# Patient Record
Sex: MALE | Race: WHITE | NOT HISPANIC OR LATINO | Employment: FULL TIME | URBAN - METROPOLITAN AREA
[De-identification: names, ages, dates, MRNs, and addresses within clinical notes are randomized per-mention and may not be internally consistent; named-entity substitution may affect disease eponyms.]

---

## 2017-01-06 ENCOUNTER — APPOINTMENT (OUTPATIENT)
Dept: LAB | Age: 62
End: 2017-01-06
Payer: COMMERCIAL

## 2017-01-06 ENCOUNTER — TRANSCRIBE ORDERS (OUTPATIENT)
Dept: ADMINISTRATIVE | Age: 62
End: 2017-01-06

## 2017-01-06 DIAGNOSIS — R73.9 HYPERGLYCEMIA: ICD-10-CM

## 2017-01-06 LAB
EST. AVERAGE GLUCOSE BLD GHB EST-MCNC: 134 MG/DL
GLUCOSE P FAST SERPL-MCNC: 110 MG/DL (ref 65–99)
HBA1C MFR BLD: 6.3 % (ref 4.2–6.3)

## 2017-01-06 PROCEDURE — 83036 HEMOGLOBIN GLYCOSYLATED A1C: CPT

## 2017-01-06 PROCEDURE — 82947 ASSAY GLUCOSE BLOOD QUANT: CPT

## 2017-01-06 PROCEDURE — 36415 COLL VENOUS BLD VENIPUNCTURE: CPT

## 2017-01-10 ENCOUNTER — ALLSCRIPTS OFFICE VISIT (OUTPATIENT)
Dept: OTHER | Facility: OTHER | Age: 62
End: 2017-01-10

## 2017-01-10 DIAGNOSIS — E11.9 TYPE 2 DIABETES MELLITUS WITHOUT COMPLICATIONS (HCC): ICD-10-CM

## 2017-05-06 ENCOUNTER — TRANSCRIBE ORDERS (OUTPATIENT)
Dept: ADMINISTRATIVE | Age: 62
End: 2017-05-06

## 2017-05-06 ENCOUNTER — APPOINTMENT (OUTPATIENT)
Dept: LAB | Age: 62
End: 2017-05-06
Payer: COMMERCIAL

## 2017-05-06 DIAGNOSIS — E11.9 TYPE 2 DIABETES MELLITUS WITHOUT COMPLICATIONS (HCC): ICD-10-CM

## 2017-05-06 LAB
ANION GAP SERPL CALCULATED.3IONS-SCNC: 5 MMOL/L (ref 4–13)
BUN SERPL-MCNC: 20 MG/DL (ref 5–25)
CALCIUM SERPL-MCNC: 8.9 MG/DL (ref 8.3–10.1)
CHLORIDE SERPL-SCNC: 103 MMOL/L (ref 100–108)
CO2 SERPL-SCNC: 31 MMOL/L (ref 21–32)
CREAT SERPL-MCNC: 0.85 MG/DL (ref 0.6–1.3)
EST. AVERAGE GLUCOSE BLD GHB EST-MCNC: 143 MG/DL
GFR SERPL CREATININE-BSD FRML MDRD: >60 ML/MIN/1.73SQ M
GLUCOSE P FAST SERPL-MCNC: 121 MG/DL (ref 65–99)
HBA1C MFR BLD: 6.6 % (ref 4.2–6.3)
POTASSIUM SERPL-SCNC: 4.1 MMOL/L (ref 3.5–5.3)
SODIUM SERPL-SCNC: 139 MMOL/L (ref 136–145)

## 2017-05-06 PROCEDURE — 83036 HEMOGLOBIN GLYCOSYLATED A1C: CPT

## 2017-05-06 PROCEDURE — 36415 COLL VENOUS BLD VENIPUNCTURE: CPT

## 2017-05-06 PROCEDURE — 80048 BASIC METABOLIC PNL TOTAL CA: CPT

## 2017-05-09 ENCOUNTER — ALLSCRIPTS OFFICE VISIT (OUTPATIENT)
Dept: OTHER | Facility: OTHER | Age: 62
End: 2017-05-09

## 2017-05-09 DIAGNOSIS — Z12.5 ENCOUNTER FOR SCREENING FOR MALIGNANT NEOPLASM OF PROSTATE: ICD-10-CM

## 2017-05-09 DIAGNOSIS — E11.9 TYPE 2 DIABETES MELLITUS WITHOUT COMPLICATIONS (HCC): ICD-10-CM

## 2017-05-09 DIAGNOSIS — I10 ESSENTIAL (PRIMARY) HYPERTENSION: ICD-10-CM

## 2017-06-17 ENCOUNTER — TRANSCRIBE ORDERS (OUTPATIENT)
Dept: ADMINISTRATIVE | Age: 62
End: 2017-06-17

## 2017-06-17 ENCOUNTER — APPOINTMENT (OUTPATIENT)
Dept: LAB | Age: 62
End: 2017-06-17
Payer: COMMERCIAL

## 2017-06-17 DIAGNOSIS — Z00.8 HEALTH EXAMINATION IN POPULATION SURVEY: ICD-10-CM

## 2017-06-17 DIAGNOSIS — Z00.8 HEALTH EXAMINATION IN POPULATION SURVEY: Primary | ICD-10-CM

## 2017-06-17 LAB
CHOLEST SERPL-MCNC: 256 MG/DL (ref 50–200)
EST. AVERAGE GLUCOSE BLD GHB EST-MCNC: 140 MG/DL
HBA1C MFR BLD: 6.5 % (ref 4.2–6.3)
HDLC SERPL-MCNC: 55 MG/DL (ref 40–60)
LDLC SERPL CALC-MCNC: 177 MG/DL (ref 0–100)
TRIGL SERPL-MCNC: 118 MG/DL

## 2017-06-17 PROCEDURE — 36415 COLL VENOUS BLD VENIPUNCTURE: CPT

## 2017-06-17 PROCEDURE — 80061 LIPID PANEL: CPT

## 2017-06-17 PROCEDURE — 83036 HEMOGLOBIN GLYCOSYLATED A1C: CPT

## 2017-07-31 ENCOUNTER — ALLSCRIPTS OFFICE VISIT (OUTPATIENT)
Dept: OTHER | Facility: OTHER | Age: 62
End: 2017-07-31

## 2017-08-09 ENCOUNTER — ALLSCRIPTS OFFICE VISIT (OUTPATIENT)
Dept: OTHER | Facility: OTHER | Age: 62
End: 2017-08-09

## 2017-08-09 DIAGNOSIS — M54.42 LOW BACK PAIN WITH LEFT-SIDED SCIATICA: ICD-10-CM

## 2017-08-14 ENCOUNTER — APPOINTMENT (OUTPATIENT)
Dept: PHYSICAL THERAPY | Age: 62
End: 2017-08-14
Payer: COMMERCIAL

## 2017-08-14 DIAGNOSIS — M54.42 LOW BACK PAIN WITH LEFT-SIDED SCIATICA: ICD-10-CM

## 2017-08-14 PROCEDURE — 97162 PT EVAL MOD COMPLEX 30 MIN: CPT

## 2017-08-17 ENCOUNTER — APPOINTMENT (OUTPATIENT)
Dept: PHYSICAL THERAPY | Age: 62
End: 2017-08-17
Payer: COMMERCIAL

## 2017-08-18 ENCOUNTER — GENERIC CONVERSION - ENCOUNTER (OUTPATIENT)
Dept: OTHER | Facility: OTHER | Age: 62
End: 2017-08-18

## 2017-08-22 ENCOUNTER — APPOINTMENT (OUTPATIENT)
Dept: PHYSICAL THERAPY | Age: 62
End: 2017-08-22
Payer: COMMERCIAL

## 2017-08-22 PROCEDURE — 97110 THERAPEUTIC EXERCISES: CPT

## 2017-08-23 ENCOUNTER — APPOINTMENT (OUTPATIENT)
Dept: PHYSICAL THERAPY | Age: 62
End: 2017-08-23
Payer: COMMERCIAL

## 2017-08-23 PROCEDURE — 97110 THERAPEUTIC EXERCISES: CPT

## 2017-08-24 ENCOUNTER — APPOINTMENT (OUTPATIENT)
Dept: PHYSICAL THERAPY | Age: 62
End: 2017-08-24
Payer: COMMERCIAL

## 2017-08-28 ENCOUNTER — APPOINTMENT (OUTPATIENT)
Dept: PHYSICAL THERAPY | Age: 62
End: 2017-08-28
Payer: COMMERCIAL

## 2017-08-28 PROCEDURE — 97110 THERAPEUTIC EXERCISES: CPT

## 2017-08-29 ENCOUNTER — APPOINTMENT (OUTPATIENT)
Dept: LAB | Age: 62
End: 2017-08-29
Payer: COMMERCIAL

## 2017-08-29 ENCOUNTER — ALLSCRIPTS OFFICE VISIT (OUTPATIENT)
Dept: OTHER | Facility: OTHER | Age: 62
End: 2017-08-29

## 2017-08-29 ENCOUNTER — TRANSCRIBE ORDERS (OUTPATIENT)
Dept: ADMINISTRATIVE | Age: 62
End: 2017-08-29

## 2017-08-29 ENCOUNTER — APPOINTMENT (OUTPATIENT)
Dept: PHYSICAL THERAPY | Age: 62
End: 2017-08-29
Payer: COMMERCIAL

## 2017-08-29 DIAGNOSIS — M54.42 ACUTE BACK PAIN WITH SCIATICA, LEFT: Primary | ICD-10-CM

## 2017-08-29 DIAGNOSIS — M54.42 LOW BACK PAIN WITH LEFT-SIDED SCIATICA: ICD-10-CM

## 2017-08-29 LAB
ANION GAP SERPL CALCULATED.3IONS-SCNC: 8 MMOL/L (ref 4–13)
BUN SERPL-MCNC: 24 MG/DL (ref 5–25)
CALCIUM SERPL-MCNC: 9.8 MG/DL (ref 8.3–10.1)
CHLORIDE SERPL-SCNC: 100 MMOL/L (ref 100–108)
CO2 SERPL-SCNC: 28 MMOL/L (ref 21–32)
CREAT SERPL-MCNC: 0.89 MG/DL (ref 0.6–1.3)
GFR SERPL CREATININE-BSD FRML MDRD: 93 ML/MIN/1.73SQ M
GLUCOSE SERPL-MCNC: 170 MG/DL (ref 65–140)
POTASSIUM SERPL-SCNC: 3.6 MMOL/L (ref 3.5–5.3)
SODIUM SERPL-SCNC: 136 MMOL/L (ref 136–145)

## 2017-08-29 PROCEDURE — 80048 BASIC METABOLIC PNL TOTAL CA: CPT

## 2017-08-29 PROCEDURE — 36415 COLL VENOUS BLD VENIPUNCTURE: CPT

## 2017-08-30 ENCOUNTER — APPOINTMENT (OUTPATIENT)
Dept: PHYSICAL THERAPY | Age: 62
End: 2017-08-30
Payer: COMMERCIAL

## 2017-08-30 PROCEDURE — 97110 THERAPEUTIC EXERCISES: CPT

## 2017-08-31 ENCOUNTER — APPOINTMENT (OUTPATIENT)
Dept: PHYSICAL THERAPY | Age: 62
End: 2017-08-31
Payer: COMMERCIAL

## 2017-09-10 ENCOUNTER — HOSPITAL ENCOUNTER (OUTPATIENT)
Dept: RADIOLOGY | Facility: HOSPITAL | Age: 62
Discharge: HOME | End: 2017-09-10
Payer: COMMERCIAL

## 2017-09-10 DIAGNOSIS — M54.42 ACUTE BACK PAIN WITH SCIATICA, LEFT: ICD-10-CM

## 2017-09-10 PROCEDURE — 72148 MRI LUMBAR SPINE W/O DYE: CPT

## 2017-09-21 ENCOUNTER — APPOINTMENT (OUTPATIENT)
Dept: LAB | Age: 62
End: 2017-09-21
Payer: COMMERCIAL

## 2017-09-21 DIAGNOSIS — E11.9 TYPE 2 DIABETES MELLITUS WITHOUT COMPLICATIONS (HCC): ICD-10-CM

## 2017-09-21 DIAGNOSIS — I10 ESSENTIAL (PRIMARY) HYPERTENSION: ICD-10-CM

## 2017-09-21 DIAGNOSIS — Z12.5 ENCOUNTER FOR SCREENING FOR MALIGNANT NEOPLASM OF PROSTATE: ICD-10-CM

## 2017-09-21 LAB
ALBUMIN SERPL BCP-MCNC: 3.7 G/DL (ref 3.5–5)
ALP SERPL-CCNC: 59 U/L (ref 46–116)
ALT SERPL W P-5'-P-CCNC: 27 U/L (ref 12–78)
ANION GAP SERPL CALCULATED.3IONS-SCNC: 6 MMOL/L (ref 4–13)
AST SERPL W P-5'-P-CCNC: 13 U/L (ref 5–45)
BACTERIA UR QL AUTO: ABNORMAL /HPF
BASOPHILS # BLD AUTO: 0.02 THOUSANDS/ΜL (ref 0–0.1)
BASOPHILS NFR BLD AUTO: 0 % (ref 0–1)
BILIRUB SERPL-MCNC: 0.6 MG/DL (ref 0.2–1)
BILIRUB UR QL STRIP: ABNORMAL
BUN SERPL-MCNC: 16 MG/DL (ref 5–25)
CALCIUM SERPL-MCNC: 9.1 MG/DL (ref 8.3–10.1)
CHLORIDE SERPL-SCNC: 104 MMOL/L (ref 100–108)
CHOLEST SERPL-MCNC: 252 MG/DL (ref 50–200)
CLARITY UR: CLEAR
CO2 SERPL-SCNC: 29 MMOL/L (ref 21–32)
COLOR UR: YELLOW
CREAT SERPL-MCNC: 0.83 MG/DL (ref 0.6–1.3)
CREAT UR-MCNC: 286 MG/DL
EOSINOPHIL # BLD AUTO: 0.17 THOUSAND/ΜL (ref 0–0.61)
EOSINOPHIL NFR BLD AUTO: 2 % (ref 0–6)
ERYTHROCYTE [DISTWIDTH] IN BLOOD BY AUTOMATED COUNT: 14.4 % (ref 11.6–15.1)
EST. AVERAGE GLUCOSE BLD GHB EST-MCNC: 143 MG/DL
GFR SERPL CREATININE-BSD FRML MDRD: 96 ML/MIN/1.73SQ M
GLUCOSE P FAST SERPL-MCNC: 118 MG/DL (ref 65–99)
GLUCOSE UR STRIP-MCNC: ABNORMAL MG/DL
HBA1C MFR BLD: 6.6 % (ref 4.2–6.3)
HCT VFR BLD AUTO: 46.6 % (ref 36.5–49.3)
HDLC SERPL-MCNC: 61 MG/DL (ref 40–60)
HGB BLD-MCNC: 16.6 G/DL (ref 12–17)
HGB UR QL STRIP.AUTO: NEGATIVE
HYALINE CASTS #/AREA URNS LPF: ABNORMAL /LPF
KETONES UR STRIP-MCNC: NEGATIVE MG/DL
LDLC SERPL CALC-MCNC: 160 MG/DL (ref 0–100)
LEUKOCYTE ESTERASE UR QL STRIP: NEGATIVE
LYMPHOCYTES # BLD AUTO: 1.97 THOUSANDS/ΜL (ref 0.6–4.47)
LYMPHOCYTES NFR BLD AUTO: 27 % (ref 14–44)
MCH RBC QN AUTO: 29 PG (ref 26.8–34.3)
MCHC RBC AUTO-ENTMCNC: 35.6 G/DL (ref 31.4–37.4)
MCV RBC AUTO: 82 FL (ref 82–98)
MICROALBUMIN UR-MCNC: 47.5 MG/L (ref 0–20)
MICROALBUMIN/CREAT 24H UR: 17 MG/G CREATININE (ref 0–30)
MONOCYTES # BLD AUTO: 0.55 THOUSAND/ΜL (ref 0.17–1.22)
MONOCYTES NFR BLD AUTO: 8 % (ref 4–12)
NEUTROPHILS # BLD AUTO: 4.6 THOUSANDS/ΜL (ref 1.85–7.62)
NEUTS SEG NFR BLD AUTO: 63 % (ref 43–75)
NITRITE UR QL STRIP: NEGATIVE
NON-SQ EPI CELLS URNS QL MICRO: ABNORMAL /HPF
NRBC BLD AUTO-RTO: 0 /100 WBCS
PH UR STRIP.AUTO: 5.5 [PH] (ref 4.5–8)
PLATELET # BLD AUTO: 192 THOUSANDS/UL (ref 149–390)
PMV BLD AUTO: 10.1 FL (ref 8.9–12.7)
POTASSIUM SERPL-SCNC: 4.1 MMOL/L (ref 3.5–5.3)
PROT SERPL-MCNC: 7.1 G/DL (ref 6.4–8.2)
PROT UR STRIP-MCNC: ABNORMAL MG/DL
PSA SERPL-MCNC: 1.1 NG/ML (ref 0–4)
RBC # BLD AUTO: 5.72 MILLION/UL (ref 3.88–5.62)
RBC #/AREA URNS AUTO: ABNORMAL /HPF
SODIUM SERPL-SCNC: 139 MMOL/L (ref 136–145)
SP GR UR STRIP.AUTO: 1.04 (ref 1–1.03)
TRIGL SERPL-MCNC: 157 MG/DL
UROBILINOGEN UR QL STRIP.AUTO: 0.2 E.U./DL
WBC # BLD AUTO: 7.35 THOUSAND/UL (ref 4.31–10.16)
WBC #/AREA URNS AUTO: ABNORMAL /HPF

## 2017-09-21 PROCEDURE — 80053 COMPREHEN METABOLIC PANEL: CPT

## 2017-09-21 PROCEDURE — 83036 HEMOGLOBIN GLYCOSYLATED A1C: CPT

## 2017-09-21 PROCEDURE — 36415 COLL VENOUS BLD VENIPUNCTURE: CPT

## 2017-09-21 PROCEDURE — G0103 PSA SCREENING: HCPCS

## 2017-09-21 PROCEDURE — 82570 ASSAY OF URINE CREATININE: CPT

## 2017-09-21 PROCEDURE — 82043 UR ALBUMIN QUANTITATIVE: CPT

## 2017-09-21 PROCEDURE — 85025 COMPLETE CBC W/AUTO DIFF WBC: CPT

## 2017-09-21 PROCEDURE — 81001 URINALYSIS AUTO W/SCOPE: CPT

## 2017-09-21 PROCEDURE — 80061 LIPID PANEL: CPT

## 2017-09-28 ENCOUNTER — ALLSCRIPTS OFFICE VISIT (OUTPATIENT)
Dept: OTHER | Facility: OTHER | Age: 62
End: 2017-09-28

## 2017-09-28 DIAGNOSIS — E11.9 TYPE 2 DIABETES MELLITUS WITHOUT COMPLICATIONS (HCC): ICD-10-CM

## 2017-10-23 ENCOUNTER — GENERIC CONVERSION - ENCOUNTER (OUTPATIENT)
Dept: OTHER | Facility: OTHER | Age: 62
End: 2017-10-23

## 2017-10-24 NOTE — CONSULTS
Assessment  Assessed    1  Lumbar radiculopathy (724 4) (M54 16)   2  Myofascial pain (729 1) (M79 1)    Plan  Lumbago    · Procedure Flowsheet; Status:Complete - Retrospective Authorization;   Done: [id number]  01:30PM   Performed: In Office; Due:61Yjt0763; Last Updated By:Alberta Miramontes; 8/29/2017 1:30:42 PM;Ordered; For:Lumbago; Ordered By:Allison Kitchen Ours;    74-year-old male with a history of diabetes complicated with neuropathy presented for initial consultation regarding a 2 month history of lumbosacral back pain and radiculopathy into the S1 distribution of the left lower extremity  The patient does have an MRI scheduled which was ordered by his primary care physician, however this has not been done yet  The patient has been doing in physical therapy without any relief  He does take gabapentin 1-200 mg daily when necessary, however does not take this regularly and has not taken this for a few weeks  He is currently taking his second course of oral steroids and tramadol 50 mg every 6-8 hours when necessary with moderate relief  #1 we will await the results of the MRI of his lumbar spine  #2 I advised the patient to initiate gabapentin 100 mg daily at bedtime and titrate up to 300 mg daily at bedtime  I discussed with the patient that he needs to take this medication consistently due to the rapid clearance of the medication in order for him to notice any appreciable effect  Occasionally we will need to titrate up to 600 mg every 8 hours when necessary before we reach optimal therapeutic doses  The patient verbalized understanding and he was given a titration schedule at this visit  The patient states that he has enough of this medication at home and does not need a refill at this time    #3 the patient may continue tramadol as ordered by his primary care physician and I advised the patient to minimize the use of this medication to prevent tolerance and side effects  #4 the patient should finish out his course of oral steroids and avoid any other NSAIDs while on this medication  #5 once the patient has finished his oral steroids he can resume ibuprofen and should not exceed 800 mg every 8 hours when necessary  #6 the patient will continue with physical therapy and I have provided the patient a sheet regarding Stephen based exercises to hand to his physical therapist to see if these exercises may be of benefit for his radicular symptoms as the exercises the patient was explained to me sound more like lumbar strengthening exercises  #7 the patient may benefit from epidural steroid injections, however we will await the results of the imaging  #8 I will follow-up with the patient in 8 weeks, however I discussed with the patient that I will call him with the results of the MRI of the lumbar spine and offer interventional therapy based upon those results  If the patient would like to proceed with interventional therapy we will schedule a follow-up visit for 4-6 weeks after an injection     Chief Complaint  Chief Complaints    1  Back Pain  Low back and left leg pain      History of Present Illness  82-year-old male presenting for initial consultation regarding a two-month history of lumbosacral back pain that radiates down the posterior aspect of his left lower extremity to the ankle and an S1 distribution  The patient denies any numbness, paresthesias, or subjective weakness  The patient denies any trauma or inciting event, however the patient recently started yoga and feels this may be somewhat related  He denies any right lower stomach symptoms, bladder or bowel incontinence, or saddle anesthesia  The patient also has a history of diabetic neuropathy for which she takes gabapentin 1-200 mg when necessary, however he has not taken this in weeks  He is currently taking tramadol 50 mg every 6-8 hours when necessary and he is on his second round of oral steroids  The oral steroids and tramadol do provide him some relief   He was also taking ibuprofen prior to the steroids with minimal relief  The patient does have an MRI scheduled which was ordered by his primary care physician  He is involved in physical therapy which did not provide much relief  patient rates his pain a 7-9 out of 10 depending upon what he is doing  The pain is constant and does not follow any particular pattern throughout the day  The pain is described as shooting, sharp, dull, aching, and throbbing  The pain is decreased with standing  The pain is increased with bending at the waist, sitting, coughing/sneezing, and bowel movements  The patient has not gotten any relief from physical therapy or heat or ice application  have personally reviewed and/or updated the patient's past medical history, past surgical history, family history, social history, allergies, and vital signs today  Other than as stated above, the patient denies any interval changes in medications, medical condition, mental condition, symptoms, or allergies since the last office visit  Referring physician is  [facility]   Primary Care physician is  Glenroy Winters presents with complaints of constant episodes of moderate left lower back pain, described as sharp, dull, aching and throbbing, radiating to the left buttock, left thigh, left lower leg and left foot  On a scale of 1 to 10, the patient rates the pain as 8  Review of Systems    Constitutional: recent weight loss, but-- no fever-- and-- no recent weight gain  Eyes: no double vision-- and-- no blurry vision  Cardiovascular: no chest pain,-- no palpitations-- and-- no lower extremity edema  Respiratory: no complaints of shortness of breath-- and-- no wheezing  Musculoskeletal: pain in extremity -- and-- decreased range of motion, but-- no difficulty walking,-- no muscle weakness,-- no joint stiffness,-- no joint swelling-- and-- no limb swelling     Neurological: no dizziness,-- no difficulty swallowing,-- no memory loss,-- no loss of consciousness-- and-- no seizures  Gastrointestinal: no nausea,-- no vomiting,-- no constipation-- and-- no diarrhea  Genitourinary: no difficulty initiating urine stream,-- no genital pain-- and-- no frequent urination  Integumentary: no complaints of skin rash  Psychiatric: no depression  Endocrine: excessive thirst, but-- no adrenal disease,-- no hypothyroidism-- and-- no hyperthyroidism  Hematologic/Lymphatic: no tendency for easy bruising-- and-- no tendency for easy bleeding  ROS reviewed  Active Problems  Problems    1  Adjustment disorder with mixed anxiety and depressed mood (309 28) (F43 23)   2  Atopic dermatitis (691 8) (L20 9)   3  Chronic diastolic HF (heart failure) (428 32) (I50 32)   4  Diabetes (250 00) (E11 9)   5  Dyspnea on exertion (786 09) (R06 09)   6  Encounter for PPD test (V74 1) (Z11 1)   7  Hyperglycemia (790 29) (R73 9)   8  Hyperlipidemia (272 4) (E78 5)   9  Hypertension (401 9) (I10)   10  Lumbago (724 2) (M54 5)   11  Lumbago with sciatica, left side (724 3) (M54 42)   12  Morbid or severe obesity due to excess calories (278 01) (E66 01)   13  Need for influenza vaccination (V04 81) (Z23)   14  Neuropathy (355 9) (G62 9)   15  Obesity (278 00) (E66 9)   16  Otitis externa (380 10) (H60 90)   17  Pelvic pain in male (789 09) (R10 2)   18  Prostate cancer screening (V76 44) (Z12 5)   19  Shortness of breath (786 05) (R06 02)    Family History  Mother    1  No pertinent family history    Social History  Problems    · Never A Smoker    Current Meds   1  Marsha Contour Next Test In [employer]; TEST ONCE DAILY; Therapy: 25HID1534 to (Last Jolene Rue)  Requested for: 26Oct2016 Ordered   2  Marsha Microlet Lancets Miscellaneous; ONE  EVERY DAY; Therapy: 75XRK3397 to (Last Rx:13Oct2016) Ordered   3  Gabapentin 100 MG Oral Capsule; 2 capsules 3 times a day; Therapy: 94Ppu1801 to (Last Rx:13Oct2016)  Requested for: 13Oct2016 Ordered   4   Jardiance 10 MG Oral Tablet; Take 1 tablet daily; Therapy: 50XLD5039 to (Evaluate:42Cox4086)  Requested for: 59KEN5022; Last   Rx:13Jun2017 Ordered   5  Medrol 4 MG Oral Tablet Therapy Pack; Take as per package instructions; Therapy: 97HON4759 to (Last Peppereladia Pratt)  Requested for: 73Grs0690 Ordered   6  TraMADol HCl - 50 MG Oral Tablet; Take 1 tablet every 6 hours as needed for pain; Therapy: 10SAK2372 to (Last Rx:58Pge7321) Ordered    Allergies  Medication    1  No Known Drug Allergies  Non-Medication    2  Seasonal    Vitals  Vital Signs    Recorded: 29Aug2017 01:26PM   Temperature 65 4 F   Systolic 420   Diastolic 88   Height 6 ft 3 in   Weight 282 lb    BMI Calculated 35 25   BSA Calculated 2 54   Pain Scale 8     Physical Exam    Constitutional   General appearance: Well developed, well nourished, alert, in no distress, non-toxic and no overt pain behavior  Eyes   Sclera: anicteric   HEENT   Hearing grossly intact  Neck   Neck: Supple, symmetric, trachea midline, no masses  Pulmonary   Respiratory effort: Even and unlabored  Abdomen   Abdomen: Soft, non-tender, non-distended  Skin   Skin and subcutaneous tissue: Normal without rashes or lesions, well hydrated  Psychiatric   Mood and affect: Mood and affect appropriate  Neurologic   the muscle tone was normal   Musculoskeletal   Gait and station: Abnormal  -- Antalgic gait  Lumbar/Sacral Spine examination demonstrates Lumbosacral Spine:   Appearance: Normal    Tenderness: right paraspinal-- and-- left paraspinal  Palpatory Findings include bilateral muscle spasms  Lumbosacral Spine Sensory: intact to light touch and pinprick in the lower extremities  ROM Lumbosacral Spine: Full except as noted: Flexion was restricted-- and-- was painful  Extension was restricted-- and-- was painful  Left lateral flexion was restricted-- and-- was painful  Right lateral flexion was restricted  ROM Hips: Full   Foot and ankle strength was normal bilaterally  Knee strength was normal bilaterally  Hip strength was normal bilaterally  Evaluation of Muscle Stretch Reflexes on the right side demonstrates muscle stretch reflexes equal and symmetric right lower limbs-- and-- negative right ankle clonus  Evaluation of Muscle Stretch Reflexes on the left side demonstrates negative left ankle clonus, but-- muscle stretch reflexes equal and symmetric right lower limbs  Special Tests: equivocal Straight Leg Raise on left, but-- negative Straight Leg Raise on right,-- negative Satish's Maneuver on right,-- negative Satish's Maneuver on lef,-- negative Gaenslen's on the left-- and-- negative Gaenslen's Test on the left  Results/Data  Procedure Flowsheet [id number] 01:30PM Tala Arreola     Test Name Result Flag Reference   Oswestry Score 40         Results    I personally reviewed the films/images in the office today  * Sacrum & Coccyx Xray 2 Views [id number] 02:36PM Steff Pearce     Test Name Result Flag Reference   XR Sacrum/Coccyx 2 V (Report)     [facility];[address] [facility];;[place];[address]  07/17/2014 1445  07/17/2014 1450  3 VIEWS    SACRUM AND COCCYX    INDICATION- Sacral/coccygeal pain  COMPARISON- CT pelvis 12/15/2013    VIEWS- 3& 3 images3 images    FINDINGS-    There is no evidence of fracture  Sacral arcuate lines are maintained  EThe SI joints appear symmetric  Pubic symphysis maintained  IMPRESSION-    No fracture          Transcribed on- [address], RAD DO  Reading RadiologistMARTIR Ray DO  Releasing Radiologist- MARTIR Tang DO  Released Date Time- 07/17/14 6289  ------------------------------------------------------------------------------  [id number] [facility]  68103Q [facility]     Future Appointments    Date/Time Provider Specialty Site   09/28/2017 05:30 PM Steff Pearce DO Internal Medicine [facility] INTERNAL MED     Signatures   Electronically signed by : Audrey Ragsdale DO; Aug 29 2017  2:15PM EST                       (Author)

## 2017-11-01 ENCOUNTER — ALLSCRIPTS OFFICE VISIT (OUTPATIENT)
Dept: RADIOLOGY | Facility: CLINIC | Age: 62
End: 2017-11-01
Payer: COMMERCIAL

## 2017-12-27 ENCOUNTER — HOSPITAL ENCOUNTER (OUTPATIENT)
Dept: RADIOLOGY | Facility: CLINIC | Age: 62
Discharge: HOME | End: 2017-12-29
Attending: ANESTHESIOLOGY
Payer: COMMERCIAL

## 2018-01-13 VITALS
WEIGHT: 282 LBS | SYSTOLIC BLOOD PRESSURE: 146 MMHG | TEMPERATURE: 97.6 F | DIASTOLIC BLOOD PRESSURE: 88 MMHG | HEIGHT: 75 IN | BODY MASS INDEX: 35.06 KG/M2

## 2018-01-13 VITALS
DIASTOLIC BLOOD PRESSURE: 110 MMHG | WEIGHT: 294.25 LBS | HEIGHT: 75 IN | OXYGEN SATURATION: 97 % | HEART RATE: 75 BPM | SYSTOLIC BLOOD PRESSURE: 166 MMHG | BODY MASS INDEX: 36.59 KG/M2 | TEMPERATURE: 96.7 F

## 2018-01-14 VITALS
OXYGEN SATURATION: 97 % | TEMPERATURE: 97.7 F | HEIGHT: 75 IN | WEIGHT: 294.13 LBS | SYSTOLIC BLOOD PRESSURE: 140 MMHG | BODY MASS INDEX: 36.57 KG/M2 | DIASTOLIC BLOOD PRESSURE: 86 MMHG | HEART RATE: 79 BPM

## 2018-01-14 NOTE — PROGRESS NOTES
Assessment    1  Diabetes (250 00) (E11 9)   2  Hypertension (401 9) (I10)   3  Obesity (278 00) (E66 9)   4  Hyperlipidemia (272 4) (E78 5)    Plan  Diabetes    · 1 [facility] NUTRITION COUNSELING [facility] OUTPATIENT REFERRAL MNT  Physician Referral  Consult  Status: Need Information - Financial  Authorization  Requested for: [id number]  Hyperlipidemia    · Follow-up visit in 1 month Evaluation and Treatment  Follow-up  Status: Hold For -  Scheduling  Requested for: [id number]  Hypertension    · AmLODIPine Besylate 2 5 MG Oral Tablet (Norvasc)  Need for influenza vaccination    · Fluzone Quadrivalent Intramuscular Suspension  Neuropathy    · Gabapentin 100 MG Oral Capsule    Discussion/Summary  Impression: health maintenance visit  Currently, he eats a poor diet and has an inadequate exercise regimen  Prostate cancer screening: the risks and benefits of prostate cancer screening were discussed and prostate cancer screening is current  Testicular cancer screening: the risks and benefits of testicular cancer screening were discussed and testicular cancer screening is current  Colorectal cancer screening: the risks and benefits of colorectal cancer screening were discussed and colorectal cancer screening is current  The risks and benefits of immunizations were discussed, immunizations are needed and patient declines immunizations  He was advised to be evaluated by an ophthalmologist  Advice and education were given regarding nutrition, aerobic exercise, weight bearing exercise, weight loss, sunscreen use and advanced directive planning  Patient discussion: discussed with the patient  #1  Diabetes mellitus type 2  This is a new diagnosis and patient sugar is out of control with a hemoglobin A1c of 7 2  As noted patient's been placed on a medication that may have benefits with reducing his blood sugar, reducing his blood pressure and also helping him with his weight   He will return to the office in approximately one month for reevaluation  At that point in time we hope to introduce the patient to using a glucometer to check on his blood sugar readings  He also hopefully will of cleated and evaluation by attrition is will help us to work on diet, weight loss  Patient understands and agrees with present treatment  If patient's blood pressure still elevated with his next visit we will place him on an ACE inhibitor to help control blood pressure and this should also have benefits with his diabetes  He was told again today the importance of routine follow-up controlling his blood pressure, sugar, weight, cholesterol  #2  Hypertension  As noted patient's blood pressure is elevated and again the hopes are with the newer medication we will see improvement in his blood pressure but treatment will be initiated if this is not improved significantly  #3  Exogenous obesity  I did reinforce to the patient again the importance of losing weight area patient states he does not watch what he eats gets a little aerobic exercise  I had mentioned the patient in the past that he would benefit from bariatric surgery but he is not interested  #4  Hyperlipidemia  Patient did not have a lipid profile performed and we will give him a slip with his next visit to have this initiated  #5  History of noncompliance  Patient has a history in the past of not keeping appointments and stopping medication on his own without consultation with our office  We reinforced the importance of follow-up visits and keeping his multiple problems under control  Possible side effects of new medications were reviewed with the patient/guardian today  The patient was counseled regarding diagnostic results, instructions for management, risk factor reductions, prognosis, patient and family education, impressions, risks and benefits of treatment options, importance of compliance with treatment        Chief Complaint  Patient is here today for a physical      Advance Directives  Advance Directive [facility]:   NO - Patient does not have an advance health care directive  Capacity/Competence: This patient has full decision making capacity for discussion of advance care planning and This patient has full decision making competency for discussion of advance care planning  Summary of Advance Directive Conversation  We discussed with the patient going online to get information about a living will and designating [address] for healthcare  Patient states he will investigate this  History of Present Illness  HM, Adult Male: The patient is being seen for a health maintenance evaluation  The last health maintenance visit was One year year(s) ago  Social History: Household members include spouse and son(s)  He is   Work status: working full time  The patient has never smoked cigarettes  He reports rare alcohol use  General Health: The patient's health since the last visit is described as poor  He has regular dental visits  He denies vision problems  He denies hearing loss  Immunizations status: not up to date  Lifestyle:  He does not have a healthy diet  He has weight concerns  He does not exercise regularly  He does not use tobacco  He consumes alcohol  He denies drug use  Reproductive health:  the patient is sexually active  birth control is not being practiced  He complains of erectile dysfunction  Screening: cancer screening reviewed and updated  metabolic screening reviewed and updated  risk screening reviewed and updated  HPI: Patient is a 61-year-old male with a history of multiple medical problems including hypertension, hyperlipidemia, hyperglycemia, history of erectile dysfunction, anxiety disorder, obesity, peripheral neuropathy  Patient is here today for general physical  Patient has missed interim appointments to be seen in the office for evaluation of his Kaylynn   Patient also has stopped all his blood pressure medication on his own stating he didn't feel well on them  Patient did have complete labs prior to being seen today and of significance is the fact that he is now considered a diabetic with a hemoglobin A1c of 7 2  Fasting blood glucose was 141  Patient states as previously that is not following his diet or watching his weight or getting any regular exercise  His other lab tests were all essentially  Patient states he has had some increased urination at nighttime  We discussed the importance again with the patient of following directions given to him by our office, getting routine labs on a regular basis  We also discussed with him the importance of diet, weight loss, exercise  We reinforced with the patient today the fact that he does have a diagnosis of diabetes and that will be very important to treat this in order to get his sugars under control  After long discussion with the patient in the hopes of showing better control of his blood pressure, sugar, promote weight loss the patient was placed on Jardiance at 10 mg a day  I did explain the mechanism of action of the medication and that it will promote the dumping of sugar in his urine and will promote better blood pressure control, weight loss and hopefully better control of his blood sugars  The hope start by placing him on this one medication initially this will have beneficial effects with his multiple medical problems  Patient understands and agrees and I told him that he may notice some lightheadedness and dizziness initially with the medication because of systems dehydration  He was told to keep himself well-hydrated especially in the hotter weather  Patient has no new complaints or problems such as chest pain or shortness of breath   He states his neuropathy may have progressed slightly to his lower extremities area      Review of Systems    Constitutional: recent 9 pound weight gain since last seen lb weight gain, but no fever, not feeling poorly, no chills, not feeling tired and no recent weight loss  Eyes: No complaints of eye pain, no red eyes, no discharge from eyes, no itchy eyes  ENT: no complaints of earache, no hearing loss, no nosebleeds, no nasal discharge, no sore throat, no hoarseness  Cardiovascular: No complaints of slow heart rate, no fast heart rate, no chest pain, no palpitations, no leg claudication, no lower extremity  Respiratory: shortness of breath during exertion and Some chronic shortness of breath with exertion but no increase, but no shortness of breath, no cough, no orthopnea, no wheezing and no PND  Gastrointestinal: No complaints of abdominal pain, no constipation, no nausea or vomiting, no diarrhea or bloody stools  Genitourinary: nocturia, but no dysuria, no urinary hesitancy, no genital lesions, no incontinence and no testicular pain  Musculoskeletal: No complaints of arthralgia, no myalgias, no joint swelling or stiffness, no limb pain or swelling  Integumentary: No complaints of skin rash or skin lesions, no itching, no skin wound, no dry skin  Neurological: numbness and tingling, but as noted in HPI, no headache, no confusion, no dizziness, no limb weakness, no convulsions, no fainting and no difficulty walking  Psychiatric: Is not suicidal, no sleep disturbances, no anxiety or depression, no change in personality, no emotional problems  Endocrine: No complaints of proptosis, no hot flashes, no muscle weakness, no erectile dysfunction, no deepening of the voice, no feelings of weakness  Hematologic/Lymphatic: No complaints of swollen glands, no swollen glands in the neck, does not bleed easily, no easy bruising  Over the past 2 weeks, how often have you been bothered by the following problems? 1 ) Little interest or pleasure in doing things? Not at all    2 ) Feeling down, depressed or hopeless? Not at all    3 ) Trouble falling asleep or sleeping too much? Not at all    4 ) Feeling tired or having little energy?  Not at all    5 ) Poor appetite or overeating? Not at all    6 ) Feeling bad about yourself, or that you are a failure, or have let yourself or your family down? Not at all    7 ) Trouble concentrating on things, such as reading a newspaper or watching television? Not at all    8 ) Moving or speaking so slowly that other people could have noticed, or the opposite, moving or speaking faster than usual? Not at all    9 ) Thoughts that you would be better off dead or of hurting yourself in some way? Not at all  Score 0     ROS reviewed  Active Problems    1  Adjustment disorder with mixed anxiety and depressed mood (309 28) (F43 23)   2  Atopic dermatitis (691 8) (L20 9)   3  Chronic diastolic HF (heart failure) (428 32) (I50 32)   4  Dyspnea on exertion (786 09) (R06 09)   5  Encounter for PPD test (V74 1) (Z11 1)   6  Hyperglycemia (790 29) (R73 9)   7  Hyperlipidemia (272 4) (E78 5)   8  Hypertension (401 9) (I10)   9  Lumbago (724 2) (M54 5)   10  Morbid or severe obesity due to excess calories (278 01) (E66 01)   11  Need for influenza vaccination (V04 81) (Z23)   12  Neuropathy (355 9) (G62 9)   13  Obesity (278 00) (E66 9)   14  Otitis externa (380 10) (H60 90)   15  Pelvic pain in male (789 09) (R10 2)   16  Prostate cancer screening (V76 44) (Z12 5)   17  Shortness of breath (786 05) (R06 02)    Family History  Mother    · No pertinent family history    Social History    · Never A Smoker    Current Meds   1  AmLODIPine Besylate 2 5 MG Oral Tablet; Take 1 tablet daily; Therapy: [id number] to (Barbra Garcia)  Requested for: [id number]; Last   Rx:13Oct2015 Ordered   2  Excedrin TABS; TAKE 1 TABLET 3 TIMES DAILY AS NEEDED; Therapy: (Recorded:44Wyl8439) to Recorded   3  Gabapentin 100 MG Oral Capsule; TAKE 6 CAPSULE Daily; Therapy: [id number] to (Evaluate:83Cwe7021)  Requested for: 46Tvo0422; Last   Rx:20Apr2015 Ordered   4  Jardiance 10 MG Oral Tablet Recorded   5   Tylenol TABS; TAKE 1 TO 2 TABLETS EVERY 6 HOURS AS NEEDED; Therapy: (Recorded:22Ahu5442) to Recorded    Allergies    1  No Known Drug Allergies    Vitals   Recorded: 17YBE5256 04:92RJ   Systolic 922   Diastolic 90   Heart Rate 71   Temperature 97 5 F   O2 Saturation 95   Height 6 ft 3 in   Weight 309 lb 4 00 oz   BMI Calculated 38 65   BSA Calculated 2 65     Physical Exam    Constitutional Obese 73-year-old male who is awake alert no acute distress oriented x3  Head and Face   Head and face: Normal     Palpation of the face and sinuses: No sinus tenderness  Eyes   Conjunctiva and lids: No erythema, swelling or discharge  Pupils and irises: Equal, round, reactive to light  Ophthalmoscopic examination: Normal fundi and optic discs  Ears, Nose, Mouth, and Throat   External inspection of ears and nose: Normal     Otoscopic examination: Tympanic membranes translucent with normal light reflex  Canals patent without erythema  Hearing: Normal     Nasal mucosa, septum, and turbinates: Normal without edema or erythema  Lips, teeth, and gums: Normal, good dentition  Oropharynx: Normal with no erythema, edema, exudate or lesions  Neck   Neck: Supple, symmetric, trachea midline, no masses  Thyroid: Normal, no thyromegaly  Pulmonary   Respiratory effort: No increased work of breathing or signs of respiratory distress  Percussion of chest: Normal     Palpation of chest: Normal     Auscultation of lungs: Clear to auscultation  Cardiovascular   Palpation of heart: Normal PMI, no thrills  Auscultation of heart: Normal rate and rhythm, normal S1 and S2, no murmurs  Carotid pulses: 2+ bilaterally  Abdominal aorta: Normal     Femoral pulses: 2+ bilaterally  Pedal pulses: 2+ bilaterally  Peripheral vascular exam: Normal     Examination of extremities for edema and/or varicosities: Normal     Chest   Breasts: Abnormal   Mild gynecomastia  Palpation of breasts and axillae: Normal, no masses palpated      Chest: Normal  Abdomen   Abdomen: Abnormal   Obese soft nontender with positive bowel sounds x4 quadrants no organomegaly  Liver and spleen: No hepatomegaly or splenomegaly  Examination for hernias: No hernias appreciated  Anus, perineum, and rectum: Normal sphincter tone, no masses, no prolapse  Stool sample for occult blood: Negative  Genitourinary   Scrotal contents: Normal testes, no masses  Penis: Normal, no lesions  Digital rectal exam of prostate: Normal size, no masses  Lymphatic   Palpation of lymph nodes in neck: No lymphadenopathy  Palpation of lymph nodes in axillae: No lymphadenopathy  Palpation of lymph nodes in groin: No lymphadenopathy  Palpation of lymph nodes in other areas: No lymphadenopathy  Musculoskeletal   Gait and station: Normal     Inspection/palpation of digits and nails: Normal without clubbing or cyanosis  Inspection/palpation of joints, bones, and muscles: Normal     Range of motion: Normal     Stability: Normal     Muscle strength/tone: Normal     Skin   Skin and subcutaneous tissue: Normal without rashes or lesions  Palpation of skin and subcutaneous tissue: Normal turgor  Neurologic   Cranial nerves: Cranial nerves 2-12 intact  Cortical function: Normal mental status  Reflexes: 2+ and symmetric  Sensation: No sensory loss  Coordination: Normal finger to nose and heel to shin  Diabetic Foot Exam: Right Foot Findings: normal foot  The toes were normal  The sensory exam showed normal position sense at the level of the toes  Left Foot Findings: normal foot  The toes were normal  The sensory exam showed diminished vibratory sensation at the level of the toes, but normal position sense at the level of the toes  Monofilament Testing: diminished tactile sensation with monofilament testing throughout both feet  Vascular: Pulses: 2+ in the posterior tibialis and 2+ in the dorsalis pedis   Pulses: 2+ in the posterior tibialis and 2+ in the dorsalis pedis  Assign Risk Category: 2: Loss of protective sensation with or without weakness, deformity, callus, pre-ulcer, or history of ulceration  High risk  Psychiatric   Judgment and insight: Normal     Orientation to person, place and time: Normal     Recent and remote memory: Intact      Mood and affect: Normal        Future Appointments    Date/Time Provider Specialty Site   10/13/2016 06:30 PM Nba Huizar DO Internal Medicine [facility] INTERNAL MED     Signatures   Electronically signed by : Rachel Fernandez DO; Sep 14 2016  8:36AM EST                       (Author)

## 2018-01-15 VITALS
DIASTOLIC BLOOD PRESSURE: 86 MMHG | HEART RATE: 63 BPM | HEIGHT: 75 IN | OXYGEN SATURATION: 96 % | WEIGHT: 299.5 LBS | BODY MASS INDEX: 37.24 KG/M2 | SYSTOLIC BLOOD PRESSURE: 134 MMHG | TEMPERATURE: 95.9 F

## 2018-01-15 VITALS
HEART RATE: 81 BPM | SYSTOLIC BLOOD PRESSURE: 142 MMHG | TEMPERATURE: 96.6 F | WEIGHT: 282.25 LBS | BODY MASS INDEX: 35.09 KG/M2 | OXYGEN SATURATION: 98 % | HEIGHT: 75 IN | DIASTOLIC BLOOD PRESSURE: 88 MMHG

## 2018-01-16 NOTE — PROGRESS NOTES
Assessment    1  Diabetes (250 00) (E11 9)   2  Hypertension (401 9) (I10)   3  Hyperlipidemia (272 4) (E78 5)   4  Lumbago (724 2) (M54 5)   5  Morbid or severe obesity due to excess calories (278 01) (E66 01)   6  Encounter for preventive health examination (V70 0) (Z00 00)    Plan  Diabetes    · (1) GLUCOSE,  FASTING; Status:Active; Requested for:[id number];    · (1) HEMOGLOBIN A1C; Status:Active; Requested DVM:[id number];    · *VB - Foot Exam; Status:Active; Requested MGD:[id number]; Health Maintenance    · Follow-up visit in 4 Months Evaluation and Treatment  Follow-up  Status: Hold For -  Scheduling  Requested for: [id number]   · Begin a limited exercise program ; Status:Complete;   Done: 85CZD7755 06:57AM   · Eat a low fat and low cholesterol diet ; Status:Complete;   Done: 96UHG9701 06:57AM   · Some eating tips that can help you lose weight ; Status:Complete;   Done: 13FNX6823  06:57AM  Hypertension    · Lisinopril 5 MG Oral Tablet; Take 1 tablet daily  Need for influenza vaccination    · Fluzone Quadrivalent Intramuscular Suspension    Discussion/Summary  health maintenance visit Currently, he eats a poor diet and has an inadequate exercise regimen  Prostate cancer screening: the risks and benefits of prostate cancer screening were discussed and prostate cancer screening is current  Testicular cancer screening: the risks and benefits of testicular cancer screening were discussed and testicular cancer screening is current  Colorectal cancer screening: the risks and benefits of colorectal cancer screening were discussed, colorectal cancer screening is current, colonoscopy is needed every ten years and fecal occult blood testing is needed every year  The risks and benefits of immunizations were discussed, immunizations are up to date and immunizations will be given as outlined in the orders   Advice and education were given regarding nutrition, aerobic exercise, weight loss, sunscreen use and vitamin D supplements  Patient discussion: discussed with the patient  The patient was counseled on Hepatitis C screening  The patient declines Hepatitis C screening  #1  Diabetes mellitus type 2  Patient's sugars are controlled as treatment and present medication  Again we reinforced to the patient the importance of diet, aerobic exercise, weight loss in order to continue to have control of his blood sugars  Patient has been referred for diabetic eye exam and we discussed the importance of this length  Patient was given a slip to check on a fasting blood sugar, hemoglobin A1c when he returns to the office in 4 months  Because of his high blood pressure patient was also placed on a very low dose of ACE inhibitor specifically lisinopril 5 mg a day order to help with blood pressure and to prevent problems in the future related to diabetic kidney disease  #2  Hypertension  Again as noted patient's blood pressure was mildly elevated and he has had a problem with every medication that we have tried in order to control his blood pressure  Again we are attempting to restart very low dose ACE inhibitor to see if this helps with the blood pressure and also again will help prevent any diabetic kidney disease  Patient was told to call if any ill effects from medication  #3  Hyperlipidemia  As previously patient's cholesterol is high  He's been tried on multiple statins in the past and has had severe side effects from this medication including progression of his peripheral neuropathy severely, diffuse myalgias and arthralgias  Patient also is been tried on fenofibrate which again caused severe problems  We reinforced with the patient the importance of weight loss, watching his diet closely, eliminating fats and cholesterol from his diet, increasing his aerobic activity  #4  Lumbago  Patient is still suffering from some low back pain   Patient will be seen again with pain management and they have been helpful and reducing his disability  The hope start the patient can restart very limited aerobic exercise program in order to help lose weight and work on his deconditioning  #5  Morbid obesity  As above patient was told the importance of losing weight  Because he is been unsuccessful we suggested that he investigate Weight Watchers or another organized program in order to work on weight loss  Patient understands and agrees and hopefully we can see some significant changes with his next visit  #6  Health maintenance  Patient is up-to-date with all parameters except for a diabetic eye exam and he has been referred to an ophthalmologist to perform this  Patient has had routine colonoscopies and his prostatic exam is normal  Patient was be seen again in the office in approximately 4 months for reevaluation and he was told any problems or concerns in between to please call  We did suggest highly the patient received the influenza vaccine for this year  The patient was counseled regarding diagnostic results, instructions for management, risk factor reductions, prognosis, patient and family education, impressions, risks and benefits of treatment options, importance of compliance with treatment  Educational resources provided: Patient was given instructions on watching the fats and cholesterol with diet, tips on weight loss, information on beginning a limited aerobic activity, exercise program    Possible side effects of new medications were reviewed with the patient/guardian today  The treatment plan was reviewed with the patient/guardian  The patient/guardian understands and agrees with the treatment plan      Chief Complaint  Patient is here today for routine physical      History of Present Illness  HM, Adult Male: The patient is being seen for a health maintenance evaluation  The last health maintenance visit was One year year(s) ago  Social History: Household members include spouse, daughter(s) and One son son(s)  He is   Work status: working full time  The patient has never smoked cigarettes  He reports rare alcohol use  The patient has no concerns about alcohol abuse  He has never used illicit drugs  General Health: The patient's health since the last visit is described as fair  He has regular dental visits  He denies vision problems  He denies hearing loss  Immunizations status: up to date  Lifestyle:  He does not have a healthy diet  He has weight concerns  He does not exercise regularly  He does not use tobacco  He consumes alcohol  He denies drug use  Reproductive health:  the patient is sexually active  birth control is not being practiced  He denies erectile dysfunction  Screening: cancer screening reviewed and updated  metabolic screening reviewed and updated  risk screening reviewed and updated  HPI: Patient is a 72-year-old male with a history of hypertension, hyperlipidemia, diabetes mellitus type 2, morbid obesity, peripheral neuropathy which is idiopathic, chronic back pain with lumbar radiculopathy bilaterally now and pain management  Patient is here today for routine physical  He did have labs performed prior to his today and we did discuss the results  Patient's CBC was normal  Patient's blood sugar fasting was 118 hemoglobin A1c of 6 6  His cholesterol was elevated at 252 with an HDL cholesterol 61 and LDL cholesterol 160 and triglyceride level of 157  Patient is intolerant to statin drugs  Patient's PSA is 1 1 and patient with his urinalysis did show and dumping some glucose but this is because of the medication is on for his diabetes  Patient continues with some low back pain but he states it is 80% improved from previously  Patient did undergo an MRI of the lumbar spine which showed some mild disease which was unexpected with the severity of patient's pain and discomfort previously   Patient states that he's had some progression of his peripheral neuropathy which again is idiopathic and not related to his diabetes  He denies any chest pain or pressure and no increasing shortness of breath  He is still struggling with his weight and again we reinforced the importance of losing weight      Review of Systems    Constitutional: no fever, not feeling poorly, no recent weight gain, no chills and not feeling tired  Eyes: No complaints of eye pain, no red eyes, no discharge from eyes, no itchy eyes  ENT: no complaints of earache, no hearing loss, no nosebleeds, no nasal discharge, no sore throat, no hoarseness  Cardiovascular: No complaints of slow heart rate, no fast heart rate, no chest pain, no palpitations, no leg claudication, no lower extremity  Respiratory: No complaints of shortness of breath, no wheezing, no cough, no SOB on exertion, no orthopnea or PND  Gastrointestinal: No complaints of abdominal pain, no constipation, no nausea or vomiting, no diarrhea or bloody stools  Genitourinary: Some increased urination most likely is secondary effect of the steroids, but No complaints of dysuria, no incontinence, no hesitancy, no nocturia, no genital lesion, no testicular pain  Musculoskeletal: arthralgias and joint stiffness, but no limb pain, no myalgias and no limb swelling  Integumentary: Increased sweating, but No complaints of skin rash or skin lesions, no itching, no skin wound, no dry skin  Neurological: numbness and tingling, but as noted in HPI, no headache, no confusion, no dizziness, no limb weakness, no convulsions, no fainting and no difficulty walking  Psychiatric: Is not suicidal, no sleep disturbances, no anxiety or depression, no change in personality, no emotional problems  Endocrine: No complaints of proptosis, no hot flashes, no muscle weakness, no erectile dysfunction, no deepening of the voice, no feelings of weakness  Hematologic/Lymphatic: No complaints of swollen glands, no swollen glands in the neck, does not bleed easily, no easy bruising  ROS reviewed  Active Problems    1  Adjustment disorder with mixed anxiety and depressed mood (309 28) (F43 23)   2  Atopic dermatitis (691 8) (L20 9)   3  Chronic diastolic HF (heart failure) (428 32) (I50 32)   4  Diabetes (250 00) (E11 9)   5  Dyspnea on exertion (786 09) (R06 09)   6  Encounter for PPD test (V74 1) (Z11 1)   7  Hyperglycemia (790 29) (R73 9)   8  Hyperlipidemia (272 4) (E78 5)   9  Hypertension (401 9) (I10)   10  Lumbago (724 2) (M54 5)   11  Lumbago with sciatica, left side (724 3) (M54 42)   12  Lumbar radiculopathy (724 4) (M54 16)   13  Morbid or severe obesity due to excess calories (278 01) (E66 01)   14  Myofascial pain (729 1) (M79 1)   15  Need for influenza vaccination (V04 81) (Z23)   16  Neuropathy (355 9) (G62 9)   17  Obesity (278 00) (E66 9)   18  Otitis externa (380 10) (H60 90)   19  Pelvic pain in male (789 09) (R10 2)   20  Prostate cancer screening (V76 44) (Z12 5)   21  Shortness of breath (786 05) (R06 02)    Family History  Mother    · No pertinent family history    Social History    · Never A Smoker    Current Meds   1  Marsha Contour Next Test In [employer]; TEST ONCE DAILY; Therapy: 79RQN8236 to (Last [facility])  Requested for: 26Oct2016 Ordered   2  Marsha Microlet Lancets Miscellaneous; ONE  EVERY DAY; Therapy: 92YKO6068 to (Last Rx:13Oct2016) Ordered   3  Gabapentin 100 MG Oral Capsule; 2 capsules 3 times a day; Therapy: 78Axf0881 to (Last Rx:11Lhb5791)  Requested for: 13Oct2016 Ordered   4  Jardiance 10 MG Oral Tablet; Take 1 tablet daily; Therapy: 32FQF7777 to (Evaluate:20Wni0009)  Requested for: 35TJP8449; Last   Rx:24Quj0835 Ordered    Allergies    1  No Known Drug Allergies    2   Seasonal    Vitals   Recorded: 11DDY9776 05:18PM   Temperature 96 9 F   Heart Rate 75   Systolic 145   Diastolic 92   Height 6 ft 3 in   Weight 288 lb    BMI Calculated 36   BSA Calculated 2 56   O2 Saturation 95     Physical Exam    Constitutional Obese 79-year-old male who is awake alert no acute distress oriented x3  Head and Face   Head and face: Normal     Palpation of the face and sinuses: No sinus tenderness  Eyes   Conjunctiva and lids: No erythema, swelling or discharge  Pupils and irises: Equal, round, reactive to light  Ophthalmoscopic examination: Normal fundi and optic discs  Ears, Nose, Mouth, and Throat   External inspection of ears and nose: Normal     Otoscopic examination: Tympanic membranes translucent with normal light reflex  Canals patent without erythema  Hearing: Normal     Nasal mucosa, septum, and turbinates: Normal without edema or erythema  Lips, teeth, and gums: Normal, good dentition  Oropharynx: Normal with no erythema, edema, exudate or lesions  Neck   Neck: Supple, symmetric, trachea midline, no masses  Thyroid: Normal, no thyromegaly  Pulmonary   Respiratory effort: No increased work of breathing or signs of respiratory distress  Percussion of chest: Normal     Palpation of chest: Normal     Auscultation of lungs: Clear to auscultation  Cardiovascular   Palpation of heart: Normal PMI, no thrills  Auscultation of heart: Normal rate and rhythm, normal S1 and S2, no murmurs  Carotid pulses: 2+ bilaterally  Abdominal aorta: Normal     Femoral pulses: 2+ bilaterally  Pedal pulses: 2+ bilaterally  Peripheral vascular exam: Normal     Examination of extremities for edema and/or varicosities: Normal     Chest   Breasts: Abnormal   Mild gynecomastia  Palpation of breasts and axillae: Normal, no masses palpated  Chest: Normal     Abdomen   Abdomen: Abnormal   Obese soft nontender with positive bowel sounds x4 quadrants no organomegaly  Liver and spleen: No hepatomegaly or splenomegaly  Examination for hernias: No hernias appreciated  Anus, perineum, and rectum: Normal sphincter tone, no masses, no prolapse  Stool sample for occult blood: Negative      Genitourinary   Scrotal contents: Normal testes, no masses  Penis: Normal, no lesions  Digital rectal exam of prostate: Normal size, no masses  Lymphatic   Palpation of lymph nodes in neck: No lymphadenopathy  Palpation of lymph nodes in axillae: No lymphadenopathy  Palpation of lymph nodes in groin: No lymphadenopathy  Palpation of lymph nodes in other areas: No lymphadenopathy  Musculoskeletal   Gait and station: Abnormal   Wide-base gait but no instability  Inspection/palpation of digits and nails: Normal without clubbing or cyanosis  Inspection/palpation of joints, bones, and muscles: Normal     Range of motion: Normal     Stability: Normal     Muscle strength/tone: Normal     Skin   Skin and subcutaneous tissue: Normal without rashes or lesions  Palpation of skin and subcutaneous tissue: Normal turgor  Neurologic   Cranial nerves: Cranial nerves 2-12 intact  Cortical function: Normal mental status  Reflexes: 2+ and symmetric  Sensation: No sensory loss  Coordination: Normal finger to nose and heel to shin  Diabetic Foot Exam: Socks and shoes removed, Right Foot Findings: normal foot  The toes were normal  The sensory exam showed diminished vibratory sensation at the level of the toes and diminished position sense at the level of the toes  Socks and Shoes removed, Left Foot Findings: normal foot  The toes were normal  The sensory exam showed diminished vibratory sensation at the level of the toes and diminished position sense at the level of the toes  Monofilament Testing:  Vascular: Pulses: 2+ in the posterior tibialis and 2+ in the dorsalis pedis  Pulses: 2+ in the posterior tibialis and 2+ in the dorsalis pedis  Assign Risk Category: 2: Loss of protective sensation with or without weakness, deformity, callus, pre-ulcer, or history of ulceration  High risk  Psychiatric   Judgment and insight: Normal     Orientation to person, place and time: Normal     Recent and remote memory: Intact      Mood and affect: Normal        Future Appointments    Date/Time Provider Specialty Site   02/22/2018 05:30 PM Chris Fletcher DO Internal Medicine [facility] INTERNAL MED   10/23/2017 01:15 PM Mando Maldonado DO Pain Management [facility] 15     Signatures   Electronically signed by : lR Riddle DO; Sep 29 2017  7:04AM EST                       (Author)

## 2018-01-22 VITALS
BODY MASS INDEX: 35.71 KG/M2 | HEART RATE: 66 BPM | TEMPERATURE: 98 F | SYSTOLIC BLOOD PRESSURE: 127 MMHG | HEIGHT: 75 IN | WEIGHT: 287.25 LBS | DIASTOLIC BLOOD PRESSURE: 84 MMHG

## 2018-01-22 VITALS
HEART RATE: 75 BPM | HEIGHT: 75 IN | OXYGEN SATURATION: 95 % | WEIGHT: 288 LBS | SYSTOLIC BLOOD PRESSURE: 158 MMHG | DIASTOLIC BLOOD PRESSURE: 92 MMHG | BODY MASS INDEX: 35.81 KG/M2 | TEMPERATURE: 96.9 F

## 2018-01-26 ENCOUNTER — CLINICAL SUPPORT (OUTPATIENT)
Dept: PAIN MEDICINE | Facility: CLINIC | Age: 63
End: 2018-01-26
Payer: COMMERCIAL

## 2018-01-26 VITALS
TEMPERATURE: 97.9 F | WEIGHT: 292.2 LBS | BODY MASS INDEX: 36.33 KG/M2 | DIASTOLIC BLOOD PRESSURE: 88 MMHG | SYSTOLIC BLOOD PRESSURE: 142 MMHG | HEIGHT: 75 IN | HEART RATE: 85 BPM

## 2018-01-26 DIAGNOSIS — M79.18 MYOFASCIAL PAIN: ICD-10-CM

## 2018-01-26 DIAGNOSIS — M51.26 LUMBAR HERNIATED DISC: ICD-10-CM

## 2018-01-26 DIAGNOSIS — M51.36 LUMBAR DEGENERATIVE DISC DISEASE: ICD-10-CM

## 2018-01-26 DIAGNOSIS — M47.816 LUMBAR SPONDYLOSIS: ICD-10-CM

## 2018-01-26 DIAGNOSIS — M54.16 LUMBAR RADICULOPATHY: Primary | ICD-10-CM

## 2018-01-26 DIAGNOSIS — M54.42 BILATERAL LOW BACK PAIN WITH LEFT-SIDED SCIATICA, UNSPECIFIED CHRONICITY: ICD-10-CM

## 2018-01-26 PROBLEM — M51.369 LUMBAR DEGENERATIVE DISC DISEASE: Status: ACTIVE | Noted: 2017-10-23

## 2018-01-26 PROCEDURE — 99204 OFFICE O/P NEW MOD 45 MIN: CPT | Performed by: ANESTHESIOLOGY

## 2018-01-26 RX ORDER — LISINOPRIL 5 MG/1
1 TABLET ORAL DAILY
COMMUNITY
Start: 2017-09-28 | End: 2018-08-14 | Stop reason: SDUPTHER

## 2018-01-26 RX ORDER — GABAPENTIN 300 MG/1
1 CAPSULE ORAL 3 TIMES DAILY
COMMUNITY
Start: 2016-09-14 | End: 2018-01-26 | Stop reason: SDUPTHER

## 2018-01-26 RX ORDER — GABAPENTIN 300 MG/1
300 CAPSULE ORAL 3 TIMES DAILY
Qty: 90 CAPSULE | Refills: 2 | Status: SHIPPED | OUTPATIENT
Start: 2018-01-26 | End: 2018-02-06

## 2018-01-26 NOTE — PROGRESS NOTES
Assessment:  1  Lumbar radiculopathy    2  Lumbar degenerative disc disease    3  Lumbar herniated disc    4  Lumbar spondylosis    5  Bilateral low back pain with left-sided sciatica, unspecified chronicity    6  Myofascial pain        Plan:  29-year-old male returning for follow-up of lumbosacral back pain with radiculopathy in the L5 distribution of the left lower extremity secondary to disc herniation/extrusion at L4-5 which displaces the left L5 nerve root  The patient is status post left L4 and L5 TFESI with only approximately 5 days of significant relief  He is currently taking gabapentin 300 milligrams b i d  He tried to titrate up to t i d , however was causing too much drowsiness  I did discuss with him that he could consider taking 300 milligrams in the morning and 600 milligrams at bedtime to see if he tolerates this better  The patient was agreeable  At this point, I did discuss with the patient that we could consider repeating the left L4 and L5 TFESI or he could speak to a neurosurgeon regarding surgical options  The patient would like to speak with a neurosurgeon at this time  1   I will refer to Neurosurgery for surgical opinion  2  The patient will increase his gabapentin to 300 milligrams in the morning and 600 milligrams at bedtime to see if this provides better control of his neuropathic symptoms without the sedation  3  The patient may continue with ibuprofen and he should not exceed more than 800 milligrams q 8 hours p r n   4   The patient will continue with his home exercise program      History of Present Illness: The patient is a 66 y o  male who presents for a follow up office visit in regards to Back Pain  The patient is status post Left L4-L5 TFESI, on 12/27/2017  The patient reports 80% pain relief following the procedure  The patients current symptoms include lumbosacral back pain that radiates into the L5 distribution of left lower extremity    The patient does get some subjective weakness, but denies any numbness or paresthesias  He denies any right lower extremity symptoms, bladder or bowel incontinence, or saddle anesthesia  Current pain medications includes:  Gabapentin 300 milligrams b i d  and ibuprofen 200-400 milligrams b i d  p r n     The patient reports that this regimen is providing 30 % pain relief  The patient is reporting sleepiness from this pain medication regimen  I have personally reviewed and/or updated the patient's past medical history, past surgical history, family history, social history, current medications, allergies, and vital signs today  Review of Systems:  Review of Systems   Respiratory: Positive for shortness of breath  Cardiovascular: Negative for chest pain  Gastrointestinal: Negative for constipation, diarrhea, nausea and vomiting  Musculoskeletal: Negative for arthralgias, gait problem (Difficulty walking), joint swelling and myalgias  Skin: Negative for rash  Neurological: Negative for dizziness, seizures and weakness  All other systems reviewed and are negative  Past Medical History:   Diagnosis Date    Diabetes mellitus ([facility] [address] )     Headache     High cholesterol     Hypertension     Kidney stones     Sleep apnea        Past Surgical History:   Procedure Laterality Date    COLONOSCOPY N/A 11/22/2016    Procedure: COLONOSCOPY;  Surgeon: Briana Agrawal MD;  Location: [facility]; Service:     DEBRIDEMENT TENNIS ELBOW  2006    HERNIA REPAIR  [id number]    x2, umbilical and right side per patient   [address]    UVULOPALATOPHARYNGOPLASTY  2004       No family history on file  Social History     Occupational History    Not on file       Social History Main Topics    Smoking status: Never Smoker    Smokeless tobacco: Not on file    Alcohol use Yes      Comment: rarely    Drug use: No    Sexual activity: Not on file         Current Outpatient Prescriptions:   Kalie Melo lancets, by Does not apply route, Disp: , Rfl:     gabapentin (NEURONTIN) 300 mg capsule, Take 1 capsule by mouth 3 (three) times a day, Disp: , Rfl:     glucose blood test strip, by In Vitro route daily, Disp: , Rfl:     lisinopril (ZESTRIL) 5 mg tablet, Take 1 tablet by mouth daily, Disp: , Rfl:     Empagliflozin (JARDIANCE) 10 MG TABS, Take 1 tablet by mouth daily, Disp: , Rfl:     No Known Allergies    Physical Exam:    There were no vitals taken for this visit  Constitutional:normal, well developed, well nourished, alert, in no distress and non-toxic and no overt pain behavior  Eyes:anicteric  HEENT:grossly intact  Neck:supple, symmetric, trachea midline and no masses   Pulmonary:even and unlabored  Cardiovascular:No edema or pitting edema present  Skin:Normal without rashes or lesions and well hydrated  Psychiatric:Mood and affect appropriate  Neurologic:Cranial Nerves II-XII grossly intact  Musculoskeletal:normal   Positive seated straight leg raise on the left and negative on the right  Left lumbar paraspinal musculature tender to palpation from L3-L5 and ropy in texture  Bilateral lower extremity strength 5/5 in all muscle groups    Imaging  No orders to display    Imaging reviewed    No orders of the defined types were placed in this encounter

## 2018-01-29 ENCOUNTER — TRANSCRIBE ORDERS (OUTPATIENT)
Dept: LAB | Facility: CLINIC | Age: 63
End: 2018-01-29

## 2018-01-29 ENCOUNTER — OFFICE VISIT (OUTPATIENT)
Dept: NEUROSURGERY | Facility: CLINIC | Age: 63
End: 2018-01-29
Payer: COMMERCIAL

## 2018-01-29 VITALS
BODY MASS INDEX: 36.43 KG/M2 | SYSTOLIC BLOOD PRESSURE: 140 MMHG | HEART RATE: 66 BPM | HEIGHT: 75 IN | RESPIRATION RATE: 16 BRPM | WEIGHT: 293 LBS | TEMPERATURE: 98.1 F | DIASTOLIC BLOOD PRESSURE: 88 MMHG

## 2018-01-29 DIAGNOSIS — M53.3 SACROILIAC DYSFUNCTION: ICD-10-CM

## 2018-01-29 DIAGNOSIS — M54.50 ACUTE MIDLINE LOW BACK PAIN WITHOUT SCIATICA: ICD-10-CM

## 2018-01-29 DIAGNOSIS — M54.16 LUMBAR RADICULOPATHY: Primary | ICD-10-CM

## 2018-01-29 PROCEDURE — 99244 OFF/OP CNSLTJ NEW/EST MOD 40: CPT | Performed by: NEUROLOGICAL SURGERY

## 2018-01-29 NOTE — PROGRESS NOTES
Assessment/Plan:    No problem-specific Assessment & Plan notes found for this encounter  Subjective:      Patient ID: Josey Moore is a 66 y o  male  Back Pain   This is a chronic problem  The current episode started more than 1 month ago  The problem occurs constantly  The problem is unchanged  The pain is present in the lumbar spine  The pain radiates to the left knee and left foot  The pain is at a severity of 8/10  The pain is moderate  The pain is worse during the day  The symptoms are aggravated by bending and standing  Stiffness is present all day  Associated symptoms include numbness, paresthesias and tingling  Pertinent negatives include no abdominal pain, chest pain, fever, headaches or weakness  He has tried home exercises and analgesics for the symptoms  The treatment provided mild relief  Review of Systems   Constitutional: Positive for activity change  Negative for chills and fever  HENT: Negative for hearing loss and tinnitus  Eyes: Negative for pain and visual disturbance  Respiratory: Negative for cough, shortness of breath and wheezing  Cardiovascular: Negative for chest pain and palpitations  Gastrointestinal: Negative for abdominal pain and nausea  Musculoskeletal: Positive for arthralgias and back pain  Negative for neck pain and neck stiffness  Neurological: Positive for tingling, numbness and paresthesias  Negative for dizziness, speech difficulty, weakness and headaches  Objective:     Physical Exam   Constitutional: He appears well-developed  HENT:   Head: Normocephalic  Eyes: Pupils are equal, round, and reactive to light  Neck: Normal range of motion  Neurological: He is alert  Skin: Skin is warm  HPI;    Chronic low grade back pain with new left sided leg pain radiating to knee and lateral calf x 7 months   Leg pain improved with persistent low back pain worse with activity    Exam:    Full strength bilateral LE  Negative SLR  Intact sensation and DTR, paravertebral spasm, SI joint tenderness, normal gait and coordination    Radiology:    Tiny left L4/5 disc extrusion causing modest epidural involvement in region of lateral recess, multilevel lumbar disc degeneration with modic endplate changes  Bilateral SI joint arthropathy    Summary/Impression:    Mr Mick Brito has chronic low back pain with improving radiculopathy  We reviewed the conservative medical options including PT, medication and HERBIE  I explained to him that that open decompression to address the tiny disc extrusion may not help with either his leg or back pain  I asked him to return to Dr Kevin Perales to consider further PM intervention including SI joint injection  I will see him on a PRN basis

## 2018-01-30 ENCOUNTER — TELEPHONE (OUTPATIENT)
Dept: PAIN MEDICINE | Facility: CLINIC | Age: 63
End: 2018-01-30

## 2018-01-30 NOTE — TELEPHONE ENCOUNTER
Pt called requesting someone to call him regarding his referral drom Dr Kelsey Mario to Dr Kaushal Dean who Dr Kaushal Dean has now referred him back to Dr Kelsey Mario  Wants to know the next step   Please call [phone]

## 2018-01-30 NOTE — TELEPHONE ENCOUNTER
Pt  Has an sovs scheduled for 4/20  Would you like him to come in for an earlier appt to discuss treatment options?

## 2018-02-06 ENCOUNTER — CLINICAL SUPPORT (OUTPATIENT)
Dept: PAIN MEDICINE | Facility: CLINIC | Age: 63
End: 2018-02-06
Payer: COMMERCIAL

## 2018-02-06 VITALS
HEART RATE: 77 BPM | SYSTOLIC BLOOD PRESSURE: 129 MMHG | DIASTOLIC BLOOD PRESSURE: 80 MMHG | WEIGHT: 300 LBS | TEMPERATURE: 99.2 F | HEIGHT: 75 IN | BODY MASS INDEX: 37.3 KG/M2

## 2018-02-06 DIAGNOSIS — M51.36 LUMBAR DEGENERATIVE DISC DISEASE: ICD-10-CM

## 2018-02-06 DIAGNOSIS — M46.1 SACROILIITIS (HCC): ICD-10-CM

## 2018-02-06 DIAGNOSIS — M54.16 LUMBAR RADICULOPATHY: Primary | ICD-10-CM

## 2018-02-06 DIAGNOSIS — M51.26 LUMBAR HERNIATED DISC: ICD-10-CM

## 2018-02-06 DIAGNOSIS — M47.816 LUMBAR SPONDYLOSIS: ICD-10-CM

## 2018-02-06 DIAGNOSIS — M79.18 MYOFASCIAL PAIN: ICD-10-CM

## 2018-02-06 PROCEDURE — 99214 OFFICE O/P EST MOD 30 MIN: CPT | Performed by: ANESTHESIOLOGY

## 2018-02-06 RX ORDER — GABAPENTIN 400 MG/1
400 CAPSULE ORAL 3 TIMES DAILY
Qty: 90 CAPSULE | Refills: 2 | Status: SHIPPED | OUTPATIENT
Start: 2018-02-06 | End: 2018-02-09 | Stop reason: SDUPTHER

## 2018-02-06 NOTE — PROGRESS NOTES
Assessment:  1  Lumbar radiculopathy    2  Lumbar degenerative disc disease    3  Lumbar herniated disc    4  Lumbar spondylosis    5  Sacroiliitis ([address] )    6  Myofascial pain        Plan:  Kay Durbin is a 66 y o  male returning for follow-up of lumbosacral back pain with radiculopathy in the L5 distribution of left lower extremity secondary to disc herniation at L4-5 which displaces the left L5 nerve root  The patient did have a left L4 and L5 TFESI x2  He was also initiated on gabapentin and has titrated up to 300 mg t i d   The patient has noted almost complete resolution of his lower extremity symptoms, however the patient does have some persistent lumbosacral back pain which is worse on the left that will occasionally radiate into the buttock  The patient's lumbosacral back pain seems to be secondary to sacroiliitis  The patient has radicular symptoms in the L5 distribution of left lower extremity are quite rare at this point  1   I will schedule the patient for bilateral SI joint injections to reduce the inflammatory component of his pain  2   I will gently increase the patient's gabapentin to 400 mg t i d  for his neuropathic complaints  3  The patient may continue with ibuprofen p r n  and should not exceed more than 800 mg q 8 hours p r n   4   Patient will continue with his home exercise program  5  I will follow up the patient in 6-8 weeks after injection    Complete risks and benefits including bleeding, infection, tissue reaction, nerve injury and allergic reaction were discussed  The approach was demonstrated using models and literature was provided  Verbal and written consent was obtained  My impressions and treatment recommendations were discussed in detail with the patient who verbalized understanding and had no further questions  Discharge instructions were provided  I personally saw and examined the patient and I agree with the above discussed plan of care      No orders of the defined types were placed in this encounter  No orders of the defined types were placed in this encounter  History of Present Illness:    Suri Salgado is a 66 y o  male returning for follow-up of lumbosacral back pain with radiculopathy in the L5 distribution of left lower extremity secondary to disc herniation at L4-5 which displaces the left L5 nerve root  The patient did have a left L4 and L5 TFESI x2  He was also initiated on gabapentin and has titrated up to 300 mg t i d   The patient has noted almost complete resolution of his lower extremity symptoms, however the patient does have some persistent lumbosacral back pain which is worse on the left that will occasionally radiate into the buttock  Rarely he will get some pain on the lateral aspect of the left calf, however this has become more intermittent  He denies any bladder or bowel incontinence or saddle anesthesia  The patient was evaluated by Neurosurgery who did not feel that surgery was indicated and is recommended further conservative therapy  The patient rates his pain 8/10 and the pain does not follow any particular pattern throughout the day  The pain is constant and described as throbbing  The pain is worse with standing, walking, and bending and twisting at the waist   The pain is alleviated with sitting, relaxation, and lying down for short periods of time  I have personally reviewed and/or updated the patient's past medical history, past surgical history, family history, social history, allergies, and vital signs today  Other than as stated above, the patient denies any interval changes in medications, medical condition, mental condition, symptoms, or allergies since the last office visit  Review of Systems:    Review of Systems   Respiratory: Negative for shortness of breath  Cardiovascular: Negative for chest pain  Gastrointestinal: Negative for constipation, diarrhea, nausea and vomiting  Musculoskeletal: Positive for back pain  Negative for arthralgias, gait problem, joint swelling and myalgias  Skin: Negative for rash  Neurological: Negative for dizziness, seizures and weakness  All other systems reviewed and are negative  Patient Active Problem List   Diagnosis    Lumbago with sciatica, left side    Lumbar degenerative disc disease    Lumbar herniated disc    Lumbar radiculopathy    Lumbar spondylosis    Myofascial pain    Obesity       Past Medical History:   Diagnosis Date    Diabetes mellitus ([address] )     Headache     High cholesterol     Hypertension     Kidney stones     Sleep apnea        Past Surgical History:   Procedure Laterality Date    COLONOSCOPY N/A 11/22/2016    Procedure: COLONOSCOPY;  Surgeon: James Rolle MD;  Location: [facility]; Service:     DEBRIDEMENT TENNIS ELBOW  2006    HERNIA REPAIR  2282    x2, umbilical and right side per patient   [address]    UVULOPALATOPHARYNGOPLASTY  2004       No family history on file  Social History     Occupational History    Not on file  Social History Main Topics    Smoking status: Never Smoker    Smokeless tobacco: Not on file    Alcohol use Yes      Comment: rarely    Drug use: No    Sexual activity: Not on file       Current Outpatient Prescriptions on File Prior to Visit   Medication Sig    JUAN MICROLET LANCETS lancets by Does not apply route    Empagliflozin (JARDIANCE) 10 MG TABS Take 1 tablet by mouth daily    gabapentin (NEURONTIN) 300 mg capsule Take 1 capsule by mouth 3 (three) times a day    glucose blood test strip by In Vitro route daily    lisinopril (ZESTRIL) 5 mg tablet Take 1 tablet by mouth daily     No current facility-administered medications on file prior to visit          No Known Allergies    Physical Exam:    /80   Pulse 77   Temp 99 2 °F (37 3 °C)   Ht 6' 3" (1 905 m)   Wt 136 kg (300 lb)   BMI 37 50 kg/m²     Constitutional: underweight  Eyes: anicteric  HEENT: grossly intact  Neck: supple, symmetric, trachea midline and no masses   Pulmonary:even and unlabored  Cardiovascular:No edema or pitting edema present  Skin:Normal without rashes or lesions and well hydrated  Psychiatric:Mood and affect appropriate  Neurologic:Cranial Nerves II-XII grossly intact  Musculoskeletal:normal gait  Bilateral lumbar paraspinals mildly tender to palpation from L4-L5  Bilateral SI joints tender to palpation  Positive AP compression test, Gaenslen's tests, and Satish's test bilaterally  Positive seated straight leg raise on the left and negative on the right    Bilateral lower extremity strength 5/5 in all muscle groups    Imaging  Imaging reviewed

## 2018-02-08 ENCOUNTER — PATIENT MESSAGE (OUTPATIENT)
Dept: PAIN MEDICINE | Facility: CLINIC | Age: 63
End: 2018-02-08

## 2018-02-08 DIAGNOSIS — M54.16 LUMBAR RADICULOPATHY: ICD-10-CM

## 2018-02-08 NOTE — TELEPHONE ENCOUNTER
Edmar Patel, Received a message from patient that Gabapentin 400 mg prescription you prescribed on 2/5/18 did not make it to [facility] on [address]  Pt requesting you resend it  Thanks

## 2018-02-09 RX ORDER — GABAPENTIN 400 MG/1
400 CAPSULE ORAL 3 TIMES DAILY
Qty: 90 CAPSULE | Refills: 2 | Status: SHIPPED | OUTPATIENT
Start: 2018-02-09 | End: 2018-04-20 | Stop reason: SDUPTHER

## 2018-02-16 ENCOUNTER — APPOINTMENT (OUTPATIENT)
Dept: LAB | Age: 63
End: 2018-02-16
Payer: COMMERCIAL

## 2018-02-16 ENCOUNTER — TRANSCRIBE ORDERS (OUTPATIENT)
Dept: ADMINISTRATIVE | Age: 63
End: 2018-02-16

## 2018-02-16 ENCOUNTER — HOSPITAL ENCOUNTER (OUTPATIENT)
Dept: RADIOLOGY | Facility: CLINIC | Age: 63
Discharge: HOME | End: 2018-02-16
Attending: ANESTHESIOLOGY
Payer: COMMERCIAL

## 2018-02-16 VITALS
HEART RATE: 70 BPM | OXYGEN SATURATION: 97 % | SYSTOLIC BLOOD PRESSURE: 128 MMHG | RESPIRATION RATE: 20 BRPM | BODY MASS INDEX: 37.5 KG/M2 | DIASTOLIC BLOOD PRESSURE: 86 MMHG | TEMPERATURE: 96.7 F | WEIGHT: 300 LBS

## 2018-02-16 DIAGNOSIS — E11.9 TYPE 2 DIABETES MELLITUS WITHOUT COMPLICATIONS (HCC): ICD-10-CM

## 2018-02-16 DIAGNOSIS — M46.1 SACROILIITIS (HCC): ICD-10-CM

## 2018-02-16 LAB
EST. AVERAGE GLUCOSE BLD GHB EST-MCNC: 146 MG/DL
GLUCOSE P FAST SERPL-MCNC: 143 MG/DL (ref 65–99)
HBA1C MFR BLD: 6.7 % (ref 4.2–6.3)

## 2018-02-16 PROCEDURE — 27096 INJECT SACROILIAC JOINT: CPT | Performed by: ANESTHESIOLOGY

## 2018-02-16 PROCEDURE — 36415 COLL VENOUS BLD VENIPUNCTURE: CPT

## 2018-02-16 PROCEDURE — 83036 HEMOGLOBIN GLYCOSYLATED A1C: CPT

## 2018-02-16 PROCEDURE — 82947 ASSAY GLUCOSE BLOOD QUANT: CPT

## 2018-02-16 RX ORDER — LIDOCAINE HYDROCHLORIDE 10 MG/ML
5 INJECTION, SOLUTION EPIDURAL; INFILTRATION; INTRACAUDAL; PERINEURAL ONCE
Status: COMPLETED | OUTPATIENT
Start: 2018-02-16 | End: 2018-02-16

## 2018-02-16 RX ORDER — BUPIVACAINE HCL/PF 2.5 MG/ML
30 VIAL (ML) INJECTION ONCE
Status: COMPLETED | OUTPATIENT
Start: 2018-02-16 | End: 2018-02-16

## 2018-02-16 RX ORDER — METHYLPREDNISOLONE ACETATE 80 MG/ML
80 INJECTION, SUSPENSION INTRA-ARTICULAR; INTRALESIONAL; INTRAMUSCULAR; PARENTERAL; SOFT TISSUE ONCE
Status: COMPLETED | OUTPATIENT
Start: 2018-02-16 | End: 2018-02-16

## 2018-02-16 RX ADMIN — BUPIVACAINE HYDROCHLORIDE 4 ML: 2.5 INJECTION, SOLUTION EPIDURAL; INFILTRATION; INTRACAUDAL at 13:30

## 2018-02-16 RX ADMIN — METHYLPREDNISOLONE ACETATE 80 MG: 80 INJECTION, SUSPENSION INTRA-ARTICULAR; INTRALESIONAL; INTRAMUSCULAR; SOFT TISSUE at 13:25

## 2018-02-16 RX ADMIN — IOHEXOL 1 ML: 300 INJECTION, SOLUTION INTRAVENOUS at 13:31

## 2018-02-16 RX ADMIN — LIDOCAINE HYDROCHLORIDE 3 ML: 10 INJECTION, SOLUTION EPIDURAL; INFILTRATION; INTRACAUDAL; PERINEURAL at 13:31

## 2018-02-16 NOTE — H&P
History of Present Illness: The patient is a 66 y o  male who presents with complaints of low back pain  Patient Active Problem List   Diagnosis    Lumbago with sciatica, left side    Lumbar degenerative disc disease    Lumbar herniated disc    Lumbar radiculopathy    Lumbar spondylosis    Myofascial pain    Obesity    [address] [address]       Past Medical History:   Diagnosis Date    Diabetes mellitus ([facility] [address] )     Headache     High cholesterol     Hypertension     Kidney stones     Sleep apnea        Past Surgical History:   Procedure Laterality Date    COLONOSCOPY N/A 11/22/2016    Procedure: COLONOSCOPY;  Surgeon: Dillon Ruff MD;  [facility];   Service:     DEBRIDEMENT TENNIS ELBOW  2006    HERNIA REPAIR  6795    x2, umbilical and right side per patient   [address]    UVULOPALATOPHARYNGOPLASTY  2004         Current Outpatient Prescriptions:     JUAN MICROLET LANCETS lancets, by Does not apply route, Disp: , Rfl:     Empagliflozin (JARDIANCE) 10 MG TABS, Take 1 tablet by mouth daily, Disp: , Rfl:     gabapentin (NEURONTIN) 400 mg capsule, Take 1 capsule (400 mg total) by mouth 3 (three) times a day, Disp: 90 capsule, Rfl: 2    glucose blood test strip, by In Vitro route daily, Disp: , Rfl:     lisinopril (ZESTRIL) 5 mg tablet, Take 1 tablet by mouth daily, Disp: , Rfl:     Current Facility-Administered Medications:     bupivacaine (PF) (MARCAINE) 0 25 % injection 30 mL, 30 mL, Intra-articular, Once, Jeramie Kitchen, DO    iohexol (OMNIPAQUE) 300 mg/mL injection 50 mL, 50 mL, Injection, Once, Jeramie Kitchen, DO    lidocaine (PF) (XYLOCAINE-MPF) 1 % injection 5 mL, 5 mL, Intra-articular, Once, Landon Kitchen, DO    methylPREDNISolone acetate (DEPO-MEDROL) injection 80 mg, 80 mg, Intra-articular, Once, Jeramie Kitchen, DO    Allergies   Allergen Reactions    Tramadol Itching       Physical Exam:   Vitals:    02/16/18 1304   BP: 130/82   Pulse: 61   Resp: 20 Temp: (!) 96 7 °F (35 9 °C)   SpO2: 95%     General: Awake, Alert, Oriented x 3, Mood and affect appropriate  Respiratory: Respirations even and unlabored  Cardiovascular: Peripheral pulses intact; no edema  Musculoskeletal Exam:  Bilateral SI joints tender to palpation    ASA Score: 2    Assessment: No diagnosis found  Plan: sacroiilitis - bilateral SI joint injections       Assessment:  1  Lumbar radiculopathy    2  Lumbar degenerative disc disease    3  Lumbar herniated disc    4  Lumbar spondylosis    5  Sacroiliitis ([address] )    6  Myofascial pain          Plan:  Nnamdi Santos is a 66 y o  male returning for follow-up of lumbosacral back pain with radiculopathy in the L5 distribution of left lower extremity secondary to disc herniation at L4-5 which displaces the left L5 nerve root  The patient did have a left L4 and L5 TFESI x2  He was also initiated on gabapentin and has titrated up to 300 mg t i d   The patient has noted almost complete resolution of his lower extremity symptoms, however the patient does have some persistent lumbosacral back pain which is worse on the left that will occasionally radiate into the buttock  The patient's lumbosacral back pain seems to be secondary to sacroiliitis  The patient has radicular symptoms in the L5 distribution of left lower extremity are quite rare at this point  1   I will schedule the patient for bilateral SI joint injections to reduce the inflammatory component of his pain  2   I will gently increase the patient's gabapentin to 400 mg t i d  for his neuropathic complaints  3  The patient may continue with ibuprofen p r n  and should not exceed more than 800 mg q 8 hours p r n   4   Patient will continue with his home exercise program  5  I will follow up the patient in 6-8 weeks after injection     Complete risks and benefits including bleeding, infection, tissue reaction, nerve injury and allergic reaction were discussed   The approach was demonstrated using models and literature was provided  Verbal and written consent was obtained      My impressions and treatment recommendations were discussed in detail with the patient who verbalized understanding and had no further questions  Discharge instructions were provided  I personally saw and examined the patient and I agree with the above discussed plan of care      No orders of the defined types were placed in this encounter      No orders of the defined types were placed in this encounter         History of Present Illness:    Casi Cooepr is a 66 y o  male returning for follow-up of lumbosacral back pain with radiculopathy in the L5 distribution of left lower extremity secondary to disc herniation at L4-5 which displaces the left L5 nerve root  The patient did have a left L4 and L5 TFESI x2  He was also initiated on gabapentin and has titrated up to 300 mg t i d   The patient has noted almost complete resolution of his lower extremity symptoms, however the patient does have some persistent lumbosacral back pain which is worse on the left that will occasionally radiate into the buttock  Rarely he will get some pain on the lateral aspect of the left calf, however this has become more intermittent  He denies any bladder or bowel incontinence or saddle anesthesia  The patient was evaluated by Neurosurgery who did not feel that surgery was indicated and is recommended further conservative therapy  The patient rates his pain 8/10 and the pain does not follow any particular pattern throughout the day  The pain is constant and described as throbbing  The pain is worse with standing, walking, and bending and twisting at the waist   The pain is alleviated with sitting, relaxation, and lying down for short periods of time      I have personally reviewed and/or updated the patient's past medical history, past surgical history, family history, social history, allergies, and vital signs today  Other than as stated above, the patient denies any interval changes in medications, medical condition, mental condition, symptoms, or allergies since the last office visit                                            Review of Systems:     Review of Systems   Respiratory: Negative for shortness of breath  Cardiovascular: Negative for chest pain  Gastrointestinal: Negative for constipation, diarrhea, nausea and vomiting  Musculoskeletal: Positive for back pain  Negative for arthralgias, gait problem, joint swelling and myalgias  Skin: Negative for rash  Neurological: Negative for dizziness, seizures and weakness  All other systems reviewed and are negative             Patient Active Problem List   Diagnosis    Lumbago with sciatica, left side    Lumbar degenerative disc disease    Lumbar herniated disc    Lumbar radiculopathy    Lumbar spondylosis    Myofascial pain    Obesity         Medical History        Past Medical History:   Diagnosis Date    [facility] [address] [address]      Headache      High cholesterol      Hypertension      Kidney stones      Sleep apnea              Surgical History         Past Surgical History:   Procedure Laterality Date    COLONOSCOPY N/A 11/22/2016     Procedure: COLONOSCOPY;  Surgeon: Ap Nugent MD;  [facility]  [facility];   Service:    [address] [place] [address]    HERNIA REPAIR   [id number]     x2, umbilical and right side per patient   [facility]    UVULOPALATOPHARYNGOPLASTY   2004            No family history on file      Social History          Occupational History    Not on file              Social History Main Topics    Smoking status: Never Smoker    Smokeless tobacco: Not on file    Alcohol use Yes         Comment: rarely    Drug use: No    Sexual activity: Not on file              Current Outpatient Prescriptions on File Prior to Visit   Medication Sig    JUAN MICROLET LANCETS lancets by Does not apply route    Empagliflozin (JARDIANCE) 10 MG TABS Take 1 tablet by mouth daily    gabapentin (NEURONTIN) 300 mg capsule Take 1 capsule by mouth 3 (three) times a day    glucose blood test strip by In Vitro route daily    lisinopril (ZESTRIL) 5 mg tablet Take 1 tablet by mouth daily      No current facility-administered medications on file prior to visit           No Known Allergies     Physical Exam:     /80   Pulse 77   Temp 99 2 °F (37 3 °C)   Ht 6' 3" (1 905 m)   Wt 136 kg (300 lb)   BMI 37 50 kg/m²      Constitutional: underweight  Eyes: anicteric  HEENT: grossly intact  Neck: supple, symmetric, trachea midline and no masses   Pulmonary:even and unlabored  Cardiovascular:No edema or pitting edema present  Skin:Normal without rashes or lesions and well hydrated  Psychiatric:Mood and affect appropriate  Neurologic:Cranial Nerves II-XII grossly intact  Musculoskeletal:normal gait  Bilateral lumbar paraspinals mildly tender to palpation from L4-L5  Bilateral SI joints tender to palpation  Positive AP compression test, Gaenslen's tests, and Satish's test bilaterally  Positive seated straight leg raise on the left and negative on the right    Bilateral lower extremity strength 5/5 in all muscle groups

## 2018-02-16 NOTE — DISCHARGE INSTRUCTIONS
Steroid Joint Injection   WHAT YOU NEED TO KNOW:   A steroid joint injection is a procedure to inject steroid medicine into a joint  Steroid medicine decreases pain and inflammation  The injection may also contain an anesthetic (numbing medicine) to decrease pain  It may be done to treat conditions such as arthritis, gout, or carpal tunnel syndrome  The injections may be given in your knee, ankle, shoulder, elbow, wrist, ankle or sacroiliac joint  1  Do not apply heat to any area that is numb  If you have discomfort or soreness at the injection site, you may apply ice today, 20 minutes on and 20 minutes off  Tomorrow you may use ice or warm, moist heat  Do not apply ice or heat directly to the skin  2  You may have an increase or change in the discomfort for 36-48 hours after your treatment  Apply ice and continue with any pain medicine you have been prescribed  3  Do not do anything strenuous today  You may shower, but no tub baths or hot tubs today  You may resume your normal activities tomorrow, but do not overdo it  Resume normal activities slowly when you are feeling better  4  If you experience redness, drainage or swelling at the injection site, or if you develop a fever above 100 degrees, please call [facility] at [phone] or go to the Emergency Room  5  Continue to take all routine medicines prescribed by your primary care physician unless otherwise instructed by our staff  Most blood thinners should be started again according to your regularly scheduled dosing  If you have any questions, please give our office a call  If you have a problem specifically related to your procedure, please call our office at [phone]  Problems not related to your procedure should be directed to your primary care physician  Blood sugars may be elevated due to steroids, call your PCP if consistently over 300

## 2018-02-22 ENCOUNTER — TELEPHONE (OUTPATIENT)
Dept: PAIN MEDICINE | Facility: CLINIC | Age: 63
End: 2018-02-22

## 2018-02-22 ENCOUNTER — OFFICE VISIT (OUTPATIENT)
Dept: INTERNAL MEDICINE CLINIC | Facility: CLINIC | Age: 63
End: 2018-02-22
Payer: COMMERCIAL

## 2018-02-22 VITALS
DIASTOLIC BLOOD PRESSURE: 90 MMHG | BODY MASS INDEX: 36.65 KG/M2 | TEMPERATURE: 97.3 F | SYSTOLIC BLOOD PRESSURE: 160 MMHG | OXYGEN SATURATION: 98 % | HEIGHT: 75 IN | WEIGHT: 294.8 LBS | RESPIRATION RATE: 12 BRPM | HEART RATE: 75 BPM

## 2018-02-22 DIAGNOSIS — E11.9 TYPE 2 DIABETES MELLITUS WITHOUT COMPLICATION, WITHOUT LONG-TERM CURRENT USE OF INSULIN (HCC): ICD-10-CM

## 2018-02-22 DIAGNOSIS — E78.01 FAMILIAL HYPERCHOLESTEROLEMIA: ICD-10-CM

## 2018-02-22 DIAGNOSIS — R73.9 HYPERGLYCEMIA: Primary | ICD-10-CM

## 2018-02-22 DIAGNOSIS — E66.09 CLASS 2 OBESITY DUE TO EXCESS CALORIES WITHOUT SERIOUS COMORBIDITY WITH BODY MASS INDEX (BMI) OF 38.0 TO 38.9 IN ADULT: ICD-10-CM

## 2018-02-22 DIAGNOSIS — M54.16 LUMBAR RADICULOPATHY: ICD-10-CM

## 2018-02-22 DIAGNOSIS — M51.36 LUMBAR DEGENERATIVE DISC DISEASE: ICD-10-CM

## 2018-02-22 DIAGNOSIS — I10 ESSENTIAL HYPERTENSION: ICD-10-CM

## 2018-02-22 PROCEDURE — 99214 OFFICE O/P EST MOD 30 MIN: CPT | Performed by: INTERNAL MEDICINE

## 2018-02-22 RX ORDER — ACETAMINOPHEN 325 MG/1
10 TABLET ORAL EVERY 4 HOURS PRN
COMMUNITY
End: 2019-11-19 | Stop reason: HOSPADM

## 2018-02-23 NOTE — PROGRESS NOTES
Assessment/Plan:    Hypertension  Hypertension  As noted patient's blood pressure is moderately elevated today but patient is in excruciating pain from his chronic low back pain and lumbar radiculopathy  Patient was told once his pain is under control we will recheck his blood pressure and modify medication if necessary but it is unfair to give him more medication when he is in so much discomfort  Lumbar degenerative disc disease  Lumbar degenerative disc disease  Patient has a longstanding history of lumbar degenerative disc disease in lumbar radiculopathy  He is working very closely with pain management in order to control pain and at recently had 2 epidural injections which have so far not been effective in treating his discomfort  Patient will continue to work with pain management  There is a complication of him using his gabapentin with some fogginess to his thought process and I did discuss with the patient that he should not take this medication when he is driving or operating any machinery  Obesity  As previously we did discuss with the patient the importance of working hard to control his weight a which is helpful for control of his blood sugar and his hypertension  We did discuss different methods in order for him to lose weight but he is not able at this time to implement them specially regarding aerobic activity secondary to his chronic low back pain  Diabetes ([facility] [address] )  Diabetes  Patient has a history of diabetes mellitus type 2  As noted patient's blood sugar is showing relatively good control with a hemoglobin A1c less than 7  We did discuss the importance of diet and hopefully with the help of pain management he will have better control of his discomfort and will be able to perform more aerobic activities       Diagnoses and all orders for this visit:    Hyperglycemia  -     Basic metabolic panel; Future  -     Hemoglobin A1c;  Future  -     Dapagliflozin Propanediol 5 MG TABS; Take 1 tablet (5 mg total) by mouth daily for 30 days    Lumbar radiculopathy    Lumbar degenerative disc disease    Familial hypercholesterolemia    Type 2 diabetes mellitus without complication, without long-term current use of insulin ([facility])    Essential hypertension    Class 2 obesity due to excess calories without serious comorbidity with body mass index (BMI) of 38 0 to 38 9 in adult    Other orders  -     ibuprofen (MOTRIN) 100 mg/5 mL suspension; Take 200 mg by mouth every 4 (four) hours as needed for mild pain  -     acetaminophen (TYLENOL) 100 mg/mL solution; Take 10 mg/kg by mouth every 4 (four) hours as needed for fever          Subjective:      Patient ID: Marnie Simmons is a 66 y o  male  Patient is a 60-year-old male with a history of hypertension, hyperlipidemia, diabetes mellitus type 2, herniated disc to the lumbar spine with chronic pain and lumbar radiculopathy  Patient is here today for routine follow-up after four-month period of time  He did have labs performed including his blood sugar which shows relatively good control  Our only concern today is a mild elevation in his blood pressure but this is most likely secondary to his severe pain in the lumbar spine        The following portions of the patient's history were reviewed and updated as appropriate:   He  has a past medical history of Diabetes mellitus ([address] ); Headache; High cholesterol; Hypertension; Kidney stones; and Sleep apnea    He   Patient Active Problem List    Diagnosis Date Noted    Sacroiliitis ([address] ) 02/06/2018    Lumbar degenerative disc disease 10/23/2017    Lumbar herniated disc 10/23/2017    Lumbar spondylosis 10/23/2017    Lumbar radiculopathy 08/29/2017    Myofascial pain 08/29/2017    Lumbago with sciatica, left side 07/31/2017    Diabetes ([address] ) 09/13/2016    Obesity 06/15/2012    Hyperlipidemia 06/15/2012    Hypertension 06/15/2012     He  has a past surgical history that includes Nasal septum surgery (1980); Uvulopalatopharyngoplasty (2004); Debridement tennis elbow (2006); Hernia repair (2011); and Colonoscopy (N/A, 11/22/2016)  His Family history is unknown by patient  He  reports that he has never smoked  He has never used smokeless tobacco  He reports that he drinks alcohol  He reports that he does not use drugs  Current Outpatient Prescriptions   Medication Sig Dispense Refill    acetaminophen (TYLENOL) 100 mg/mL solution Take 10 mg/kg by mouth every 4 (four) hours as needed for fever      JUAN MICROLET LANCETS lancets by Does not apply route      gabapentin (NEURONTIN) 400 mg capsule Take 1 capsule (400 mg total) by mouth 3 (three) times a day 90 capsule 2    glucose blood test strip by In Vitro route daily      ibuprofen (MOTRIN) 100 mg/5 mL suspension Take 200 mg by mouth every 4 (four) hours as needed for mild pain      lisinopril (ZESTRIL) 5 mg tablet Take 1 tablet by mouth daily      Dapagliflozin Propanediol 5 MG TABS Take 1 tablet (5 mg total) by mouth daily for 30 days 30 tablet 5     No current facility-administered medications for this visit  Current Outpatient Prescriptions on File Prior to Visit   Medication Sig    JUAN MICROLET LANCETS lancets by Does not apply route    gabapentin (NEURONTIN) 400 mg capsule Take 1 capsule (400 mg total) by mouth 3 (three) times a day    glucose blood test strip by In Vitro route daily    lisinopril (ZESTRIL) 5 mg tablet Take 1 tablet by mouth daily    [DISCONTINUED] Empagliflozin (JARDIANCE) 10 MG TABS Take 1 tablet by mouth daily     No current facility-administered medications on file prior to visit  He is allergic to tramadol       Review of Systems   Constitutional: Positive for activity change (Patient states that he has been having to restrict his activity because of his chronic pain)  Negative for appetite change, chills, diaphoresis, fatigue, fever and unexpected weight change     HENT: Negative for congestion, dental problem, drooling, ear discharge, ear pain, facial swelling, hearing loss, mouth sores, nosebleeds, postnasal drip, rhinorrhea, sinus pain, sinus pressure, sneezing, sore throat, tinnitus, trouble swallowing and voice change  Eyes: Negative for photophobia, pain, discharge, redness and itching  Respiratory: Negative for apnea, cough, choking, chest tightness, shortness of breath, wheezing and stridor  Cardiovascular: Negative for chest pain, palpitations and leg swelling  Gastrointestinal: Negative for abdominal distention, abdominal pain, anal bleeding, blood in stool, constipation, diarrhea, nausea and rectal pain  Endocrine: Negative for cold intolerance, heat intolerance, polydipsia, polyphagia and polyuria  Genitourinary: Negative for decreased urine volume, difficulty urinating, discharge, dysuria, enuresis, flank pain, frequency, genital sores, hematuria, penile pain, penile swelling, scrotal swelling, testicular pain and urgency  Musculoskeletal: Positive for arthralgias, back pain and gait problem  Negative for joint swelling, myalgias, neck pain and neck stiffness  Skin: Negative for color change, pallor, rash and wound  Allergic/Immunologic: Negative for environmental allergies, food allergies and immunocompromised state  Neurological: Negative for dizziness, tremors, seizures, syncope, facial asymmetry, speech difficulty, weakness, light-headedness, numbness and headaches  Hematological: Negative for adenopathy  Does not bruise/bleed easily  Psychiatric/Behavioral: Negative for agitation, behavioral problems, confusion, decreased concentration, dysphoric mood, hallucinations, self-injury, sleep disturbance and suicidal ideas  The patient is not nervous/anxious and is not hyperactive            Objective:      /90 (BP Location: Left arm, Patient Position: Sitting, Cuff Size: Adult)   Pulse 75   Temp (!) 97 3 °F (36 3 °C) (Tympanic)   Resp 12   Ht 6' 3" (1 905 m)   Wt 134 kg (294 lb 12 8 oz)   SpO2 98%   BMI 36 85 kg/m²          Physical Exam   Constitutional: He is oriented to person, place, and time  He appears well-developed and well-nourished  He appears distressed  Pleasant obese 65-year-old male who is awake alert  Antalgic gait secondary to the severe low back pain the lumbar region   HENT:   Head: Normocephalic and atraumatic  Right Ear: External ear normal    Left Ear: External ear normal    Nose: Nose normal    Mouth/Throat: Oropharynx is clear and moist  No oropharyngeal exudate  Eyes: Conjunctivae and EOM are normal  Pupils are equal, round, and reactive to light  Right eye exhibits no discharge  Left eye exhibits no discharge  No scleral icterus  Neck: Normal range of motion  Neck supple  No JVD present  No tracheal deviation present  No thyromegaly present  Cardiovascular: Normal rate, regular rhythm, normal heart sounds and intact distal pulses  Exam reveals no gallop and no friction rub  No murmur heard  Pulmonary/Chest: Effort normal and breath sounds normal  No stridor  No respiratory distress  He has no wheezes  He has no rales  He exhibits no tenderness  Abdominal: Soft  Bowel sounds are normal  He exhibits no distension and no mass  There is no tenderness  There is no rebound and no guarding  Patient's abdomen it is morbidly obese soft nontender with positive bowel sounds x4 quadrants   Musculoskeletal: He exhibits tenderness  He exhibits no edema  We performed a very limited exam of his back today secondary to his severe pain and discomfort  Again patient recently had epidural injections to the lumbar spine with pain management and is still in considerable amount of discomfort  Patient has and talc G gait and difficulty with transfer and movement   Lymphadenopathy:     He has no cervical adenopathy  Neurological: He is alert and oriented to person, place, and time  He has normal reflexes  No cranial nerve deficit   Coordination normal    Skin: Skin is warm and dry  No rash noted  He is not diaphoretic  No erythema  No pallor  Psychiatric: He has a normal mood and affect  His behavior is normal  Judgment and thought content normal    Nursing note and vitals reviewed

## 2018-02-23 NOTE — ASSESSMENT & PLAN NOTE
Diabetes  Patient has a history of diabetes mellitus type 2  As noted patient's blood sugar is showing relatively good control with a hemoglobin A1c less than 7    We did discuss the importance of diet and hopefully with the help of pain management he will have better control of his discomfort and will be able to perform more aerobic activities

## 2018-02-23 NOTE — ASSESSMENT & PLAN NOTE
As previously we did discuss with the patient the importance of working hard to control his weight a which is helpful for control of his blood sugar and his hypertension  We did discuss different methods in order for him to lose weight but he is not able at this time to implement them specially regarding aerobic activity secondary to his chronic low back pain

## 2018-02-23 NOTE — ASSESSMENT & PLAN NOTE
Lumbar degenerative disc disease  Patient has a longstanding history of lumbar degenerative disc disease in lumbar radiculopathy  He is working very closely with pain management in order to control pain and at recently had 2 epidural injections which have so far not been effective in treating his discomfort  Patient will continue to work with pain management  There is a complication of him using his gabapentin with some fogginess to his thought process and I did discuss with the patient that he should not take this medication when he is driving or operating any machinery

## 2018-02-23 NOTE — ASSESSMENT & PLAN NOTE
Hypertension  As noted patient's blood pressure is moderately elevated today but patient is in excruciating pain from his chronic low back pain and lumbar radiculopathy  Patient was told once his pain is under control we will recheck his blood pressure and modify medication if necessary but it is unfair to give him more medication when he is in so much discomfort

## 2018-02-23 NOTE — PROGRESS NOTES
Diabetic Foot Exam    Patient's shoes and socks removed  Right Foot/Ankle   Right Foot Inspection  Skin Exam: skin normal and skin intact no dry skin, no warmth, no callus, no erythema, no maceration, no abnormal color, no pre-ulcer, no ulcer and no callus                          Toe Exam: no swelling, no tenderness, erythema and  no right toe deformity  Sensory   Vibration: intact  Proprioception: intact   Monofilament testing: intact      Left Foot/Ankle  Left Foot Inspection  Skin Exam: skin normal and skin intactno dry skin, no warmth, no erythema, no maceration, normal color, no pre-ulcer, no ulcer and no callus                         Toe Exam: no swelling, no tenderness, no erythema and no left toe deformity                   Sensory   Vibration: intact  Proprioception: intact  Monofilament: intact    Assign Risk Category:  No deformity present; No loss of protective sensation;  No weak pulses       Risk: 0

## 2018-03-05 ENCOUNTER — OFFICE VISIT (OUTPATIENT)
Dept: SLEEP CENTER | Facility: CLINIC | Age: 63
End: 2018-03-05

## 2018-03-05 VITALS
HEART RATE: 60 BPM | BODY MASS INDEX: 36.48 KG/M2 | HEIGHT: 75 IN | WEIGHT: 293.4 LBS | SYSTOLIC BLOOD PRESSURE: 138 MMHG | DIASTOLIC BLOOD PRESSURE: 86 MMHG

## 2018-03-05 DIAGNOSIS — I10 ESSENTIAL HYPERTENSION: ICD-10-CM

## 2018-03-05 DIAGNOSIS — G47.10 HYPERSOMNIA: ICD-10-CM

## 2018-03-05 DIAGNOSIS — E13.9 DIABETES 1.5, MANAGED AS TYPE 2 (HCC): ICD-10-CM

## 2018-03-05 DIAGNOSIS — E66.9 OBESITY (BMI 30-39.9): ICD-10-CM

## 2018-03-05 DIAGNOSIS — G47.33 OSA (OBSTRUCTIVE SLEEP APNEA): Primary | ICD-10-CM

## 2018-03-05 DIAGNOSIS — G47.00 INSOMNIA, UNSPECIFIED TYPE: ICD-10-CM

## 2018-03-05 NOTE — PROGRESS NOTES
Follow-Up Note - [address]  66 y o  male  :1955  IBJ:[id number]    CC: I saw this patient for follow-up in clinic today for his Sleep Disordered Breathing, Coexisting Sleep and Medical Problems  Medications, Past, Family & Social History were reviewed  Interval changes notable for low back pain with radicular symptoms for which he is getting lumbar epidural steroid injections  ROS: as attached reviewed   HPI:  With respect to positive airway pressure therapy (PAP) and compliance: He discontinued CPAP around 6 months ago mainly because of mask leaks  He tried various interfaces with no benefit  Yun Nunes reports ongoing symptoms of sleep disordered breathing:  Loud snoring that disturbs his wife who also has witnessed apneas  He has excessive drowsiness  He reported no features of movement disorder of parasomnia  Sleep Routine:  Yun Nunes reports getting 7 to 7-1/2 hours sleep  ; he has no difficulty initiating, but reports diffculty maintaining sleep  because of nocturia 3-4 times a night  He awakens spontaneously feeling unrefreshed He has excessive drowsiness  He rated himself at Total score: 10 /24 on the [place] sleepiness scale  EXAM: Vitals are stable: Blood pressure 138/86, pulse 60, height 6' 3" (1 905 m), weight 133 kg (293 lb 6 4 oz)  Body mass index is 36 67 kg/m²  [place]ck Circumference: 47 cm  Patient is alert, orientated, cooperative ad in no distress  Mental state appears normal  There are no facial pressure marks or rashes  Physical findings are essentially unchanged from previous  IMPRESSION:     1  AIDEE (obstructive sleep apnea)     2  Insomnia, unspecified type     3  Hypersomnia     4  Obesity (BMI 30-39 9)     5  Essential hypertension     6  Diabetes 1 5, 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[email or web address] [facility] )         PLAN:  1  I reviewed results of the Sleep studies with the patient:  He has diagnostic study in 2015 demonstrated an AHI of 32 per hour    Minimum oxygen saturation was 86% and 14 8% of time asleep was spent with saturations less than 90%  During a subsequent therapeutic study, he required nasal CPAP at between 16 and 20 cm H2 O to remediate is sleep disordered breathing  2  Treatment with  PAP is medically necessary and Mary Gong is agreable re-attempt use  He will try using 1 of the newer interfaces that will hopefully reduce the amount of Mask leaks  3  Pressure setting: [Continue at 12-20 cm H2O in auto titrating mode  He has a sleep number bed and elevating the head of the bed may reduce the pressure that he requires  4  Instruction on care of equipment and strategies to improve comfort with use of PAP were discussed :controlling mucosal dryness, nasal symptoms and Mask leaks]  5  Care coordinated with DME provider and prescription to replace supplies as needed was provided  6  Need for compliance with therapy and weight reduction were emphasized  7  With your consent, follow-up is advised in 1 year or sooner if neededto monitor progress, compliance and to adjust therapy]  Thank you for allowing me to participate in the care of this patient      Sincerely,    Scarlett Hines MD  Board Certified Specialist

## 2018-03-05 NOTE — PROGRESS NOTES
Review of Systems      Genitourinary frequent urination and frequent urination at night   Cardiology lightheadedness   Gastrointestinal heartburn/acid reflux   Neurology numbness/tingling of an extremity, forgetfulness and difficulity finding words   Constitutional none   Integumentary none   Psychiatry none   Musculoskeletal muscle tenderness/aching and back pain   Pulmonary none   ENT none   Endocrine none   Hematological none

## 2018-03-07 NOTE — PROCEDURES
Procedure      2 Level Transforaminal Epidural Steroid Injection    Indication: Low back and leg pain  Preoperative diagnosis: Lumbar radiculitis  Postoperative diagnosis: Lumbar radiculitis    Procedure: Fluoroscopically-guided left L4 and L5 transforaminal epidural steroid injection under fluoroscopy      After discussing the risks, benefits, and alternatives to the procedure, the patient expressed understanding and wished to proceed  The patient was brought to the fluoroscopy suite and placed in the prone position  A procedural pause was conducted to verify: correct patient identity, procedure to be performed and as applicable, correct side and site, correct patient position, and availability of implants, special equipment and special requirements  After identifying the left L4 and L5 pedicles fluoroscopically with an oblique view, the skin was sterilely prepped and draped in the usual fashion using Chloraprep skin prep  The skin and subcutaneous tissue were anesthetized with 1% lidocaine  A 5 inch 22 gauge spinal needle was then advanced under fluoroscopic guidance to the posterior aspect of the left L4 and L5 neural foramens  Appropriate foraminal depth was determined with a lateral fluoroscopic view, and AP visualization confirmed needle positioning at approximately the 6 o'clock position relative to the pedicles  After negative aspiration, 2 mL of Omnipaque 300 contrast was injected using live fluoroscopy/digital subtraction angiography, confirming appropriate transforaminal spread without evidence of intravascular or intrathecal uptake  Next, a local anesthetic test dose consisting of 1 mL of 2% lidocaine was injected through the needle at each level  After an appropriate period of observation, a directed neurological exam was performed which revealed no new neurologic deficits   Next, a 1 5 ml solution consisting of 7 5 mg of dexamethasone in sterile saline was injected slowly and incrementally into the epidural space at each level  Following the injection the needles were withdrawn slightly and flushed with lidocaine as they were fully extracted  The patient tolerated the procedure well and there were no apparent complications  The patient did not develop any new neurologic deficits  After appropriate observation, the patient was dismissed from the clinic in good condition under their own power  COMMENTS   The patient received a total steroid dose of 15 mg of dexamethasone        Signatures   Electronically signed by : Dada Grimes DO; Dec 27 2017 12:48PM EST                       (Author)

## 2018-03-07 NOTE — PROCEDURES
Procedure    2 Level Transforaminal Epidural Steroid Injection    Indication: Low back and leg pain   Preoperative diagnosis: Lumbar radiculitis   Postoperative diagnosis: Lumbar radiculitis    Procedure: Fluoroscopically-guided left L4 and L5 transforaminal epidural steroid injection under fluoroscopy   After discussing the risks, benefits, and alternatives to the procedure, the patient expressed understanding and wished to proceed  The patient was brought to the fluoroscopy suite and placed in the prone position  A procedural pause was conducted to verify: correct patient identity, procedure to be performed and as applicable, correct side and site, correct patient position, and availability of implants, special equipment and special requirements  After identifying the left L4 and L5 pedicles fluoroscopically with an oblique view, the skin was sterilely prepped and draped in the usual fashion using Chloraprep skin prep  The skin and subcutaneous tissue were anesthetized with 1% lidocaine  A 3 and half inch 22 gauge spinal needle was then advanced under fluoroscopic guidance to the posterior aspect of the the left L4 and L5 neural foramens  Appropriate foraminal depth was determined with a lateral fluoroscopic view, and AP visualization confirmed needle positioning at approximately the 6 o'clock position relative to the pedicles  After negative aspiration, 2 mL of Omnipaque 300 contrast was injected using live fluoroscopy/digital subtraction angiography, confirming appropriate transforaminal spread without evidence of intravascular or intrathecal uptake  Next, a local anesthetic test dose consisting of 1 mL of 2% lidocaine was injected through the needle at each level  After an appropriate period of observation, a directed neurological exam was performed which revealed no new neurologic deficits   Next, a 1 5 ml solution consisting of 7 5 mg of dexamethasone in sterile saline was injected slowly and incrementally into the epidural space at each level  Following the injection the needles were withdrawn slightly and flushed with lidocaine as they were fully extracted  The patient tolerated the procedure well and there were no apparent complications  The patient did not develop any new neurologic deficits  After appropriate observation, the patient was dismissed from the clinic in good condition under their own power  COMMENTS  The patient received a total steroid dose of 15 mg of dexamethasone        Signatures   Electronically signed by : Rl Conley DO; Nov 1 2017 10:09AM EST                       (Author)

## 2018-03-12 ENCOUNTER — TELEPHONE (OUTPATIENT)
Dept: PAIN MEDICINE | Facility: CLINIC | Age: 63
End: 2018-03-12

## 2018-03-12 DIAGNOSIS — M46.1 SACROILIITIS (HCC): Primary | ICD-10-CM

## 2018-03-12 NOTE — TELEPHONE ENCOUNTER
Please notify the patient that a prescription for diclofenac 50 milligrams b i d  p r n  with sent to pharmacy on file

## 2018-04-20 ENCOUNTER — CLINICAL SUPPORT (OUTPATIENT)
Dept: PAIN MEDICINE | Facility: CLINIC | Age: 63
End: 2018-04-20
Payer: COMMERCIAL

## 2018-04-20 VITALS
DIASTOLIC BLOOD PRESSURE: 87 MMHG | SYSTOLIC BLOOD PRESSURE: 133 MMHG | BODY MASS INDEX: 36.85 KG/M2 | HEIGHT: 75 IN | TEMPERATURE: 97.7 F | HEART RATE: 69 BPM | WEIGHT: 296.4 LBS

## 2018-04-20 DIAGNOSIS — M79.18 MYOFASCIAL PAIN: ICD-10-CM

## 2018-04-20 DIAGNOSIS — M47.816 LUMBAR SPONDYLOSIS: ICD-10-CM

## 2018-04-20 DIAGNOSIS — M46.1 SACROILIITIS (HCC): Primary | ICD-10-CM

## 2018-04-20 DIAGNOSIS — M51.36 LUMBAR DEGENERATIVE DISC DISEASE: ICD-10-CM

## 2018-04-20 DIAGNOSIS — M51.26 LUMBAR HERNIATED DISC: ICD-10-CM

## 2018-04-20 DIAGNOSIS — M54.16 LUMBAR RADICULOPATHY: ICD-10-CM

## 2018-04-20 PROCEDURE — 99214 OFFICE O/P EST MOD 30 MIN: CPT | Performed by: ANESTHESIOLOGY

## 2018-04-20 RX ORDER — DAPAGLIFLOZIN 5 MG/1
5 TABLET, FILM COATED ORAL DAILY
Refills: 5 | COMMUNITY
Start: 2018-03-31 | End: 2018-04-30 | Stop reason: SDUPTHER

## 2018-04-20 RX ORDER — GABAPENTIN 400 MG/1
400 CAPSULE ORAL 3 TIMES DAILY
Qty: 90 CAPSULE | Refills: 2 | Status: SHIPPED | OUTPATIENT
Start: 2018-04-20 | End: 2018-07-25 | Stop reason: SDUPTHER

## 2018-04-20 NOTE — PROGRESS NOTES
Assessment:  1  Sacroiliitis ([facility] [address] )    2  Lumbar radiculopathy    3  Lumbar degenerative disc disease    4  Lumbar herniated disc    5  Lumbar spondylosis    6  Myofascial pain        Plan:  80-year-old male returning for follow-up of lumbosacral back pain with radiculopathy in the L5 distribution of the left lower extremity secondary to disc herniation at L4-5 and sacroiliitis  The patient had essentially complete resolution of his left lower extremity radicular symptoms with left L4 and L5 TFESI x2  He continues to take gabapentin 400 mg t i d  for his neuropathic complaints with excellent relief  The patient did have bilateral SI joint injections which did give him good relief, however the relief was only lasting approximately 5-7 days  He has been taking diclofenac 50 mg b i d  p r n  with moderate relief, however the patient is still having some difficulty with prolonged periods of standing and walking secondary to the pain and pressure in his lumbosacral region  He gets approximately 90% of relief from his current pain medicine regimen, however once the patient is in the standing or upright position he only gets approximately 20% relief of his low back pain  The patient's major complaint is his lumbosacral back pain secondary to sacroiliitis  1   I will schedule the patient for bilateral L4 and L5 medial branch blocks and S1, S2, and S3 lateral branch blocks  The diagnostic nature of these blocks were discussed with the patient and if he has a favorable result x2 we could proceed with RFA for longer lasting relief  The patient wishes to proceed  2   We will continue diclofenac 50 mg b i d  p r n   3   We will continue gabapentin 400 mg t i d  for his neuropathic complaints  4    I will repeat left L4 and L5 TFESI p r n   5   I will follow up the patient pending results of medial and lateral branch blocks      [place] Prescription Drug Monitoring Program report was reviewed and was appropriate Complete risks and benefits including bleeding, infection, tissue reaction, nerve injury and allergic reaction were discussed  The approach was demonstrated using models and literature was provided  Verbal and written consent was obtained  My impressions and treatment recommendations were discussed in detail with the patient who verbalized understanding and had no further questions  Discharge instructions were provided  I personally saw and examined the patient and I agree with the above discussed plan of care  No orders of the defined types were placed in this encounter  New Medications Ordered This Visit   Medications    FARXIGA 5 MG TABS     Sig: Take 5 mg by mouth daily     Refill:  5       History of Present Illness:    Franchesca Peters is a 66 y o  male returning for follow-up of lumbar degenerative disc disease, spondylosis, disc herniation at L4-5, radiculopathy in the L5 distribution of the left lower extremity, and sacroiliitis  The patient has had excellent relief of his left lower extremity radicular symptoms with left L4 and L5 TFESI gabapentin 400 mg t i d  and has almost complete resolution of his radicular symptoms  He denies any right lower extremity symptoms, bladder or bowel incontinence, or saddle anesthesia  The patient continues to have lumbosacral back pain and did have excellent relief from bilateral SI joint injections, however the relief only lasted for approximately 5-7 days  He continues to take diclofenac 50 mg b i d  p r n  which gives him approximately 90% relief I will use sitting, however only about 20% relief when he is standing upright  He is unable to stand or walk for any particular distance secondary to the pain and pressure in his low back  The patient rates his pain 8/10 on the pain does not follow any particular pattern throughout the day  The pain is constant and described as sharp, pressure-like, pins and needles    The pain is worse with standing and walking  The pain is alleviated with sitting and lying down  I have personally reviewed and/or updated the patient's past medical history, past surgical history, family history, social history, current medications, allergies, and vital signs today  Other than as stated above, the patient denies any interval changes in medications, medical condition, mental condition, symptoms, or allergies since the last office visit  Review of Systems:    Review of Systems   Respiratory: Negative for shortness of breath  Cardiovascular: Negative for chest pain  Gastrointestinal: Negative for constipation, diarrhea, nausea and vomiting  Musculoskeletal: Negative for arthralgias, gait problem, joint swelling and myalgias  Difficulty walking, Decreased ROM    Skin: Negative for rash  Neurological: Negative for dizziness, seizures and weakness  All other systems reviewed and are negative  Patient Active Problem List   Diagnosis    Lumbago with sciatica, left side    Lumbar degenerative disc disease    Lumbar herniated disc    Lumbar radiculopathy    Lumbar spondylosis    Myofascial pain    Obesity    Sacroiliitis ([address]    Hyperlipidemia    Diabetes ([address] )    Essential hypertension    AIDEE (obstructive sleep apnea)    Insomnia    Hypersomnia    Obesity (BMI 30-39  9)    Diabetes 1 5, 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[address] )       Past Medical History:   Diagnosis Date    Diabetes mellitus ([address] )     Headache     High cholesterol     Hypertension     Kidney stones     Sleep apnea        Past Surgical History:   Procedure Laterality Date    COLONOSCOPY N/A 11/22/2016    Procedure: COLONOSCOPY;  Surgeon: Dia Durant MD;  [facility];   Service:     DEBRIDEMENT TENNIS ELBOW  2006    HERNIA REPAIR  2447    x2, umbilical and right side per patient   [address]    UVULOPALATOPHARYNGOPLASTY  2004       Family History   Problem Relation Age of Onset    Family history unknown: Yes       Social History     Occupational History    Not on file  Social History Main Topics    Smoking status: Never Smoker    Smokeless tobacco: Never Used    Alcohol use Yes      Comment: rarely    Drug use: No    Sexual activity: Not on file       Current Outpatient Prescriptions on File Prior to Visit   Medication Sig    acetaminophen (TYLENOL) 100 mg/mL solution Take 10 mg/kg by mouth every 4 (four) hours as needed for fever    JUAN MICROLET LANCETS lancets by Does not apply route    diclofenac sodium (VOLTAREN) 50 mg EC tablet Take 1 tablet (50 mg total) by mouth 2 (two) times a day as needed (pain)    gabapentin (NEURONTIN) 400 mg capsule Take 1 capsule (400 mg total) by mouth 3 (three) times a day    glucose blood test strip by In Vitro route daily    lisinopril (ZESTRIL) 5 mg tablet Take 1 tablet by mouth daily    [DISCONTINUED] Dapagliflozin Propanediol 5 MG TABS Take 1 tablet (5 mg total) by mouth daily for 30 days     No current facility-administered medications on file prior to visit  Allergies   Allergen Reactions    Tramadol Itching       Physical Exam:    /87   Pulse 69   Temp 97 7 °F (36 5 °C)   Ht 6' 3" (1 905 m)   Wt 134 kg (296 lb 6 4 oz)   BMI 37 05 kg/m²     Constitutional: normal, well developed, well nourished, alert, in no distress and non-toxic and no overt pain behavior  Eyes: anicteric  HEENT: grossly intact  Neck: supple, symmetric, trachea midline and no masses   Pulmonary:even and unlabored  Cardiovascular:No edema or pitting edema present  Skin:Normal without rashes or lesions and well hydrated  Psychiatric:Mood and affect appropriate  Neurologic:Cranial Nerves II-XII grossly intact  Musculoskeletal:normal gait  Bilateral lumbar paraspinals mildly tender to palpation and ropy in texture from L3-L5  Bilateral SI joints tender to palpation  Bilateral lower extremity strength 5/5 in all muscle groups    Negative seated straight leg raise bilaterally  Positive bilateral Satish's, Gaenslen's, and AP compression test bilaterally        Imaging  Imaging reviewed

## 2018-04-30 DIAGNOSIS — E11.9 TYPE 2 DIABETES MELLITUS WITHOUT COMPLICATION, WITHOUT LONG-TERM CURRENT USE OF INSULIN (HCC): Primary | ICD-10-CM

## 2018-04-30 RX ORDER — DAPAGLIFLOZIN 5 MG/1
5 TABLET, FILM COATED ORAL DAILY
Qty: 90 TABLET | Refills: 1 | Status: SHIPPED | OUTPATIENT
Start: 2018-04-30 | End: 2018-10-25 | Stop reason: SDUPTHER

## 2018-05-04 ENCOUNTER — HOSPITAL ENCOUNTER (OUTPATIENT)
Dept: RADIOLOGY | Facility: CLINIC | Age: 63
Discharge: HOME | End: 2018-05-04
Admitting: ANESTHESIOLOGY
Payer: COMMERCIAL

## 2018-05-04 VITALS
RESPIRATION RATE: 18 BRPM | TEMPERATURE: 97 F | OXYGEN SATURATION: 96 % | HEART RATE: 78 BPM | SYSTOLIC BLOOD PRESSURE: 138 MMHG | DIASTOLIC BLOOD PRESSURE: 82 MMHG

## 2018-05-04 DIAGNOSIS — M47.816 LUMBAR SPONDYLOSIS: ICD-10-CM

## 2018-05-04 DIAGNOSIS — M46.1 SACROILIITIS (HCC): ICD-10-CM

## 2018-05-04 PROCEDURE — 64493 INJ PARAVERT F JNT L/S 1 LEV: CPT | Performed by: ANESTHESIOLOGY

## 2018-05-04 PROCEDURE — 64450 NJX AA&/STRD OTHER PN/BRANCH: CPT | Performed by: ANESTHESIOLOGY

## 2018-05-04 RX ORDER — LIDOCAINE HYDROCHLORIDE 10 MG/ML
10 INJECTION, SOLUTION EPIDURAL; INFILTRATION; INTRACAUDAL; PERINEURAL ONCE
Status: COMPLETED | OUTPATIENT
Start: 2018-05-04 | End: 2018-05-04

## 2018-05-04 RX ADMIN — LIDOCAINE HYDROCHLORIDE 5 ML: 20 INJECTION, SOLUTION EPIDURAL; INFILTRATION; INTRACAUDAL; PERINEURAL at 15:19

## 2018-05-04 RX ADMIN — LIDOCAINE HYDROCHLORIDE 10 ML: 10 INJECTION, SOLUTION EPIDURAL; INFILTRATION; INTRACAUDAL; PERINEURAL at 15:14

## 2018-05-04 NOTE — DISCHARGE INSTRUCTIONS

## 2018-05-04 NOTE — H&P
History of Present Illness: The patient is a 66 y o  male who presents with complaints of low back pain  Patient Active Problem List   Diagnosis    Lumbago with sciatica, left side    Lumbar degenerative disc disease    Lumbar herniated disc    Lumbar radiculopathy    Lumbar spondylosis    Myofascial pain    Obesity    Sacroiliitis ([facility] [address]    Hyperlipidemia    Diabetes ([facility] [address] )    Essential hypertension    AIDEE (obstructive sleep apnea)    Insomnia    Hypersomnia    Obesity (BMI 30-39  9)    Diabetes 1 5, 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[facility] [address] )       Past Medical History:   Diagnosis Date    Diabetes mellitus ([facility] [address] )     Headache     High cholesterol     Hypertension     Kidney stones     Sleep apnea        Past Surgical History:   Procedure Laterality Date    COLONOSCOPY N/A 11/22/2016    Procedure: COLONOSCOPY;  Surgeon: Chaka Sanchez MD;  [facility];   Service:     DEBRIDEMENT TENNIS ELBOW  2006    HERNIA REPAIR  9814    x2, umbilical and right side per patient   [facility]    UVULOPALATOPHARYNGOPLASTY  2004         Current Outpatient Prescriptions:     acetaminophen (TYLENOL) 100 mg/mL solution, Take 10 mg/kg by mouth every 4 (four) hours as needed for fever, Disp: , Rfl:     JUAN MICROLET LANCETS lancets, by Does not apply route, Disp: , Rfl:     diclofenac sodium (VOLTAREN) 50 mg EC tablet, Take 1 tablet (50 mg total) by mouth 2 (two) times a day as needed (pain), Disp: 60 tablet, Rfl: 2    FARXIGA 5 MG TABS, Take 1 tablet (5 mg total) by mouth daily, Disp: 90 tablet, Rfl: 1    gabapentin (NEURONTIN) 400 mg capsule, Take 1 capsule (400 mg total) by mouth 3 (three) times a day, Disp: 90 capsule, Rfl: 2    glucose blood test strip, by In Vitro route daily, Disp: , Rfl:     lisinopril (ZESTRIL) 5 mg tablet, Take 1 tablet by mouth daily, Disp: , Rfl:     Allergies   Allergen Reactions    Tramadol Itching       Physical Exam:   Vitals:    05/04/18 1436   BP: 129/70 Pulse: 79   Resp: 18   Temp: (!) 97 °F (36 1 °C)   SpO2: 94%     General: Awake, Alert, Oriented x 3, Mood and affect appropriate  Respiratory: Respirations even and unlabored  Cardiovascular: Peripheral pulses intact; no edema  Musculoskeletal Exam:  Bilateral SI joints tender to palpation  ASA Score: 2    Assessment:   1  Sacroiliitis ([facility] [address] )    2   Lumbar spondylosis        Plan:  Bilateral L4 and L5 medial branch blocks and S1, S2, and S3 lateral branch blocks

## 2018-05-07 DIAGNOSIS — M54.16 LUMBAR RADICULOPATHY: ICD-10-CM

## 2018-05-07 RX ORDER — GABAPENTIN 400 MG/1
CAPSULE ORAL
Qty: 90 CAPSULE | Refills: 2 | OUTPATIENT
Start: 2018-05-07

## 2018-05-14 ENCOUNTER — TELEPHONE (OUTPATIENT)
Dept: RADIOLOGY | Facility: CLINIC | Age: 63
End: 2018-05-14

## 2018-05-25 ENCOUNTER — HOSPITAL ENCOUNTER (OUTPATIENT)
Dept: RADIOLOGY | Facility: CLINIC | Age: 63
Discharge: HOME | End: 2018-05-25
Admitting: ANESTHESIOLOGY
Payer: COMMERCIAL

## 2018-05-25 VITALS
TEMPERATURE: 97.6 F | DIASTOLIC BLOOD PRESSURE: 88 MMHG | OXYGEN SATURATION: 94 % | HEART RATE: 76 BPM | RESPIRATION RATE: 20 BRPM | SYSTOLIC BLOOD PRESSURE: 124 MMHG

## 2018-05-25 DIAGNOSIS — M47.816 LUMBAR SPONDYLOSIS: ICD-10-CM

## 2018-05-25 DIAGNOSIS — M46.1 SACROILIITIS (HCC): ICD-10-CM

## 2018-05-25 PROCEDURE — 64493 INJ PARAVERT F JNT L/S 1 LEV: CPT | Performed by: ANESTHESIOLOGY

## 2018-05-25 PROCEDURE — 64450 NJX AA&/STRD OTHER PN/BRANCH: CPT | Performed by: ANESTHESIOLOGY

## 2018-05-25 RX ORDER — LIDOCAINE HYDROCHLORIDE 10 MG/ML
10 INJECTION, SOLUTION EPIDURAL; INFILTRATION; INTRACAUDAL; PERINEURAL ONCE
Status: COMPLETED | OUTPATIENT
Start: 2018-05-25 | End: 2018-05-25

## 2018-05-25 RX ORDER — BUPIVACAINE HYDROCHLORIDE 5 MG/ML
30 INJECTION, SOLUTION EPIDURAL; INTRACAUDAL ONCE
Status: COMPLETED | OUTPATIENT
Start: 2018-05-25 | End: 2018-05-25

## 2018-05-25 RX ADMIN — BUPIVACAINE HYDROCHLORIDE 5 ML: 5 INJECTION, SOLUTION EPIDURAL; INTRACAUDAL at 08:56

## 2018-05-25 RX ADMIN — LIDOCAINE HYDROCHLORIDE 10 ML: 10 INJECTION, SOLUTION EPIDURAL; INFILTRATION; INTRACAUDAL; PERINEURAL at 08:50

## 2018-05-25 NOTE — DISCHARGE INSTRUCTIONS

## 2018-05-25 NOTE — H&P
History of Present Illness: The patient is a 66 y o  male who presents with complaints of low back pain  Patient Active Problem List   Diagnosis    Lumbago with sciatica, left side    Lumbar degenerative disc disease    Lumbar herniated disc    Lumbar radiculopathy    Lumbar spondylosis    Myofascial pain    Obesity    [facility]roiliitis ([facility] [address] [facility]    Hyperlipidemia    Diabetes ([facility] [address] )    Essential hypertension    AIDEE (obstructive sleep apnea)    Insomnia    Hypersomnia    Obesity (BMI 30-39  9)    Diabetes 1 5, 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[facility] [address] )       Past Medical History:   Diagnosis Date    Diabetes mellitus ([facility] [address] )     Headache     High cholesterol     Hypertension     Kidney stones     Sleep apnea        Past Surgical History:   Procedure Laterality Date    COLONOSCOPY N/A 11/22/2016    Procedure: COLONOSCOPY;  Surgeon: Ceferino Thomas MD;  [facility];   Service:     DEBRIDEMENT TENNIS ELBOW  2006    HERNIA REPAIR  8736    x2, umbilical and right side per patient   [address] [facility]    UVULOPALATOPHARYNGOPLASTY  2004         Current Outpatient Prescriptions:     acetaminophen (TYLENOL) 100 mg/mL solution, Take 10 mg/kg by mouth every 4 (four) hours as needed for fever, Disp: , Rfl:     JUAN MICROLET LANCETS lancets, by Does not apply route, Disp: , Rfl:     diclofenac sodium (VOLTAREN) 50 mg EC tablet, Take 1 tablet (50 mg total) by mouth 2 (two) times a day as needed (pain), Disp: 60 tablet, Rfl: 2    FARXIGA 5 MG TABS, Take 1 tablet (5 mg total) by mouth daily, Disp: 90 tablet, Rfl: 1    gabapentin (NEURONTIN) 400 mg capsule, Take 1 capsule (400 mg total) by mouth 3 (three) times a day, Disp: 90 capsule, Rfl: 2    glucose blood test strip, by In Vitro route daily, Disp: , Rfl:     lisinopril (ZESTRIL) 5 mg tablet, Take 1 tablet by mouth daily, Disp: , Rfl:     Allergies   Allergen Reactions    Tramadol Itching       Physical Exam:   Vitals:    05/25/18 0828   BP: 121/77 Pulse: 85   Resp: 18   Temp: 97 6 °F (36 4 °C)   SpO2: 95%     General: Awake, Alert, Oriented x 3, Mood and affect appropriate  Respiratory: Respirations even and unlabored  Cardiovascular: Peripheral pulses intact; no edema  Musculoskeletal Exam:  Bilateral SI joints tender to palpation  ASA Score: 2    Assessment:   1  Sacroiliitis ([facility] [address] )    2   Lumbar spondylosis        Plan: B/L L4, L5 MBB & S1, S2, S3 LBB#2

## 2018-06-05 ENCOUNTER — TELEPHONE (OUTPATIENT)
Dept: PAIN MEDICINE | Facility: CLINIC | Age: 63
End: 2018-06-05

## 2018-06-05 NOTE — TELEPHONE ENCOUNTER
The patient had a favorable result to bilateral L4 and L5 medial branch and S1, S2, and S3 lateral branch block 2  Please call the patient to schedule L4, L5, S1, S2, and S3 RFA    Please ask the patient which side he would like to start with 1st and schedule the opposite side for 2 weeks later

## 2018-06-07 NOTE — TELEPHONE ENCOUNTER
Called pt, scheduled LT RFA on 06/20/18 and RT RFA on 07/11/18  Went over pre procedure instructions, NPO 1 hr prior, if sick or on abx needs to call to rs, wear loose, comf clothing- no buttons/zippers, needs   Pt verbalized understanding

## 2018-06-18 ENCOUNTER — APPOINTMENT (OUTPATIENT)
Dept: LAB | Age: 63
End: 2018-06-18
Payer: COMMERCIAL

## 2018-06-18 DIAGNOSIS — R73.9 HYPERGLYCEMIA: ICD-10-CM

## 2018-06-18 LAB
ANION GAP SERPL CALCULATED.3IONS-SCNC: 7 MMOL/L (ref 4–13)
BUN SERPL-MCNC: 17 MG/DL (ref 5–25)
CALCIUM SERPL-MCNC: 8.7 MG/DL (ref 8.3–10.1)
CHLORIDE SERPL-SCNC: 106 MMOL/L (ref 100–108)
CO2 SERPL-SCNC: 27 MMOL/L (ref 21–32)
CREAT SERPL-MCNC: 0.93 MG/DL (ref 0.6–1.3)
EST. AVERAGE GLUCOSE BLD GHB EST-MCNC: 146 MG/DL
GFR SERPL CREATININE-BSD FRML MDRD: 88 ML/MIN/1.73SQ M
GLUCOSE P FAST SERPL-MCNC: 124 MG/DL (ref 65–99)
HBA1C MFR BLD: 6.7 % (ref 4.2–6.3)
POTASSIUM SERPL-SCNC: 4.1 MMOL/L (ref 3.5–5.3)
SODIUM SERPL-SCNC: 140 MMOL/L (ref 136–145)

## 2018-06-18 PROCEDURE — 36415 COLL VENOUS BLD VENIPUNCTURE: CPT

## 2018-06-18 PROCEDURE — 80048 BASIC METABOLIC PNL TOTAL CA: CPT

## 2018-06-18 PROCEDURE — 83036 HEMOGLOBIN GLYCOSYLATED A1C: CPT

## 2018-06-20 ENCOUNTER — TELEPHONE (OUTPATIENT)
Dept: RADIOLOGY | Facility: CLINIC | Age: 63
End: 2018-06-20

## 2018-06-20 ENCOUNTER — HOSPITAL ENCOUNTER (OUTPATIENT)
Dept: RADIOLOGY | Facility: CLINIC | Age: 63
Discharge: HOME | End: 2018-06-20
Payer: COMMERCIAL

## 2018-06-20 VITALS
SYSTOLIC BLOOD PRESSURE: 143 MMHG | DIASTOLIC BLOOD PRESSURE: 83 MMHG | TEMPERATURE: 97.3 F | OXYGEN SATURATION: 95 % | HEART RATE: 65 BPM | RESPIRATION RATE: 20 BRPM

## 2018-06-20 DIAGNOSIS — M46.1 SACROILIITIS (HCC): ICD-10-CM

## 2018-06-20 DIAGNOSIS — M47.816 LUMBAR SPONDYLOSIS: ICD-10-CM

## 2018-06-20 PROCEDURE — 64640 INJECTION TREATMENT OF NERVE: CPT | Performed by: ANESTHESIOLOGY

## 2018-06-20 PROCEDURE — 64635 DESTROY LUMB/SAC FACET JNT: CPT | Performed by: ANESTHESIOLOGY

## 2018-06-20 RX ORDER — BUPIVACAINE HYDROCHLORIDE 5 MG/ML
30 INJECTION, SOLUTION EPIDURAL; INTRACAUDAL ONCE
Status: COMPLETED | OUTPATIENT
Start: 2018-06-20 | End: 2018-06-20

## 2018-06-20 RX ORDER — LIDOCAINE HYDROCHLORIDE 10 MG/ML
10 INJECTION, SOLUTION EPIDURAL; INFILTRATION; INTRACAUDAL; PERINEURAL ONCE
Status: COMPLETED | OUTPATIENT
Start: 2018-06-20 | End: 2018-06-20

## 2018-06-20 RX ADMIN — LIDOCAINE HYDROCHLORIDE 2 ML: 20 INJECTION, SOLUTION EPIDURAL; INFILTRATION; INTRACAUDAL; PERINEURAL at 10:20

## 2018-06-20 RX ADMIN — BUPIVACAINE HYDROCHLORIDE 5 ML: 5 INJECTION, SOLUTION EPIDURAL; INTRACAUDAL at 10:24

## 2018-06-20 RX ADMIN — LIDOCAINE HYDROCHLORIDE 3 ML: 20 INJECTION, SOLUTION EPIDURAL; INFILTRATION; INTRACAUDAL; PERINEURAL at 10:20

## 2018-06-20 RX ADMIN — LIDOCAINE HYDROCHLORIDE 9 ML: 10 INJECTION, SOLUTION EPIDURAL; INFILTRATION; INTRACAUDAL; PERINEURAL at 10:16

## 2018-06-20 NOTE — H&P
History of Present Illness: The patient is a 66 y o  male who presents with complaints of low back pain  Patient Active Problem List   Diagnosis    Lumbago with sciatica, left side    Lumbar degenerative disc disease    Lumbar herniated disc    Lumbar radiculopathy    Lumbar spondylosis    Myofascial pain    Obesity    [facility]roiliitis ([facility] [address] [facility]    Hyperlipidemia    Diabetes ([facility] [address] )    Essential hypertension    AIDEE (obstructive sleep apnea)    Insomnia    Hypersomnia    Obesity (BMI 30-39  9)    Diabetes 1 5, 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[facility] [address] )       Past Medical History:   Diagnosis Date    Diabetes mellitus ([facility] [address] )     Headache     High cholesterol     Hypertension     Kidney stones     Sleep apnea        Past Surgical History:   Procedure Laterality Date    COLONOSCOPY N/A 11/22/2016    Procedure: COLONOSCOPY;  Surgeon: Christoph Cash MD;  [facility];   Service:     DEBRIDEMENT TENNIS ELBOW  2006    HERNIA REPAIR  2512    x2, umbilical and right side per patient   [address] [facility]    UVULOPALATOPHARYNGOPLASTY  2004         Current Outpatient Prescriptions:     acetaminophen (TYLENOL) 100 mg/mL solution, Take 10 mg/kg by mouth every 4 (four) hours as needed for fever, Disp: , Rfl:     JUAN MICROLET LANCETS lancets, by Does not apply route, Disp: , Rfl:     diclofenac sodium (VOLTAREN) 50 mg EC tablet, Take 1 tablet (50 mg total) by mouth 2 (two) times a day as needed (pain), Disp: 60 tablet, Rfl: 2    FARXIGA 5 MG TABS, Take 1 tablet (5 mg total) by mouth daily, Disp: 90 tablet, Rfl: 1    gabapentin (NEURONTIN) 400 mg capsule, Take 1 capsule (400 mg total) by mouth 3 (three) times a day, Disp: 90 capsule, Rfl: 2    glucose blood test strip, by In Vitro route daily, Disp: , Rfl:     lisinopril (ZESTRIL) 5 mg tablet, Take 1 tablet by mouth daily, Disp: , Rfl:     Current Facility-Administered Medications:     bupivacaine (PF) (MARCAINE) 0 5 % injection 30 mL, 30 mL, Injection, Once, Aldean Bolus, DO    lidocaine (PF) (XYLOCAINE-MPF) 1 % injection 10 mL, 10 mL, Intra-articular, Once, Jeramie Randolpht, DO    lidocaine (PF) (XYLOCAINE-MPF) 2 % injection 4 mL, 4 mL, Intra-articular, Once, Jeramie Randolpht, DO    Allergies   Allergen Reactions    Tramadol Itching       Physical Exam:   Vitals:    06/20/18 0926   BP: 133/84   Pulse: 66   Resp: 20   Temp: (!) 97 3 °F (36 3 °C)   SpO2: 96%     General: Awake, Alert, Oriented x 3, Mood and affect appropriate  Respiratory: Respirations even and unlabored  Cardiovascular: Peripheral pulses intact; no edema  Musculoskeletal Exam:  Left SI joint tender to palpation  ASA Score: 2    Patient/Chart Verification  Patient ID Verified: Verbal  ID Band Applied: No  Consents Confirmed: Procedural, To be obtained in the Pre-Procedure area  H&P( within 30 days) Verified: To be obtained in the Pre-Procedure area  Interval H&P(within 24 hr) Complete (required for Outpatients and Surgery Admit only): To be obtained in the Pre-Procedure area  Allergies Reviewed: Yes  Anticoag/NSAID held?: No  Currently on antibiotics?: No    Assessment:   1  Sacroiliitis ([facility] [address] )    2   Lumbar spondylosis        Plan: LT L4, L5 MB & S1, S2, S3 LB RFA

## 2018-06-20 NOTE — TELEPHONE ENCOUNTER
To be called on 6/21/18  S/P Lt L4 & L5 MB & S1,S2,S3 LB RFA on 6/20/18 with OXANA  Pt is scheduled on 7/11/18 with JW

## 2018-06-20 NOTE — DISCHARGE INSTRUCTIONS

## 2018-06-21 ENCOUNTER — OFFICE VISIT (OUTPATIENT)
Dept: INTERNAL MEDICINE CLINIC | Facility: CLINIC | Age: 63
End: 2018-06-21
Payer: COMMERCIAL

## 2018-06-21 VITALS
BODY MASS INDEX: 36.06 KG/M2 | HEART RATE: 75 BPM | DIASTOLIC BLOOD PRESSURE: 90 MMHG | RESPIRATION RATE: 16 BRPM | HEIGHT: 75 IN | TEMPERATURE: 97.6 F | SYSTOLIC BLOOD PRESSURE: 130 MMHG | WEIGHT: 290 LBS | OXYGEN SATURATION: 95 %

## 2018-06-21 DIAGNOSIS — E13.9 DIABETES 1.5, MANAGED AS TYPE 2 (HCC): ICD-10-CM

## 2018-06-21 DIAGNOSIS — M51.36 LUMBAR DEGENERATIVE DISC DISEASE: ICD-10-CM

## 2018-06-21 DIAGNOSIS — E11.9 TYPE 2 DIABETES MELLITUS WITHOUT COMPLICATION, WITHOUT LONG-TERM CURRENT USE OF INSULIN (HCC): ICD-10-CM

## 2018-06-21 DIAGNOSIS — M46.1 SACROILIITIS (HCC): ICD-10-CM

## 2018-06-21 DIAGNOSIS — I10 ESSENTIAL HYPERTENSION: ICD-10-CM

## 2018-06-21 DIAGNOSIS — G47.33 OSA (OBSTRUCTIVE SLEEP APNEA): ICD-10-CM

## 2018-06-21 DIAGNOSIS — M54.16 LUMBAR RADICULOPATHY: ICD-10-CM

## 2018-06-21 DIAGNOSIS — Z12.11 COLON CANCER SCREENING: ICD-10-CM

## 2018-06-21 DIAGNOSIS — M51.26 LUMBAR HERNIATED DISC: ICD-10-CM

## 2018-06-21 DIAGNOSIS — Z12.5 PROSTATE CANCER SCREENING: Primary | ICD-10-CM

## 2018-06-21 DIAGNOSIS — M47.816 LUMBAR SPONDYLOSIS: ICD-10-CM

## 2018-06-21 DIAGNOSIS — E78.01 FAMILIAL HYPERCHOLESTEROLEMIA: ICD-10-CM

## 2018-06-21 PROCEDURE — 99214 OFFICE O/P EST MOD 30 MIN: CPT | Performed by: INTERNAL MEDICINE

## 2018-06-21 NOTE — TELEPHONE ENCOUNTER
Pt returned call and states he is doing ok  Still taking the pain meds  Pt can be reached at [phone]

## 2018-06-21 NOTE — TELEPHONE ENCOUNTER
S/w the patient today and he stated he is feeling better, a little sore, bandaid's are off and reviewed the postop instructions

## 2018-06-22 NOTE — PROGRESS NOTES
Diabetic Foot Exam    Patient's shoes and socks removed  Right Foot/Ankle   Right Foot Inspection  Skin Exam: skin normal and skin intact no dry skin, no warmth, no callus, no erythema, no maceration, no abnormal color, no pre-ulcer, no ulcer and no callus                          Toe Exam: ROM and strength within normal limits and swelling (Trace edema)no tenderness and  no right toe deformity  Sensory   Vibration: diminished  Proprioception: diminished   Monofilament testing: diminished  Vascular  Capillary refills: < 3 seconds  The right DP pulse is 2+  The right PT pulse is 2+  Left Foot/Ankle  Left Foot Inspection  Skin Exam: skin normal and skin intactno dry skin, no warmth, no erythema, no maceration, normal color, no pre-ulcer, no ulcer and no callus                         Toe Exam: ROM and strength within normal limits and swelling ( trace edema)no tenderness, no erythema and no left toe deformity                   Sensory   Vibration: diminished  Proprioception: diminished  Monofilament: diminished  Vascular  Capillary refills: < 3 seconds  The left DP pulse is 2+  The left PT pulse is 2+  Assign Risk Category:  No deformity present; Loss of protective sensation;  No weak pulses       Risk: 1

## 2018-06-22 NOTE — PROGRESS NOTES
Assessment/Plan:    Essential hypertension  Hypertension  Has noted patient's blood pressure is under relatively good control other than a diastolic blood pressure of 90  Patient is continuing to take his ACE-inhibitor on a daily basis and we discussed with him increasing the dose  We have had trouble with increasing dosage of medication in the past with actually problems with hypotension  Patient at this point will continue present medication and we will check full labs with his next visit and consider some adjustment with treatment when he returns in 4 months for physical     Diabetes ([facility] [address] )  Lab Results   Component Value Date    HGBA1C 6 7 (H) 06/18/2018       No results for input(s): POCGLU in the last 72 hours  Blood Sugar Average: Last 72 hrs:   diabetes mellitus type 2  As noted patient's hemoglobin A1c is stable at 6 7  Patient is still not lost any weight since his last visit and admits the fact that he is not compliant 100% with his diet  We did discuss with the patient the importance of decreasing his caloric intake on a daily basis, avoiding concentrated sweets and simple carbohydrates  Patient because he has less problems with back pain and discomfort in his more ambulatory we also discussed with him increasing his exercise tolerance  We will check a fasting blood sugar, hemoglobin A1c with his next visit  Patient on exam does have a diabetic neuropathy which is longstanding but not progressed  AIDEE (obstructive sleep apnea)  Patient was told if he has any problems with the device and compliance to please call us or his sleep specialist for further evaluation Obstructive sleep apnea  Patient has been compliant with using his CPAP on a nightly basis  Patient again understands the importance of using this device in order to help with his low oxygen saturation at night      Lumbar degenerative disc disease  Lumbar degenerative disc disease with a history of lumbar disc herniation, spondylosis, sacroiliitis  Patient continues to follow-up with pain management and states that the therapies that he is receiving from them have been therapeutic and he is having less difficulty with low back pain  He is pleased with the results and states that he is beginning to be more active physically which should help with his overall health and welfare  Hyperlipidemia  Hyperlipidemia  Again we discussed with the patient today the importance of diet, avoiding fats and cholesterol in his diet  Patient was given a slip to check on a lipid profile when he returns in 4 months for physical   If patient does not show significant improvement we will need to place him on a statin drug in order to help keep his cholesterol under control especially in light of history of hypertension and diabetes  Diagnoses and all orders for this visit:    Prostate cancer screening  -     PSA, Total Screen; Future    Type 2 diabetes mellitus without complication, without long-term current use of insulin (HCC)  -     Comprehensive metabolic panel; Future  -     CBC and differential; Future  -     Lipid panel; Future  -     TSH, 3rd generation with T4 reflex; Future    Diabetes 1 5, 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[facility] [address] )  -     Hemoglobin A1C; Future    AIDEE (obstructive sleep apnea)    Essential hypertension  -     Comprehensive metabolic panel; Future  -     CBC and differential; Future  -     Lipid panel; Future  -     TSH, 3rd generation with T4 reflex; Future    Lumbar radiculopathy    Lumbar degenerative disc disease    Lumbar herniated disc    Lumbar spondylosis    Sacroiliitis (HCC)    Colon cancer screening  -     Fecal Globin By Immunochemistry; Future    Familial hypercholesterolemia          Subjective:      Patient ID: Tirso Melton is a 66 y o  male      Patient is a 72-year-old male with a history of hypertension, hyperlipidemia, diabetes mellitus type 2, exogenous obesity, chronic low back pain with a history of disc herniation, lumbago  Patient is here today for routine follow-up  We did discuss the results of recent lab tests showing relatively good control of his blood sugars with present treatment  Patient has been continuing to follow-up with pain management and states that he has less discomfort in low back and is more mobile  He still has not lost any weight since his last visit and we did discuss the importance of weight loss in order to help him medically with his diabetes, hypertension, cholesterol and degenerative arthritis  The following portions of the patient's history were reviewed and updated as appropriate:   He  has a past medical history of Diabetes mellitus ([facility] ); Headache; High cholesterol; Hypertension; Kidney stones; and Sleep apnea  He   Patient Active Problem List    Diagnosis Date Noted    AIDEE (obstructive sleep apnea) 03/05/2018    Insomnia 03/05/2018    Hypersomnia 03/05/2018    Obesity (BMI 30-39 9) 03/05/2018    Diabetes 1 5, 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[place] [address] ) 03/05/2018    Sacroiliitis ([place] [address] ) 02/06/2018    Lumbar degenerative disc disease 10/23/2017    Lumbar herniated disc 10/23/2017    Lumbar spondylosis 10/23/2017    Lumbar radiculopathy 08/29/2017    Myofascial pain 08/29/2017    Lumbago with sciatica, left side 07/31/2017    Diabetes ([facility] ) 09/13/2016    Obesity 06/15/2012    Hyperlipidemia 06/15/2012    Essential hypertension 06/15/2012     He  has a past surgical history that includes Nasal septum surgery (1980); Uvulopalatopharyngoplasty (2004); Debridement tennis elbow (2006); Hernia repair (2011); and Colonoscopy (N/A, 11/22/2016)  His Family history is unknown by patient  He  reports that he has never smoked  He has never used smokeless tobacco  He reports that he drinks alcohol  He reports that he does not use drugs    Current Outpatient Prescriptions   Medication Sig Dispense Refill    acetaminophen (TYLENOL) 325 mg tablet Take 10 mg/kg by mouth every 4 (four) hours as needed for fever      JUAN MICROLET LANCETS lancets by Does not apply route      diclofenac sodium (VOLTAREN) 50 mg EC tablet Take 1 tablet (50 mg total) by mouth 2 (two) times a day as needed (pain) 60 tablet 2    FARXIGA 5 MG TABS Take 1 tablet (5 mg total) by mouth daily 90 tablet 1    gabapentin (NEURONTIN) 400 mg capsule Take 1 capsule (400 mg total) by mouth 3 (three) times a day 90 capsule 2    glucose blood test strip by In Vitro route daily      lisinopril (ZESTRIL) 5 mg tablet Take 1 tablet by mouth daily       No current facility-administered medications for this visit  Current Outpatient Prescriptions on File Prior to Visit   Medication Sig    acetaminophen (TYLENOL) 325 mg tablet Take 10 mg/kg by mouth every 4 (four) hours as needed for fever    JUAN MICROLET LANCETS lancets by Does not apply route    diclofenac sodium (VOLTAREN) 50 mg EC tablet Take 1 tablet (50 mg total) by mouth 2 (two) times a day as needed (pain)    FARXIGA 5 MG TABS Take 1 tablet (5 mg total) by mouth daily    gabapentin (NEURONTIN) 400 mg capsule Take 1 capsule (400 mg total) by mouth 3 (three) times a day    glucose blood test strip by In Vitro route daily    lisinopril (ZESTRIL) 5 mg tablet Take 1 tablet by mouth daily     No current facility-administered medications on file prior to visit  He is allergic to tramadol       Review of Systems   Constitutional: Positive for activity change (Patient states he is more physically active now that he is having less problems with his low back pain  )  Negative for appetite change, chills, diaphoresis, fatigue, fever and unexpected weight change  HENT: Negative  Eyes: Negative  Respiratory: Negative  Cardiovascular: Positive for leg swelling ( some chronic edema bilateral lower extremities which is worse by the into the day but no increase)  Negative for chest pain  Gastrointestinal: Negative  Endocrine: Negative  Genitourinary: Negative  Musculoskeletal: Positive for arthralgias and back pain ( some appreciable improvement in his chronic low back pain with therapy that is been initiated with pain management)  Skin: Negative  Allergic/Immunologic: Negative  Neurological: Positive for numbness  Negative for dizziness, tremors, seizures, syncope, facial asymmetry, speech difficulty, weakness, light-headedness and headaches  Patient does have a history of a peripheral neuropathy been even though he does have diabetes this neuropathy was present before that diagnosis was made  At this point we will state that this is idiopathic and not specifically from his diabetes although this can be a contributing factor   Hematological: Negative  Psychiatric/Behavioral: Negative  Objective:      /90 (BP Location: Left arm, Patient Position: Sitting, Cuff Size: Standard)   Pulse 75   Temp 97 6 °F (36 4 °C)   Resp 16   Ht 6' 3" (1 905 m)   Wt 132 kg (290 lb)   SpO2 95%   BMI 36 25 kg/m²          Physical Exam   Constitutional: He is oriented to person, place, and time  He appears well-developed and well-nourished  No distress  Very pleasant, cheerful, overweight 72-year-old male who is awake alert in no acute distress and oriented x3   HENT:   Head: Normocephalic and atraumatic  Right Ear: External ear normal    Left Ear: External ear normal    Nose: Nose normal    Mouth/Throat: Oropharynx is clear and moist  No oropharyngeal exudate  Eyes: Conjunctivae and EOM are normal  Pupils are equal, round, and reactive to light  Right eye exhibits no discharge  Left eye exhibits no discharge  No scleral icterus  Neck: Normal range of motion  Neck supple  No JVD present  No tracheal deviation present  No thyromegaly present  Very thick neck but full range of motion both passively and actively without pain or discomfort   Cardiovascular: Normal rate, regular rhythm, normal heart sounds and intact distal pulses    Exam reveals no gallop and no friction rub  No murmur heard  Pulmonary/Chest: Effort normal and breath sounds normal  No stridor  No respiratory distress  He has no wheezes  He has no rales  He exhibits no tenderness  Abdominal: Soft  Bowel sounds are normal  He exhibits no distension and no mass  There is no tenderness  There is no rebound and no guarding  Abdomen is morbidly obese soft nontender with positive bowel sounds x4 quadrants  No organomegaly could be detected on exam today   Musculoskeletal: He exhibits edema, tenderness and deformity  Patient has a flattening of his lumbar curve with slight tenderness to palpation to the SI joint on the left  Patient has no aggravation of pain with passive and active range of motion but these were both decreased and evaluation to the lumbar spine throughout  Lymphadenopathy:     He has no cervical adenopathy  Neurological: He is alert and oriented to person, place, and time  He displays abnormal reflex  No cranial nerve deficit  He exhibits normal muscle tone  Coordination normal    Patient has peripheral neuropathy with paresthesia bilateral lower extremities  This is not progress since his last visit  Patient has normal tone and strength and absent deep tender reflexes to the Achilles and patella tendons   Skin: Skin is warm and dry  No rash noted  He is not diaphoretic  No erythema  No pallor  Psychiatric: He has a normal mood and affect  His behavior is normal  Judgment and thought content normal    Nursing note and vitals reviewed

## 2018-06-22 NOTE — ASSESSMENT & PLAN NOTE
Lab Results   Component Value Date    HGBA1C 6 7 (H) 06/18/2018       No results for input(s): POCGLU in the last 72 hours  Blood Sugar Average: Last 72 hrs:   diabetes mellitus type 2  As noted patient's hemoglobin A1c is stable at 6 7  Patient is still not lost any weight since his last visit and admits the fact that he is not compliant 100% with his diet  We did discuss with the patient the importance of decreasing his caloric intake on a daily basis, avoiding concentrated sweets and simple carbohydrates  Patient because he has less problems with back pain and discomfort in his more ambulatory we also discussed with him increasing his exercise tolerance  We will check a fasting blood sugar, hemoglobin A1c with his next visit  Patient on exam does have a diabetic neuropathy which is longstanding but not progressed

## 2018-06-22 NOTE — ASSESSMENT & PLAN NOTE
Patient was told if he has any problems with the device and compliance to please call us or his sleep specialist for further evaluation Obstructive sleep apnea  Patient has been compliant with using his CPAP on a nightly basis  Patient again understands the importance of using this device in order to help with his low oxygen saturation at night

## 2018-06-22 NOTE — ASSESSMENT & PLAN NOTE
Lumbar degenerative disc disease with a history of lumbar disc herniation, spondylosis, sacroiliitis  Patient continues to follow-up with pain management and states that the therapies that he is receiving from them have been therapeutic and he is having less difficulty with low back pain  He is pleased with the results and states that he is beginning to be more active physically which should help with his overall health and welfare

## 2018-06-22 NOTE — ASSESSMENT & PLAN NOTE
Hypertension  Has noted patient's blood pressure is under relatively good control other than a diastolic blood pressure of 90  Patient is continuing to take his ACE-inhibitor on a daily basis and we discussed with him increasing the dose  We have had trouble with increasing dosage of medication in the past with actually problems with hypotension    Patient at this point will continue present medication and we will check full labs with his next visit and consider some adjustment with treatment when he returns in 4 months for physical

## 2018-06-22 NOTE — ASSESSMENT & PLAN NOTE
Hyperlipidemia  Again we discussed with the patient today the importance of diet, avoiding fats and cholesterol in his diet  Patient was given a slip to check on a lipid profile when he returns in 4 months for physical   If patient does not show significant improvement we will need to place him on a statin drug in order to help keep his cholesterol under control especially in light of history of hypertension and diabetes

## 2018-07-11 ENCOUNTER — HOSPITAL ENCOUNTER (OUTPATIENT)
Dept: RADIOLOGY | Facility: CLINIC | Age: 63
Discharge: HOME | End: 2018-07-11
Payer: COMMERCIAL

## 2018-07-11 ENCOUNTER — TELEPHONE (OUTPATIENT)
Dept: RADIOLOGY | Facility: CLINIC | Age: 63
End: 2018-07-11

## 2018-07-11 VITALS
DIASTOLIC BLOOD PRESSURE: 84 MMHG | OXYGEN SATURATION: 96 % | RESPIRATION RATE: 20 BRPM | TEMPERATURE: 97.3 F | HEART RATE: 65 BPM | SYSTOLIC BLOOD PRESSURE: 122 MMHG

## 2018-07-11 DIAGNOSIS — M46.1 SACROILIITIS (HCC): ICD-10-CM

## 2018-07-11 DIAGNOSIS — M47.816 LUMBAR SPONDYLOSIS: ICD-10-CM

## 2018-07-11 PROCEDURE — 64635 DESTROY LUMB/SAC FACET JNT: CPT | Performed by: ANESTHESIOLOGY

## 2018-07-11 PROCEDURE — 64636 DESTROY L/S FACET JNT ADDL: CPT | Performed by: ANESTHESIOLOGY

## 2018-07-11 RX ORDER — BUPIVACAINE HYDROCHLORIDE 5 MG/ML
30 INJECTION, SOLUTION EPIDURAL; INTRACAUDAL ONCE
Status: COMPLETED | OUTPATIENT
Start: 2018-07-11 | End: 2018-07-11

## 2018-07-11 RX ORDER — LIDOCAINE HYDROCHLORIDE 10 MG/ML
10 INJECTION, SOLUTION EPIDURAL; INFILTRATION; INTRACAUDAL; PERINEURAL ONCE
Status: COMPLETED | OUTPATIENT
Start: 2018-07-11 | End: 2018-07-11

## 2018-07-11 RX ADMIN — LIDOCAINE HYDROCHLORIDE 5 ML: 20 INJECTION, SOLUTION EPIDURAL; INFILTRATION; INTRACAUDAL; PERINEURAL at 08:25

## 2018-07-11 RX ADMIN — LIDOCAINE HYDROCHLORIDE 10 ML: 10 INJECTION, SOLUTION EPIDURAL; INFILTRATION; INTRACAUDAL; PERINEURAL at 08:20

## 2018-07-11 RX ADMIN — BUPIVACAINE HYDROCHLORIDE 5 ML: 5 INJECTION, SOLUTION EPIDURAL; INTRACAUDAL at 08:30

## 2018-07-11 NOTE — TELEPHONE ENCOUNTER
To be called on 7/12/18  S/P Right L4,L5,MB & S1,S2,S3, LB RFA on 7/11/18 with OXANA  Patient is scheduled on 8/9/18  (4 week) f/u with OXANA

## 2018-07-11 NOTE — H&P
History of Present Illness: The patient is a 66 y o  male who presents with complaints of low back pain  Patient Active Problem List   Diagnosis    Lumbago with sciatica, left side    Lumbar degenerative disc disease    Lumbar herniated disc    Lumbar radiculopathy    Lumbar spondylosis    Myofascial pain    Obesity    [facility]roili[facility] [address] )    Hyperlipidemia    Diabetes ([facility] [address] )    Essential hypertension    AIDEE (obstructive sleep apnea)    Insomnia    Hypersomnia    Obesity (BMI 30-39  9)    Diabetes 1 5, 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[facility] [address] )       Past Medical History:   Diagnosis Date    Diabetes mellitus ([facility] [address] )     Headache     High cholesterol     Hypertension     Kidney stones     Sleep apnea        Past Surgical History:   Procedure Laterality Date    COLONOSCOPY N/A 11/22/2016    Procedure: COLONOSCOPY;  Surgeon: Kay Escoto MD;  Location: [facility];   Service:     DEBRIDEMENT TENNIS ELBOW  2006    HERNIA REPAIR  4564    x2, umbilical and right side per patient   [address]    UVULOPALATOPHARYNGOPLASTY  2004         Current Outpatient Prescriptions:     acetaminophen (TYLENOL) 325 mg tablet, Take 10 mg/kg by mouth every 4 (four) hours as needed for fever, Disp: , Rfl:     JUAN MICROLET LANCETS lancets, by Does not apply route, Disp: , Rfl:     diclofenac sodium (VOLTAREN) 50 mg EC tablet, Take 1 tablet (50 mg total) by mouth 2 (two) times a day as needed (pain), Disp: 60 tablet, Rfl: 2    FARXIGA 5 MG TABS, Take 1 tablet (5 mg total) by mouth daily, Disp: 90 tablet, Rfl: 1    gabapentin (NEURONTIN) 400 mg capsule, Take 1 capsule (400 mg total) by mouth 3 (three) times a day, Disp: 90 capsule, Rfl: 2    glucose blood test strip, by In Vitro route daily, Disp: , Rfl:     lisinopril (ZESTRIL) 5 mg tablet, Take 1 tablet by mouth daily, Disp: , Rfl:     Current Facility-Administered Medications:     bupivacaine (PF) (MARCAINE) 0 5 % injection 30 mL, 30 mL, Injection, Once, Jordan Godoy, DO    lidocaine (PF) (XYLOCAINE-MPF) 1 % injection 10 mL, 10 mL, Intra-articular, Once, Edgerton Ramming, DO    lidocaine (PF) (XYLOCAINE-MPF) 2 % injection 6 mL, 6 mL, Intra-articular, Once, Jeramie Kitchen, DO    Allergies   Allergen Reactions    Tramadol Itching       Physical Exam:   Vitals:    07/11/18 0753   BP: 122/74   Pulse: 67   Resp: 20   Temp: (!) 97 3 °F (36 3 °C)   SpO2: 96%     General: Awake, Alert, Oriented x 3, Mood and affect appropriate  Respiratory: Respirations even and unlabored  Cardiovascular: Peripheral pulses intact; no edema  Musculoskeletal Exam: Right SI joint tender to palpation    ASA Score: 2    Patient/Chart Verification  Patient ID Verified: Verbal  ID Band Applied: No  Consents Confirmed: Procedural, To be obtained in the Pre-Procedure area  H&P( within 30 days) Verified: To be obtained in the Pre-Procedure area  Interval H&P(within 24 hr) Complete (required for Outpatients and Surgery Admit only): To be obtained in the Pre-Procedure area  Allergies Reviewed: Yes  Anticoag/NSAID held?: No  Currently on antibiotics?: No    Assessment:   1  Sacroiliitis ([facility] [address] )    2   Lumbar spondylosis        Plan: RT L4, L5 MB & S1, S2, S3 LB RFA

## 2018-07-11 NOTE — DISCHARGE INSTRUCTIONS

## 2018-07-12 NOTE — TELEPHONE ENCOUNTER
S/W pt  Pt stated needle sites look good and there are no problems, denies S&S of infection, denies fevers and denies sun burn like sensation  Pt stated he was a little sore yesterday afternoon and he has no soreness now  Advised him if he gets pain to take his prescribed or OTC pain medications and/or use ice/heat and that it takes 4 to 6 weeks to see the full effect  Confirmed next appt w/ pt  Pt verbalized understanding

## 2018-07-14 ENCOUNTER — APPOINTMENT (OUTPATIENT)
Dept: LAB | Age: 63
End: 2018-07-14
Payer: COMMERCIAL

## 2018-07-14 ENCOUNTER — TRANSCRIBE ORDERS (OUTPATIENT)
Dept: ADMINISTRATIVE | Age: 63
End: 2018-07-14

## 2018-07-14 DIAGNOSIS — Z00.8 HEALTH EXAMINATION IN POPULATION SURVEY: ICD-10-CM

## 2018-07-14 DIAGNOSIS — Z00.8 HEALTH EXAMINATION IN POPULATION SURVEY: Primary | ICD-10-CM

## 2018-07-14 LAB
CHOLEST SERPL-MCNC: 288 MG/DL (ref 50–200)
EST. AVERAGE GLUCOSE BLD GHB EST-MCNC: 146 MG/DL
HBA1C MFR BLD: 6.7 % (ref 4.2–6.3)
HDLC SERPL-MCNC: 53 MG/DL (ref 40–60)
LDLC SERPL CALC-MCNC: 205 MG/DL (ref 0–100)
NONHDLC SERPL-MCNC: 235 MG/DL
TRIGL SERPL-MCNC: 148 MG/DL

## 2018-07-14 PROCEDURE — 36415 COLL VENOUS BLD VENIPUNCTURE: CPT

## 2018-07-14 PROCEDURE — 83036 HEMOGLOBIN GLYCOSYLATED A1C: CPT

## 2018-07-14 PROCEDURE — 80061 LIPID PANEL: CPT

## 2018-07-21 DIAGNOSIS — M46.1 SACROILIITIS (HCC): ICD-10-CM

## 2018-07-21 DIAGNOSIS — M54.16 LUMBAR RADICULOPATHY: ICD-10-CM

## 2018-07-23 RX ORDER — GABAPENTIN 400 MG/1
CAPSULE ORAL
Qty: 90 CAPSULE | Refills: 2 | OUTPATIENT
Start: 2018-07-23

## 2018-07-25 RX ORDER — GABAPENTIN 400 MG/1
400 CAPSULE ORAL 3 TIMES DAILY
Qty: 90 CAPSULE | Refills: 1 | Status: SHIPPED | OUTPATIENT
Start: 2018-07-25 | End: 2018-08-07 | Stop reason: SDUPTHER

## 2018-07-25 NOTE — TELEPHONE ENCOUNTER
LMOM for pt to C/B, C/B # provided      --when pt calls back need to ask him if he needs a refill of the Gabapentin also--

## 2018-07-25 NOTE — TELEPHONE ENCOUNTER
Pt contacted Call Center requested refill of their medication  Medication Name: Diclofenac Sodium      Dosage of Med: 50 mg      Frequency of Med: 1 tablet 2x daily      Remaining Medication: 3 or 4 tablets      Pharmacy and Location: [facility] on [address] [place] [address] Dr Saini can be reached at [phone]

## 2018-07-26 NOTE — TELEPHONE ENCOUNTER
Left detailed message on home/cell # as per release of health information, advising pt the same  C/B # provided for any questions

## 2018-08-07 ENCOUNTER — OFFICE VISIT (OUTPATIENT)
Dept: PAIN MEDICINE | Facility: CLINIC | Age: 63
End: 2018-08-07
Payer: COMMERCIAL

## 2018-08-07 VITALS
BODY MASS INDEX: 36.37 KG/M2 | SYSTOLIC BLOOD PRESSURE: 129 MMHG | DIASTOLIC BLOOD PRESSURE: 85 MMHG | HEART RATE: 66 BPM | WEIGHT: 291 LBS

## 2018-08-07 DIAGNOSIS — M51.26 LUMBAR HERNIATED DISC: ICD-10-CM

## 2018-08-07 DIAGNOSIS — M51.36 LUMBAR DEGENERATIVE DISC DISEASE: ICD-10-CM

## 2018-08-07 DIAGNOSIS — M54.16 LUMBAR RADICULOPATHY: ICD-10-CM

## 2018-08-07 DIAGNOSIS — M46.1 SACROILIITIS (HCC): ICD-10-CM

## 2018-08-07 DIAGNOSIS — M47.816 LUMBAR SPONDYLOSIS: Primary | ICD-10-CM

## 2018-08-07 PROCEDURE — 99213 OFFICE O/P EST LOW 20 MIN: CPT | Performed by: NURSE PRACTITIONER

## 2018-08-07 RX ORDER — GABAPENTIN 400 MG/1
400 CAPSULE ORAL 3 TIMES DAILY
Qty: 90 CAPSULE | Refills: 0 | Status: SHIPPED | OUTPATIENT
Start: 2018-08-07 | End: 2018-09-21 | Stop reason: SDUPTHER

## 2018-08-07 NOTE — PROGRESS NOTES
Pt is c/o lower back pain  Assessment:  1  Lumbar spondylosis    2  Lumbar radiculopathy    3  Sacroiliitis ([address] )    4  Lumbar herniated disc    5  Lumbar degenerative disc disease        Plan:  1  At this time, the patient is not even a month s/p right L4-5 medial branch and S1 to and 3 lateral branch RFA  He does continue to experience some discomfort in his low back, however he has received approximately 95% relief from the procedure in conjunction with the use of diclofenac  I did explain to the patient that the nerves may need some more time to fully degenerate before he feels full relief, but also did explain to the patient that our goal is 50% pain relief 50% of the time, using the least amount of medications as possible with the least amount of side effects  The patient was agreeable and verbalized an understanding  2   The patient will continue on gabapentin 400 mg t i d  as he feels this medication is providing significant relief of his radicular symptoms at this current dose  This medication was refilled at today's office visit  3   The patient can continue on diclofenac 50 mg b i d  as needed for pain  I did discuss with the patient to take this medication with food and to not use any other NSAIDs while on this medication  The patient verbalized understanding  This medication was refilled at today's office visit  4   Discussed with the patient the use of over-the-counter topical analgesics for his breakthrough pain  These include lidocaine 4% patches 12 hours on and 12 hours off, Aspercreme with lidocaine, Enrique-Wayne and Biofreeze which can be applied to the painful area 4 times a day as needed as he has received some relief of his low back pain from topical patches in the past   The patient was agreeable and verbalized an understanding  5   The patient can continue with Tylenol p r n , max 3 g daily    6   The patient will continue with his home exercise program   7   Will repeat left L4 and L5 TFESI prn, should patient's radiating leg pain return  8  The patient will follow-up in 8 weeks for medication prescription refill and reevaluation  The patient was advised to contact the office should their symptoms worsen in the interim  The patient was agreeable and verbalized an understanding  [place] Prescription Drug Monitoring Program report was reviewed and was appropriate     History of Present Illness: The patient is a 66 y o  male last seen on 4/20/18 who presents for a follow up office visit in regards to chronic pain secondary to lumbar degenerative disc disease, spondylosis, disc herniation at L4-5 with radiculopathy, and sacroiliitis  The patient has been evaluated by Dr Sameera Parra from neurosurgery in the past who did not feel surgery was warranted at that time and was to continue with conservative treatment options  Patient is status post left L4-5 medial branch and S1-S2 S3 lateral branch RFA on June 20, 2018 and right L4-5 medial branch and S1-S2 S3 lateral branch RFA on July 11, 2018  The patient has had bilateral SI joint injections that only provided relief for approximately 5-7 days  The patient currently reports that although he is still experiencing some discomfort in his bilateral axial low back, the pain is much improved, especially with the use of diclofenac  He does state however that his hopes were to discontinue the use of diclofenac after the RFA  The patient states, however, that he has been using his rowing machine frequently which he feels may be contributing to some of his low back pain  He denies any radiation into the bilateral lower extremities at this time  He denies any bowel or bladder incontinence or saddle anesthesia  At today's office visit, he rates his pain as 7/10 on the numeric pain rating scale  He states the pain is constant is most bothersome in the morning  He characterizes the pain as sharp, pressure like and shooting      Current pain medications includes:  Diclofenac 50 b i d  p r n pain and gabapentin 400 mg t i d   The patient reports that this regimen is providing 95% pain relief  The patient is reporting no side effects from this pain medication regimen  I have personally reviewed and/or updated the patient's past medical history, past surgical history, family history, social history, current medications, allergies, and vital signs today  Review of Systems:    Review of Systems   Respiratory: Positive for shortness of breath  Cardiovascular: Negative for chest pain  Gastrointestinal: Negative for constipation, diarrhea, nausea and vomiting  Musculoskeletal: Negative for arthralgias, gait problem, joint swelling and myalgias  Difficulty walking  Decreased rom   Skin: Negative for rash  Neurological: Negative for dizziness, seizures and weakness  Memory loss   All other systems reviewed and are negative  Past Medical History:   Diagnosis Date    [address] mellitus [address] [address] )     Headache     High cholesterol     Hypertension     Kidney stones     Sleep apnea        Past Surgical History:   Procedure Laterality Date    COLONOSCOPY N/A 11/22/2016    Procedure: COLONOSCOPY;  Surgeon: Unique Roque MD;  [facility]; Service:     DEBRIDEMENT TENNIS ELBOW  2006    HERNIA REPAIR  1033    x2, umbilical and right side per patient   [address]    UVULOPALATOPHARYNGOPLASTY  2004       Family History   Problem Relation Age of Onset    Family history unknown: Yes       Social History     Occupational History    Not on file       Social History Main Topics    Smoking status: Never Smoker    Smokeless tobacco: Never Used    Alcohol use Yes      Comment: rarely    Drug use: No    Sexual activity: Not on file         Current Outpatient Prescriptions:     acetaminophen (TYLENOL) 325 mg tablet, Take 10 mg/kg by mouth every 4 (four) hours as needed for fever, Disp: , Rfl:     JUAN MICROLET LANCETS lancets, by Does not apply route, Disp: , Rfl:     diclofenac sodium (VOLTAREN) 50 mg EC tablet, Take 1 tablet (50 mg total) by mouth 2 (two) times a day as needed (pain), Disp: 60 tablet, Rfl: 0    FARXIGA 5 MG TABS, Take 1 tablet (5 mg total) by mouth daily, Disp: 90 tablet, Rfl: 1    gabapentin (NEURONTIN) 400 mg capsule, Take 1 capsule (400 mg total) by mouth 3 (three) times a day, Disp: 90 capsule, Rfl: 0    glucose blood test strip, by In Vitro route daily, Disp: , Rfl:     lisinopril (ZESTRIL) 5 mg tablet, Take 1 tablet by mouth daily, Disp: , Rfl:     Allergies   Allergen Reactions    Tramadol Itching       Physical Exam:    /85   Pulse 66   Wt 132 kg (291 lb)   BMI 36 37 kg/m²     Constitutional:normal, well developed, well nourished, alert, in no distress and non-toxic and no overt pain behavior  Eyes:anicteric  HEENT:grossly intact  Neck:supple, symmetric, trachea midline and no masses   Pulmonary:even and unlabored  Cardiovascular:No edema or pitting edema present  Skin:Normal without rashes or lesions and well hydrated  Psychiatric:Mood and affect appropriate  Neurologic:Cranial Nerves II-XII grossly intact  Musculoskeletal:normal gait  Lumbar Spine Exam    Appearance:  Normal lordosis  Palpation/Tenderness:  left lumbar paraspinal tenderness  right lumbar paraspinal tenderness  Mild left sacroiliac joint tenderness  Mild right sacroiliac joint tenderness  Motor Strength:  Left hip flexion:  5/5  Right hip flexion:  5/5  Left knee flexion:  5/5  Left knee extension:  5/5  Right knee flexion:  5/5  Right knee extension:  5/5  Left foot dorsiflexion:  5/5  Left foot plantar flexion:  5/5  Right foot dorsiflexion:  5/5  Right foot plantar flexion:  5/5  Special Tests:  Negative seated straight leg raise bilaterally  Lumbar facet loading does reproduce the patient's pain complaint        Imaging reviewed  No orders to display       MRI lumbar spine wo contrast   Status: Final result   PACS Images     Show images for MRI lumbar spine wo contrast   Order Report      Order Details   Study Result     MRI LUMBAR SPINE WITHOUT CONTRAST     INDICATION:  Left leg pain and dysesthesia     COMPARISON:  None      TECHNIQUE:  Sagittal T1, sagittal T2, sagittal inversion recovery, axial T1 and axial T2, coronal T2        IMAGE QUALITY:  Diagnostic     FINDINGS:     ALIGNMENT:  Minor straightening of normal lumbar lordosis      MARROW SIGNAL:  Mild multifocal marrow edema on largely, the endplates affecting superior L3 endplate, and the opposing endplates at V1-9  Hemangioma L1      DISTAL CORD AND CONUS:  Normal size and signal within the distal cord and conus  The conus ends at the T12-L1 level      PARASPINAL SOFT TISSUES:  Paraspinal soft tissues are unremarkable      SACRUM:  Normal signal within the sacrum  No evidence of insufficiency or stress fracture      LOWER THORACIC DISC SPACES:  Normal disc height and signal   No disc herniation, canal stenosis or foraminal narrowing      LUMBAR DISC SPACES:          L1-L2:  Normal      L2-L3:  Reduction disc height, marginal osteophytes, facet are sinus  An evolving superior endplate Schmorl's node     L3-L4:  L3   Minor circumferential bulge, slight facet arthrosis     L4-L5:  Right greater than left facet arthrosis, circumferential bulge, reactive marrow edema, small left disc extrusion descending from the displacing contact with the L5 root  Correlate for left L5 radiculitis      L5-S1:  Circumferential bulging of this with marginal osteophytes  Minor loss of disc height and degenerative endplate changes      IMPRESSION:     A small disc extrusion descends on the left from the L4-L5 disc space  Correlate for left L5 radiculitis         Workstation performed: [id number]          No orders of the defined types were placed in this encounter

## 2018-08-14 DIAGNOSIS — I10 ESSENTIAL HYPERTENSION: Primary | ICD-10-CM

## 2018-08-14 PROCEDURE — 4010F ACE/ARB THERAPY RXD/TAKEN: CPT | Performed by: INTERNAL MEDICINE

## 2018-08-14 RX ORDER — LISINOPRIL 5 MG/1
TABLET ORAL
Qty: 90 TABLET | Refills: 2 | Status: SHIPPED | OUTPATIENT
Start: 2018-08-14 | End: 2019-04-26 | Stop reason: SDUPTHER

## 2018-09-10 ENCOUNTER — TELEPHONE (OUTPATIENT)
Dept: PAIN MEDICINE | Facility: CLINIC | Age: 63
End: 2018-09-10

## 2018-09-10 NOTE — TELEPHONE ENCOUNTER
Attempted to call the patient to acknowledge that we received his note via [employer] and left a detailed mom stating Melida Kang was out of the office and that the oncall physician would review  Please advise   thanks

## 2018-09-10 NOTE — TELEPHONE ENCOUNTER
----- Message from Renea Mcfadden sent at 9/8/2018 10:47 AM EDT -----  Regarding: Leroy Page, you have a [facility] Visit Summary [facility]  To view your summary pl  Contact: [phone]  Dr Nilda Camargo,  need some help! We did the oblation shots over 8 weeks ago  Over the last 3 weeks the pain has come back in a major way  The Diclofenac had been helping up until about 2 weeks ago  I am back to having real issues standing for very long  I have added lidocaine lotion to the mix and it's not helping either  The last two days I added an extra dose of the Diclofenac and it's provided no relief  My next appointment is 10/2 but I need something to  relieve this pain  I literally have to sit (for minimum pain) and lay down so it's bearable  Is there any treatment available until we decide next steps? Thanks  Vanessa Hubbard  ----- Message -----  From: Beena Quintanilla DO  Sent: 4/20/2018  9:00 AM EDT  To: Renea Mcfadden  Subject: Rocky Griffith, you have a new After Visit Summary in [address] [facility]  To view your summary please log into your   [employer] account by clicking here: [email or web address]  [address], you have a new after visit summary in [address]! Please click on visits and then your most recent appointment, to view your After Visit Summary

## 2018-09-21 ENCOUNTER — OFFICE VISIT (OUTPATIENT)
Dept: PAIN MEDICINE | Facility: CLINIC | Age: 63
End: 2018-09-21
Payer: COMMERCIAL

## 2018-09-21 VITALS
TEMPERATURE: 97.8 F | BODY MASS INDEX: 36.06 KG/M2 | SYSTOLIC BLOOD PRESSURE: 128 MMHG | HEIGHT: 75 IN | HEART RATE: 64 BPM | WEIGHT: 290 LBS | DIASTOLIC BLOOD PRESSURE: 88 MMHG

## 2018-09-21 DIAGNOSIS — G89.29 CHRONIC BILATERAL LOW BACK PAIN WITHOUT SCIATICA: Primary | ICD-10-CM

## 2018-09-21 DIAGNOSIS — M54.42 BILATERAL LOW BACK PAIN WITH LEFT-SIDED SCIATICA, UNSPECIFIED CHRONICITY: ICD-10-CM

## 2018-09-21 DIAGNOSIS — M54.50 CHRONIC BILATERAL LOW BACK PAIN WITHOUT SCIATICA: Primary | ICD-10-CM

## 2018-09-21 DIAGNOSIS — M79.18 MYOFASCIAL PAIN: ICD-10-CM

## 2018-09-21 DIAGNOSIS — M47.816 LUMBAR SPONDYLOSIS: ICD-10-CM

## 2018-09-21 DIAGNOSIS — M51.36 LUMBAR DEGENERATIVE DISC DISEASE: ICD-10-CM

## 2018-09-21 DIAGNOSIS — M51.26 LUMBAR HERNIATED DISC: ICD-10-CM

## 2018-09-21 DIAGNOSIS — M46.1 SACROILIITIS (HCC): ICD-10-CM

## 2018-09-21 DIAGNOSIS — M54.16 LUMBAR RADICULOPATHY: ICD-10-CM

## 2018-09-21 PROCEDURE — 99214 OFFICE O/P EST MOD 30 MIN: CPT | Performed by: NURSE PRACTITIONER

## 2018-09-21 RX ORDER — GABAPENTIN 400 MG/1
400 CAPSULE ORAL 3 TIMES DAILY
Qty: 90 CAPSULE | Refills: 2 | Status: SHIPPED | OUTPATIENT
Start: 2018-09-21 | End: 2018-12-17 | Stop reason: SDUPTHER

## 2018-09-21 NOTE — PROGRESS NOTES
Assessment:  1  Chronic bilateral low back pain without sciatica    2  Lumbar radiculopathy    3  Lumbar spondylosis    4  Sacroiliitis ([address] )    5  Lumbar herniated disc    6  Lumbar degenerative disc disease    7  Bilateral low back pain with left-sided sciatica, unspecified chronicity    8  Myofascial pain        Plan:  1  Patient continues to have bilateral back pain despite right L4-5 medial branch and S1-S3 lateral branch RFA on July 11, 2018  I offered an L5-S1 LESI in attempts to decrease the inflammatory component of the patient's low back pain, however he declines at this time and would like to try more conservative measures as he did not get much relief from previous epidural steroid injections in the past  Complete risks and benefits including bleeding, infection, tissue reaction, nerve injury and allergic reaction were discussed  The approach was demonstrated using models  2   I will order the patient aquatic physical therapy for his low back and myofascial pain complaints  3   Patient will continue on gabapentin 400 mg t i d  for his neuropathic pain complaints  This medication was refilled at today's office visit  4   The patient is no longer utilizing diclofenac 50 mg b i d p r n , therefore I will discontinue his medication at this time  5   The patient can continue wearing his back brace for lumbar support as he does feel that the brace gives him substantial relief of his low back pain  I instructed the patient to only wear it during active periods and to while he is relaxing or sleeping  The patient was agreeable and verbalized an understanding  6   Patient can continue with Tylenol p r n  max 3000 mg daily  7  The patient just recently joined a gym and plans to go at least 3 times a week  He will continue with his home exercise program   8   The patient will follow-up in 3 months for medication prescription refill and reevaluation   The patient was advised to contact the office should their symptoms worsen in the interim  The patient was agreeable and verbalized an understanding  [facility] Prescription Drug Monitoring Program report was reviewed and was appropriate     History of Present Illness: The patient is a 66 y o  male last seen on August 7, 2018 who presents for a follow up office visit in regards to chronic pain secondary to lumbar spondylosis sacroiliitis, lumbar degenerative disc disease, myofascial pain syndrome, lumbar radiculopathy, and chronic pain syndrome  The patient is status post left L4-5 medial branch and S1-3 lateral branch RFA on June 20, 2018 and right L4-5 medial branch and S1-3 lateral branch RFA on July 11, 2018 and reports that the RFA has not improved his bilateral low back pain complaints at this time  He continues with bilateral low back pain that does not radiate into the bilateral lower extremities at this time  He denies any bowel or bladder incontinence or saddle anesthesia  He has been seen and evaluated by Dr Donna Mitchell from Neurosurgery who did not feel he was a surgical candidate at that time  The patient reports that since our last office visit, he purchased a lumbar support back brace that he wears when he is active and feels like the brace essentially resolved his low back pain  He also recently joined a gym and is trying to lose weight and strengthen his core  He has not done physical therapy recently  He currently rates his pain a 5 to 7/10 on the numeric pain rating scale  He states his pain is constant in nature and bothersome throughout the entirety of the day, especially when changing positions  He characterizes the pain as dull aching, sharp, pressure like and pins and needles  Current pain medications includes:  Gabapentin 400 mg t i d      The patient reports that this regimen is providing 20% pain relief for his low back pain, however 100% relief of his lower extremity symptoms    The patient is reporting no side effects from this pain medication regimen  He also states that he has stopped taking the diclofenac since wearing the back brace and going to the gym since he did not feel the diclofenac was effective  I have personally reviewed and/or updated the patient's past medical history, past surgical history, family history, social history, current medications, allergies, and vital signs today  Review of Systems:    Review of Systems   Respiratory: Negative for shortness of breath  Cardiovascular: Negative for chest pain  Gastrointestinal: Negative for constipation, diarrhea, nausea and vomiting  Musculoskeletal: Negative for arthralgias, gait problem (Difficulty walking, decreased range of motion), joint swelling and myalgias  Joint stiffness   Skin: Negative for rash  Neurological: Negative for dizziness, seizures and weakness  All other systems reviewed and are negative  Past Medical History:   Diagnosis Date    Diabetes mellitus ([address] )     Headache     High cholesterol     Hypertension     Kidney stones     Sleep apnea        Past Surgical History:   Procedure Laterality Date    COLONOSCOPY N/A 11/22/2016    Procedure: COLONOSCOPY;  Surgeon: Molly Batista MD;  [facility]; Service:     DEBRIDEMENT TENNIS ELBOW  2006    HERNIA REPAIR  4514    x2, umbilical and right side per patient   [facility]    UVULOPALATOPHARYNGOPLASTY  2004       Family History   Problem Relation Age of Onset    Family history unknown: Yes       Social History     Occupational History    Not on file       Social History Main Topics    Smoking status: Never Smoker    Smokeless tobacco: Never Used    Alcohol use Yes      Comment: rarely    Drug use: No    Sexual activity: Not on file         Current Outpatient Prescriptions:     acetaminophen (TYLENOL) 325 mg tablet, Take 10 mg/kg by mouth every 4 (four) hours as needed for fever, Disp: , Rfl:    JUAN MICROLET LANCETS lancets, by Does not apply route, Disp: , Rfl:     diclofenac sodium (VOLTAREN) 50 mg EC tablet, Take 1 tablet (50 mg total) by mouth 2 (two) times a day as needed (pain), Disp: 60 tablet, Rfl: 0    FARXIGA 5 MG TABS, Take 1 tablet (5 mg total) by mouth daily, Disp: 90 tablet, Rfl: 1    gabapentin (NEURONTIN) 400 mg capsule, Take 1 capsule (400 mg total) by mouth 3 (three) times a day, Disp: 90 capsule, Rfl: 2    glucose blood test strip, by In Vitro route daily, Disp: , Rfl:     lisinopril (ZESTRIL) 5 mg tablet, TAKE 1 TABLET EVERY DAY, Disp: 90 tablet, Rfl: 2    Allergies   Allergen Reactions    Tramadol Itching       Physical Exam:    /88   Pulse 64   Temp 97 8 °F (36 6 °C)   Ht 6' 3" (1 905 m)   Wt 132 kg (290 lb)   BMI 36 25 kg/m²     Constitutional:Endomorphic body habitus  Eyes:anicteric  HEENT:grossly intact  Neck:supple, symmetric, trachea midline and no masses   Pulmonary:even and unlabored  Cardiovascular:No edema or pitting edema present  Skin:Normal without rashes or lesions and well hydrated  Psychiatric:Mood and affect appropriate  Neurologic:Cranial Nerves II-XII grossly intact  Musculoskeletal:normal gait  Bilateral lumbar paraspinal musculature tender to palpation  Bilateral lower extremity strength 5/5 in all muscle groups  Negative seated straight leg raise bilaterally  Imaging    MRI LUMBAR SPINE WITHOUT CONTRAST     INDICATION:  Left leg pain and dysesthesia     COMPARISON:  None      TECHNIQUE:  Sagittal T1, sagittal T2, sagittal inversion recovery, axial T1 and axial T2, coronal T2        IMAGE QUALITY:  Diagnostic     FINDINGS:     ALIGNMENT:  Minor straightening of normal lumbar lordosis      MARROW SIGNAL:  Mild multifocal marrow edema on largely, the endplates affecting superior L3 endplate, and the opposing endplates at P4-0  Hemangioma L1      DISTAL CORD AND CONUS:  Normal size and signal within the distal cord and conus    The conus ends at the T12-L1 level      PARASPINAL SOFT TISSUES:  Paraspinal soft tissues are unremarkable      SACRUM:  Normal signal within the sacrum  No evidence of insufficiency or stress fracture      LOWER THORACIC DISC SPACES:  Normal disc height and signal   No disc herniation, canal stenosis or foraminal narrowing      LUMBAR DISC SPACES:          L1-L2:  Normal      L2-L3:  Reduction disc height, marginal osteophytes, facet are sinus  An evolving superior endplate Schmorl's node     L3-L4:  L3   Minor circumferential bulge, slight facet arthrosis     L4-L5:  Right greater than left facet arthrosis, circumferential bulge, reactive marrow edema, small left disc extrusion descending from the displacing contact with the L5 root  Correlate for left L5 radiculitis      L5-S1:  Circumferential bulging of this with marginal osteophytes  Minor loss of disc height and degenerative endplate changes      IMPRESSION:     A small disc extrusion descends on the left from the L4-L5 disc space    Correlate for left L5 radiculitis          Orders Placed This Encounter   Procedures    Ambulatory referral to Physical Therapy

## 2018-10-03 ENCOUNTER — APPOINTMENT (OUTPATIENT)
Dept: LAB | Age: 63
End: 2018-10-03
Payer: COMMERCIAL

## 2018-10-03 DIAGNOSIS — I10 ESSENTIAL HYPERTENSION: ICD-10-CM

## 2018-10-03 DIAGNOSIS — E11.9 TYPE 2 DIABETES MELLITUS WITHOUT COMPLICATION, WITHOUT LONG-TERM CURRENT USE OF INSULIN (HCC): ICD-10-CM

## 2018-10-03 DIAGNOSIS — Z12.5 PROSTATE CANCER SCREENING: ICD-10-CM

## 2018-10-03 DIAGNOSIS — E13.9 DIABETES 1.5, MANAGED AS TYPE 2 (HCC): ICD-10-CM

## 2018-10-03 DIAGNOSIS — Z12.11 COLON CANCER SCREENING: ICD-10-CM

## 2018-10-03 LAB
ALBUMIN SERPL BCP-MCNC: 3.7 G/DL (ref 3.5–5)
ALP SERPL-CCNC: 50 U/L (ref 46–116)
ALT SERPL W P-5'-P-CCNC: 53 U/L (ref 12–78)
ANION GAP SERPL CALCULATED.3IONS-SCNC: 6 MMOL/L (ref 4–13)
AST SERPL W P-5'-P-CCNC: 20 U/L (ref 5–45)
BASOPHILS # BLD AUTO: 0.04 THOUSANDS/ΜL (ref 0–0.1)
BASOPHILS NFR BLD AUTO: 1 % (ref 0–1)
BILIRUB SERPL-MCNC: 0.68 MG/DL (ref 0.2–1)
BUN SERPL-MCNC: 18 MG/DL (ref 5–25)
CALCIUM SERPL-MCNC: 9 MG/DL (ref 8.3–10.1)
CHLORIDE SERPL-SCNC: 105 MMOL/L (ref 100–108)
CHOLEST SERPL-MCNC: 223 MG/DL (ref 50–200)
CO2 SERPL-SCNC: 27 MMOL/L (ref 21–32)
CREAT SERPL-MCNC: 0.86 MG/DL (ref 0.6–1.3)
EOSINOPHIL # BLD AUTO: 0.1 THOUSAND/ΜL (ref 0–0.61)
EOSINOPHIL NFR BLD AUTO: 1 % (ref 0–6)
ERYTHROCYTE [DISTWIDTH] IN BLOOD BY AUTOMATED COUNT: 13.7 % (ref 11.6–15.1)
EST. AVERAGE GLUCOSE BLD GHB EST-MCNC: 146 MG/DL
GFR SERPL CREATININE-BSD FRML MDRD: 94 ML/MIN/1.73SQ M
GLUCOSE P FAST SERPL-MCNC: 111 MG/DL (ref 65–99)
HBA1C MFR BLD: 6.7 % (ref 4.2–6.3)
HCT VFR BLD AUTO: 47.9 % (ref 36.5–49.3)
HDLC SERPL-MCNC: 51 MG/DL (ref 40–60)
HEMOCCULT STL QL IA: POSITIVE
HGB BLD-MCNC: 16.5 G/DL (ref 12–17)
IMM GRANULOCYTES # BLD AUTO: 0.03 THOUSAND/UL (ref 0–0.2)
IMM GRANULOCYTES NFR BLD AUTO: 0 % (ref 0–2)
LDLC SERPL CALC-MCNC: 151 MG/DL (ref 0–100)
LYMPHOCYTES # BLD AUTO: 2.11 THOUSANDS/ΜL (ref 0.6–4.47)
LYMPHOCYTES NFR BLD AUTO: 30 % (ref 14–44)
MCH RBC QN AUTO: 28.9 PG (ref 26.8–34.3)
MCHC RBC AUTO-ENTMCNC: 34.4 G/DL (ref 31.4–37.4)
MCV RBC AUTO: 84 FL (ref 82–98)
MONOCYTES # BLD AUTO: 0.65 THOUSAND/ΜL (ref 0.17–1.22)
MONOCYTES NFR BLD AUTO: 9 % (ref 4–12)
NEUTROPHILS # BLD AUTO: 4.2 THOUSANDS/ΜL (ref 1.85–7.62)
NEUTS SEG NFR BLD AUTO: 59 % (ref 43–75)
NONHDLC SERPL-MCNC: 172 MG/DL
NRBC BLD AUTO-RTO: 0 /100 WBCS
PLATELET # BLD AUTO: 191 THOUSANDS/UL (ref 149–390)
PMV BLD AUTO: 10.2 FL (ref 8.9–12.7)
POTASSIUM SERPL-SCNC: 4 MMOL/L (ref 3.5–5.3)
PROT SERPL-MCNC: 7 G/DL (ref 6.4–8.2)
PSA SERPL-MCNC: 0.9 NG/ML (ref 0–4)
RBC # BLD AUTO: 5.71 MILLION/UL (ref 3.88–5.62)
SODIUM SERPL-SCNC: 138 MMOL/L (ref 136–145)
TRIGL SERPL-MCNC: 107 MG/DL
TSH SERPL DL<=0.05 MIU/L-ACNC: 2.96 UIU/ML (ref 0.36–3.74)
WBC # BLD AUTO: 7.13 THOUSAND/UL (ref 4.31–10.16)

## 2018-10-03 PROCEDURE — 36415 COLL VENOUS BLD VENIPUNCTURE: CPT

## 2018-10-03 PROCEDURE — 80061 LIPID PANEL: CPT

## 2018-10-03 PROCEDURE — 85025 COMPLETE CBC W/AUTO DIFF WBC: CPT

## 2018-10-03 PROCEDURE — G0103 PSA SCREENING: HCPCS

## 2018-10-03 PROCEDURE — 84443 ASSAY THYROID STIM HORMONE: CPT

## 2018-10-03 PROCEDURE — G0328 FECAL BLOOD SCRN IMMUNOASSAY: HCPCS

## 2018-10-03 PROCEDURE — 80053 COMPREHEN METABOLIC PANEL: CPT

## 2018-10-03 PROCEDURE — 83036 HEMOGLOBIN GLYCOSYLATED A1C: CPT

## 2018-10-09 ENCOUNTER — OFFICE VISIT (OUTPATIENT)
Dept: INTERNAL MEDICINE CLINIC | Facility: CLINIC | Age: 63
End: 2018-10-09
Payer: COMMERCIAL

## 2018-10-09 ENCOUNTER — TELEPHONE (OUTPATIENT)
Dept: PAIN MEDICINE | Facility: CLINIC | Age: 63
End: 2018-10-09

## 2018-10-09 VITALS
HEIGHT: 75 IN | RESPIRATION RATE: 14 BRPM | WEIGHT: 288 LBS | BODY MASS INDEX: 35.81 KG/M2 | DIASTOLIC BLOOD PRESSURE: 78 MMHG | SYSTOLIC BLOOD PRESSURE: 126 MMHG | OXYGEN SATURATION: 97 % | HEART RATE: 66 BPM | TEMPERATURE: 95.6 F

## 2018-10-09 DIAGNOSIS — E66.09 CLASS 2 OBESITY DUE TO EXCESS CALORIES WITHOUT SERIOUS COMORBIDITY WITH BODY MASS INDEX (BMI) OF 38.0 TO 38.9 IN ADULT: ICD-10-CM

## 2018-10-09 DIAGNOSIS — M51.36 LUMBAR DEGENERATIVE DISC DISEASE: ICD-10-CM

## 2018-10-09 DIAGNOSIS — E11.9 TYPE 2 DIABETES MELLITUS WITHOUT COMPLICATION, WITHOUT LONG-TERM CURRENT USE OF INSULIN (HCC): ICD-10-CM

## 2018-10-09 DIAGNOSIS — G47.00 INSOMNIA, UNSPECIFIED TYPE: ICD-10-CM

## 2018-10-09 DIAGNOSIS — M46.1 SACROILIITIS (HCC): ICD-10-CM

## 2018-10-09 DIAGNOSIS — M54.16 LUMBAR RADICULOPATHY: ICD-10-CM

## 2018-10-09 DIAGNOSIS — Z00.00 HEALTHCARE MAINTENANCE: ICD-10-CM

## 2018-10-09 DIAGNOSIS — M47.816 LUMBAR SPONDYLOSIS: ICD-10-CM

## 2018-10-09 DIAGNOSIS — E78.01 FAMILIAL HYPERCHOLESTEROLEMIA: ICD-10-CM

## 2018-10-09 DIAGNOSIS — I10 ESSENTIAL HYPERTENSION: ICD-10-CM

## 2018-10-09 DIAGNOSIS — M51.26 LUMBAR HERNIATED DISC: ICD-10-CM

## 2018-10-09 DIAGNOSIS — M79.18 MYOFASCIAL PAIN: ICD-10-CM

## 2018-10-09 DIAGNOSIS — M54.42 BILATERAL LOW BACK PAIN WITH LEFT-SIDED SCIATICA, UNSPECIFIED CHRONICITY: ICD-10-CM

## 2018-10-09 DIAGNOSIS — E66.9 OBESITY (BMI 30-39.9): ICD-10-CM

## 2018-10-09 DIAGNOSIS — G47.33 OSA (OBSTRUCTIVE SLEEP APNEA): ICD-10-CM

## 2018-10-09 DIAGNOSIS — Z23 NEED FOR INFLUENZA VACCINATION: Primary | ICD-10-CM

## 2018-10-09 PROCEDURE — 90682 RIV4 VACC RECOMBINANT DNA IM: CPT

## 2018-10-09 PROCEDURE — 90471 IMMUNIZATION ADMIN: CPT

## 2018-10-09 PROCEDURE — 99396 PREV VISIT EST AGE 40-64: CPT | Performed by: INTERNAL MEDICINE

## 2018-10-09 NOTE — TELEPHONE ENCOUNTER
----- Message from Letitia Carrasco, Katerine Lucille  sent at 10/9/2018 11:00 AM EDT -----  Regarding: FW:Your Recent Visit  Contact: [phone]  If he has enough 300mg, I would have the patient take 300mg TID for 5 days, then BID for 5 days, then QHS for 5 days, then stop     ----- Message -----  From: Carla Asif RN  Sent: 10/9/2018  10:47 AM  To: GOLDY Gustafson  Subject: FW:Your Recent Visit                             Please advise   ----- Message -----  From: Nnamdi Santos  Sent: 10/9/2018   9:47 AM  To: [facility]  Subject: RE:Your Recent Visit                             Hi Rylee, I forgot to discuss with you a plan to come off the Gabapintin when we last met  I have not had any sciatica pain for almost 2 months now  I would like to come off of the Gabapintin if I can due to the memory side effects it has  I know Dr Alejandro Morton didn't want me to just stop so can you give me a plan to come off of it  I have plenty of 300mg and 100mg left so I don't think I would need any prescriptions to support coming off     thanks  ----- Message -----  From: GOLDY Cheema  Sent: 9/21/2018 11:38 AM EDT  To: Renetta Lay  Subject: Your Recent Visit  Nnamdi Santos, you have a new After Visit Summary in [address] [place] [address]  Please click on visits and then your most recent appointment, to view your After Visit Summary  If you are experiencing any technical issues please call our patient service desk at [phone]) option [phone]

## 2018-10-09 NOTE — ASSESSMENT & PLAN NOTE
As noted patient's blood pressure is under good control with present treatment  Patient will continue with present medication and this we checked again with his next visit in approximately 4 months  Patient is on an ACE-inhibitor which is beneficial with his diabetes also    Patient has no renal function abnormalities

## 2018-10-09 NOTE — ASSESSMENT & PLAN NOTE
Patient has a history of lumbar stenosis, spondylosis, sacroiliitis  He continues to follow-up with pain management and did have have multiple therapeutic injections which she states have not helped  He notes he started with wearing his back brace and that this is given him a lot of relief of his discomfort  He will discuss this with pain management    They are weaning him off of his gabapentin

## 2018-10-09 NOTE — ASSESSMENT & PLAN NOTE
Continued problem with the patient is is obesity  Patient states that both he and his wife have not only started a new diet with reduction of her caloric intake on a daily basis but the also has started on a new exercise program   Patient will continue both of these and we will hopefully see some significant weight loss in the next few months

## 2018-10-09 NOTE — ASSESSMENT & PLAN NOTE
Patient states he has been compliant with his CPAP machine  He he states he does not and has not noted an increase in his energy level since using the machine    Patient will continue with present treatment

## 2018-10-09 NOTE — PROGRESS NOTES
Assessment/Plan:    Healthcare maintenance  Patient is here today for a routine physical   He did have labs performed prior to the visit today and in general he has improved overall  His cholesterol shows significant improvement and patient admits that he is working hard on his diet and is getting regular exercise  He is still not to goal with his cholesterol and we have tried multiple statin drugs with the patient in the past which she is unable to tolerate  His sugars under good control  Patient states he is getting regular exercise which is new both for him and his wife and he is working hard on weight loss  Is up-to-date with routine testing including routine colonoscopy  Recent Hemoccult is positive and patient states he was having problems with some external hemorrhoids and some bleeding when he did the Hemoccult testing  Obesity (BMI 30-39  9)  Continued problem with the patient is is obesity  Patient states that both he and his wife have not only started a new diet with reduction of her caloric intake on a daily basis but the also has started on a new exercise program   Patient will continue both of these and we will hopefully see some significant weight loss in the next few months  Lumbar degenerative disc disease  Patient has a history of lumbar stenosis, spondylosis, sacroiliitis  He continues to follow-up with pain management and did have have multiple therapeutic injections which she states have not helped  He notes he started with wearing his back brace and that this is given him a lot of relief of his discomfort  He will discuss this with pain management  They are weaning him off of his gabapentin    Diabetes ([facility] )  Lab Results   Component Value Date    HGBA1C 6 7 (H) 10/03/2018       No results for input(s): POCGLU in the last 72 hours  Blood Sugar Average: Last 72 hrs:   patient's sugar showing good control with a hemoglobin A1c of 6 7    Patient states as noted that he and his wife her working harder on her diet lately and hopefully he will continues show good control  We will check a fasting blood sugar, hemoglobin A1c with his next visit  Patient was also given a slip to check on microalbumin which was not performed with this visit  AIDEE (obstructive sleep apnea)  Patient states he has been compliant with his CPAP machine  He he states he does not and has not noted an increase in his energy level since using the machine  Patient will continue with present treatment    Essential hypertension  As noted patient's blood pressure is under good control with present treatment  Patient will continue with present medication and this we checked again with his next visit in approximately 4 months  Patient is on an ACE-inhibitor which is beneficial with his diabetes also  Patient has no renal function abnormalities       Diagnoses and all orders for this visit:    Need for influenza vaccination  -     influenza vaccine, [id number], quadrivalent, recombinant, PF, 0 5 mL, for patients 18 yr+ (FLUBLOK)    Type 2 diabetes mellitus without complication, without long-term current use of insulin ([address] )  -     Lipid panel; Future  -     Hemoglobin A1C; Future  -     Basic metabolic panel; Future  -     Microalbumin / creatinine urine ratio    AIDEE (obstructive sleep apnea)    Essential hypertension  -     Basic metabolic panel; Future    Bilateral low back pain with left-sided sciatica, unspecified chronicity    Lumbar radiculopathy    Lumbar degenerative disc disease    Lumbar herniated disc    Lumbar spondylosis    Sacroiliitis (HCC)    Myofascial pain    Class 2 obesity due to excess calories without serious comorbidity with body mass index (BMI) of 38 0 to 38 9 in adult    Familial hypercholesterolemia  -     Lipid panel; Future    Insomnia, unspecified type    Obesity (BMI 30-39  9)    Healthcare maintenance          Subjective:      Patient ID: Casi Goodwin is a 66 y o  male      Patient is a 56-year-old male with a history of multiple medical problems who is here today for routine physical   Patient states he has been doing relatively well and he has no new complaints or problems  We did review the results of recent lab testing with the patient        The following portions of the patient's history were reviewed and updated as appropriate:   He  has a past medical history of Diabetes mellitus ([facility] ); Headache; High cholesterol; Hypertension; Kidney stones; and Sleep apnea  He   Patient Active Problem List    Diagnosis Date Noted   [address] maintenance 10/09/2018    AIDEE (obstructive sleep apnea) 03/05/2018    Insomnia 03/05/2018    Hypersomnia 03/05/2018    Obesity (BMI 30-39 9) 03/05/2018    Diabetes 1 5, 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[place] [address] ) 03/05/2018    Sacroiliitis ([facility] ) 02/06/2018    Lumbar degenerative disc disease 10/23/2017    Lumbar herniated disc 10/23/2017    Lumbar spondylosis 10/23/2017    Lumbar radiculopathy 08/29/2017    Myofascial pain 08/29/2017    Lumbago with sciatica, left side 07/31/2017    Diabetes ([facility] ) 09/13/2016    Obesity 06/15/2012    Hyperlipidemia 06/15/2012    Essential hypertension 06/15/2012     He  has a past surgical history that includes Nasal septum surgery (1980); Uvulopalatopharyngoplasty (2004); Debridement tennis elbow (2006); Hernia repair (2011); and Colonoscopy (N/A, 11/22/2016)  His Family history is unknown by patient  He  reports that he has never smoked  He has never used smokeless tobacco  He reports that he drinks alcohol  He reports that he does not use drugs    Current Outpatient Prescriptions   Medication Sig Dispense Refill    acetaminophen (TYLENOL) 325 mg tablet Take 10 mg/kg by mouth every 4 (four) hours as needed for fever      JUAN MICROLET LANCETS lancets by Does not apply route      diclofenac sodium (VOLTAREN) 50 mg EC tablet Take 1 tablet (50 mg total) by mouth 2 (two) times a day as needed (pain) 60 tablet 0    FARXIGA 5 MG TABS Take 1 tablet (5 mg total) by mouth daily 90 tablet 1    gabapentin (NEURONTIN) 400 mg capsule Take 1 capsule (400 mg total) by mouth 3 (three) times a day 90 capsule 2    glucose blood test strip by In Vitro route daily      lisinopril (ZESTRIL) 5 mg tablet TAKE 1 TABLET EVERY DAY 90 tablet 2     No current facility-administered medications for this visit  Current Outpatient Prescriptions on File Prior to Visit   Medication Sig    acetaminophen (TYLENOL) 325 mg tablet Take 10 mg/kg by mouth every 4 (four) hours as needed for fever    JUAN MICROLET LANCETS lancets by Does not apply route    diclofenac sodium (VOLTAREN) 50 mg EC tablet Take 1 tablet (50 mg total) by mouth 2 (two) times a day as needed (pain)    FARXIGA 5 MG TABS Take 1 tablet (5 mg total) by mouth daily    gabapentin (NEURONTIN) 400 mg capsule Take 1 capsule (400 mg total) by mouth 3 (three) times a day    glucose blood test strip by In Vitro route daily    lisinopril (ZESTRIL) 5 mg tablet TAKE 1 TABLET EVERY DAY     No current facility-administered medications on file prior to visit  He is allergic to tramadol       Review of Systems   Constitutional: Positive for activity change (Patient states both he and his wife if increased her activity level and joint gym)  Negative for appetite change, chills, diaphoresis, fatigue, fever and unexpected weight change  HENT: Negative  Eyes: Negative  Respiratory: Negative  Cardiovascular: Negative  Gastrointestinal: Negative  Endocrine: Negative  Genitourinary: Negative  Musculoskeletal: Positive for back pain  Negative for arthralgias (ic back pain), gait problem, joint swelling, myalgias, neck pain and neck stiffness  Skin: Negative  Allergic/Immunologic: Negative  Neurological: Positive for numbness ( paresthesia to both lower extremities worse on the right than left)   Negative for dizziness, tremors, seizures, syncope, facial asymmetry, speech difficulty, weakness, light-headedness and headaches  Hematological: Negative  Psychiatric/Behavioral: Negative  Objective:      /78 (BP Location: Right arm, Patient Position: Sitting, Cuff Size: Large)   Pulse 66   Temp (!) 95 6 °F (35 3 °C)   Resp 14   Ht 6' 3" (1 905 m)   Wt 131 kg (288 lb)   SpO2 97%   BMI 36 00 kg/m²          Physical Exam   Constitutional: He is oriented to person, place, and time  He appears well-developed and well-nourished  No distress  Extremely cheerful, overweight 80-year-old male who is awake alert no acute distress and oriented x3   HENT:   Head: Normocephalic and atraumatic  Right Ear: External ear normal    Left Ear: External ear normal    Nose: Nose normal    Mouth/Throat: Oropharynx is clear and moist  No oropharyngeal exudate  Eyes: Pupils are equal, round, and reactive to light  Conjunctivae are normal  Right eye exhibits no discharge  Left eye exhibits no discharge  No scleral icterus  Neck: No JVD present  No tracheal deviation present  No thyromegaly present  Patient has a very thick neck with some decreased range of motion throughout both passively and actively but no pain with movement   Cardiovascular: Normal rate, regular rhythm, normal heart sounds and intact distal pulses  Exam reveals no gallop and no friction rub  No murmur heard  Heart sounds are slightly distant   Pulmonary/Chest: Effort normal and breath sounds normal  No stridor  No respiratory distress  He has no wheezes  He has no rales  He exhibits no tenderness  Abdominal: Soft  Bowel sounds are normal  He exhibits no distension and no mass  There is no tenderness  There is no rebound and no guarding  Patient's abdomen is morbidly obese soft nontender with positive bowel sounds x4 quadrants   Genitourinary: Prostate normal and penis normal  Rectal exam shows guaiac positive stool  No penile tenderness  Musculoskeletal: Normal range of motion   He exhibits tenderness and deformity  He exhibits no edema  Patient has a notable flattening of his lumbar curve with decreased range of motion throughout all  Patient has no other specific orthopedic changes  Patient has chronic tenderness to palpation to the SI joints bilaterally   Lymphadenopathy:     He has no cervical adenopathy  Neurological: He is alert and oriented to person, place, and time  He displays abnormal reflex (Patient has chronic absence of patella and Achilles tendon reflexes bilaterally)  No cranial nerve deficit  He exhibits normal muscle tone  Coordination normal    Patient has history of a significant peripheral neuropathy to both lower extremities which is not secondary to his diabetes  He states the neuropathy is stable and has not progressed  Skin: Skin is warm and dry  No rash noted  He is not diaphoretic  No erythema  No pallor  Psychiatric: He has a normal mood and affect  His behavior is normal  Judgment and thought content normal    Nursing note and vitals reviewed

## 2018-10-09 NOTE — ASSESSMENT & PLAN NOTE
Patient is here today for a routine physical   He did have labs performed prior to the visit today and in general he has improved overall  His cholesterol shows significant improvement and patient admits that he is working hard on his diet and is getting regular exercise  He is still not to goal with his cholesterol and we have tried multiple statin drugs with the patient in the past which she is unable to tolerate  His sugars under good control  Patient states he is getting regular exercise which is new both for him and his wife and he is working hard on weight loss  Is up-to-date with routine testing including routine colonoscopy  Recent Hemoccult is positive and patient states he was having problems with some external hemorrhoids and some bleeding when he did the Hemoccult testing

## 2018-10-09 NOTE — PROGRESS NOTES
Diabetic Foot Exam    Patient's shoes and socks removed  Right Foot/Ankle   Right Foot Inspection  Skin Exam: skin normal and skin intact no dry skin, no warmth, no callus, no erythema, no maceration, no abnormal color, no pre-ulcer, no ulcer and no callus                          Toe Exam: no swelling, no tenderness, erythema and  no right toe deformity  Sensory   Vibration: diminished  Proprioception: diminished   Monofilament testing: diminished  Vascular  Capillary refills: < 3 seconds  The right DP pulse is 2+  The right PT pulse is 2+  Left Foot/Ankle  Left Foot Inspection  Skin Exam: skin normal and skin intactno dry skin, no warmth, no erythema, no maceration, normal color, no pre-ulcer, no ulcer and no callus                         Toe Exam: no swelling, no tenderness, no erythema and no left toe deformity                   Sensory   Vibration: diminished  Proprioception: diminished  Monofilament: diminished  Vascular  Capillary refills: < 3 seconds  The left DP pulse is 2+  The left PT pulse is 2+  Assign Risk Category:  No deformity present; Loss of protective sensation;  No weak pulses       Risk: 1

## 2018-10-09 NOTE — ASSESSMENT & PLAN NOTE
Lab Results   Component Value Date    HGBA1C 6 7 (H) 10/03/2018       No results for input(s): POCGLU in the last 72 hours  Blood Sugar Average: Last 72 hrs:   patient's sugar showing good control with a hemoglobin A1c of 6 7  Patient states as noted that he and his wife her working harder on her diet lately and hopefully he will continues show good control  We will check a fasting blood sugar, hemoglobin A1c with his next visit  Patient was also given a slip to check on microalbumin which was not performed with this visit

## 2018-10-17 DIAGNOSIS — M46.1 SACROILIITIS (HCC): ICD-10-CM

## 2018-10-25 DIAGNOSIS — E11.9 TYPE 2 DIABETES MELLITUS WITHOUT COMPLICATION, WITHOUT LONG-TERM CURRENT USE OF INSULIN (HCC): ICD-10-CM

## 2018-10-29 RX ORDER — DAPAGLIFLOZIN 5 MG/1
TABLET, FILM COATED ORAL
Qty: 90 TABLET | Refills: 1 | Status: SHIPPED | OUTPATIENT
Start: 2018-10-29 | End: 2019-04-26 | Stop reason: SDUPTHER

## 2018-12-17 ENCOUNTER — OFFICE VISIT (OUTPATIENT)
Dept: PAIN MEDICINE | Facility: CLINIC | Age: 63
End: 2018-12-17
Payer: COMMERCIAL

## 2018-12-17 VITALS
SYSTOLIC BLOOD PRESSURE: 137 MMHG | DIASTOLIC BLOOD PRESSURE: 89 MMHG | HEIGHT: 75 IN | BODY MASS INDEX: 35.43 KG/M2 | WEIGHT: 285 LBS | HEART RATE: 53 BPM

## 2018-12-17 DIAGNOSIS — M46.1 SACROILIITIS (HCC): ICD-10-CM

## 2018-12-17 DIAGNOSIS — M51.36 LUMBAR DEGENERATIVE DISC DISEASE: ICD-10-CM

## 2018-12-17 DIAGNOSIS — M54.16 LUMBAR RADICULOPATHY: ICD-10-CM

## 2018-12-17 DIAGNOSIS — M79.18 MYOFASCIAL PAIN: ICD-10-CM

## 2018-12-17 DIAGNOSIS — M51.26 LUMBAR HERNIATED DISC: ICD-10-CM

## 2018-12-17 DIAGNOSIS — M79.18 MYOFASCIAL PAIN SYNDROME: Primary | ICD-10-CM

## 2018-12-17 DIAGNOSIS — M54.42 BILATERAL LOW BACK PAIN WITH LEFT-SIDED SCIATICA, UNSPECIFIED CHRONICITY: ICD-10-CM

## 2018-12-17 DIAGNOSIS — M47.816 LUMBAR SPONDYLOSIS: ICD-10-CM

## 2018-12-17 PROCEDURE — 99214 OFFICE O/P EST MOD 30 MIN: CPT | Performed by: NURSE PRACTITIONER

## 2018-12-17 RX ORDER — GABAPENTIN 400 MG/1
400 CAPSULE ORAL 3 TIMES DAILY
Qty: 90 CAPSULE | Refills: 2 | Status: SHIPPED | OUTPATIENT
Start: 2018-12-17 | End: 2019-02-18 | Stop reason: SDUPTHER

## 2018-12-17 RX ORDER — TIZANIDINE 2 MG/1
TABLET ORAL
Qty: 60 TABLET | Refills: 1 | Status: SHIPPED | OUTPATIENT
Start: 2018-12-17 | End: 2019-02-28 | Stop reason: HOSPADM

## 2018-12-17 NOTE — PROGRESS NOTES
Assessment:  1  Myofascial pain syndrome    2  Sacroiliitis ([address] )    3  Lumbar radiculopathy    4  Lumbar degenerative disc disease    5  Lumbar herniated disc    6  Lumbar spondylosis    7  Bilateral low back pain with left-sided sciatica, unspecified chronicity    8  Myofascial pain        Plan:  1  I will prescribe the patient tizanidine 2 mg 1-2 tablets at bedtime as needed for myofascial pain  I advised the patient that they should not drive or operate machinery while on this medication until they see how it affects them, as it could cause lethargy and mental cloudyness  I advised the patient to call our office if they experience any side effects or issues with the medication changes  The patient verbalized an understanding  2   Patient will initiate in hands on healing massage therapy for his myofascial complaints  If this is not covered by insurance, could try aqua therapy  3   Patient may continue gabapentin 400 mg t i d  for his neuropathic complaints  4  Patient may continue diclofenac 50 mg b i d  p r n   5   May consider L5-S1 LESI for patient's ongoing low back pain  6  Discussed the possibility of spinal cord stimulation should he fail conservative therapy  7   Patient may continue with his home exercise program  8  The patient will follow-up in 8 weeks for medication prescription refill and reevaluation  The patient was advised to contact the office should their symptoms worsen in the interim  The patient was agreeable and verbalized an understanding  History of Present Illness: The patient is a 66 y o  male last seen on 9/21/18 who presents for a follow up office visit in regards to chronic bilateral low back pain with radiculopathy into the left lower extremity secondary to lumbar degenerative disc disease, lumbar spondylosis, sacroiliitis, myofascial pain syndrome, lumbar radiculopathy and chronic pain syndrome        At today's office visit, the patient's main pain complaint is his bilateral low back pain, right greater than left, which he localizes to the right paraspinal musculature  The patient is status post bilateral L4-5 medial branch and S1-3 lateral branch RFA and reports the procedure provided no relief to his back pain complaints  He has also had bilateral SI joint injections without relief of his low back pain  He denies any radiating pain into the bilateral lower extremities at this time, as he states the gabapentin is managing and treating that symptom  He has been seen and evaluated by Dr Skinny Hood from Neurosurgery who did not feel he was a surgical candidate at this time  The patient currently rates his pain a 5/10 on the numeric pain rating scale  He states his pain is constant in nature most bothersome in the morning  He characterizes the pain as sharp, pressure like and shooting  Current pain medications includes:  Gabapentin 400 mg t i d  and diclofenac 50 mg b i d     The patient reports that this regimen is providing 20-30% pain relief of his low back pain, however 100% relief of his lower extremity symptoms  The patient is reporting no side effects from this pain medication regimen  I have personally reviewed and/or updated the patient's past medical history, past surgical history, family history, social history, current medications, allergies, and vital signs today  Review of Systems:    Review of Systems   Respiratory: Positive for shortness of breath  Cardiovascular: Negative for chest pain  Gastrointestinal: Negative for constipation, diarrhea, nausea and vomiting  Musculoskeletal: Positive for gait problem (Difficulty walking, decreased range of motion)  Negative for arthralgias, joint swelling and myalgias  Joint stiffness   Skin: Negative for rash  Neurological: Negative for dizziness, seizures and weakness  All other systems reviewed and are negative          Past Medical History:   Diagnosis Date    Diabetes mellitus ([facility] )  Headache     High cholesterol     Hypertension     Kidney stones     Sleep apnea        Past Surgical History:   Procedure Laterality Date    COLONOSCOPY N/A 11/22/2016    Procedure: COLONOSCOPY;  Surgeon: Bernadette Walker MD;  Location: [facility]; Service:     DEBRIDEMENT TENNIS ELBOW  2006    HERNIA REPAIR  3255    x2, umbilical and right side per patient   [address]    UVULOPALATOPHARYNGOPLASTY  2004       Family History   Problem Relation Age of Onset    Family history unknown: Yes       Social History     Occupational History    Not on file       Social History Main Topics    Smoking status: Never Smoker    Smokeless tobacco: Never Used    Alcohol use Yes      Comment: rarely    Drug use: No    Sexual activity: Not on file         Current Outpatient Prescriptions:     acetaminophen (TYLENOL) 325 mg tablet, Take 10 mg/kg by mouth every 4 (four) hours as needed for fever, Disp: , Rfl:     JUAN MICROLET LANCETS lancets, by Does not apply route, Disp: , Rfl:     diclofenac sodium (VOLTAREN) 50 mg EC tablet, Take 1 tablet (50 mg total) by mouth 2 (two) times a day as needed (pain), Disp: 60 tablet, Rfl: 2    FARXIGA 5 MG TABS, TAKE 1 TABLET BY MOUTH EVERY DAY, Disp: 90 tablet, Rfl: 1    gabapentin (NEURONTIN) 400 mg capsule, Take 1 capsule (400 mg total) by mouth 3 (three) times a day, Disp: 90 capsule, Rfl: 2    glucose blood test strip, by In Vitro route daily, Disp: , Rfl:     lisinopril (ZESTRIL) 5 mg tablet, TAKE 1 TABLET EVERY DAY, Disp: 90 tablet, Rfl: 2    tiZANidine (ZANAFLEX) 2 mg tablet, 1-2 tabs PO QHS PRN myofascial pain, Disp: 60 tablet, Rfl: 1    Allergies   Allergen Reactions    Tramadol Itching       Physical Exam:    /89   Pulse (!) 53   Ht 6' 3" (1 905 m)   Wt 129 kg (285 lb)   BMI 35 62 kg/m²     Constitutional:normal, well developed, well nourished, alert, in no distress and non-toxic and no overt pain behavior  Eyes:anicteric  HEENT:grossly intact  Neck:supple, symmetric, trachea midline and no masses   Pulmonary:even and unlabored  Cardiovascular:No edema or pitting edema present  Skin:Normal without rashes or lesions and well hydrated  Psychiatric:Mood and affect appropriate  Neurologic:Cranial Nerves II-XII grossly intact  Musculoskeletal:normal gait  Bilateral lumbar paraspinal musculature tender to palpation from L2-L5, right greater than left  Bilateral SI joints nontender to palpation  Bilateral lower extremity strength 5/5 in all muscle groups  Negative seated straight leg raise bilaterally  Imaging  No orders to display     MRI LUMBAR SPINE WITHOUT CONTRAST     INDICATION:  Left leg pain and dysesthesia     COMPARISON:  None      TECHNIQUE:  Sagittal T1, sagittal T2, sagittal inversion recovery, axial T1 and axial T2, coronal T2        IMAGE QUALITY:  Diagnostic     FINDINGS:     ALIGNMENT:  Minor straightening of normal lumbar lordosis      MARROW SIGNAL:  Mild multifocal marrow edema on largely, the endplates affecting superior L3 endplate, and the opposing endplates at I0-2  Hemangioma L1      DISTAL CORD AND CONUS:  Normal size and signal within the distal cord and conus  The conus ends at the T12-L1 level      PARASPINAL SOFT TISSUES:  Paraspinal soft tissues are unremarkable      SACRUM:  Normal signal within the sacrum  No evidence of insufficiency or stress fracture      LOWER THORACIC DISC SPACES:  Normal disc height and signal   No disc herniation, canal stenosis or foraminal narrowing      LUMBAR DISC SPACES:          L1-L2:  Normal      L2-L3:  Reduction disc height, marginal osteophytes, facet are sinus    An evolving superior endplate Schmorl's node     L3-L4:  L3   Minor circumferential bulge, slight facet arthrosis     L4-L5:  Right greater than left facet arthrosis, circumferential bulge, reactive marrow edema, small left disc extrusion descending from the displacing contact with the L5 root  Correlate for left L5 radiculitis      L5-S1:  Circumferential bulging of this with marginal osteophytes  Minor loss of disc height and degenerative endplate changes      IMPRESSION:     A small disc extrusion descends on the left from the L4-L5 disc space    Correlate for left L5 radiculitis           Orders Placed This Encounter   Procedures    Ambulatory referral to Physical Therapy

## 2019-01-08 LAB
LEFT EYE DIABETIC RETINOPATHY: NORMAL
RIGHT EYE DIABETIC RETINOPATHY: NORMAL

## 2019-02-07 PROBLEM — E66.01 MORBID OBESITY (HCC): Status: ACTIVE | Noted: 2019-02-07

## 2019-02-12 DIAGNOSIS — M79.18 MYOFASCIAL PAIN SYNDROME: ICD-10-CM

## 2019-02-12 RX ORDER — TIZANIDINE 2 MG/1
TABLET ORAL
Qty: 60 TABLET | Refills: 1 | OUTPATIENT
Start: 2019-02-12

## 2019-02-18 ENCOUNTER — OFFICE VISIT (OUTPATIENT)
Dept: PAIN MEDICINE | Facility: CLINIC | Age: 64
End: 2019-02-18
Payer: COMMERCIAL

## 2019-02-18 VITALS
HEART RATE: 48 BPM | SYSTOLIC BLOOD PRESSURE: 123 MMHG | DIASTOLIC BLOOD PRESSURE: 80 MMHG | WEIGHT: 279 LBS | BODY MASS INDEX: 34.69 KG/M2 | HEIGHT: 75 IN

## 2019-02-18 DIAGNOSIS — M54.16 LUMBAR RADICULOPATHY: ICD-10-CM

## 2019-02-18 DIAGNOSIS — M54.50 CHRONIC BILATERAL LOW BACK PAIN WITHOUT SCIATICA: Primary | ICD-10-CM

## 2019-02-18 DIAGNOSIS — M51.36 LUMBAR DEGENERATIVE DISC DISEASE: ICD-10-CM

## 2019-02-18 DIAGNOSIS — M51.26 LUMBAR HERNIATED DISC: ICD-10-CM

## 2019-02-18 DIAGNOSIS — M46.1 SACROILIITIS (HCC): ICD-10-CM

## 2019-02-18 DIAGNOSIS — G89.29 CHRONIC BILATERAL LOW BACK PAIN WITHOUT SCIATICA: Primary | ICD-10-CM

## 2019-02-18 PROCEDURE — 99214 OFFICE O/P EST MOD 30 MIN: CPT | Performed by: NURSE PRACTITIONER

## 2019-02-18 RX ORDER — GABAPENTIN 400 MG/1
400 CAPSULE ORAL 3 TIMES DAILY
Qty: 90 CAPSULE | Refills: 2 | Status: SHIPPED | OUTPATIENT
Start: 2019-02-18 | End: 2019-02-21 | Stop reason: SDUPTHER

## 2019-02-18 NOTE — PROGRESS NOTES
Assessment:  1  Chronic bilateral low back pain without sciatica    2  Lumbar radiculopathy    3  Sacroiliitis ([address] )    4  Lumbar degenerative disc disease    5  Lumbar herniated disc        Plan:  1  I will schedule the patient for an L5-S1 interlaminar epidural steroid injection with Dr Hazel Ortiz to address the neuropathic component of the patient's low back pain  Complete risks and benefits including bleeding, infection, tissue reaction, nerve injury and allergic reaction were discussed  The approach was demonstrated using models and literature was provided  Verbal consent was obtained  2   Patient may continue with gabapentin 400 milligrams t i d  He does feel this medication is providing significant relief to his lower extremity symptoms  3  Patient may continue diclofenac 50 milligrams b i d  P r n     This medication was refilled at today's office visit  4  Patient will continue with his home exercise program   He has noticed a significant decrease in his low back pain as he has been increasing his activity and strengthening his low back  5   Patient is no longer taking tizanidine as he did not feel the medication was effective in treating his pain  6  We can repeat left L4 and L5 TFESI PRN should his symptoms return  7  The patient can continue with Tylenol p r n  And should not exceed more than 3000 milligrams daily  8  The patient will follow-up 4 weeks after the procedure for medication prescription refill and reevaluation  The patient was advised to contact the office should their symptoms worsen in the interim  The patient was agreeable and verbalized an understanding  History of Present Illness:     The patient is a 60 y o  male last seen on 12/17/18 who presents for a follow up office visit in regards to chronic lumbaosacral back pain with radiculopathy into the left lower extremity secondary to lumbar degenerative disc disease, lumbar spondylosis and stenosis, sacroiliitis, myofascial pain syndrome and chronic pain syndrome  The patient is status post bilateral L4-5 medial branch and S1-3 lateral branch RFA without any significant relief of his low back pain complaints  He has had a left L4 and L5 TFESI in the past with relief of his left lower extremity symptoms  He also states that the gabapentin continues to manage his lower extremity symptoms as well  His main complaint is his low back pain  He denies any bowel or bladder incontinence or saddle anesthesia  The patient was evaluated by Dr Aimee Casillas of neurosurgery in the past who did not feel he was a surgical candidate at that time  The patient states he continues with his home exercise program and has noticed this has significantly improved his pain complaints in his low back  He now mostly complains of stiffness in the morning    Patient currently rates his pain a 4/10 on the numeric pain rating scale  He states his pain is constant in nature most bothersome in the morning  He characterizes the pain as dull aching, pressure like and shooting  Current pain medications includes:  Gabapentin 400 milligrams t i d  And diclofenac 50 milligrams b i d  P r n     The patient reports that this regimen is providing 90% pain relief  The patient is reporting no side effects from this pain medication regimen  I have personally reviewed and/or updated the patient's past medical history, past surgical history, family history, social history, current medications, allergies, and vital signs today  Review of Systems:    Review of Systems   Respiratory: Negative for shortness of breath  Cardiovascular: Negative for chest pain  Gastrointestinal: Negative for constipation, diarrhea, nausea and vomiting  Musculoskeletal: Positive for gait problem (Difficulty walking, Decreased range of motion)  Negative for arthralgias, joint swelling and myalgias  Joint sstiffness   Skin: Negative for rash     Neurological: Negative for dizziness, seizures and weakness  All other systems reviewed and are negative  Past Medical History:   Diagnosis Date    Diabetes mellitus ([address] )     Headache     High cholesterol     Hypertension     Kidney stones     Sleep apnea        Past Surgical History:   Procedure Laterality Date    COLONOSCOPY N/A 11/22/2016    Procedure: COLONOSCOPY;  Surgeon: Dillon Ruff MD;  Location: [facility];   Service:     DEBRIDEMENT TENNIS ELBOW  2006    HERNIA REPAIR  3515    x2, umbilical and right side per patient   [address]    UVULOPALATOPHARYNGOPLASTY  2004       Family History   Family history unknown: Yes       Social History     Occupational History    Not on file   Tobacco Use    Smoking status: Never Smoker    Smokeless tobacco: Never Used   Substance and Sexual Activity    Alcohol use: Yes     Comment: rarely    Drug use: No    Sexual activity: Not on file         Current Outpatient Medications:     acetaminophen (TYLENOL) 325 mg tablet, Take 10 mg/kg by mouth every 4 (four) hours as needed for fever, Disp: , Rfl:     JUAN MICROLET LANCETS lancets, by Does not apply route, Disp: , Rfl:     diclofenac sodium (VOLTAREN) 50 mg EC tablet, Take 1 tablet (50 mg total) by mouth 2 (two) times a day as needed (pain), Disp: 60 tablet, Rfl: 2    FARXIGA 5 MG TABS, TAKE 1 TABLET BY MOUTH EVERY DAY, Disp: 90 tablet, Rfl: 1    gabapentin (NEURONTIN) 400 mg capsule, Take 1 capsule (400 mg total) by mouth 3 (three) times a day, Disp: 90 capsule, Rfl: 2    glucose blood test strip, by In Vitro route daily, Disp: , Rfl:     lisinopril (ZESTRIL) 5 mg tablet, TAKE 1 TABLET EVERY DAY, Disp: 90 tablet, Rfl: 2    tiZANidine (ZANAFLEX) 2 mg tablet, 1-2 tabs PO QHS PRN myofascial pain, Disp: 60 tablet, Rfl: 1    Allergies   Allergen Reactions    Tramadol Itching       Physical Exam:    /80   Pulse (!) 48   Ht 6' 3" (1 905 m)   Wt 127 kg (279 lb)   BMI 34 87 kg/m² Constitutional:normal, well developed, well nourished, alert, in no distress and non-toxic and no overt pain behavior  Eyes:anicteric  HEENT:grossly intact  Neck:supple, symmetric, trachea midline and no masses   Pulmonary:even and unlabored  Cardiovascular:No edema or pitting edema present  Skin:Normal without rashes or lesions and well hydrated  Psychiatric:Mood and affect appropriate  Neurologic:Cranial Nerves II-XII grossly intact  Musculoskeletal:Slightly antalgic gait, but steady without the use of assistive devices  Positive seated straight leg raise bilaterally  Imaging  FL spine and pain procedure    (Results Pending)   MRI LUMBAR SPINE WITHOUT CONTRAST     INDICATION:  Left leg pain and dysesthesia     COMPARISON:  None      TECHNIQUE:  Sagittal T1, sagittal T2, sagittal inversion recovery, axial T1 and axial T2, coronal T2        IMAGE QUALITY:  Diagnostic     FINDINGS:     ALIGNMENT:  Minor straightening of normal lumbar lordosis      MARROW SIGNAL:  Mild multifocal marrow edema on largely, the endplates affecting superior L3 endplate, and the opposing endplates at R4-2   Hemangioma L1      DISTAL CORD AND CONUS:  Normal size and signal within the distal cord and conus   The conus ends at the T12-L1 level      PARASPINAL SOFT TISSUES:  Paraspinal soft tissues are unremarkable      SACRUM:  Normal signal within the sacrum   No evidence of insufficiency or stress fracture      LOWER THORACIC DISC SPACES:  Normal disc height and signal   No disc herniation, canal stenosis or foraminal narrowing      LUMBAR DISC SPACES:          L1-L2:  Normal      L2-L3:  Reduction disc height, marginal osteophytes, facet are sinus   An evolving superior endplate Schmorl's node     L3-L4:  L3   Minor circumferential bulge, slight facet arthrosis     L4-L5:  Right greater than left facet arthrosis, circumferential bulge, reactive marrow edema, small left disc extrusion descending from the displacing contact with the L5 root   Correlate for left L5 radiculitis      L5-S1:  Circumferential bulging of this with marginal osteophytes   Minor loss of disc height and degenerative endplate changes      IMPRESSION:     A small disc extrusion descends on the left from the L4-L5 disc space   Correlate for left L5 radiculitis          Orders Placed This Encounter   Procedures    FL spine and pain procedure

## 2019-02-21 ENCOUNTER — TELEPHONE (OUTPATIENT)
Dept: PAIN MEDICINE | Facility: CLINIC | Age: 64
End: 2019-02-21

## 2019-02-21 DIAGNOSIS — M46.1 SACROILIITIS (HCC): ICD-10-CM

## 2019-02-21 DIAGNOSIS — M54.16 LUMBAR RADICULOPATHY: ICD-10-CM

## 2019-02-21 RX ORDER — GABAPENTIN 400 MG/1
400 CAPSULE ORAL 3 TIMES DAILY
Qty: 270 CAPSULE | Refills: 0 | Status: SHIPPED | OUTPATIENT
Start: 2019-02-21 | End: 2019-11-21

## 2019-02-21 NOTE — TELEPHONE ENCOUNTER
[facility] needs the scripts to be written as 90 day supply,  to fill per insurance  Thanks

## 2019-02-21 NOTE — TELEPHONE ENCOUNTER
[facility] pharmacy is calling because the scripst for Diclofenac and Gabapentin will have to be resent because the insurance will only pay for 90 day scripts   Call back # [phone]

## 2019-02-25 ENCOUNTER — APPOINTMENT (OUTPATIENT)
Dept: LAB | Age: 64
End: 2019-02-25
Payer: COMMERCIAL

## 2019-02-25 DIAGNOSIS — E78.01 FAMILIAL HYPERCHOLESTEROLEMIA: ICD-10-CM

## 2019-02-25 DIAGNOSIS — I10 ESSENTIAL HYPERTENSION: ICD-10-CM

## 2019-02-25 DIAGNOSIS — E11.9 TYPE 2 DIABETES MELLITUS WITHOUT COMPLICATION, WITHOUT LONG-TERM CURRENT USE OF INSULIN (HCC): ICD-10-CM

## 2019-02-25 LAB
ANION GAP SERPL CALCULATED.3IONS-SCNC: 3 MMOL/L (ref 4–13)
BUN SERPL-MCNC: 17 MG/DL (ref 5–25)
CALCIUM SERPL-MCNC: 8.9 MG/DL (ref 8.3–10.1)
CHLORIDE SERPL-SCNC: 104 MMOL/L (ref 100–108)
CHOLEST SERPL-MCNC: 265 MG/DL (ref 50–200)
CO2 SERPL-SCNC: 32 MMOL/L (ref 21–32)
CREAT SERPL-MCNC: 0.92 MG/DL (ref 0.6–1.3)
CREAT UR-MCNC: 166 MG/DL
EST. AVERAGE GLUCOSE BLD GHB EST-MCNC: 154 MG/DL
GFR SERPL CREATININE-BSD FRML MDRD: 89 ML/MIN/1.73SQ M
GLUCOSE P FAST SERPL-MCNC: 135 MG/DL (ref 65–99)
HBA1C MFR BLD: 7 % (ref 4.2–6.3)
HDLC SERPL-MCNC: 56 MG/DL (ref 40–60)
LDLC SERPL CALC-MCNC: 173 MG/DL (ref 0–100)
MICROALBUMIN UR-MCNC: 29.7 MG/L (ref 0–20)
MICROALBUMIN/CREAT 24H UR: 18 MG/G CREATININE (ref 0–30)
NONHDLC SERPL-MCNC: 209 MG/DL
POTASSIUM SERPL-SCNC: 4.2 MMOL/L (ref 3.5–5.3)
SODIUM SERPL-SCNC: 139 MMOL/L (ref 136–145)
TRIGL SERPL-MCNC: 182 MG/DL

## 2019-02-25 PROCEDURE — 80061 LIPID PANEL: CPT

## 2019-02-25 PROCEDURE — 80048 BASIC METABOLIC PNL TOTAL CA: CPT

## 2019-02-25 PROCEDURE — 82570 ASSAY OF URINE CREATININE: CPT | Performed by: INTERNAL MEDICINE

## 2019-02-25 PROCEDURE — 82043 UR ALBUMIN QUANTITATIVE: CPT | Performed by: INTERNAL MEDICINE

## 2019-02-25 PROCEDURE — 36415 COLL VENOUS BLD VENIPUNCTURE: CPT

## 2019-02-25 PROCEDURE — 83036 HEMOGLOBIN GLYCOSYLATED A1C: CPT

## 2019-02-25 PROCEDURE — 3061F NEG MICROALBUMINURIA REV: CPT | Performed by: INTERNAL MEDICINE

## 2019-02-28 ENCOUNTER — OFFICE VISIT (OUTPATIENT)
Dept: INTERNAL MEDICINE CLINIC | Facility: CLINIC | Age: 64
End: 2019-02-28
Payer: COMMERCIAL

## 2019-02-28 VITALS
SYSTOLIC BLOOD PRESSURE: 136 MMHG | TEMPERATURE: 97.5 F | BODY MASS INDEX: 34.57 KG/M2 | DIASTOLIC BLOOD PRESSURE: 82 MMHG | OXYGEN SATURATION: 95 % | HEIGHT: 75 IN | HEART RATE: 57 BPM | WEIGHT: 278 LBS

## 2019-02-28 DIAGNOSIS — E66.9 OBESITY (BMI 30.0-34.9): ICD-10-CM

## 2019-02-28 DIAGNOSIS — I10 ESSENTIAL HYPERTENSION: ICD-10-CM

## 2019-02-28 DIAGNOSIS — E66.9 OBESITY (BMI 30-39.9): ICD-10-CM

## 2019-02-28 DIAGNOSIS — G47.33 OSA (OBSTRUCTIVE SLEEP APNEA): ICD-10-CM

## 2019-02-28 DIAGNOSIS — E11.9 TYPE 2 DIABETES MELLITUS WITHOUT COMPLICATION, WITHOUT LONG-TERM CURRENT USE OF INSULIN (HCC): Primary | ICD-10-CM

## 2019-02-28 DIAGNOSIS — M54.16 LUMBAR RADICULOPATHY: ICD-10-CM

## 2019-02-28 DIAGNOSIS — E78.01 FAMILIAL HYPERCHOLESTEROLEMIA: ICD-10-CM

## 2019-02-28 PROCEDURE — 3075F SYST BP GE 130 - 139MM HG: CPT | Performed by: INTERNAL MEDICINE

## 2019-02-28 PROCEDURE — 3079F DIAST BP 80-89 MM HG: CPT | Performed by: INTERNAL MEDICINE

## 2019-02-28 PROCEDURE — 99214 OFFICE O/P EST MOD 30 MIN: CPT | Performed by: INTERNAL MEDICINE

## 2019-02-28 PROCEDURE — 3008F BODY MASS INDEX DOCD: CPT | Performed by: INTERNAL MEDICINE

## 2019-02-28 RX ORDER — ROSUVASTATIN CALCIUM 5 MG/1
5 TABLET, COATED ORAL DAILY
Qty: 30 TABLET | Refills: 5 | Status: SHIPPED | OUTPATIENT
Start: 2019-02-28 | End: 2019-08-20 | Stop reason: SDUPTHER

## 2019-03-01 NOTE — ASSESSMENT & PLAN NOTE
Patient is having difficulties wearing his CPAP  He states the pressure with his CPAP device a so high that it pushes the mask office face when he is sleeping  I did suggest the patient discuss this with his sleep medicine physician and see if there is any alternative does or methods that he can change his device so he can be more compliant

## 2019-03-01 NOTE — ASSESSMENT & PLAN NOTE
Patient's blood pressure showing excellent control  Patient will continue with present medications surveillance  We will be checking on his renal function with his next visit    Patient has had no side effects from present medication, treatment

## 2019-03-01 NOTE — PROGRESS NOTES
Diabetic Foot Exam    Patient's shoes and socks removed  Right Foot/Ankle   Right Foot Inspection  Skin Exam: skin normal and skin intact no dry skin, no warmth, no callus, no erythema, no maceration, no abnormal color, no pre-ulcer, no ulcer and no callus                          Toe Exam: ROM and strength within normal limitsno swelling, no tenderness, erythema and  no right toe deformity  Sensory   Vibration: diminished  Proprioception: diminished   Monofilament testing: diminished  Vascular  Capillary refills: < 3 seconds  The right DP pulse is 2+  The right PT pulse is 2+  Left Foot/Ankle  Left Foot Inspection  Skin Exam: skin normal and skin intactno dry skin, no warmth, no erythema, no maceration, normal color, no pre-ulcer, no ulcer and no callus                         Toe Exam: no swelling, no tenderness, no erythema and no left toe deformity                   Sensory   Vibration: diminished  Proprioception: diminished  Monofilament: diminished  Vascular  Capillary refills: < 3 seconds  The left DP pulse is 2+  The left PT pulse is 2+  Assign Risk Category:  No deformity present; Loss of protective sensation;  No weak pulses       Risk: 1

## 2019-03-01 NOTE — PROGRESS NOTES
Assessment/Plan:    Essential hypertension  Patient's blood pressure showing excellent control  Patient will continue with present medications surveillance  We will be checking on his renal function with his next visit  Patient has had no side effects from present medication, treatment    Diabetes ([facility] [address] )  Lab Results   Component Value Date    HGBA1C 7 0 (H) 02/25/2019       No results for input(s): POCGLU in the last 72 hours  Blood Sugar Average: Last 72 hrs:   patient is work to extremely hard on his diet and exercise program over the last 4 months  Patient's hemoglobin A1c is acceptable at 7 0  Patient states he will continue to work on his diet program and we will be checking on his blood sugars with his next visit including a fasting blood sugar, hemoglobin A1c  Diabetic foot exam was performed today and is abnormal with the patient having history of peripheral neuropathy which was found before the patient did have a diagnosis of diabetes  AIDEE (obstructive sleep apnea)  Patient is having difficulties wearing his CPAP  He states the pressure with his CPAP device a so high that it pushes the mask office face when he is sleeping  I did suggest the patient discuss this with his sleep medicine physician and see if there is any alternative does or methods that he can change his device so he can be more compliant  Lumbar radiculopathy  Patient states that since he started his exercise program he is having less problems with low back pain and discomfort  Patient is encouraged to continue his exercise program and hopefully along with weight loss and increased muscle tone he will have less difficulties with his chronic back pain  Hyperlipidemia  Patient did have a lipid profile performed prior to the visit today  His cholesterol is extremely high putting him at increased risk for coronary artery disease, stroke  We have reinstituted starting a statin drug specifically Crestor 5 mg a day    He did have problems in the past with statin drugs causing more problems with his peripheral neuropathy  Patient was told to call us if he is unable to tolerate the medication  Again he is commended on working with his diet and exercise program and hopefully we will see some improvement with his lipid profile with his next visit       Diagnoses and all orders for this visit:    Type 2 diabetes mellitus without complication, without long-term current use of insulin ([address] )  -     Basic metabolic panel; Future  -     Hemoglobin A1C; Future  -     Lipid panel; Future    Essential hypertension  -     Basic metabolic panel; Future    Obesity (BMI 30-39  9)    Familial hypercholesterolemia  -     Lipid panel; Future  -     rosuvastatin (CRESTOR) 5 mg tablet; Take 1 tablet (5 mg total) by mouth daily    AIDEE (obstructive sleep apnea)    Lumbar radiculopathy          Subjective:      Patient ID: Tomeka Mcclendon is a 60 y o  male  Patient is a 53-year-old male with a history of multiple medical problems as outlined previously  Patient is here today for routine follow-up  He did have labs performed prior to being seen today in the office and we did discuss the results showing adequate control of his blood sugars, severely elevated cholesterol level  The following portions of the patient's history were reviewed and updated as appropriate:   He  has a past medical history of Diabetes mellitus ([address] ), Headache, High cholesterol, Hypertension, Kidney stones, and Sleep apnea    He   Patient Active Problem List    Diagnosis Date Noted    Morbid obesity ([address] ) 02/07/2019    Healthcare maintenance 10/09/2018    AIDEE (obstructive sleep apnea) 03/05/2018    Insomnia 03/05/2018    Hypersomnia 03/05/2018    Obesity (BMI 30-39 9) 03/05/2018    Diabetes 1 5, 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[address] ) 03/05/2018    Sacroiliitis ([address] ) 02/06/2018    Lumbar degenerative disc disease 10/23/2017    Lumbar herniated disc 10/23/2017    Lumbar spondylosis 10/23/2017    Lumbar radiculopathy 08/29/2017    Myofascial pain 08/29/2017    Lumbago with sciatica, left side 07/31/2017    Diabetes ([facility] [address] ) 09/13/2016    Obesity 06/15/2012    Hyperlipidemia 06/15/2012    Essential hypertension 06/15/2012     He  has a past surgical history that includes Nasal septum surgery (1980); Uvulopalatopharyngoplasty (2004); Debridement tennis elbow (2006); Hernia repair (2011); and Colonoscopy (N/A, 11/22/2016)  His Family history is unknown by patient  He  reports that he has never smoked  He has never used smokeless tobacco  He reports that he drinks alcohol  He reports that he does not use drugs  Current Outpatient Medications   Medication Sig Dispense Refill    acetaminophen (TYLENOL) 325 mg tablet Take 10 mg/kg by mouth every 4 (four) hours as needed for fever      diclofenac sodium (VOLTAREN) 50 mg EC tablet Take 1 tablet (50 mg total) by mouth 2 (two) times a day as needed (pain) 180 tablet 0    FARXIGA 5 MG TABS TAKE 1 TABLET BY MOUTH EVERY DAY 90 tablet 1    gabapentin (NEURONTIN) 400 mg capsule Take 1 capsule (400 mg total) by mouth 3 (three) times a day 270 capsule 0    lisinopril (ZESTRIL) 5 mg tablet TAKE 1 TABLET EVERY DAY 90 tablet 2    rosuvastatin (CRESTOR) 5 mg tablet Take 1 tablet (5 mg total) by mouth daily 30 tablet 5     No current facility-administered medications for this visit        Current Outpatient Medications on File Prior to Visit   Medication Sig    acetaminophen (TYLENOL) 325 mg tablet Take 10 mg/kg by mouth every 4 (four) hours as needed for fever    diclofenac sodium (VOLTAREN) 50 mg EC tablet Take 1 tablet (50 mg total) by mouth 2 (two) times a day as needed (pain)    FARXIGA 5 MG TABS TAKE 1 TABLET BY MOUTH EVERY DAY    gabapentin (NEURONTIN) 400 mg capsule Take 1 capsule (400 mg total) by mouth 3 (three) times a day    lisinopril (ZESTRIL) 5 mg tablet TAKE 1 TABLET EVERY DAY     No current facility-administered medications on file prior to visit  He is allergic to tramadol       Review of Systems   Constitutional: Positive for activity change (Patient states that he is continued with his exercise program is physically more active  He is commended on this)  Negative for appetite change, chills, diaphoresis, fatigue, fever and unexpected weight change  HENT: Negative  Eyes: Negative  Respiratory: Negative  Cardiovascular: Negative  Gastrointestinal: Negative  Endocrine: Negative  Genitourinary: Negative  Musculoskeletal: Positive for back pain ( some chronic low back pain but he states this has occasional pain but not as severe in the past   Again this improved with his continued exercise program)  Negative for arthralgias, gait problem, joint swelling and myalgias  Allergic/Immunologic: Negative  Neurological: Positive for numbness ( peripheral neuropathy to lower extremities which she states has not progressed)  Negative for dizziness, tremors, seizures, syncope, facial asymmetry, speech difficulty, weakness, light-headedness and headaches  Hematological: Negative  Psychiatric/Behavioral: Negative  Objective:      /82   Pulse 57   Temp 97 5 °F (36 4 °C)   Ht 6' 3" (1 905 m)   Wt 126 kg (278 lb)   SpO2 95%   BMI 34 75 kg/m²          Physical Exam   Constitutional: He is oriented to person, place, and time  He appears well-developed and well-nourished  No distress  Very pleasant, overweight 59-year-old male who is awake alert no acute distress oriented x3   HENT:   Head: Normocephalic and atraumatic  Right Ear: External ear normal    Left Ear: External ear normal    Nose: Nose normal    Mouth/Throat: Oropharynx is clear and moist  No oropharyngeal exudate  Eyes: Pupils are equal, round, and reactive to light  Conjunctivae and EOM are normal  Right eye exhibits no discharge  Left eye exhibits no discharge  No scleral icterus  Neck: Normal range of motion  Neck supple   No JVD present  No tracheal deviation present  No thyromegaly present  Cardiovascular: Normal rate, regular rhythm, normal heart sounds and intact distal pulses  Exam reveals no gallop and no friction rub  No murmur heard  Pulmonary/Chest: Effort normal and breath sounds normal  No stridor  No respiratory distress  He has no wheezes  He has no rales  He exhibits no tenderness  Abdominal: Soft  Bowel sounds are normal  He exhibits no distension and no mass  There is no tenderness  There is no rebound and no guarding  No hernia  Obese   Musculoskeletal: He exhibits deformity  He exhibits no edema or tenderness  Patient has a flattening to his lumbar curve, decreased range of motion both actively and passively but improved since last visit  Lymphadenopathy:     He has no cervical adenopathy  Neurological: He is alert and oriented to person, place, and time  He displays normal reflexes  A sensory deficit is present  No cranial nerve deficit  He exhibits normal muscle tone  Coordination normal    Skin: Skin is warm and dry  Capillary refill takes less than 2 seconds  No rash noted  He is not diaphoretic  No erythema  No pallor  Psychiatric: He has a normal mood and affect  His behavior is normal  Judgment and thought content normal    Nursing note and vitals reviewed  BMI Counseling: Body mass index is 34 75 kg/m²  Discussed the patient's BMI with him  The BMI is above average  BMI counseling and education was provided to the patient  Nutrition recommendations include reducing portion sizes, decreasing overall calorie intake, 3-5 servings of fruits/vegetables daily, moderation in carbohydrate intake, increasing intake of lean protein, reducing intake of saturated fat and trans fat and reducing intake of cholesterol  Exercise recommendations include moderate aerobic physical activity for 150 minutes/week

## 2019-03-01 NOTE — ASSESSMENT & PLAN NOTE
Patient did have a lipid profile performed prior to the visit today  His cholesterol is extremely high putting him at increased risk for coronary artery disease, stroke  We have reinstituted starting a statin drug specifically Crestor 5 mg a day  He did have problems in the past with statin drugs causing more problems with his peripheral neuropathy  Patient was told to call us if he is unable to tolerate the medication    Again he is commended on working with his diet and exercise program and hopefully we will see some improvement with his lipid profile with his next visit

## 2019-03-01 NOTE — PATIENT INSTRUCTIONS
Low Fat Diet   AMBULATORY CARE:   A low-fat diet  is an eating plan that is low in total fat, unhealthy fat, and cholesterol  You may need to follow a low-fat diet if you have trouble digesting or absorbing fat  You may also need to follow this diet if you have high cholesterol  You can also lower your cholesterol by increasing the amount of fiber in your diet  Soluble fiber is a type of fiber that helps to decrease cholesterol levels  Different types of fat in food:   · Limit unhealthy fats  A diet that is high in cholesterol, saturated fat, and trans fat may cause unhealthy cholesterol levels  Unhealthy cholesterol levels increase your risk of heart disease  ¨ Cholesterol:  Limit intake of cholesterol to less than 200 mg per day  Cholesterol is found in meat, eggs, and dairy  ¨ Saturated fat:  Limit saturated fat to less than 7% of your total daily calories  Ask your dietitian how many calories you need each day  Saturated fat is found in butter, cheese, ice cream, whole milk, and palm oil  Saturated fat is also found in meat, such as beef, pork, chicken skin, and processed meats  Processed meats include sausage, hot dogs, and bologna  ¨ Trans fat:  Avoid trans fat as much as possible  Trans fat is used in fried and baked foods  Foods that say trans fat free on the label may still have up to 0 5 grams of trans fat per serving  · Include healthy fats  Replace foods that are high in saturated and trans fat with foods high in healthy fats  This may help to decrease high cholesterol levels  ¨ Monounsaturated fats: These are found in avocados, nuts, and vegetable oils, such as olive, canola, and sunflower oil  ¨ Polyunsaturated fats: These can be found in vegetable oils, such as soybean or corn oil  Omega-3 fats can help to decrease the risk of heart disease  Omega-3 fats are found in fish, such as salmon, herring, trout, and tuna   Omega-3 fats can also be found in plant foods, such as walnuts, flaxseed, soybeans, and canola oil    Foods to limit or avoid:   · Grains:      ¨ Snacks that are made with partially hydrogenated oils, such as chips, regular crackers, and butter-flavored popcorn    ¨ High-fat baked goods, such as biscuits, croissants, doughnuts, pies, cookies, and pastries    · Dairy:      ¨ Whole milk, 2% milk, and yogurt and ice cream made with whole milk    ¨ Half and half creamer, heavy cream, and whipping cream    ¨ Cheese, cream cheese, and sour cream    · Meats and proteins:      ¨ High-fat cuts of meat (T-bone steak, regular hamburger, and ribs)    ¨ Fried meat, poultry (turkey and chicken), and fish    ¨ Poultry (chicken and turkey) with skin    ¨ Cold cuts (salami or bologna), hot dogs, escobar, and sausage    ¨ Whole eggs and egg yolks    · Vegetables and fruits with added fat:      ¨ Fried vegetables or vegetables in butter or high-fat sauces, such as cream or cheese sauces    ¨ Fried fruit or fruit served with butter or cream    · Fats:      ¨ Butter, stick margarine, and shortening    ¨ Coconut, palm oil, and palm kernel oil  Foods to include:   · Grains:      ¨ Whole-grain breads, cereals, pasta, and brown rice    ¨ Low-fat crackers and pretzels    · Vegetables and fruits:      ¨ Fresh, frozen, or canned vegetables (no salt or low-sodium)    ¨ Fresh, frozen, dried, or canned fruit (canned in light syrup or fruit juice)    ¨ Avocado    · Low-fat dairy products:      ¨ Nonfat (skim) or 1% milk    ¨ Nonfat or low-fat cheese, yogurt, and cottage cheese    · Meats and proteins:      ¨ Chicken or turkey with no skin    ¨ Baked or broiled fish    ¨ Lean beef and pork (loin, round, extra lean hamburger)    ¨ Beans and peas, unsalted nuts, soy products    ¨ Egg whites and substitutes    ¨ Seeds and nuts    · Fats:      ¨ Unsaturated oil, such as canola, olive, peanut, soybean, or sunflower oil    ¨ Soft or liquid margarine and vegetable oil spread    ¨ Low-fat salad dressing  Other ways to decrease fat:   · Read food labels before you buy foods  Choose foods that have less than 30% of calories from fat  Choose low-fat or fat-free dairy products  Remember that fat free does not mean calorie free  These foods still contain calories, and too many calories can lead to weight gain  · Trim fat from meat and avoid fried food  Trim all visible fat from meat before you cook it  Remove the skin from poultry  Do not au meat, fish, or poultry  Bake, roast, boil, or broil these foods instead  Avoid fried foods  Eat a baked potato instead of Western Rosie fries  Steam vegetables instead of sautéing them in butter  · Add less fat to foods  Use imitation escobar bits on salads and baked potatoes instead of regular escobar bits  Use fat-free or low-fat salad dressings instead of regular dressings  Use low-fat or nonfat butter-flavored topping instead of regular butter or margarine on popcorn and other foods  Ways to decrease fat in recipes:  Replace high-fat ingredients with low-fat or nonfat ones  This may cause baked goods to be drier than usual  You may need to use nonfat cooking spray on pans to prevent food from sticking  You also may need to change the amount of other ingredients, such as water, in the recipe  Try the following:  · Use low-fat or light margarine instead of regular margarine or shortening  · Use lean ground turkey breast or chicken, or lean ground beef (less than 5% fat) instead of hamburger  · Add 1 teaspoon of canola oil to 8 ounces of skim milk instead of using cream or half and half  · Use grated zucchini, carrots, or apples in breads instead of coconut  · Use blenderized, low-fat cottage cheese, plain tofu, or low-fat ricotta cheese instead of cream cheese  · Use 1 egg white and 1 teaspoon of canola oil, or use ¼ cup (2 ounces) of fat-free egg substitute instead of a whole egg       · Replace half of the oil that is called for in a recipe with applesauce when you bake  Use 3 tablespoons of cocoa powder and 1 tablespoon of canola oil instead of a square of baking chocolate  How to increase fiber:  Eat enough high-fiber foods to get 20 to 30 grams of fiber every day  Slowly increase your fiber intake to avoid stomach cramps, gas, and other problems  · Eat 3 ounces of whole-grain foods each day  An ounce is about 1 slice of bread  Eat whole-grain breads, such as whole-wheat bread  Whole wheat, whole-wheat flour, or other whole grains should be listed as the first ingredient on the food label  Replace white flour with whole-grain flour or use half of each in recipes  Whole-grain flour is heavier than white flour, so you may have to add more yeast or baking powder  · Eat a high-fiber cereal for breakfast   Oatmeal is a good source of soluble fiber  Look for cereals that have bran or fiber in the name  Choose whole-grain products, such as brown rice, barley, and whole-wheat pasta  · Eat more beans, peas, and lentils  For example, add beans to soups or salads  Eat at least 5 cups of fruits and vegetables each day  Eat fruits and vegetables with the peel because the peel is high in fiber  © 2017 [employer] [employer] Information is for End User's use only and may not be sold, redistributed or otherwise used for commercial purposes  All illustrations and images included in CareNotes® are the copyrighted property of [employer]  or [employer]  The above information is an  only  It is not intended as medical advice for individual conditions or treatments  Talk to your doctor, nurse or pharmacist before following any medical regimen to see if it is safe and effective for you  Heart Healthy Diet   AMBULATORY CARE:   A heart healthy diet  is an eating plan low in total fat, unhealthy fats, and sodium (salt)  A heart healthy diet helps decrease your risk for heart disease and stroke   Limit the amount of fat you eat to 25% to 35% of your total daily calories  Limit sodium to less than 2,300 mg each day  Healthy fats:  Healthy fats can help improve cholesterol levels  The risk for heart disease is decreased when cholesterol levels are normal  Choose healthy fats, such as the following:  · Unsaturated fat  is found in foods such as soybean, canola, olive, corn, and safflower oils  It is also found in soft tub margarine that is made with liquid vegetable oil  · Omega-3 fat  is found in certain fish, such as salmon, tuna, and trout, and in walnuts and flaxseed  Unhealthy fats:  Unhealthy fats can cause unhealthy cholesterol levels in your blood and increase your risk of heart disease  Limit unhealthy fats, such as the following:  · Cholesterol  is found in animal foods, such as eggs and lobster, and in dairy products made from whole milk  Limit cholesterol to less than 300 milligrams (mg) each day  You may need to limit cholesterol to 200 mg each day if you have heart disease  · Saturated fat  is found in meats, such as escobar and hamburger  It is also found in chicken or turkey skin, whole milk, and butter  Limit saturated fat to less than 7% of your total daily calories  Limit saturated fat to less than 6% if you have heart disease or are at increased risk for it  · Trans fat  is found in packaged foods, such as potato chips and cookies  It is also in hard margarine, some fried foods, and shortening  Avoid trans fats as much as possible    Heart healthy foods and drinks to include:  Ask your dietitian or healthcare provider how many servings to have from each of the following food groups:  · Grains:      ¨ Whole-wheat breads, cereals, and pastas, and brown rice    ¨ Low-fat, low-sodium crackers and chips    · Vegetables:      ¨ Broccoli, green beans, green peas, and spinach    ¨ Collards, kale, and lima beans    ¨ Carrots, sweet potatoes, tomatoes, and peppers    ¨ Canned vegetables with no salt added    · Fruits:      ¨ Bananas, peaches, pears, and pineapple    ¨ Grapes, raisins, and dates    ¨ Oranges, tangerines, grapefruit, orange juice, and grapefruit juice    ¨ Apricots, mangoes, melons, and papaya    ¨ Raspberries and strawberries    ¨ Canned fruit with no added sugar    · Low-fat dairy products:      ¨ Nonfat (skim) milk, 1% milk, and low-fat almond, cashew, or soy milks fortified with calcium    ¨ Low-fat cheese, regular or frozen yogurt, and cottage cheese    · Meats and proteins , such as lean cuts of beef and pork (loin, leg, round), skinless chicken and turkey, legumes, soy products, egg whites, and nuts  Foods and drinks to limit or avoid:  Ask your dietitian or healthcare provider about these and other foods that are high in unhealthy fat, sodium, and sugar:  · Snack or packaged foods , such as frozen dinners, cookies, macaroni and cheese, and cereals with more than 300 mg of sodium per serving    · Canned or dry mixes  for cakes, soups, sauces, or gravies    · Vegetables with added sodium , such as instant potatoes, vegetables with added sauces, or regular canned vegetables    · Other foods high in sodium , such as ketchup, barbecue sauce, salad dressing, pickles, olives, soy sauce, and miso    · High-fat dairy foods  such as whole or 2% milk, cream cheese, or sour cream, and cheeses     · High-fat protein foods  such as high-fat cuts of beef (T-bone steaks, ribs), chicken or turkey with skin, and organ meats, such as liver    · Cured or smoked meats , such as hot dogs, escobar, and sausage    · Unhealthy fats and oils , such as butter, stick margarine, shortening, and cooking oils such as coconut or palm oil    · Food and drinks high in sugar , such as soft drinks (soda), sports drinks, sweetened tea, candy, cake, cookies, pies, and doughnuts  Other diet guidelines to follow:   · Eat more foods containing omega-3 fats  Eat fish high in omega-3 fats at least 2 times a week  · Limit alcohol    Too much alcohol can damage your heart and raise your blood pressure  Women should limit alcohol to 1 drink a day  Men should limit alcohol to 2 drinks a day  A drink of alcohol is 12 ounces of beer, 5 ounces of wine, or 1½ ounces of liquor  · Choose low-sodium foods  High-sodium foods can lead to high blood pressure  Add little or no salt to food you prepare  Use herbs and spices in place of salt  · Eat more fiber  to help lower cholesterol levels  Eat at least 5 servings of fruits and vegetables each day  Eat 3 ounces of whole-grain foods each day  Legumes (beans) are also a good source of fiber  Lifestyle guidelines:   · Do not smoke  Nicotine and other chemicals in cigarettes and cigars can cause lung and heart damage  Ask your healthcare provider for information if you currently smoke and need help to quit  E-cigarettes or smokeless tobacco still contain nicotine  Talk to your healthcare provider before you use these products  · Exercise regularly  to help you maintain a healthy weight and improve your blood pressure and cholesterol levels  Ask your healthcare provider about the best exercise plan for you  Do not start an exercise program without asking your healthcare provider  Follow up with your healthcare provider as directed:  Write down your questions so you remember to ask them during your visits  © 2017 [address] [facility] Information is for End User's use only and may not be sold, redistributed or otherwise used for commercial purposes  All illustrations and images included in CareNotes® are the copyrighted property of [employer]  or [employer]  The above information is an  only  It is not intended as medical advice for individual conditions or treatments  Talk to your doctor, nurse or pharmacist before following any medical regimen to see if it is safe and effective for you  Calorie Counting Diet   WHAT YOU NEED TO KNOW:   What is a calorie counting diet?   It is a meal plan based on counting calories each day to reach a healthy body weight  You will need to eat fewer calories if you are trying to lose weight  Weight loss may decrease your risk for certain health problems or improve your health if you have health problems  Some of these health problems include heart disease, high blood pressure, and diabetes  What foods should I avoid? Your dietitian will tell you if you need to avoid certain foods based on your body weight and health condition  You may need to avoid high-fat foods if you are at risk for or have heart disease  You may need to eat fewer foods from the breads and starches food group if you have diabetes  How many calories are in foods? The following is a list of foods and drinks with the approximate number of calories in each  Check the food label to find the exact number of calories  A dietitian can tell you how many calories you should have from each food group each day    · Carbohydrate:      ¨ ½ of a 3-inch bagel, 1 slice of bread, or ½ of a hamburger bun or hot dog bun (80)    ¨ 1 (8-inch) flour tortilla or ½ cup of cooked rice (100)    ¨ 1 (6-inch) corn tortilla (80)    ¨ 1 (6-inch) pancake or 1 cup of bran flakes cereal (110)    ¨ ½ cup of cooked cereal (80)    ¨ ½ cup of cooked pasta (85)    ¨ 1 ounce of pretzels (100)    ¨ 3 cups of air-popped popcorn without butter or oil (80)    · Dairy:      ¨ 1 cup of skim or 1% milk (90)    ¨ 1 cup of 2% milk (120)    ¨ 1 cup of whole milk (160)    ¨ 1 cup of 2% chocolate milk (220)    ¨ 1 ounce of low-fat cheese with 3 grams of fat per ounce (70)    ¨ 1 ounce of cheddar cheese (114)    ¨ ½ cup of 1% fat cottage cheese (80)    ¨ 1 cup of plain or sugar-free, fat-free yogurt (90)    · Protein foods:      ¨ 3 ounces of fish (not breaded or fried) (95)    ¨ 3 ounces of breaded, fried fish (195)    ¨ ¾ cup of tuna canned in water (105)    ¨ 3 ounces of chicken breast without skin (105)    ¨ 1 fried chicken breast with skin (350)    ¨ ¼ cup of fat free egg substitute (40)    ¨ 1 large egg (75)    ¨ 3 ounces of lean beef or pork (165)    ¨ 3 ounces of fried pork chop or ham (185)    ¨ ½ cup of cooked dried beans, such as kidney, ross, lentils, or navy (115)    ¨ 3 ounces of bologna or lunch meat (225)    ¨ 2 links of breakfast sausage (140)    · Vegetables:      ¨ ½ cup of sliced mushrooms (10)    ¨ 1 cup of salad greens, such as lettuce, spinach, or brenda (15)    ¨ ½ cup of steamed asparagus (20)    ¨ ½ cup of cooked summer squash, zucchini squash, or green or wax beans (25)    ¨ 1 cup of broccoli or cauliflower florets, or 1 medium tomato (25)    ¨ 1 large raw carrot or ½ cup of cooked carrots (40)    ¨ ? of a medium cucumber or 1 stalk of celery (5)    ¨ 1 small baked potato (160)    ¨ 1 cup of breaded, fried vegetables (230)    · Fruit:      ¨ 1 (6-inch) banana (55)     ¨ ½ of a 4-inch grapefruit (55)    ¨ 15 grapes (60)    ¨ 1 medium orange or apple (70)    ¨ 1 large peach (65)    ¨ 1 cup of fresh pineapple chunks (75)    ¨ 1 cup of melon cubes (50)    ¨ 1¼ cups of whole strawberries (45)    ¨ ½ cup of fruit canned in juice (55)    ¨ ½ cup of fruit canned in heavy syrup (110)    ¨ ?  cup of raisins (130)    ¨ ½ cup of unsweetened fruit juice (60)    ¨ ½ cup of grape, cranberry, or prune juice (90)    · Fat:      ¨ 10 peanuts or 2 teaspoons of peanut butter (55)    ¨ 2 tablespoons of avocado or 1 tablespoon of regular salad dressing (45)    ¨ 2 slices of escobar (90)    ¨ 1 teaspoon of oil, such as safflower, canola, corn, or olive oil (45)    ¨ 2 teaspoons of low-fat margarine, or 1 tablespoon of low-fat mayonnaise (50)    ¨ 1 teaspoon of regular margarine (40)    ¨ 1 tablespoon of regular mayonnaise (135)    ¨ 1 tablespoon of cream cheese or 2 tablespoons of low-fat cream cheese (45)    ¨ 2 tablespoons of vegetable shortening (215)    · Dessert and sweets:      ¨ 8 animal crackers or 5 vanilla wafers (80)    ¨ 1 frozen fruit juice bar (80)    ¨ ½ cup of ice milk or low-fat frozen yogurt (90)    ¨ ½ cup of sherbet or sorbet (125)    ¨ ½ cup of sugar-free pudding or custard (60)    ¨ ½ cup of ice cream (140)    ¨ ½ cup of pudding or custard (175)    ¨ 1 (2-inch) square chocolate brownie (185)    · Combination foods:      ¨ Bean burrito made with an 8-inch tortilla, without cheese (275)    ¨ Chicken breast sandwich with lettuce and tomato (325)    ¨ 1 cup of chicken noodle soup (60)    ¨ 1 beef taco (175)    ¨ Regular hamburger with lettuce and tomato (310)    ¨ Regular cheeseburger with lettuce and tomato (410)     ¨ ¼ of a 12-inch cheese pizza (280)    ¨ Fried fish sandwich with lettuce and tomato (425)    ¨ Hot dog and bun (275)    ¨ 1½ cups of macaroni and cheese (310)    ¨ Taco salad with a fried tortilla shell (870)    · Low-calorie foods:      ¨ 1 tablespoon of ketchup or 1 tablespoon of fat free sour cream (15)    ¨ 1 teaspoon of mustard (5)    ¨ ¼ cup of salsa (20)    ¨ 1 large dill pickle (15)    ¨ 1 tablespoon of fat free salad dressing (10)    ¨ 2 teaspoons of low-sugar, light jam or jelly, or 1 tablespoon of sugar-free syrup (15)    ¨ 1 sugar-free popsicle (15)    ¨ 1 cup of club soda, seltzer water, or diet soda (0)  CARE AGREEMENT:   You have the right to help plan your care  Discuss treatment options with your caregivers to decide what care you want to receive  You always have the right to refuse treatment  The above information is an  only  It is not intended as medical advice for individual conditions or treatments  Talk to your doctor, nurse or pharmacist before following any medical regimen to see if it is safe and effective for you  © 2017 [address] [employer] Information is for End User's use only and may not be sold, redistributed or otherwise used for commercial purposes   All illustrations and images included in CareNotes® are the copyrighted property of [employer]  or [employer]

## 2019-03-01 NOTE — ASSESSMENT & PLAN NOTE
Lab Results   Component Value Date    HGBA1C 7 0 (H) 02/25/2019       No results for input(s): POCGLU in the last 72 hours  Blood Sugar Average: Last 72 hrs:   patient is work to extremely hard on his diet and exercise program over the last 4 months  Patient's hemoglobin A1c is acceptable at 7 0  Patient states he will continue to work on his diet program and we will be checking on his blood sugars with his next visit including a fasting blood sugar, hemoglobin A1c  Diabetic foot exam was performed today and is abnormal with the patient having history of peripheral neuropathy which was found before the patient did have a diagnosis of diabetes

## 2019-03-08 ENCOUNTER — HOSPITAL ENCOUNTER (OUTPATIENT)
Dept: RADIOLOGY | Facility: CLINIC | Age: 64
Discharge: HOME | End: 2019-03-08
Admitting: ANESTHESIOLOGY
Payer: COMMERCIAL

## 2019-03-08 VITALS
SYSTOLIC BLOOD PRESSURE: 125 MMHG | TEMPERATURE: 98 F | OXYGEN SATURATION: 98 % | DIASTOLIC BLOOD PRESSURE: 84 MMHG | RESPIRATION RATE: 20 BRPM | HEART RATE: 72 BPM

## 2019-03-08 DIAGNOSIS — M54.16 LUMBAR RADICULOPATHY: ICD-10-CM

## 2019-03-08 PROCEDURE — 62323 NJX INTERLAMINAR LMBR/SAC: CPT | Performed by: ANESTHESIOLOGY

## 2019-03-08 RX ORDER — METHYLPREDNISOLONE ACETATE 80 MG/ML
80 INJECTION, SUSPENSION INTRA-ARTICULAR; INTRALESIONAL; INTRAMUSCULAR; PARENTERAL; SOFT TISSUE ONCE
Status: COMPLETED | OUTPATIENT
Start: 2019-03-08 | End: 2019-03-08

## 2019-03-08 RX ORDER — LIDOCAINE HYDROCHLORIDE 10 MG/ML
5 INJECTION, SOLUTION EPIDURAL; INFILTRATION; INTRACAUDAL; PERINEURAL ONCE
Status: COMPLETED | OUTPATIENT
Start: 2019-03-08 | End: 2019-03-08

## 2019-03-08 RX ADMIN — METHYLPREDNISOLONE ACETATE 80 MG: 80 INJECTION, SUSPENSION INTRA-ARTICULAR; INTRALESIONAL; INTRAMUSCULAR; SOFT TISSUE at 08:58

## 2019-03-08 RX ADMIN — IOHEXOL 1 ML: 300 INJECTION, SOLUTION INTRAVENOUS at 08:57

## 2019-03-08 RX ADMIN — LIDOCAINE HYDROCHLORIDE 3 ML: 10 INJECTION, SOLUTION EPIDURAL; INFILTRATION; INTRACAUDAL; PERINEURAL at 08:56

## 2019-03-08 NOTE — DISCHARGE INSTRUCTIONS
Epidural Steroid Injection   WHAT YOU NEED TO KNOW:   An epidural steroid injection (HERBIE) is a procedure to inject steroid medicine into the epidural space  The epidural space is between your spinal cord and vertebrae  Steroids reduce inflammation and fluid buildup in your spine that may be causing pain  You may be given pain medicine along with the steroids  ACTIVITY  · Do not drive or operate machinery today  · No strenuous activity today - bending, lifting, etc   · You may resume normal activites starting tomorrow - start slowly and as tolerated  · You may shower today, but no tub baths or hot tubs  · You may have numbness for several hours from the local anesthetic  Please use caution and common sense, especially with weight-bearing activities  CARE OF THE INJECTION SITE  · If you have soreness or pain, apply ice to the area today (20 minutes on/20 minutes off)  · Starting tomorrow, you may use warm, moist heat or ice if needed  · You may have an increase or change in your discomfort for 36-48 hours after your treatment  · Apply ice and continue with any pain medication you have been prescribed  · Notify [facility] if you have any of the following: redness, drainage, swelling, headache, stiff neck or fever above 100°F     SPECIAL INSTRUCTIONS  · Our office will contact you in approximately 7 days for a progress report  MEDICATIONS  · Continue to take all routine medications  · Our office may have instructed you to hold some medications  If you have a problem specifically related to your procedure, please call our office at [phone]  Problems not related to your procedure should be directed to your primary care physician

## 2019-03-08 NOTE — H&P
History of Present Illness: The patient is a 60 y o  male who presents with complaints of low back and leg pain  Patient Active Problem List   Diagnosis    Lumbago with sciatica, left side    Lumbar degenerative disc disease    Lumbar herniated disc    Lumbar radiculopathy    Lumbar spondylosis    Myofascial pain    Obesity    Sacroiliitis ([address] )    Hyperlipidemia    Diabetes ([address] )    Essential hypertension    AIDEE (obstructive sleep apnea)    Insomnia    Hypersomnia    Obesity (BMI 30-39  9)    Diabetes 1 5, 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[facility] )   [address] maintenance    Morbid obesity ([facility] )       Past Medical History:   Diagnosis Date    Diabetes mellitus ([facility] [facility] )     Headache     High cholesterol     Hypertension     Kidney stones     Sleep apnea        Past Surgical History:   Procedure Laterality Date    COLONOSCOPY N/A 11/22/2016    Procedure: COLONOSCOPY;  Surgeon: Dillon Ruff MD;  Location: [facility];   Service:     DEBRIDEMENT TENNIS ELBOW  2006    HERNIA REPAIR  6125    x2, umbilical and right side per patient   [address]    UVULOPALATOPHARYNGOPLASTY  2004         Current Outpatient Medications:     acetaminophen (TYLENOL) 325 mg tablet, Take 10 mg/kg by mouth every 4 (four) hours as needed for fever, Disp: , Rfl:     diclofenac sodium (VOLTAREN) 50 mg EC tablet, Take 1 tablet (50 mg total) by mouth 2 (two) times a day as needed (pain), Disp: 180 tablet, Rfl: 0    FARXIGA 5 MG TABS, TAKE 1 TABLET BY MOUTH EVERY DAY, Disp: 90 tablet, Rfl: 1    gabapentin (NEURONTIN) 400 mg capsule, Take 1 capsule (400 mg total) by mouth 3 (three) times a day, Disp: 270 capsule, Rfl: 0    lisinopril (ZESTRIL) 5 mg tablet, TAKE 1 TABLET EVERY DAY, Disp: 90 tablet, Rfl: 2    rosuvastatin (CRESTOR) 5 mg tablet, Take 1 tablet (5 mg total) by mouth daily, Disp: 30 tablet, Rfl: 5    Allergies   Allergen Reactions    Tramadol Itching       Physical Exam:   Vitals:    03/08/19 0841   BP: 131/87   Pulse: 67   Resp: 20   Temp: 98 °F (36 7 °C)   SpO2: 92%     General: Awake, Alert, Oriented x 3, Mood and affect appropriate  Respiratory: Respirations even and unlabored  Cardiovascular: Peripheral pulses intact; no edema  Musculoskeletal Exam:  Bilateral lumbar paraspinals tender to palpation  ASA Score: 2    Patient/Chart Verification  Patient ID Verified: Verbal  ID Band Applied: No  Consents Confirmed: Procedural  H&P( within 30 days) Verified: To be obtained in the Pre-Procedure area  Interval H&P(within 24 hr) Complete (required for Outpatients and Surgery Admit only): To be obtained in the Pre-Procedure area  Allergies Reviewed: Yes  Anticoag/NSAID held?: Yes(stopped diclofenac on 3/3)  Currently on antibiotics?: No  Pre-op Lab/Test Results Available: N/A    Assessment:   1   Lumbar radiculopathy        Plan: L5-S1 LESI

## 2019-03-15 ENCOUNTER — TELEPHONE (OUTPATIENT)
Dept: PAIN MEDICINE | Facility: CLINIC | Age: 64
End: 2019-03-15

## 2019-03-29 ENCOUNTER — TELEPHONE (OUTPATIENT)
Dept: PAIN MEDICINE | Facility: CLINIC | Age: 64
End: 2019-03-29

## 2019-03-29 NOTE — TELEPHONE ENCOUNTER
Regarding: RE:Your Recent Visit  Contact: [phone]  ----- Message from [address] [place] [address] [address], Generic sent at 3/29/2019  3:23 PM EDT -----    Mark Chopra,     Happy Spring  2 things  1 - it's been 3 weeks since my shots - the 2nd week I was able to come off the diclofenac, but this week I had to go back on it  I believe you indicated we can do a second shot without waiting the 6 months  if that is right can we schedule it? 2 - i'd like to try and go off the gabapentin again  I haven't had any leg pain for the last 3 weeks so I'm hoping with the weight loss and exercise the pressure on the sciatic nerve has let up  Let me know  Thanks  Sophy Sandhu  ----- Message -----  From: GOLDY Bojorquez  Sent: 2/18/2019 12:10 PM EST  To: Ines Alcantar  Subject: Your Recent Visit  Ines Alcantar, you have a new After Visit Summary in [address] [place] [address]  Please click on visits and then your most recent appointment, to view your After Visit Summary  If you are experiencing any technical issues please call our patient service desk at [phone]) option [phone]

## 2019-04-01 NOTE — TELEPHONE ENCOUNTER
Sent message to pt via [employer], please schedule pt for repeat L5-S1 LESI, pt will have to hold Diclofenac for 4 days prior

## 2019-04-01 NOTE — TELEPHONE ENCOUNTER
If he'd like to wean off, he can decrease to 400mg BID x 1 week, then 400my daily x 1 week, then every other day x 3 days and then STOP

## 2019-04-01 NOTE — TELEPHONE ENCOUNTER
Spoke with patient scheduled Repeat L5-S1 LESI, 04/17/19 arrival 7:45am  Diclofenac LD:  04/12/19    Went over pre procedure instructions, pt diabetic, follow normal routine for meds and food, if sick or on abx needs to call to rs, wear loose, comf clothing- no buttons/zippers, needs   Pt verbalized understanding

## 2019-04-01 NOTE — TELEPHONE ENCOUNTER
Sure - if the patient got good relief from the first epidural at the beginning of march, we can schedule for a repeat  Patient would have to hold diclofenac for procedure  In regards to gabapentin, please confirm dosage and that he is taking 400mg TID

## 2019-04-01 NOTE — TELEPHONE ENCOUNTER
S/w pt, he would like to repeat the L5-S1 LESI he had back on 3/7 (will send to Prudence Tay), and pt confirmed he is taking Gabapentin 400 mg TID, would like to know titration schedule  (Pt asked if I could respond via [employer] with schedule)

## 2019-04-17 ENCOUNTER — HOSPITAL ENCOUNTER (OUTPATIENT)
Dept: RADIOLOGY | Facility: CLINIC | Age: 64
Discharge: HOME | End: 2019-04-17
Payer: COMMERCIAL

## 2019-04-17 VITALS
HEART RATE: 56 BPM | OXYGEN SATURATION: 92 % | RESPIRATION RATE: 20 BRPM | DIASTOLIC BLOOD PRESSURE: 82 MMHG | SYSTOLIC BLOOD PRESSURE: 128 MMHG | TEMPERATURE: 99 F

## 2019-04-17 DIAGNOSIS — M54.16 LUMBAR RADICULOPATHY: ICD-10-CM

## 2019-04-17 PROCEDURE — 62323 NJX INTERLAMINAR LMBR/SAC: CPT | Performed by: ANESTHESIOLOGY

## 2019-04-17 RX ORDER — LIDOCAINE HYDROCHLORIDE 10 MG/ML
5 INJECTION, SOLUTION EPIDURAL; INFILTRATION; INTRACAUDAL; PERINEURAL ONCE
Status: COMPLETED | OUTPATIENT
Start: 2019-04-17 | End: 2019-04-17

## 2019-04-17 RX ORDER — METHYLPREDNISOLONE ACETATE 80 MG/ML
80 INJECTION, SUSPENSION INTRA-ARTICULAR; INTRALESIONAL; INTRAMUSCULAR; PARENTERAL; SOFT TISSUE ONCE
Status: COMPLETED | OUTPATIENT
Start: 2019-04-17 | End: 2019-04-17

## 2019-04-17 RX ADMIN — LIDOCAINE HYDROCHLORIDE 3 ML: 10 INJECTION, SOLUTION EPIDURAL; INFILTRATION; INTRACAUDAL; PERINEURAL at 08:16

## 2019-04-17 RX ADMIN — METHYLPREDNISOLONE ACETATE 80 MG: 80 INJECTION, SUSPENSION INTRA-ARTICULAR; INTRALESIONAL; INTRAMUSCULAR; SOFT TISSUE at 08:19

## 2019-04-17 RX ADMIN — IOHEXOL 1 ML: 300 INJECTION, SOLUTION INTRAVENOUS at 08:18

## 2019-04-24 ENCOUNTER — TELEPHONE (OUTPATIENT)
Dept: PAIN MEDICINE | Facility: CLINIC | Age: 64
End: 2019-04-24

## 2019-04-26 DIAGNOSIS — E11.9 TYPE 2 DIABETES MELLITUS WITHOUT COMPLICATION, WITHOUT LONG-TERM CURRENT USE OF INSULIN (HCC): ICD-10-CM

## 2019-04-26 DIAGNOSIS — I10 ESSENTIAL HYPERTENSION: ICD-10-CM

## 2019-04-26 PROCEDURE — 4010F ACE/ARB THERAPY RXD/TAKEN: CPT | Performed by: INTERNAL MEDICINE

## 2019-04-26 RX ORDER — LISINOPRIL 5 MG/1
TABLET ORAL
Qty: 90 TABLET | Refills: 2 | Status: SHIPPED | OUTPATIENT
Start: 2019-04-26 | End: 2020-01-27 | Stop reason: SDUPTHER

## 2019-04-26 RX ORDER — DAPAGLIFLOZIN 5 MG/1
TABLET, FILM COATED ORAL
Qty: 90 TABLET | Refills: 1 | Status: SHIPPED | OUTPATIENT
Start: 2019-04-26 | End: 2019-10-19 | Stop reason: SDUPTHER

## 2019-05-13 ENCOUNTER — TELEPHONE (OUTPATIENT)
Dept: PAIN MEDICINE | Facility: CLINIC | Age: 64
End: 2019-05-13

## 2019-05-13 DIAGNOSIS — M51.26 LUMBAR HERNIATED DISC: ICD-10-CM

## 2019-05-13 DIAGNOSIS — M54.16 LUMBAR RADICULOPATHY: Primary | ICD-10-CM

## 2019-06-03 ENCOUNTER — OFFICE VISIT (OUTPATIENT)
Dept: OBGYN CLINIC | Facility: HOSPITAL | Age: 64
End: 2019-06-03
Payer: COMMERCIAL

## 2019-06-03 ENCOUNTER — HOSPITAL ENCOUNTER (OUTPATIENT)
Dept: RADIOLOGY | Facility: HOSPITAL | Age: 64
Discharge: HOME | End: 2019-06-03
Attending: ORTHOPAEDIC SURGERY
Payer: COMMERCIAL

## 2019-06-03 VITALS
HEIGHT: 75 IN | DIASTOLIC BLOOD PRESSURE: 92 MMHG | SYSTOLIC BLOOD PRESSURE: 142 MMHG | BODY MASS INDEX: 34.44 KG/M2 | HEART RATE: 69 BPM | WEIGHT: 277 LBS

## 2019-06-03 DIAGNOSIS — M51.36 LUMBAR DEGENERATIVE DISC DISEASE: ICD-10-CM

## 2019-06-03 DIAGNOSIS — M47.816 LUMBAR SPONDYLOSIS: ICD-10-CM

## 2019-06-03 DIAGNOSIS — M54.16 LUMBAR RADICULOPATHY: ICD-10-CM

## 2019-06-03 DIAGNOSIS — M54.50 LUMBAR BACK PAIN: Primary | ICD-10-CM

## 2019-06-03 DIAGNOSIS — M51.26 LUMBAR HERNIATED DISC: ICD-10-CM

## 2019-06-03 PROCEDURE — 99204 OFFICE O/P NEW MOD 45 MIN: CPT | Performed by: ORTHOPAEDIC SURGERY

## 2019-06-03 PROCEDURE — 72110 X-RAY EXAM L-2 SPINE 4/>VWS: CPT

## 2019-06-06 ENCOUNTER — HOSPITAL ENCOUNTER (OUTPATIENT)
Dept: RADIOLOGY | Age: 64
Discharge: HOME | End: 2019-06-06
Payer: COMMERCIAL

## 2019-06-06 DIAGNOSIS — M54.50 LUMBAR BACK PAIN: ICD-10-CM

## 2019-06-06 PROCEDURE — 72148 MRI LUMBAR SPINE W/O DYE: CPT

## 2019-06-10 ENCOUNTER — OFFICE VISIT (OUTPATIENT)
Dept: OBGYN CLINIC | Facility: HOSPITAL | Age: 64
End: 2019-06-10
Payer: COMMERCIAL

## 2019-06-10 VITALS
SYSTOLIC BLOOD PRESSURE: 120 MMHG | DIASTOLIC BLOOD PRESSURE: 82 MMHG | HEIGHT: 75 IN | HEART RATE: 61 BPM | BODY MASS INDEX: 34.44 KG/M2 | WEIGHT: 277 LBS

## 2019-06-10 DIAGNOSIS — M51.36 LUMBAR DEGENERATIVE DISC DISEASE: ICD-10-CM

## 2019-06-10 DIAGNOSIS — M47.816 LUMBAR SPONDYLOSIS: Primary | ICD-10-CM

## 2019-06-10 PROCEDURE — 99213 OFFICE O/P EST LOW 20 MIN: CPT | Performed by: ORTHOPAEDIC SURGERY

## 2019-06-28 ENCOUNTER — APPOINTMENT (OUTPATIENT)
Dept: LAB | Age: 64
End: 2019-06-28
Payer: COMMERCIAL

## 2019-06-28 ENCOUNTER — TRANSCRIBE ORDERS (OUTPATIENT)
Dept: ADMINISTRATIVE | Age: 64
End: 2019-06-28

## 2019-06-28 DIAGNOSIS — E78.01 FAMILIAL HYPERCHOLESTEROLEMIA: ICD-10-CM

## 2019-06-28 DIAGNOSIS — I10 ESSENTIAL HYPERTENSION: ICD-10-CM

## 2019-06-28 DIAGNOSIS — E11.9 TYPE 2 DIABETES MELLITUS WITHOUT COMPLICATION, WITHOUT LONG-TERM CURRENT USE OF INSULIN (HCC): ICD-10-CM

## 2019-06-28 LAB
ANION GAP SERPL CALCULATED.3IONS-SCNC: 6 MMOL/L (ref 4–13)
BUN SERPL-MCNC: 17 MG/DL (ref 5–25)
CALCIUM SERPL-MCNC: 9 MG/DL (ref 8.3–10.1)
CHLORIDE SERPL-SCNC: 108 MMOL/L (ref 100–108)
CHOLEST SERPL-MCNC: 221 MG/DL (ref 50–200)
CO2 SERPL-SCNC: 29 MMOL/L (ref 21–32)
CREAT SERPL-MCNC: 0.85 MG/DL (ref 0.6–1.3)
EST. AVERAGE GLUCOSE BLD GHB EST-MCNC: 143 MG/DL
GFR SERPL CREATININE-BSD FRML MDRD: 93 ML/MIN/1.73SQ M
GLUCOSE P FAST SERPL-MCNC: 117 MG/DL (ref 65–99)
HBA1C MFR BLD: 6.6 % (ref 4.2–6.3)
HDLC SERPL-MCNC: 61 MG/DL (ref 40–60)
LDLC SERPL CALC-MCNC: 135 MG/DL (ref 0–100)
NONHDLC SERPL-MCNC: 160 MG/DL
POTASSIUM SERPL-SCNC: 4 MMOL/L (ref 3.5–5.3)
SODIUM SERPL-SCNC: 143 MMOL/L (ref 136–145)
TRIGL SERPL-MCNC: 126 MG/DL

## 2019-06-28 PROCEDURE — 80048 BASIC METABOLIC PNL TOTAL CA: CPT

## 2019-06-28 PROCEDURE — 36415 COLL VENOUS BLD VENIPUNCTURE: CPT

## 2019-06-28 PROCEDURE — 83036 HEMOGLOBIN GLYCOSYLATED A1C: CPT

## 2019-06-28 PROCEDURE — 80061 LIPID PANEL: CPT

## 2019-07-02 ENCOUNTER — OFFICE VISIT (OUTPATIENT)
Dept: INTERNAL MEDICINE CLINIC | Facility: CLINIC | Age: 64
End: 2019-07-02
Payer: COMMERCIAL

## 2019-07-02 VITALS
DIASTOLIC BLOOD PRESSURE: 84 MMHG | TEMPERATURE: 97.2 F | HEIGHT: 75 IN | HEART RATE: 76 BPM | BODY MASS INDEX: 34.07 KG/M2 | OXYGEN SATURATION: 98 % | SYSTOLIC BLOOD PRESSURE: 128 MMHG | WEIGHT: 274 LBS

## 2019-07-02 DIAGNOSIS — Z00.00 HEALTHCARE MAINTENANCE: ICD-10-CM

## 2019-07-02 DIAGNOSIS — G47.33 OSA (OBSTRUCTIVE SLEEP APNEA): ICD-10-CM

## 2019-07-02 DIAGNOSIS — Z12.11 COLON CANCER SCREENING: ICD-10-CM

## 2019-07-02 DIAGNOSIS — M65.331 TRIGGER MIDDLE FINGER OF RIGHT HAND: Primary | ICD-10-CM

## 2019-07-02 DIAGNOSIS — E13.9 DIABETES 1.5, MANAGED AS TYPE 2 (HCC): ICD-10-CM

## 2019-07-02 DIAGNOSIS — E78.01 FAMILIAL HYPERCHOLESTEROLEMIA: ICD-10-CM

## 2019-07-02 DIAGNOSIS — E66.09 CLASS 2 OBESITY DUE TO EXCESS CALORIES WITHOUT SERIOUS COMORBIDITY WITH BODY MASS INDEX (BMI) OF 38.0 TO 38.9 IN ADULT: ICD-10-CM

## 2019-07-02 DIAGNOSIS — M46.1 SACROILIITIS (HCC): ICD-10-CM

## 2019-07-02 DIAGNOSIS — I10 ESSENTIAL HYPERTENSION: ICD-10-CM

## 2019-07-02 PROCEDURE — 99214 OFFICE O/P EST MOD 30 MIN: CPT | Performed by: INTERNAL MEDICINE

## 2019-07-02 PROCEDURE — 1036F TOBACCO NON-USER: CPT | Performed by: INTERNAL MEDICINE

## 2019-07-02 NOTE — ASSESSMENT & PLAN NOTE
Patient is tolerating the Crestor which she takes 3 times a week  He states he has been taking along with this Co Q10 which has decreased his myalgias  Patient did have a cholesterol profile showing a reduction down to 230 from 260  Patient was told to increase his Crestor to 4 times per week and hopefully we can titrate him up to a dose which not only he is able to tolerate but helps to keep his cholesterol under better control    I again reinforced with the patient the importance of diet, limiting fats and cholesterol from his diet

## 2019-07-02 NOTE — ASSESSMENT & PLAN NOTE
Lab Results   Component Value Date    HGBA1C 6 6 (H) 06/28/2019       No results for input(s): POCGLU in the last 72 hours  Blood Sugar Average: Last 72 hrs:   patient's sugar continues to show good control with a hemoglobin A1c of 6 6  Patient will continue present medication  We will check a fasting blood sugar hemoglobin A1c, urine for microalbumin with his next visit  We did perform a diabetic foot exam patient is seeing his ophthalmologist at least on a yearly basis  We still encourage the patient to work harder on his diet and at weight loss

## 2019-07-02 NOTE — PROGRESS NOTES
Assessment/Plan:    Diabetes 1 5, 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[facility] [address] )  Lab Results   Component Value Date    HGBA1C 6 6 (H) 06/28/2019       No results for input(s): POCGLU in the last 72 hours  Blood Sugar Average: Last 72 hrs:   patient's sugar continues to show good control with a hemoglobin A1c of 6 6  Patient will continue present medication  We will check a fasting blood sugar hemoglobin A1c, urine for microalbumin with his next visit  We did perform a diabetic foot exam patient is seeing his ophthalmologist at least on a yearly basis  We still encourage the patient to work harder on his diet and at weight loss  AIDEE (obstructive sleep apnea)  Patient does have a history of severe obstructive sleep apnea  He has been unable to tolerate the CPAP and they have tried numerous masks and devices in order to help the patient  I did tell the patient that it is extremely important that he works a weight loss which might be help pull with his breathing disorder acute makes the CPAP more tolerable    Essential hypertension  Blood pressure control  Patient will continue present medications surveillance  We will check on his renal function with his next visit  Hyperlipidemia  Patient is tolerating the Crestor which she takes 3 times a week  He states he has been taking along with this Co Q10 which has decreased his myalgias  Patient did have a cholesterol profile showing a reduction down to 230 from 260  Patient was told to increase his Crestor to 4 times per week and hopefully we can titrate him up to a dose which not only he is able to tolerate but helps to keep his cholesterol under better control  I again reinforced with the patient the importance of diet, limiting fats and cholesterol from his diet    Healthcare maintenance  Patient will be due with his next visit for full physical   Patient was given a slip for labs to be performed prior to the visit    His employer wishes the patient to be tested for hepatitis C and this is been ordered also  Diagnoses and all orders for this visit:    Trigger middle finger of right hand  -     Ambulatory referral to Orthopedic Surgery; Future    Diabetes 1 5, 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[facility] [address] )  -     Comprehensive metabolic panel; Future  -     CBC and differential; Future  -     Lipid panel; Future  -     TSH, 3rd generation with Free T4 reflex; Future  -     Hemoglobin A1C; Future  -     Microalbumin / creatinine urine ratio    AIDEE (obstructive sleep apnea)    Essential hypertension  -     Comprehensive metabolic panel; Future  -     CBC and differential; Future  -     Lipid panel; Future    Class 2 obesity due to excess calories without serious comorbidity with body mass index (BMI) of 38 0 to 38 9 in adult    Sacroiliitis (HCC)  -     diclofenac sodium (VOLTAREN) 50 mg EC tablet; Take 1 tablet (50 mg total) by mouth 2 (two) times a day as needed (pain)    Healthcare maintenance  -     Hepatitis C antibody; Future  -     Comprehensive metabolic panel; Future  -     CBC and differential; Future    Colon cancer screening  -     Occult Blood, Fecal Immunochemical; Future    Familial hypercholesterolemia          Subjective:      Patient ID: Abbey Blackwell is a 60 y o  male  Patient is a 72-year-old male with a history of multiple medical problems as outlined previously  Patient is here today for routine follow-up after four-month period of time  Patient states he still having problems with chronic low back pain and was told by Orthopedics and Pain Management is secondary to his arthritis  He states he had to stop the gabapentin because of side effects and he is only on Voltaren 50 mg twice a day as needed for his discomfort  Patient otherwise states he is feeling well  He has lost 3 lb since his last visit and is commended for this but he was told he needs to work even harder at weight loss    He continues to work on his core exercises to keep his back pain and disability down to a minimum  He is complaining of trigger fingers index fingers both hands  We did discuss the results of recent lab testing showing good control of his blood sugar, some improvement in his cholesterol      The following portions of the patient's history were reviewed and updated as appropriate: He  has a past medical history of Diabetes mellitus ([facility] ), Headache, High cholesterol, Hypertension, Kidney stones, and Sleep apnea  He   Patient Active Problem List    Diagnosis Date Noted    Trigger middle finger of right hand 07/02/2019    Morbid obesity ([facility] [address] ) 02/07/2019    Healthcare maintenance 10/09/2018    AIDEE (obstructive sleep apnea) 03/05/2018    Insomnia 03/05/2018    Hypersomnia 03/05/2018    Obesity (BMI 30-39 9) 03/05/2018    Diabetes 1 5, 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[facility] ) 03/05/2018    Sacroiliitis ([facility] ) 02/06/2018    Lumbar degenerative disc disease 10/23/2017    Lumbar herniated disc 10/23/2017    Lumbar spondylosis 10/23/2017    Lumbar radiculopathy 08/29/2017    Myofascial pain 08/29/2017    Lumbago with sciatica, left side 07/31/2017    Diabetes ([facility] ) 09/13/2016    Obesity 06/15/2012    Hyperlipidemia 06/15/2012    Essential hypertension 06/15/2012     He  has a past surgical history that includes Nasal septum surgery (1980); Uvulopalatopharyngoplasty (2004); Debridement tennis elbow (2006); Hernia repair (2011); and Colonoscopy (N/A, 11/22/2016)  His Family history is unknown by patient  He  reports that he has never smoked  He has never used smokeless tobacco  He reports that he drinks alcohol  He reports that he does not use drugs    Current Outpatient Medications   Medication Sig Dispense Refill    acetaminophen (TYLENOL) 325 mg tablet Take 10 mg/kg by mouth every 4 (four) hours as needed for fever      diclofenac sodium (VOLTAREN) 50 mg EC tablet Take 1 tablet (50 mg total) by mouth 2 (two) times a day as needed (pain) 180 tablet 2    FARXIGA 5 MG TABS TAKE 1 TABLET BY MOUTH EVERY DAY 90 tablet 1    lisinopril (ZESTRIL) 5 mg tablet TAKE 1 TABLET EVERY DAY 90 tablet 2    rosuvastatin (CRESTOR) 5 mg tablet Take 1 tablet (5 mg total) by mouth daily 30 tablet 5    gabapentin (NEURONTIN) 400 mg capsule Take 1 capsule (400 mg total) by mouth 3 (three) times a day (Patient not taking: Reported on 7/2/2019) 270 capsule 0     No current facility-administered medications for this visit  Current Outpatient Medications on File Prior to Visit   Medication Sig    acetaminophen (TYLENOL) 325 mg tablet Take 10 mg/kg by mouth every 4 (four) hours as needed for fever    FARXIGA 5 MG TABS TAKE 1 TABLET BY MOUTH EVERY DAY    lisinopril (ZESTRIL) 5 mg tablet TAKE 1 TABLET EVERY DAY    rosuvastatin (CRESTOR) 5 mg tablet Take 1 tablet (5 mg total) by mouth daily    [DISCONTINUED] diclofenac sodium (VOLTAREN) 50 mg EC tablet Take 1 tablet (50 mg total) by mouth 2 (two) times a day as needed (pain)    gabapentin (NEURONTIN) 400 mg capsule Take 1 capsule (400 mg total) by mouth 3 (three) times a day (Patient not taking: Reported on 7/2/2019)     No current facility-administered medications on file prior to visit  He is allergic to tramadol       Review of Systems   Constitutional: Negative  HENT: Negative  Eyes: Negative  Respiratory: Negative  Cardiovascular: Negative  Gastrointestinal: Negative  Endocrine: Negative  Genitourinary: Negative  Musculoskeletal: Positive for arthralgias and back pain  Negative for gait problem, joint swelling, myalgias, neck pain and neck stiffness  Skin: Negative  Allergic/Immunologic: Negative  Neurological: Negative  Hematological: Negative  Psychiatric/Behavioral: Negative  Objective:      /84   Pulse 76   Temp (!) 97 2 °F (36 2 °C)   Ht 6' 3" (1 905 m)   Wt 124 kg (274 lb)   SpO2 98%   BMI 34 25 kg/m²          Physical Exam   Constitutional: He is oriented to person, place, and time   He appears well-developed and well-nourished  No distress  Very pleasant, articulate, overweight 26-year-old male who is awake alert in no acute distress and oriented x3   HENT:   Head: Normocephalic and atraumatic  Right Ear: External ear normal    Left Ear: External ear normal    Nose: Nose normal    Mouth/Throat: No oropharyngeal exudate  Pink but slightly dry mucous membranes   Eyes: Pupils are equal, round, and reactive to light  Conjunctivae and EOM are normal  Right eye exhibits no discharge  Left eye exhibits no discharge  No scleral icterus  Neck: Normal range of motion  Neck supple  No JVD present  No tracheal deviation present  No thyromegaly present  Cardiovascular: Normal rate, regular rhythm, normal heart sounds and intact distal pulses  Exam reveals no friction rub  No murmur heard  Pulmonary/Chest: Effort normal and breath sounds normal  No stridor  No respiratory distress  He has no wheezes  He has no rales  He exhibits no tenderness  Abdominal: Soft  Bowel sounds are normal  He exhibits no distension and no mass  There is no tenderness  There is no rebound and no guarding  No hernia  Obese   Musculoskeletal: Normal range of motion  He exhibits deformity  He exhibits no edema or tenderness  Lymphadenopathy:     He has no cervical adenopathy  Neurological: He is alert and oriented to person, place, and time  He displays normal reflexes  A sensory deficit is present  No cranial nerve deficit  He exhibits normal muscle tone  Coordination normal    Paresthesia to bilateral feet and ankles secondary to his progressive neuropathy   Skin: Skin is warm and dry  Capillary refill takes less than 2 seconds  He is not diaphoretic  No erythema  No pallor  Psychiatric: He has a normal mood and affect  His behavior is normal  Judgment and thought content normal    Nursing note and vitals reviewed

## 2019-07-02 NOTE — ASSESSMENT & PLAN NOTE
Patient does have a history of severe obstructive sleep apnea  He has been unable to tolerate the CPAP and they have tried numerous masks and devices in order to help the patient    I did tell the patient that it is extremely important that he works a weight loss which might be help pull with his breathing disorder acute makes the CPAP more tolerable

## 2019-07-02 NOTE — PROGRESS NOTES
Diabetic Foot Exam    Patient's shoes and socks removed  Right Foot/Ankle   Right Foot Inspection  Skin Exam: skin normal and skin intact no dry skin, no warmth, no callus, no erythema, no maceration, no abnormal color, no pre-ulcer, no ulcer and no callus                          Toe Exam: ROM and strength within normal limitsno swelling, no tenderness, erythema and  no right toe deformity  Sensory   Vibration: diminished  Proprioception: diminished   Monofilament testing: diminished  Vascular  Capillary refills: < 3 seconds  The right DP pulse is 2+  The right PT pulse is 2+  Left Foot/Ankle  Left Foot Inspection                           Toe Exam: ROM and strength within normal limitsno swelling, no tenderness and no left toe deformity                   Sensory   Vibration: diminished  Proprioception: diminished  Monofilament: diminished  Vascular  Capillary refills: < 3 seconds  The left DP pulse is 2+  The left PT pulse is 2+  Assign Risk Category:  No deformity present; Loss of protective sensation;  No weak pulses       Risk: 1

## 2019-07-02 NOTE — ASSESSMENT & PLAN NOTE
Blood pressure control  Patient will continue present medications surveillance  We will check on his renal function with his next visit

## 2019-07-02 NOTE — ASSESSMENT & PLAN NOTE
Patient will be due with his next visit for full physical   Patient was given a slip for labs to be performed prior to the visit  His employer wishes the patient to be tested for hepatitis C and this is been ordered also

## 2019-07-08 ENCOUNTER — TELEPHONE (OUTPATIENT)
Dept: PAIN MEDICINE | Facility: CLINIC | Age: 64
End: 2019-07-08

## 2019-07-08 NOTE — TELEPHONE ENCOUNTER
Appointment canceled for Tessa Ramirez ([id number])   Visit Type: OFFICE VISIT SHORT [facility]   Date        Time      Length    Provider                  Department   7/18/2019    8:30 AM  15 mins  GOLDY Roa       [facility] [place]      Reason for Cancellation: Patient      Patient Comments: no further pain treatment options available daron Mckee and the surgeon I was referred to says no option for surgery  I will continue treatment follow up with Dr Matias Arreguin, he has agreed to continue to prescribe Diclofenac and monitor my bloodwork

## 2019-08-02 ENCOUNTER — CONSULT (OUTPATIENT)
Dept: OBGYN CLINIC | Facility: HOSPITAL | Age: 64
End: 2019-08-02
Payer: COMMERCIAL

## 2019-08-02 VITALS
DIASTOLIC BLOOD PRESSURE: 79 MMHG | HEIGHT: 75 IN | WEIGHT: 283 LBS | SYSTOLIC BLOOD PRESSURE: 126 MMHG | HEART RATE: 62 BPM | BODY MASS INDEX: 35.19 KG/M2

## 2019-08-02 DIAGNOSIS — M65.332 TRIGGER FINGER, LEFT MIDDLE FINGER: Primary | ICD-10-CM

## 2019-08-02 DIAGNOSIS — M65.331 TRIGGER MIDDLE FINGER OF RIGHT HAND: ICD-10-CM

## 2019-08-02 PROCEDURE — 20550 NJX 1 TENDON SHEATH/LIGAMENT: CPT | Performed by: ORTHOPAEDIC SURGERY

## 2019-08-02 PROCEDURE — 99213 OFFICE O/P EST LOW 20 MIN: CPT | Performed by: ORTHOPAEDIC SURGERY

## 2019-08-02 RX ORDER — BETAMETHASONE SODIUM PHOSPHATE AND BETAMETHASONE ACETATE 3; 3 MG/ML; MG/ML
6 INJECTION, SUSPENSION INTRA-ARTICULAR; INTRALESIONAL; INTRAMUSCULAR; SOFT TISSUE
Status: COMPLETED | OUTPATIENT
Start: 2019-08-02 | End: 2019-08-02

## 2019-08-02 RX ORDER — LIDOCAINE HYDROCHLORIDE 20 MG/ML
1 INJECTION, SOLUTION EPIDURAL; INFILTRATION; INTRACAUDAL; PERINEURAL
Status: COMPLETED | OUTPATIENT
Start: 2019-08-02 | End: 2019-08-02

## 2019-08-02 RX ADMIN — BETAMETHASONE SODIUM PHOSPHATE AND BETAMETHASONE ACETATE 6 MG: 3; 3 INJECTION, SUSPENSION INTRA-ARTICULAR; INTRALESIONAL; INTRAMUSCULAR; SOFT TISSUE at 09:59

## 2019-08-02 RX ADMIN — LIDOCAINE HYDROCHLORIDE 1 ML: 20 INJECTION, SOLUTION EPIDURAL; INFILTRATION; INTRACAUDAL; PERINEURAL at 09:59

## 2019-08-02 NOTE — PATIENT INSTRUCTIONS
What is it TRIGGER FINGER? Stenosing tenosynovitis, commonly known as trigger finger or trigger thumb, involves the pulleys and tendons in the hand that bend the fingers  The tendons work like long ropes connecting the muscles of the forearm with the bones of the fingers and thumb  In the finger, the pulleys are a series of rings that form a tunnel through which the tendons must glide, much like the guides on a fishing kailyn through which the line (or tendon) must pass  These pulleys hold the tendons close against the bone  The tendons and the tunnel have a slick lining that allows easy gliding of the tendon through the pulleys (see Figure 1)  Trigger finger/thumb occurs when the pulley at the base of the finger becomes too thick and constricting around the tendon, making it hard for the tendon to move freely through the pulley  Sometimes the tendon develops a nodule (knot) or swelling of its lining  Because of the increased resistance to the gliding of the tendon through the  pulley, one may feel pain, popping, or a catching feeling in the finger or thumb (see Figure 2)  When the tendon catches, it produces irritation and more swelling  This causes a vicious cycle of triggering, irritation, and swelling  Sometimes the finger becomes stuck or locked, and is hard to straighten or bend  What causes it? Causes for this condition are not always clear  Some trigger fingers are associated with medical conditions such as rheumatoid arthritis, gout, and diabetes  Local trauma to the palm/base of the finger may be a factor on occasion, but in most cases there is not a clear cause  Signs and symptoms   Trigger finger/thumb may start with discomfort felt at the base of the finger or thumb, where they join the palm  This area is often tender to local pressure  A nodule may sometimes be found in this area   When the finger begins to trigger or lock, the patient may think the  problem is at the middle knuckle of the finger or the tip knuckle of the thumb, since the tendon that is sticking is the one that moves these joints  Treatment  The goal of treatment in trigger finger/thumb is to eliminate the catching or locking and allow full movement of the finger or thumb without discomfort  Swelling around the flexor tendon and tendon sheath must be reduced to allow smooth gliding of the tendon  The wearing of a splint or taking an oral anti-inflammatory medication may sometimes  help  Treatment may also include changing activities to reduce swelling  An injection of steroid into the area around the tendon and pulley is often effective in relieving the trigger finger/thumb  If non-surgical forms of treatment do not relieve the symptoms, surgery may be recommended  This surgery is performed as an outpatient, usually with simple local anesthesia  The goal of surgery is to open the pulley at the base of the finger so that the tendon can glide more freely (see Figure 3)  Active motion of the finger generally begins immediately after surgery  Normal use of the hand can usually be resumed once comfort permits  Some patients may feel tenderness, discomfort, and swelling about the area of their surgery longer than others  Occasionally, hand therapy is required after surgery to regain  better use  © 2012 [employer]  www handcare  org

## 2019-08-02 NOTE — PROGRESS NOTES
ASSESSMENT/PLAN:    Assessment:   B/L Long TFs    Plan:   Pt elects steroid injections today for b/l long fingers (1st)  Pt advised to monitor his BG after these   Diabetic chieroarthropathy was also d/w pt today that can cause stiffness and discomfort of the fingers  HEP for intrinsic tightness     Follow Up:  6  week(s)    General Discussions:  Trigger FInger: The anatomy and physiology of trigger finger was discussed with the patient today in the office  Edema and increased contact pressure within the flexor tendons at the A1 pulley can cause pain, crepitation, and limitation of function  Treatment options include resting MP blocking splints to decrease edema, oral anti-inflammatory medications, home or formal therapy exercises, up to 2 steroid injections within the tendon sheath, or surgical release  While majority of patients do respond to conservative treatment, up to 20% may require surgical release      _____________________________________________________  CHIEF COMPLAINT:  Chief Complaint   Patient presents with    Right Hand - Pain    Left Hand - Pain         SUBJECTIVE:  Thad Nuñez is a 60 y o  male who presents with c/o b/l long finger clicking and locking  States no specific pain with this locking, but is cumbersome  He also describes some generalized occasional discomfort and weakness sensation to the left hand primarily  No n/t  PAST MEDICAL HISTORY:  Past Medical History:   Diagnosis Date    Diabetes mellitus ([address] )     Headache     High cholesterol     Hypertension     Kidney stones     Sleep apnea        PAST SURGICAL HISTORY:  Past Surgical History:   Procedure Laterality Date    COLONOSCOPY N/A 11/22/2016    Procedure: COLONOSCOPY;  Surgeon: Anmol Rey MD;  Location: [facility];   Service:     DEBRIDEMENT TENNIS ELBOW  2006    HERNIA REPAIR  [id number]    x2, umbilical and right side per patient   [address]    UVULOPALATOPHARYNGOPLASTY  2004       FAMILY HISTORY:  Family History   Family history unknown: Yes       SOCIAL HISTORY:  Social History     Tobacco Use    Smoking status: Never Smoker    Smokeless tobacco: Never Used   Substance Use Topics    Alcohol use: Yes     Comment: rarely    Drug use: No       MEDICATIONS:    Current Outpatient Medications:     acetaminophen (TYLENOL) 325 mg tablet, Take 10 mg/kg by mouth every 4 (four) hours as needed for fever, Disp: , Rfl:     diclofenac sodium (VOLTAREN) 50 mg EC tablet, Take 1 tablet (50 mg total) by mouth 2 (two) times a day as needed (pain), Disp: 180 tablet, Rfl: 2    FARXIGA 5 MG TABS, TAKE 1 TABLET BY MOUTH EVERY DAY, Disp: 90 tablet, Rfl: 1    gabapentin (NEURONTIN) 400 mg capsule, Take 1 capsule (400 mg total) by mouth 3 (three) times a day, Disp: 270 capsule, Rfl: 0    lisinopril (ZESTRIL) 5 mg tablet, TAKE 1 TABLET EVERY DAY, Disp: 90 tablet, Rfl: 2    rosuvastatin (CRESTOR) 5 mg tablet, Take 1 tablet (5 mg total) by mouth daily, Disp: 30 tablet, Rfl: 5    ALLERGIES:  Allergies   Allergen Reactions    Tramadol Itching       REVIEW OF SYSTEMS:  Pertinent items are noted in HPI  A comprehensive review of systems was negative      LABS:  HgA1c:   Lab Results   Component Value Date    HGBA1C 6 6 (H) 06/28/2019     BMP:   Lab Results   Component Value Date    GLUCOSE 117 09/18/2015    CALCIUM 9 0 06/28/2019     09/18/2015    K 4 0 06/28/2019    CO2 29 06/28/2019     06/28/2019    BUN 17 06/28/2019    CREATININE 0 85 06/28/2019         _____________________________________________________  PHYSICAL EXAMINATION:  Vital signs: /79   Pulse 62   Ht 6' 3" (1 905 m)   Wt 128 kg (283 lb)   BMI 35 37 kg/m²   General: well developed and well nourished, alert, oriented times 3 and appears comfortable  Psychiatric: Normal  HEENT: Trachea Midline, No torticollis  Cardiovascular: No discernable arrhythmia  Pulmonary: No wheezing or stridor  Skin: No masses, erthema, lacerations, fluctation, ulcerations  Neurovascular: Sensation Intact to the Median, Ulnar, Radial Nerve, Motor Intact to the Median, Ulnar, Radial Nerve and Pulses Intact    MUSCULOSKELETAL EXAMINATION:  LEFT SIDE:  Finger:  Triggering  long finger, Nodules  Long, ring and small fingers and locking of the long finger  Patient with +Jamal testing of long, ring and small fingers  Negative Tinel's carpal tunnel  5/5 strength AIN, APB and intrinsics  RIGHT SIDE:  Finger:  Triggering long finger, nodules Long, ring and small fingers and locking of the long finger  Patient with +Wood testing primarily of long and ring fingers  Negative Tinel's carpal tunnel  5/5 strength AIN, APB and intrinsics      _____________________________________________________  STUDIES REVIEWED:  No Studies to review      PROCEDURES PERFORMED:  Hand/upper extremity injection: R long A1  Date/Time: 8/2/2019 9:59 AM  Consent given by: patient  Site marked: site marked  Supporting Documentation  Indications: pain   Procedure Details  Condition:trigger finger Location: long finger - R long A1   Needle size: 25 G  Ultrasound guidance: no  Approach: volar  Medications administered: 6 mg betamethasone acetate-betamethasone sodium phosphate 6 (3-3) mg/mL; 1 mL lidocaine (PF) 2 %    Patient tolerance: patient tolerated the procedure well with no immediate complications  Dressing:  Sterile dressing applied     Hand/upper extremity injection: L long A1  Date/Time: 8/2/2019 9:59 AM  Consent given by: patient  Site marked: site marked  Supporting Documentation  Indications: pain   Procedure Details  Condition:trigger finger Location: long finger - L long A1   Needle size: 25 G  Ultrasound guidance: no  Approach: volar  Medications administered: 6 mg betamethasone acetate-betamethasone sodium phosphate 6 (3-3) mg/mL; 1 mL lidocaine (PF) 2 %    Patient tolerance: patient tolerated the procedure well with no immediate complications  Dressing:  Sterile dressing applied Scribe Attestation    I,:   Radha Dhillon PA-C am acting as a scribe while in the presence of the attending physician :        I,:   Hernan Dangelo MD personally performed the services described in this documentation    as scribed in my presence :

## 2019-08-20 DIAGNOSIS — E78.01 FAMILIAL HYPERCHOLESTEROLEMIA: ICD-10-CM

## 2019-08-20 RX ORDER — ROSUVASTATIN CALCIUM 5 MG/1
TABLET, COATED ORAL
Qty: 90 TABLET | Refills: 1 | Status: SHIPPED | OUTPATIENT
Start: 2019-08-20 | End: 2019-11-05

## 2019-09-13 ENCOUNTER — OFFICE VISIT (OUTPATIENT)
Dept: OBGYN CLINIC | Facility: HOSPITAL | Age: 64
End: 2019-09-13
Payer: COMMERCIAL

## 2019-09-13 VITALS
DIASTOLIC BLOOD PRESSURE: 83 MMHG | HEIGHT: 75 IN | SYSTOLIC BLOOD PRESSURE: 128 MMHG | HEART RATE: 65 BPM | WEIGHT: 288 LBS | BODY MASS INDEX: 35.81 KG/M2

## 2019-09-13 DIAGNOSIS — M65.332 TRIGGER FINGER, LEFT MIDDLE FINGER: ICD-10-CM

## 2019-09-13 DIAGNOSIS — M65.331 TRIGGER MIDDLE FINGER OF RIGHT HAND: Primary | ICD-10-CM

## 2019-09-13 PROCEDURE — 99214 OFFICE O/P EST MOD 30 MIN: CPT | Performed by: ORTHOPAEDIC SURGERY

## 2019-09-13 RX ORDER — LIDOCAINE HYDROCHLORIDE AND EPINEPHRINE 10; 10 MG/ML; UG/ML
20 INJECTION, SOLUTION INFILTRATION; PERINEURAL ONCE
Status: CANCELLED | OUTPATIENT
Start: 2019-09-13 | End: 2019-09-13

## 2019-09-13 NOTE — PROGRESS NOTES
ASSESSMENT/PLAN:    Assessment:   Right long trigger finger  Left long trigger finger    Plan:   Trigger Finger Release  bilateral  long finger under local medication in a staged fashion    Follow Up: After Surgery    To Do Next Visit:  Sutures out      Operative Discussions:     Trigger Finger Release: The anatomy and physiology of trigger finger was discussed with the patient today in the office  Edema and increased contact pressure within the flexor tendons at the A1 pulley can cause pain, crepitation, and limitation of function  Treatment options include resting MP blocking splints to decrease edema, oral anti-inflammatory medications, home or formal therapy exercises, up to 2 steroid injections or surgical release  While majority of patients do respond to conservative treatment, up to 20% may require surgical release  The patient has elected release of the trigger finger  The patient has elected to undergo a release of the A1 pulley (trigger finger)  A small incision will be made over the palmar aspect of the hand, the tendon sheath holding the flexor tendons will be released  In the postoperative period, light activities are allowed immediately, driving is allowed when narcotic medication has stopped, and the incision may get wet after 2 days  Heavy activities (lifting more than approximately 10 pounds) will be allowed after the follow up appointment in 1-2 weeks  While the pain and discomfort within the wrist typically improves rapidly, some residual discomfort may be present for up to 6 weeks  The nodule that is typically palpable in the palmar aspect of the hand will not be removed, as this would necessitate removal of a portion of the flexor tendon, however the catching, clicking, and locking should resolve  Approximate success rate is 98%  The risks and benefits of the procedure were explained to the patient, which include, but are not limited to: Bleeding, infection, recurrence, pain, scar, damage to tendons, damage to nerves, and damage to blood vessels, need for future surgery and complications related to anesthesia  If bony work is done, risks also include malunion and nonunion  These risks, along with alternative conservative treatment options, and postoperative protocols were voiced back and understood by the patient  All questions were answered to the patient's satisfaction  The patient agrees to comply with a standard postoperative protocol, and is willing to proceed  Education was provided via written and auditory forms  There were no barriers to learning  Written handouts regarding wound care, incision and scar care, and general preoperative information, as well as risks and benefits were provided to the patient       _____________________________________________________  CHIEF COMPLAINT:  Chief Complaint   Patient presents with    Left Middle Finger - Follow-up    Right Middle Finger - Follow-up         SUBJECTIVE:  Lui Huitron is a 60y o  year old male who presents for follow up regarding Trigger Finger  bilateral  long finger  Since last visit, Lui Huitron has tried steroid injections with only partial relief  Today there is Catching and Locking to the bilateral long finger  Radiation: None  Associated symptoms: None    PAST MEDICAL HISTORY:  Past Medical History:   Diagnosis Date    Diabetes mellitus ([address] )     Headache     High cholesterol     Hypertension     Kidney stones     Sleep apnea        PAST SURGICAL HISTORY:  Past Surgical History:   Procedure Laterality Date    COLONOSCOPY N/A 11/22/2016    Procedure: COLONOSCOPY;  Surgeon: Jaxon Bergman MD;  Location: [facility];   Service:     DEBRIDEMENT TENNIS ELBOW  2006    HERNIA REPAIR  [id number]    x2, umbilical and right side per patient   [address] [facility]    UVULOPALATOPHARYNGOPLASTY  2004       FAMILY HISTORY:  Family History   Family history unknown: Yes       SOCIAL HISTORY:  Social History Tobacco Use    Smoking status: Never Smoker    Smokeless tobacco: Never Used   Substance Use Topics    Alcohol use: Yes     Comment: rarely    Drug use: No       MEDICATIONS:    Current Outpatient Medications:     diclofenac sodium (VOLTAREN) 50 mg EC tablet, Take 1 tablet (50 mg total) by mouth 2 (two) times a day as needed (pain), Disp: 180 tablet, Rfl: 2    FARXIGA 5 MG TABS, TAKE 1 TABLET BY MOUTH EVERY DAY, Disp: 90 tablet, Rfl: 1    lisinopril (ZESTRIL) 5 mg tablet, TAKE 1 TABLET EVERY DAY, Disp: 90 tablet, Rfl: 2    rosuvastatin (CRESTOR) 5 mg tablet, TAKE 1 TABLET BY MOUTH EVERY DAY, Disp: 90 tablet, Rfl: 1    acetaminophen (TYLENOL) 325 mg tablet, Take 10 mg/kg by mouth every 4 (four) hours as needed for fever, Disp: , Rfl:     gabapentin (NEURONTIN) 400 mg capsule, Take 1 capsule (400 mg total) by mouth 3 (three) times a day (Patient not taking: Reported on 9/13/2019), Disp: 270 capsule, Rfl: 0    ALLERGIES:  Allergies   Allergen Reactions    Tramadol Itching       REVIEW OF SYSTEMS:  Pertinent items are noted in HPI  A comprehensive review of systems was negative      LABS:  HgA1c:   Lab Results   Component Value Date    HGBA1C 6 6 (H) 06/28/2019     BMP:   Lab Results   Component Value Date    GLUCOSE 117 09/18/2015    CALCIUM 9 0 06/28/2019     09/18/2015    K 4 0 06/28/2019    CO2 29 06/28/2019     06/28/2019    BUN 17 06/28/2019    CREATININE 0 85 06/28/2019           _____________________________________________________    PHYSICAL EXAMINATION:  /83   Pulse 65   Ht 6' 3" (1 905 m)   Wt 131 kg (288 lb)   BMI 36 00 kg/m²   General: well developed and well nourished, alert, oriented times 3 and appears comfortable  Psychiatric: Normal  HEENT: Trachea Midline, No torticollis  Cardiovascular: No discernable arrhythmia  Pulmonary: No wheezing or stridor  Skin: No masses, erthema, lacerations, fluctation, ulcerations  Neurovascular: Sensation Intact to the Median, Ulnar, Radial Nerve, Motor Intact to the Median, Ulnar, Radial Nerve and Pulses Intact    MUSCULOSKELETAL EXAMINATION:  LEFT SIDE:  Finger:  Triggering  long finger, Nodules  Long, ring and small fingers and locking of the long finger  Patient with +Jamal testing of long, ring and small fingers  Negative Tinel's carpal tunnel  5/5 strength AIN, APB and intrinsics  RIGHT SIDE:  Finger:  Triggering long finger, nodules Long, ring and small fingers and locking of the long finger  Patient with +[place] testing primarily of long and ring fingers  Negative Tinel's carpal tunnel  5/5 strength AIN, APB and intrinsics      _____________________________________________________  STUDIES REVIEWED:  No Studies to review      PROCEDURES PERFORMED:  Procedures  No Procedures performed today    Scribe Attestation    I,:   Sandrita Garcia am acting as a scribe while in the presence of the attending physician :        I,:   Min Lyles MD personally performed the services described in this documentation    as scribed in my presence :

## 2019-09-13 NOTE — H&P
ASSESSMENT/PLAN:    Assessment:   Right long trigger finger  Left long trigger finger    Plan:   Trigger Finger Release  bilateral  long finger under local medication in a staged fashion    Follow Up: After Surgery    To Do Next Visit:  Sutures out      Operative Discussions:     Trigger Finger Release: The anatomy and physiology of trigger finger was discussed with the patient today in the office  Edema and increased contact pressure within the flexor tendons at the A1 pulley can cause pain, crepitation, and limitation of function  Treatment options include resting MP blocking splints to decrease edema, oral anti-inflammatory medications, home or formal therapy exercises, up to 2 steroid injections or surgical release  While majority of patients do respond to conservative treatment, up to 20% may require surgical release  The patient has elected release of the trigger finger  The patient has elected to undergo a release of the A1 pulley (trigger finger)  A small incision will be made over the palmar aspect of the hand, the tendon sheath holding the flexor tendons will be released  In the postoperative period, light activities are allowed immediately, driving is allowed when narcotic medication has stopped, and the incision may get wet after 2 days  Heavy activities (lifting more than approximately 10 pounds) will be allowed after the follow up appointment in 1-2 weeks  While the pain and discomfort within the wrist typically improves rapidly, some residual discomfort may be present for up to 6 weeks  The nodule that is typically palpable in the palmar aspect of the hand will not be removed, as this would necessitate removal of a portion of the flexor tendon, however the catching, clicking, and locking should resolve  Approximate success rate is 98%  The risks and benefits of the procedure were explained to the patient, which include, but are not limited to: Bleeding, infection, recurrence, pain, scar, damage to tendons, damage to nerves, and damage to blood vessels, need for future surgery and complications related to anesthesia  If bony work is done, risks also include malunion and nonunion  These risks, along with alternative conservative treatment options, and postoperative protocols were voiced back and understood by the patient  All questions were answered to the patient's satisfaction  The patient agrees to comply with a standard postoperative protocol, and is willing to proceed  Education was provided via written and auditory forms  There were no barriers to learning  Written handouts regarding wound care, incision and scar care, and general preoperative information, as well as risks and benefits were provided to the patient       _____________________________________________________  CHIEF COMPLAINT:  Chief Complaint   Patient presents with    Left Middle Finger - Follow-up    Right Middle Finger - Follow-up         SUBJECTIVE:  Anika Louie is a 60y o  year old male who presents for follow up regarding Trigger Finger  bilateral  long finger  Since last visit, Anika Louie has tried steroid injections with only partial relief  Today there is Catching and Locking to the bilateral long finger  Radiation: None  Associated symptoms: None    PAST MEDICAL HISTORY:  Past Medical History:   Diagnosis Date    Diabetes mellitus ([address] )     Headache     High cholesterol     Hypertension     Kidney stones     Sleep apnea        PAST SURGICAL HISTORY:  Past Surgical History:   Procedure Laterality Date    COLONOSCOPY N/A 11/22/2016    Procedure: COLONOSCOPY;  Surgeon: Jovan Hardy MD;  Location: [facility];   Service:     DEBRIDEMENT TENNIS ELBOW  2006    HERNIA REPAIR  2572    x2, umbilical and right side per patient   [address] [facility]    UVULOPALATOPHARYNGOPLASTY  2004       FAMILY HISTORY:  Family History   Family history unknown: Yes       SOCIAL HISTORY:  Social History Tobacco Use    Smoking status: Never Smoker    Smokeless tobacco: Never Used   Substance Use Topics    Alcohol use: Yes     Comment: rarely    Drug use: No       MEDICATIONS:    Current Outpatient Medications:     diclofenac sodium (VOLTAREN) 50 mg EC tablet, Take 1 tablet (50 mg total) by mouth 2 (two) times a day as needed (pain), Disp: 180 tablet, Rfl: 2    FARXIGA 5 MG TABS, TAKE 1 TABLET BY MOUTH EVERY DAY, Disp: 90 tablet, Rfl: 1    lisinopril (ZESTRIL) 5 mg tablet, TAKE 1 TABLET EVERY DAY, Disp: 90 tablet, Rfl: 2    rosuvastatin (CRESTOR) 5 mg tablet, TAKE 1 TABLET BY MOUTH EVERY DAY, Disp: 90 tablet, Rfl: 1    acetaminophen (TYLENOL) 325 mg tablet, Take 10 mg/kg by mouth every 4 (four) hours as needed for fever, Disp: , Rfl:     gabapentin (NEURONTIN) 400 mg capsule, Take 1 capsule (400 mg total) by mouth 3 (three) times a day (Patient not taking: Reported on 9/13/2019), Disp: 270 capsule, Rfl: 0    ALLERGIES:  Allergies   Allergen Reactions    Tramadol Itching       REVIEW OF SYSTEMS:  Pertinent items are noted in HPI  A comprehensive review of systems was negative      LABS:  HgA1c:   Lab Results   Component Value Date    HGBA1C 6 6 (H) 06/28/2019     BMP:   Lab Results   Component Value Date    GLUCOSE 117 09/18/2015    CALCIUM 9 0 06/28/2019     09/18/2015    K 4 0 06/28/2019    CO2 29 06/28/2019     06/28/2019    BUN 17 06/28/2019    CREATININE 0 85 06/28/2019           _____________________________________________________    PHYSICAL EXAMINATION:  /83   Pulse 65   Ht 6' 3" (1 905 m)   Wt 131 kg (288 lb)   BMI 36 00 kg/m²    General: well developed and well nourished, alert, oriented times 3 and appears comfortable  Psychiatric: Normal  HEENT: Trachea Midline, No torticollis  Cardiovascular: No discernable arrhythmia  Pulmonary: No wheezing or stridor  Skin: No masses, erthema, lacerations, fluctation, ulcerations  Neurovascular: Sensation Intact to the Median, Ulnar, Radial Nerve, Motor Intact to the Median, Ulnar, Radial Nerve and Pulses Intact    MUSCULOSKELETAL EXAMINATION:  LEFT SIDE:  Finger:  Triggering  long finger, Nodules  Long, ring and small fingers and locking of the long finger  Patient with +[employer] testing of long, ring and small fingers  Negative Tinel's carpal tunnel  5/5 strength AIN, APB and intrinsics  RIGHT SIDE:  Finger:  Triggering long finger, nodules Long, ring and small fingers and locking of the long finger  Patient with +[employer] testing primarily of long and ring fingers  Negative Tinel's carpal tunnel  5/5 strength AIN, APB and intrinsics      _____________________________________________________  STUDIES REVIEWED:  No Studies to review      PROCEDURES PERFORMED:  Procedures  No Procedures performed today    Scribe Attestation    I,:   Kole Godinez am acting as a scribe while in the presence of the attending physician :        I,:   Roberto Carlos Kenny MD personally performed the services described in this documentation    as scribed in my presence :

## 2019-10-19 DIAGNOSIS — E11.9 TYPE 2 DIABETES MELLITUS WITHOUT COMPLICATION, WITHOUT LONG-TERM CURRENT USE OF INSULIN (HCC): ICD-10-CM

## 2019-10-21 RX ORDER — DAPAGLIFLOZIN 5 MG/1
TABLET, FILM COATED ORAL
Qty: 90 TABLET | Refills: 1 | Status: SHIPPED | OUTPATIENT
Start: 2019-10-21 | End: 2020-04-17 | Stop reason: SDUPTHER

## 2019-10-31 ENCOUNTER — APPOINTMENT (OUTPATIENT)
Dept: LAB | Age: 64
End: 2019-10-31
Payer: COMMERCIAL

## 2019-10-31 DIAGNOSIS — Z12.11 COLON CANCER SCREENING: ICD-10-CM

## 2019-10-31 DIAGNOSIS — I10 ESSENTIAL HYPERTENSION: ICD-10-CM

## 2019-10-31 DIAGNOSIS — Z00.00 HEALTHCARE MAINTENANCE: ICD-10-CM

## 2019-10-31 DIAGNOSIS — E13.9 DIABETES 1.5, MANAGED AS TYPE 2 (HCC): ICD-10-CM

## 2019-10-31 LAB
ALBUMIN SERPL BCP-MCNC: 3.9 G/DL (ref 3.5–5)
ALP SERPL-CCNC: 53 U/L (ref 46–116)
ALT SERPL W P-5'-P-CCNC: 50 U/L (ref 12–78)
ANION GAP SERPL CALCULATED.3IONS-SCNC: 8 MMOL/L (ref 4–13)
AST SERPL W P-5'-P-CCNC: 22 U/L (ref 5–45)
BASOPHILS # BLD AUTO: 0.05 THOUSANDS/ΜL (ref 0–0.1)
BASOPHILS NFR BLD AUTO: 1 % (ref 0–1)
BILIRUB SERPL-MCNC: 0.64 MG/DL (ref 0.2–1)
BUN SERPL-MCNC: 18 MG/DL (ref 5–25)
CALCIUM SERPL-MCNC: 9.3 MG/DL (ref 8.3–10.1)
CHLORIDE SERPL-SCNC: 106 MMOL/L (ref 100–108)
CHOLEST SERPL-MCNC: 230 MG/DL (ref 50–200)
CO2 SERPL-SCNC: 26 MMOL/L (ref 21–32)
CREAT SERPL-MCNC: 0.87 MG/DL (ref 0.6–1.3)
EOSINOPHIL # BLD AUTO: 0.11 THOUSAND/ΜL (ref 0–0.61)
EOSINOPHIL NFR BLD AUTO: 2 % (ref 0–6)
ERYTHROCYTE [DISTWIDTH] IN BLOOD BY AUTOMATED COUNT: 13.2 % (ref 11.6–15.1)
EST. AVERAGE GLUCOSE BLD GHB EST-MCNC: 163 MG/DL
GFR SERPL CREATININE-BSD FRML MDRD: 92 ML/MIN/1.73SQ M
GLUCOSE P FAST SERPL-MCNC: 152 MG/DL (ref 65–99)
HBA1C MFR BLD: 7.3 % (ref 4.2–6.3)
HCT VFR BLD AUTO: 49.1 % (ref 36.5–49.3)
HCV AB SER QL: NORMAL
HDLC SERPL-MCNC: 60 MG/DL
HEMOCCULT STL QL IA: NEGATIVE
HGB BLD-MCNC: 16.7 G/DL (ref 12–17)
IMM GRANULOCYTES # BLD AUTO: 0.05 THOUSAND/UL (ref 0–0.2)
IMM GRANULOCYTES NFR BLD AUTO: 1 % (ref 0–2)
LDLC SERPL CALC-MCNC: 139 MG/DL (ref 0–100)
LYMPHOCYTES # BLD AUTO: 2 THOUSANDS/ΜL (ref 0.6–4.47)
LYMPHOCYTES NFR BLD AUTO: 27 % (ref 14–44)
MCH RBC QN AUTO: 28.6 PG (ref 26.8–34.3)
MCHC RBC AUTO-ENTMCNC: 34 G/DL (ref 31.4–37.4)
MCV RBC AUTO: 84 FL (ref 82–98)
MONOCYTES # BLD AUTO: 0.65 THOUSAND/ΜL (ref 0.17–1.22)
MONOCYTES NFR BLD AUTO: 9 % (ref 4–12)
NEUTROPHILS # BLD AUTO: 4.6 THOUSANDS/ΜL (ref 1.85–7.62)
NEUTS SEG NFR BLD AUTO: 60 % (ref 43–75)
NONHDLC SERPL-MCNC: 170 MG/DL
NRBC BLD AUTO-RTO: 0 /100 WBCS
PLATELET # BLD AUTO: 181 THOUSANDS/UL (ref 149–390)
PMV BLD AUTO: 9.6 FL (ref 8.9–12.7)
POTASSIUM SERPL-SCNC: 3.7 MMOL/L (ref 3.5–5.3)
PROT SERPL-MCNC: 7.1 G/DL (ref 6.4–8.2)
RBC # BLD AUTO: 5.83 MILLION/UL (ref 3.88–5.62)
SODIUM SERPL-SCNC: 140 MMOL/L (ref 136–145)
TRIGL SERPL-MCNC: 153 MG/DL
TSH SERPL DL<=0.05 MIU/L-ACNC: 3.39 UIU/ML (ref 0.36–3.74)
WBC # BLD AUTO: 7.46 THOUSAND/UL (ref 4.31–10.16)

## 2019-10-31 PROCEDURE — 83036 HEMOGLOBIN GLYCOSYLATED A1C: CPT

## 2019-10-31 PROCEDURE — 86803 HEPATITIS C AB TEST: CPT

## 2019-10-31 PROCEDURE — 84443 ASSAY THYROID STIM HORMONE: CPT

## 2019-10-31 PROCEDURE — 80061 LIPID PANEL: CPT

## 2019-10-31 PROCEDURE — 85025 COMPLETE CBC W/AUTO DIFF WBC: CPT

## 2019-10-31 PROCEDURE — 80053 COMPREHEN METABOLIC PANEL: CPT

## 2019-10-31 PROCEDURE — 36415 COLL VENOUS BLD VENIPUNCTURE: CPT

## 2019-10-31 PROCEDURE — G0328 FECAL BLOOD SCRN IMMUNOASSAY: HCPCS

## 2019-11-05 ENCOUNTER — OFFICE VISIT (OUTPATIENT)
Dept: INTERNAL MEDICINE CLINIC | Facility: CLINIC | Age: 64
End: 2019-11-05
Payer: COMMERCIAL

## 2019-11-05 VITALS
SYSTOLIC BLOOD PRESSURE: 122 MMHG | RESPIRATION RATE: 18 BRPM | WEIGHT: 294 LBS | OXYGEN SATURATION: 98 % | DIASTOLIC BLOOD PRESSURE: 80 MMHG | HEIGHT: 75 IN | TEMPERATURE: 97.8 F | HEART RATE: 78 BPM | BODY MASS INDEX: 36.56 KG/M2

## 2019-11-05 DIAGNOSIS — M65.331 TRIGGER MIDDLE FINGER OF RIGHT HAND: ICD-10-CM

## 2019-11-05 DIAGNOSIS — M65.332 TRIGGER FINGER, LEFT MIDDLE FINGER: ICD-10-CM

## 2019-11-05 DIAGNOSIS — Z01.818 PRE-OP TESTING: ICD-10-CM

## 2019-11-05 DIAGNOSIS — E13.9 DIABETES 1.5, MANAGED AS TYPE 2 (HCC): ICD-10-CM

## 2019-11-05 DIAGNOSIS — M54.16 LUMBAR RADICULOPATHY: ICD-10-CM

## 2019-11-05 DIAGNOSIS — M70.62 GREATER TROCHANTERIC BURSITIS OF BOTH HIPS: ICD-10-CM

## 2019-11-05 DIAGNOSIS — E78.01 FAMILIAL HYPERCHOLESTEROLEMIA: Primary | ICD-10-CM

## 2019-11-05 DIAGNOSIS — I10 ESSENTIAL HYPERTENSION: ICD-10-CM

## 2019-11-05 DIAGNOSIS — M70.61 GREATER TROCHANTERIC BURSITIS OF BOTH HIPS: ICD-10-CM

## 2019-11-05 PROCEDURE — 99214 OFFICE O/P EST MOD 30 MIN: CPT | Performed by: INTERNAL MEDICINE

## 2019-11-05 PROCEDURE — 3074F SYST BP LT 130 MM HG: CPT | Performed by: INTERNAL MEDICINE

## 2019-11-05 PROCEDURE — 3079F DIAST BP 80-89 MM HG: CPT | Performed by: INTERNAL MEDICINE

## 2019-11-05 RX ORDER — ROSUVASTATIN CALCIUM 10 MG/1
10 TABLET, COATED ORAL DAILY
Qty: 90 TABLET | Refills: 3 | Status: SHIPPED | OUTPATIENT
Start: 2019-11-05 | End: 2020-10-08 | Stop reason: SDUPTHER

## 2019-11-05 NOTE — ASSESSMENT & PLAN NOTE
Had recent lipid profile  Cholesterol is still not under control and we have increased his Crestor to 10 milligrams daily  We will continue to monitor this and make adjustments to medication    He states he can't tolerate the Crestor as long as he is taking the Co Q10

## 2019-11-05 NOTE — ASSESSMENT & PLAN NOTE
Blood pressure is controlled  Patient will continue present medication and surveillance  Patient's renal function has been stable

## 2019-11-05 NOTE — ASSESSMENT & PLAN NOTE
Patient does have a history of lumbago with sciatica on the left, lumbar radiculopathy  He had been seeing Pain Management and they did told him at this point time they have exhausted all their options as far as treatment is concerned  He was seen then by surgery who has been feels that is disease is not severe enough to require surgery  Patient is extremely frustrated    He is doing exercises which she states helps but knows the most likely he he may have worsening of symptoms

## 2019-11-05 NOTE — ASSESSMENT & PLAN NOTE
Patient is here for preop evaluation prior to undergoing surgical procedure, trigger finger will left and right middle fingers  Patient will have the surgery performed later this month  Had EKG performed prior to the visit today showing no contraindication to the procedure with his EKG  Went through physical exam today again showing no contraindication for upcoming procedure

## 2019-11-05 NOTE — PROGRESS NOTES
Assessment/Plan:    Trigger finger, left middle finger  Patient is here for preop evaluation prior to undergoing surgical procedure, trigger finger will left and right middle fingers  Patient will have the surgery performed later this month  Had EKG performed prior to the visit today showing no contraindication to the procedure with his EKG  Went through physical exam today again showing no contraindication for upcoming procedure  Diabetes 1 5, 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[address] )    Lab Results   Component Value Date    HGBA1C 7 3 (H) 10/31/2019    Patient's sugar continues to show deterioration control  At this point time patient states he tries his best to watches diet but is not always perfect  I feel the patient would be better served by seeing an endocrinologist regarding his diabetes  He is intolerant to metformin that we have tried in the past   He has having no difficulties with the [place] but no weight loss  Patient is hampered as far as exercise is concerned because of his chronic low back pain  Essential hypertension  Blood pressure is controlled  Patient will continue present medication and surveillance  Patient's renal function has been stable  Lumbar radiculopathy  Patient does have a history of lumbago with sciatica on the left, lumbar radiculopathy  He had been seeing Pain Management and they did told him at this point time they have exhausted all their options as far as treatment is concerned  He was seen then by surgery who has been feels that is disease is not severe enough to require surgery  Patient is extremely frustrated  He is doing exercises which she states helps but knows the most likely he he may have worsening of symptoms    Greater trochanteric bursitis of both hips  Patient is complaining of new pain  Bilateral greater trochanteric discomfort  Tender to palpation  Patient will be seen Orthopedics for further evaluation and treatment      Hyperlipidemia  Had recent lipid profile  Cholesterol is still not under control and we have increased his Crestor to 10 milligrams daily  We will continue to monitor this and make adjustments to medication  He states he can't tolerate the Crestor as long as he is taking the Co Q10        Diagnoses and all orders for this visit:    Familial hypercholesterolemia  -     rosuvastatin (CRESTOR) 10 MG tablet; Take 1 tablet (10 mg total) by mouth daily    Pre-op testing  -     POCT ECG    Greater trochanteric bursitis of both hips  -     Ambulatory referral to Orthopedic Surgery; Future    Trigger middle finger of right hand    Trigger finger, left middle finger    Diabetes 1 5, 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[facility] )  -     Ambulatory referral to Endocrinology; Future    Essential hypertension    Lumbar radiculopathy          Subjective:      Patient ID: Faoroq Lake is a 60 y o  male  Patient is a 57-year-old male with a history of multiple medical problems as outlined previously  Patient is here today originally for complete physical but this is changed to be preop evaluation and clearance prior to having bilateral repair of trigger fingers, middle fingers of both hands  Patient did have extensive lab testing prior to the visit today we did discuss the results  Patient states in general he is frustrated because he still having low back pain and he states is Pain Management physician no longer feels that he can help him with this problem  He is also complaining of discomfort to the greater trochanteric areas bilaterally  The following portions of the patient's history were reviewed and updated as appropriate: He  has a past medical history of Diabetes mellitus ([facility] [address] ), Headache, High cholesterol, Hypertension, Kidney stones, and Sleep apnea    He   Patient Active Problem List    Diagnosis Date Noted    Greater trochanteric bursitis of both hips 11/05/2019    Trigger finger, left middle finger 08/02/2019    Trigger middle finger of right hand 07/02/2019    Morbid obesity ([facility] [address] ) 02/07/2019    Healthcare maintenance 10/09/2018    AIDEE (obstructive sleep apnea) 03/05/2018    Insomnia 03/05/2018    Hypersomnia 03/05/2018    Obesity (BMI 30-39 9) 03/05/2018    Diabetes 1 5, 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[facility] [address] ) 03/05/2018    Sacroiliitis ([facility] ) 02/06/2018    Lumbar degenerative disc disease 10/23/2017    Lumbar herniated disc 10/23/2017    Lumbar spondylosis 10/23/2017    Lumbar radiculopathy 08/29/2017    Myofascial pain 08/29/2017    Lumbago with sciatica, left side 07/31/2017    Diabetes ([facility] ) 09/13/2016    Obesity 06/15/2012    Hyperlipidemia 06/15/2012    Essential hypertension 06/15/2012     He  has a past surgical history that includes Nasal septum surgery (1980); Uvulopalatopharyngoplasty (2004); Debridement tennis elbow (2006); Hernia repair (2011); and Colonoscopy (N/A, 11/22/2016)  His Family history is unknown by patient  He  reports that he has never smoked  He has never used smokeless tobacco  He reports that he drinks alcohol  He reports that he does not use drugs  Current Outpatient Medications   Medication Sig Dispense Refill    acetaminophen (TYLENOL) 325 mg tablet Take 10 mg/kg by mouth every 4 (four) hours as needed for fever      diclofenac sodium (VOLTAREN) 50 mg EC tablet Take 1 tablet (50 mg total) by mouth 2 (two) times a day as needed (pain) 180 tablet 2    FARXIGA 5 MG TABS TAKE 1 TABLET BY MOUTH EVERY DAY 90 tablet 1    gabapentin (NEURONTIN) 400 mg capsule Take 1 capsule (400 mg total) by mouth 3 (three) times a day 270 capsule 0    lisinopril (ZESTRIL) 5 mg tablet TAKE 1 TABLET EVERY DAY 90 tablet 2    rosuvastatin (CRESTOR) 10 MG tablet Take 1 tablet (10 mg total) by mouth daily 90 tablet 3     No current facility-administered medications for this visit        Current Outpatient Medications on File Prior to Visit   Medication Sig    acetaminophen (TYLENOL) 325 mg tablet Take 10 mg/kg by mouth every 4 (four) hours as needed for fever    diclofenac sodium (VOLTAREN) 50 mg EC tablet Take 1 tablet (50 mg total) by mouth 2 (two) times a day as needed (pain)    FARXIGA 5 MG TABS TAKE 1 TABLET BY MOUTH EVERY DAY    gabapentin (NEURONTIN) 400 mg capsule Take 1 capsule (400 mg total) by mouth 3 (three) times a day    lisinopril (ZESTRIL) 5 mg tablet TAKE 1 TABLET EVERY DAY    [DISCONTINUED] rosuvastatin (CRESTOR) 5 mg tablet TAKE 1 TABLET BY MOUTH EVERY DAY     No current facility-administered medications on file prior to visit  He is allergic to tramadol       Review of Systems   Constitutional: Negative  HENT: Negative  Eyes: Negative  Respiratory: Negative  Cardiovascular: Negative  Gastrointestinal: Negative  Endocrine: Negative  Genitourinary: Negative  Musculoskeletal: Positive for arthralgias and back pain  Negative for gait problem, joint swelling, myalgias, neck pain and neck stiffness  Skin: Negative  Allergic/Immunologic: Negative  Neurological: Negative  Hematological: Negative  Psychiatric/Behavioral: Negative  Objective:      /80   Pulse 78   Temp 97 8 °F (36 6 °C)   Resp 18   Ht 6' 3" (1 905 m)   Wt 133 kg (294 lb)   SpO2 98%   BMI 36 75 kg/m²          Physical Exam   Constitutional: He is oriented to person, place, and time  He appears well-developed and well-nourished  No distress  Very pleasant, articulate, overweight 28-year-old male who is awake alert no acute distress and oriented x3   HENT:   Head: Normocephalic  Right Ear: External ear normal    Left Ear: External ear normal    Nose: Nose normal    Mouth/Throat: Oropharynx is clear and moist  No oropharyngeal exudate  Eyes: Pupils are equal, round, and reactive to light  Conjunctivae and EOM are normal  Right eye exhibits no discharge  Left eye exhibits no discharge  No scleral icterus  Neck: Normal range of motion  Neck supple  No JVD present  No tracheal deviation present   No thyromegaly present  Cardiovascular: Normal rate, regular rhythm, normal heart sounds and intact distal pulses  Exam reveals no gallop and no friction rub  No murmur heard  Pulmonary/Chest: Effort normal and breath sounds normal  No stridor  No respiratory distress  He has no wheezes  He has no rales  He exhibits no tenderness  Abdominal: Soft  Bowel sounds are normal  He exhibits no distension and no mass  There is no tenderness  There is no rebound and no guarding  No hernia  Obese   Musculoskeletal: He exhibits edema, tenderness and deformity  Trigger finger is bilaterally he 3rd digit both hands  Flattening to his lumbar curve with decreased range of motion both actively and passively, tenderness to palpation to the greater trochanters bilaterally which is not aggravated by movement  Trace edema bilateral lower extremities   Lymphadenopathy:     He has no cervical adenopathy  Neurological: He is alert and oriented to person, place, and time  He displays abnormal reflex  No cranial nerve deficit or sensory deficit  He exhibits normal muscle tone  Coordination normal    Skin: Skin is warm and dry  Capillary refill takes less than 2 seconds  No rash noted  He is not diaphoretic  No erythema  Psychiatric: He has a normal mood and affect  His behavior is normal  Judgment and thought content normal    Nursing note and vitals reviewed

## 2019-11-05 NOTE — ASSESSMENT & PLAN NOTE
Lab Results   Component Value Date    HGBA1C 7 3 (H) 10/31/2019    Patient's sugar continues to show deterioration control  At this point time patient states he tries his best to watches diet but is not always perfect  I feel the patient would be better served by seeing an endocrinologist regarding his diabetes  He is intolerant to metformin that we have tried in the past   He has having no difficulties with the [place] but no weight loss  Patient is hampered as far as exercise is concerned because of his chronic low back pain

## 2019-11-05 NOTE — ASSESSMENT & PLAN NOTE
Patient is complaining of new pain  Bilateral greater trochanteric discomfort  Tender to palpation  Patient will be seen Orthopedics for further evaluation and treatment

## 2019-11-06 ENCOUNTER — TELEPHONE (OUTPATIENT)
Dept: OBGYN CLINIC | Facility: HOSPITAL | Age: 64
End: 2019-11-06

## 2019-11-06 PROCEDURE — 93000 ELECTROCARDIOGRAM COMPLETE: CPT | Performed by: INTERNAL MEDICINE

## 2019-11-06 NOTE — TELEPHONE ENCOUNTER
Received a call from [facility] @ PCP office that Angelina Trent was going for clearance appt  He was just going to get an EKG but he was set up for a clearance appt also  Please see his note from Dr Cele Mendoza  Thanks!

## 2019-11-19 ENCOUNTER — HOSPITAL ENCOUNTER (OUTPATIENT)
Facility: HOSPITAL | Age: 64
Setting detail: OUTPATIENT SURGERY
Discharge: HOME | End: 2019-11-19
Attending: ORTHOPAEDIC SURGERY | Admitting: ORTHOPAEDIC SURGERY
Payer: COMMERCIAL

## 2019-11-19 VITALS
SYSTOLIC BLOOD PRESSURE: 140 MMHG | OXYGEN SATURATION: 94 % | WEIGHT: 288 LBS | DIASTOLIC BLOOD PRESSURE: 86 MMHG | RESPIRATION RATE: 16 BRPM | HEIGHT: 75 IN | HEART RATE: 66 BPM | TEMPERATURE: 97.5 F | BODY MASS INDEX: 35.81 KG/M2

## 2019-11-19 DIAGNOSIS — M65.331 TRIGGER MIDDLE FINGER OF RIGHT HAND: Primary | ICD-10-CM

## 2019-11-19 LAB — GLUCOSE SERPL-MCNC: 143 MG/DL (ref 65–140)

## 2019-11-19 PROCEDURE — 82948 REAGENT STRIP/BLOOD GLUCOSE: CPT

## 2019-11-19 PROCEDURE — 26055 INCISE FINGER TENDON SHEATH: CPT | Performed by: PHYSICIAN ASSISTANT

## 2019-11-19 PROCEDURE — 99024 POSTOP FOLLOW-UP VISIT: CPT | Performed by: ORTHOPAEDIC SURGERY

## 2019-11-19 PROCEDURE — 26055 INCISE FINGER TENDON SHEATH: CPT | Performed by: ORTHOPAEDIC SURGERY

## 2019-11-19 RX ORDER — MAGNESIUM HYDROXIDE 1200 MG/15ML
LIQUID ORAL AS NEEDED
Status: DISCONTINUED | OUTPATIENT
Start: 2019-11-19 | End: 2019-11-19 | Stop reason: HOSPADM

## 2019-11-19 RX ORDER — NAPROXEN SODIUM 220 MG
220 TABLET ORAL 2 TIMES DAILY WITH MEALS
Qty: 10 TABLET | Refills: 0 | Status: SHIPPED | OUTPATIENT
Start: 2019-11-19 | End: 2019-12-10 | Stop reason: ALTCHOICE

## 2019-11-19 RX ORDER — HYDROCODONE BITARTRATE AND ACETAMINOPHEN 5; 325 MG/1; MG/1
1 TABLET ORAL EVERY 6 HOURS PRN
Qty: 5 TABLET | Refills: 0 | Status: SHIPPED | OUTPATIENT
Start: 2019-11-19 | End: 2019-11-24

## 2019-11-19 RX ORDER — SENNOSIDES 8.6 MG
650 CAPSULE ORAL EVERY 8 HOURS
Qty: 15 TABLET | Refills: 0 | Status: SHIPPED | OUTPATIENT
Start: 2019-11-19

## 2019-11-19 RX ADMIN — SODIUM BICARBONATE: 84 INJECTION, SOLUTION INTRAVENOUS at 10:50

## 2019-11-19 NOTE — H&P
H&P Exam - Orthopedics   Phyllis Cooper 60 y o  male MRN: [id number]  Unit/Bed#: APU 01    Assessment/Plan   Assessment:  B/L long TFs  Plan:  R long TFR today followed by L long TFR to be performed at a later date    History of Present Illness   HPI:  Phyllis Cooper is a 60 y o  male who presents with continued c/o b/l long finger pain and clicking/locking  Previous steroid injxs have only provided partial relief  Historical Information  Review Of Systems:   · Skin: Normal  · Neuro: See HPI  · Musculoskeletal: See HPI  · 14 point review of systems negative except as stated above     Past Medical History:   Past Medical History:   Diagnosis Date    Diabetes mellitus ([address] )     Headache     High cholesterol     Hypertension     Kidney stones     Sleep apnea        Past Surgical History:   Past Surgical History:   Procedure Laterality Date    COLONOSCOPY N/A 11/22/2016    Procedure: COLONOSCOPY;  Surgeon: Ramo Anthony MD;  Location: [facility];   Service:     DEBRIDEMENT TENNIS ELBOW  2006    HERNIA REPAIR  7335    x2, umbilical and right side per patient   [address]    UVULOPALATOPHARYNGOPLASTY  2004       Family History:  Family history reviewed and non-contributory  Family History   Family history unknown: Yes       Social History:  Social History     Socioeconomic History    Marital status: /Civil Union     Spouse name: None    Number of children: None    Years of education: None    Highest education level: None   Occupational History    None   Social Needs    Financial resource strain: None    Food insecurity:     Worry: None     Inability: None    Transportation needs:     Medical: None     Non-medical: None   Tobacco Use    Smoking status: Never Smoker    Smokeless tobacco: Never Used   Substance and Sexual Activity    Alcohol use: Yes     Comment: rarely    Drug use: No    Sexual activity: None   Lifestyle    Physical activity:     Days per week: None Minutes per session: None    Stress: None   Relationships    Social connections:     Talks on phone: None     Gets together: None     Attends Gnosticist service: None     Active member of club or organization: None     Attends meetings of clubs or organizations: None     Relationship status: None    Intimate partner violence:     Fear of current or ex partner: None     Emotionally abused: None     Physically abused: None     Forced sexual activity: None   Other Topics Concern    None   Social History Narrative    None       Allergies: Allergies   Allergen Reactions    Tramadol Itching           Labs:  0   Lab Value Date/Time    HCT 49 1 10/31/2019 0850    HCT 47 9 10/03/2018 0854    HCT 46 6 09/21/2017 0816    HCT 43 4 09/18/2015 0940    HCT 44 5 07/28/2015 0728    HCT 44 7 04/08/2014 1016    HGB 16 7 10/31/2019 0850    HGB 16 5 10/03/2018 0854    HGB 16 6 09/21/2017 0816    HGB 15 4 09/18/2015 0940    HGB 15 6 07/28/2015 0728    HGB 15 5 04/08/2014 1016    INR 1 04 09/18/2015 0940    WBC 7 46 10/31/2019 0850    WBC 7 13 10/03/2018 0854    WBC 7 35 09/21/2017 0816    WBC 7 18 09/18/2015 0940    WBC 7 92 07/28/2015 0728    WBC 8 22 04/08/2014 1016       Meds:    Current Facility-Administered Medications:     lidocaine-epinephrine (XYLOCAINE/EPINEPHRINE) 1 %-1:100,000 20 mL, sodium bicarbonate 2 mEq infiltration, , Infiltration, Once, Jalen Darling MD    Blood Culture:   No results found for: BLOODCX    Wound Culture:   No results found for: WOUNDCULT    Ins and Outs:  No intake/output data recorded              Physical Exam  /89   Pulse 76   Temp (!) 97 3 °F (36 3 °C) (Tympanic)   Resp 16   Ht 6' 3" (1 905 m)   Wt 131 kg (288 lb)   SpO2 94%   BMI 36 00 kg/m²   /89   Pulse 76   Temp (!) 97 3 °F (36 3 °C) (Tympanic)   Resp 16   Ht 6' 3" (1 905 m)   Wt 131 kg (288 lb)   SpO2 94%   BMI 36 00 kg/m²   Gen: Alert and oriented to person, place, time  HEENT: EOMI, eyes clear, moist mucus membranes, hearing intact  Respiratory: Bilateral chest rise   No audible wheezing found  Cardiovascular: Regular Rate and Rhythm  Abdomen: soft nontender/nondistended  Ortho Exam: TTP A1 pulleys of b/l long fingers with + nodules and + triggering, + [place] testing   Neuro Exam: Sensation and motor grossly intact    Lab Results: Reviewed  Imaging: Reviewed

## 2019-11-19 NOTE — OP NOTE
OPERATIVE REPORT  PATIENT NAME: Haroon Childers  :  1955  MRN: [id number]  Pt Location: [facility]    SURGERY DATE: 19    Surgeon(s) and Role:     * Shaun Simms MD - Primary     * Beryle Minister, PA-C - Assisting    Pre-Op Diagnosis:  Trigger middle finger of right hand [M65 331]    Post-Op Diagnosis Codes:     * Trigger middle finger of right hand [M65 331]    Procedure(s):  RELEASE TRIGGER FINGER-right long finger (Right)    Specimen(s):  * No orders in the log *    Estimated Blood Loss:   Minimal      Anesthesia Type:   Local    Operative Indications: The patient has a history of Trigger Finger  right  long finger that was recalcitrant to conservative management  The decision was made to bring the patient to the operating room for Trigger Finger Release  right  long finger  Risks of the procedure were explained which include, but are not limited to bleeding; infection; damage to nerves, arteries,veins, tendons; scar; pain; need for reoperation; failure to give desired result; and risks of anaesthesia  All questions were answered to satisfaction and they were willing to proceed  Operative Findings:  Right long finger trigger finger    Complications:   None    Procedure and Technique:  After the patient, site, and procedure were identified, the patient was brought into the operating room in a supine position  Local anaesthesia was adminstered in the preoperative holding area  A tourniquet was not used  The  right upper extremity was then prepped and drapped in a normal, sterile, orthopedic fashion  After the patient, site, and procedure were once again identified, attention was turned to the right long finger  An incision was made over the flexor tendon sheath at the level of the A1 pulley  Dissection was carried out in-line with the flexor tendon sheath and the radial and ulnar digital artery and nerve were protected  The A1 pulley was identified at the base of the incision  Under direct visualization, the A1 pulley was divided along the midline in its entirety with care taken to avoid injury to the underlying tendon  The tendons were examined to ensure that no further catching, popping, clicking or locking occurred with motion of the finger  At the completion of the procedure, hemostasis was obtained with cautery and direct pressure  The wounds were copiously irrigated with sterile solution  The wounds were closed with Prolene  Sterile dressings were applied, including Xeroform, gauze, tweeners, webril, ACE  Please note, all sponge, needle, and instrument counts were correct prior to closure  [place] magnification was utilized  The patient tolerated the procedure well       I was present for the entire procedure, A qualified resident physician was not available and A physician assistant was required during the procedure for retraction tissue handling,dissection and suturing    Patient Disposition:  APU and hemodynamically stable    SIGNATURE: Yulissa Brown MD  DATE: 11/19/19  TIME: 11:34 AM

## 2019-11-19 NOTE — H&P (VIEW-ONLY)
H&P Exam - Orthopedics   Emerson Ugalde 60 y o  male MRN: [id number]  Unit/Bed#: [facility] 01    Assessment/Plan   Assessment:  B/L long TFs  Plan:  R long TFR today followed by L long TFR to be performed at a later date    History of Present Illness   HPI:  Emerson Ugalde is a 60 y o  male who presents with continued c/o b/l long finger pain and clicking/locking  Previous steroid injxs have only provided partial relief  Historical Information  Review Of Systems:   · Skin: Normal  · Neuro: See HPI  · Musculoskeletal: See HPI  · 14 point review of systems negative except as stated above     Past Medical History:   Past Medical History:   Diagnosis Date    Diabetes mellitus ([address] )     Headache     High cholesterol     Hypertension     Kidney stones     Sleep apnea        Past Surgical History:   Past Surgical History:   Procedure Laterality Date    COLONOSCOPY N/A 11/22/2016    Procedure: COLONOSCOPY;  Surgeon: Cathie Espinosa MD;  [facility]: [facility];   Service:     DEBRIDEMENT TENNIS ELBOW  2006    HERNIA REPAIR  7361    x2, umbilical and right side per patient   [address]    UVULOPALATOPHARYNGOPLASTY  2004       Family History:  Family history reviewed and non-contributory  Family History   Family history unknown: Yes       Social History:  Social History     Socioeconomic History    Marital status: /Civil Union     Spouse name: None    Number of children: None    Years of education: None    Highest education level: None   Occupational History    None   Social Needs    Financial resource strain: None    Food insecurity:     Worry: None     Inability: None    Transportation needs:     Medical: None     Non-medical: None   Tobacco Use    Smoking status: Never Smoker    Smokeless tobacco: Never Used   Substance and Sexual Activity    Alcohol use: Yes     Comment: rarely    Drug use: No    Sexual activity: None   Lifestyle    Physical activity:     Days per week: None Minutes per session: None    Stress: None   Relationships    Social connections:     Talks on phone: None     Gets together: None     Attends Amish service: None     Active member of club or organization: None     Attends meetings of clubs or organizations: None     Relationship status: None    Intimate partner violence:     Fear of current or ex partner: None     Emotionally abused: None     Physically abused: None     Forced sexual activity: None   Other Topics Concern    None   Social History Narrative    None       Allergies: Allergies   Allergen Reactions    Tramadol Itching           Labs:  0   Lab Value Date/Time    HCT 49 1 10/31/2019 0850    HCT 47 9 10/03/2018 0854    HCT 46 6 09/21/2017 0816    HCT 43 4 09/18/2015 0940    HCT 44 5 07/28/2015 0728    HCT 44 7 04/08/2014 1016    HGB 16 7 10/31/2019 0850    HGB 16 5 10/03/2018 0854    HGB 16 6 09/21/2017 0816    HGB 15 4 09/18/2015 0940    HGB 15 6 07/28/2015 0728    HGB 15 5 04/08/2014 1016    INR 1 04 09/18/2015 0940    WBC 7 46 10/31/2019 0850    WBC 7 13 10/03/2018 0854    WBC 7 35 09/21/2017 0816    WBC 7 18 09/18/2015 0940    WBC 7 92 07/28/2015 0728    WBC 8 22 04/08/2014 1016       Meds:    Current Facility-Administered Medications:     lidocaine-epinephrine (XYLOCAINE/EPINEPHRINE) 1 %-1:100,000 20 mL, sodium bicarbonate 2 mEq infiltration, , Infiltration, Once, Larissa Weber MD    Blood Culture:   No results found for: BLOODCX    Wound Culture:   No results found for: WOUNDCULT    Ins and Outs:  No intake/output data recorded              Physical Exam  /89   Pulse 76   Temp (!) 97 3 °F (36 3 °C) (Tympanic)   Resp 16   Ht 6' 3" (1 905 m)   Wt 131 kg (288 lb)   SpO2 94%   BMI 36 00 kg/m²   /89   Pulse 76   Temp (!) 97 3 °F (36 3 °C) (Tympanic)   Resp 16   Ht 6' 3" (1 905 m)   Wt 131 kg (288 lb)   SpO2 94%   BMI 36 00 kg/m²   Gen: Alert and oriented to person, place, time  HEENT: EOMI, eyes clear, moist mucus membranes, hearing intact  Respiratory: Bilateral chest rise   No audible wheezing found  Cardiovascular: Regular Rate and Rhythm  Abdomen: soft nontender/nondistended  Ortho Exam: TTP A1 pulleys of b/l long fingers with + nodules and + triggering, + [place] testing   Neuro Exam: Sensation and motor grossly intact    Lab Results: Reviewed  Imaging: Reviewed

## 2019-11-21 ENCOUNTER — OFFICE VISIT (OUTPATIENT)
Dept: OBGYN CLINIC | Facility: HOSPITAL | Age: 64
End: 2019-11-21
Payer: COMMERCIAL

## 2019-11-21 ENCOUNTER — HOSPITAL ENCOUNTER (OUTPATIENT)
Dept: RADIOLOGY | Facility: HOSPITAL | Age: 64
Discharge: HOME | End: 2019-11-21
Attending: ORTHOPAEDIC SURGERY
Payer: COMMERCIAL

## 2019-11-21 VITALS
HEART RATE: 86 BPM | DIASTOLIC BLOOD PRESSURE: 94 MMHG | WEIGHT: 292.6 LBS | SYSTOLIC BLOOD PRESSURE: 147 MMHG | BODY MASS INDEX: 36.38 KG/M2 | HEIGHT: 75 IN

## 2019-11-21 DIAGNOSIS — M70.62 GREATER TROCHANTERIC BURSITIS, LEFT: Primary | ICD-10-CM

## 2019-11-21 DIAGNOSIS — M25.552 PAIN IN LEFT HIP: ICD-10-CM

## 2019-11-21 PROCEDURE — 99214 OFFICE O/P EST MOD 30 MIN: CPT | Performed by: ORTHOPAEDIC SURGERY

## 2019-11-21 PROCEDURE — 73502 X-RAY EXAM HIP UNI 2-3 VIEWS: CPT

## 2019-11-21 RX ORDER — KETOCONAZOLE 20 MG/G
CREAM TOPICAL
COMMUNITY
End: 2019-12-10 | Stop reason: ALTCHOICE

## 2019-11-21 RX ORDER — LANCETS
EACH MISCELLANEOUS
COMMUNITY
End: 2021-03-18 | Stop reason: ALTCHOICE

## 2019-11-21 NOTE — PROGRESS NOTES
Assessment:    Left greater trochanteric bursitis    Plan:     Patient has left greater trochanteric bursitis   Patient received a left greater trochanteric bursitis injection in the office today   Provided patient with stretching and strengthening exercises for the greater trochanter   Follow-up in 3 months        The above stated was discussed in layman's terms and the patient expressed understanding  All questions were answered to the patient's satisfaction  Subjective:   Evert Ahumada is a 60 y o  male who presents left hip pain  Patient states he has been having left hip pain for 1 month, denies any falls or trauma  Patient states having left lateral hip pain  It is a constant ache  Denies any groin pain  Denies any radiating leg symptoms  Patient states left leg feels unstable with transitional positions  Pain is worse with lying on the left side and walking  Patient has tried using heat with no relief  Patient does have history of sciatica and does take Diclofenac p r n  For pain  Patient has no history of having physical therapy, surgeries, or injections in the left hip  Review of systems negative unless otherwise specified in HPI    Past Medical History:   Diagnosis Date    Diabetes mellitus ([address] )     Headache     High cholesterol     Hypertension     Kidney stones     Sleep apnea        Past Surgical History:   Procedure Laterality Date    COLONOSCOPY N/A 11/22/2016    Procedure: COLONOSCOPY;  Surgeon: Jin Gamez MD;  [facility]: [facility];   Service:     DEBRIDEMENT TENNIS ELBOW  2006    HERNIA REPAIR  0015    x2, umbilical and right side per patient   [address] Right 11/19/2019    Procedure: RELEASE TRIGGER FINGER-right long finger;  Surgeon: Allan Reynolds MD;  [facility];  Service: Orthopedics    UVULOPALATOPHARYNGOPLASTY  2004       Family History   Family history unknown: Yes       Social History     Occupational History    Not on file   Tobacco Use    Smoking status: Never Smoker    Smokeless tobacco: Never Used   Substance and Sexual Activity    Alcohol use: Yes     Comment: rarely    Drug use: No    Sexual activity: Not on file         Current Outpatient Medications:     acetaminophen (TYLENOL 8 HOUR) 650 mg CR tablet, Take 1 tablet (650 mg total) by mouth every 8 (eight) hours, Disp: 15 tablet, Rfl: 0    diclofenac sodium (VOLTAREN) 50 mg EC tablet, Take 1 tablet (50 mg total) by mouth 2 (two) times a day as needed (pain), Disp: 180 tablet, Rfl: 2    FARXIGA 5 MG TABS, TAKE 1 TABLET BY MOUTH EVERY DAY, Disp: 90 tablet, Rfl: 1    HYDROcodone-acetaminophen (NORCO) 5-325 mg per tablet, Take 1 tablet by mouth every 6 (six) hours as needed for pain for up to 5 daysMax Daily Amount: 4 tablets, Disp: 5 tablet, Rfl: 0    ketoconazole (NIZORAL) 2 % cream, ketoconazole 2 % topical cream, Disp: , Rfl:     lisinopril (ZESTRIL) 5 mg tablet, TAKE 1 TABLET EVERY DAY, Disp: 90 tablet, Rfl: 2    MICROLET LANCETS MISC, Microlet Lancet, Disp: , Rfl:     naproxen sodium (ALEVE) 220 MG tablet, Take 1 tablet (220 mg total) by mouth 2 (two) times a day with meals for 5 days, Disp: 10 tablet, Rfl: 0    rosuvastatin (CRESTOR) 10 MG tablet, Take 1 tablet (10 mg total) by mouth daily, Disp: 90 tablet, Rfl: 3    Allergies   Allergen Reactions    Tramadol Itching            Vitals:    11/21/19 1314   BP: 147/94   Pulse: 86       Objective:            Physical Exam  · General: Awake, Alert, Oriented  · Eyes: Pupils equal, round and reactive to light  · Heart: regular rate and rhythm  · Lungs: No audible wheezing  · Abdomen: soft                    Ortho Exam  Left hip  No lacerations, no abrasions, no open wounds  No erythema  No pain with internal rotation  Negative Stinchfield  Tenderness to palpation of the greater trochanteric bursa  Neurovascularly Intact Distally     Diagnostics, reviewed and taken today if performed as documented: The attending physician has personally reviewed the pertinent films in PACS and interpretation is as follows:  XR Left hip:  No acute osseous abnormality    Procedures, if performed today:    Procedures    None performed      Scribe Attestation    I,:   Je Munoz am acting as a scribe while in the presence of the attending physician :        I,:   Venessa Huang MD personally performed the services described in this documentation    as scribed in my presence :              Portions of the record may have been created with voice recognition software  Occasional wrong word or "sound a like" substitutions may have occurred due to the inherent limitations of voice recognition software  Read the chart carefully and recognize, using context, where substitutions have occurred

## 2019-11-26 ENCOUNTER — HOSPITAL ENCOUNTER (OUTPATIENT)
Facility: HOSPITAL | Age: 64
Setting detail: OUTPATIENT SURGERY
Discharge: HOME | End: 2019-11-26
Attending: ORTHOPAEDIC SURGERY | Admitting: ORTHOPAEDIC SURGERY
Payer: COMMERCIAL

## 2019-11-26 VITALS
HEIGHT: 75 IN | TEMPERATURE: 96.3 F | WEIGHT: 280 LBS | DIASTOLIC BLOOD PRESSURE: 93 MMHG | RESPIRATION RATE: 16 BRPM | SYSTOLIC BLOOD PRESSURE: 148 MMHG | HEART RATE: 61 BPM | OXYGEN SATURATION: 95 % | BODY MASS INDEX: 34.82 KG/M2

## 2019-11-26 LAB — GLUCOSE SERPL-MCNC: 161 MG/DL (ref 65–140)

## 2019-11-26 PROCEDURE — 82948 REAGENT STRIP/BLOOD GLUCOSE: CPT

## 2019-11-26 PROCEDURE — 26055 INCISE FINGER TENDON SHEATH: CPT | Performed by: PHYSICIAN ASSISTANT

## 2019-11-26 PROCEDURE — 26055 INCISE FINGER TENDON SHEATH: CPT | Performed by: ORTHOPAEDIC SURGERY

## 2019-11-26 PROCEDURE — NC001 PR NO CHARGE: Performed by: PHYSICIAN ASSISTANT

## 2019-11-26 RX ORDER — LIDOCAINE HYDROCHLORIDE AND EPINEPHRINE 10; 10 MG/ML; UG/ML
20 INJECTION, SOLUTION INFILTRATION; PERINEURAL ONCE
Status: DISCONTINUED | OUTPATIENT
Start: 2019-11-26 | End: 2019-11-26 | Stop reason: HOSPADM

## 2019-11-26 RX ORDER — MAGNESIUM HYDROXIDE 1200 MG/15ML
LIQUID ORAL AS NEEDED
Status: DISCONTINUED | OUTPATIENT
Start: 2019-11-26 | End: 2019-11-26 | Stop reason: HOSPADM

## 2019-11-26 RX ADMIN — SODIUM BICARBONATE: 84 INJECTION, SOLUTION INTRAVENOUS at 08:03

## 2019-11-26 NOTE — INTERVAL H&P NOTE
H&P reviewed  After examining the patient I find no changes in the patients condition since the H&P had been written  Patient for left long trigger finger release today      Vitals:    11/26/19 0737   BP: 148/95   Pulse: 71   Resp: 20   Temp: (!) 96 7 °F (35 9 °C)   SpO2: 95%

## 2019-11-26 NOTE — DISCHARGE INSTRUCTIONS
Post Operative Instructions    You have had surgery on your arm today, please read and follow the information below:  · Elevate your hand above your elbow during the next 24-48 hours to help with swelling  · Place your hand and arm over your head with motion at your shoulder three times a day  · Do not apply any cream/ointment/oil to your incisions including antibiotics  · Do not soak your hands in standing water (dishwater, tubs, Jacuzzi's, pools, etc ) until given permission (typically 2-3 weeks after injury)    Call the office if you notice any:  · Increased numbness or tingling of your hand or fingers that is not relieved with elevation  · Increasing pain that is not controlled with medication  · Difficulty chewing, breathing, swallowing  · Chest pains or shortness of breath  · Fever over 101 4 degrees  Bandage: Remove bandage after 5 days  Motion: Move fingers into a fist 5 times a day, DO NOT move any splinted fingers  Weight bearing status: Avoid heavy lifting (>5 pounds) with the extremity that was operated on until follow up appointment  Normal activities of daily living are OK  Ice: Ice for 10 minutes every hour as needed for swelling x 24 hours  Sling: No sling necessary  Medications:   Naproxen 220 mg two times a day (or you may take your Voltaren, do not take both)  Tylenol Extended Release 650 mg every 8 hours  Norco/Hydrocodone one tab every 6 hours AS NEEDED for pain     Follow-up Appointment: 7-10 days  Please call the office if you have any questions or concerns regarding your post-operative care

## 2019-11-26 NOTE — OP NOTE
OPERATIVE REPORT  PATIENT NAME: Terra Rodriguez  :  1955  MRN: [id number]  Pt Location: [facility]    SURGERY DATE: 19    Surgeon(s) and Role:     * Bird Day MD - Primary     * Una Bauman PA-C - Assisting    Pre-Op Diagnosis:  Trigger finger, left middle finger [M65 332]    Post-Op Diagnosis Codes:     * Trigger finger, left middle finger [M65 332]    Procedure(s):  RELEASE TRIGGER FINGER-left long finger (Left)    Specimen(s):  * No orders in the log *    Estimated Blood Loss:   Minimal      Anesthesia Type:   Local    Operative Indications: The patient has a history of Trigger Finger  left  long finger that was recalcitrant to conservative management  The decision was made to bring the patient to the operating room for Trigger Finger Release  left  long finger  Risks of the procedure were explained which include, but are not limited to bleeding; infection; damage to nerves, arteries,veins, tendons; scar; pain; need for reoperation; failure to give desired result; and risks of anaesthesia  All questions were answered to satisfaction and they were willing to proceed  Operative Findings:  Left long finger trigger finger    Complications:   None    Procedure and Technique:  After the patient, site, and procedure were identified, the patient was brought into the operating room in a supine position  Local anaesthesia was adminstered in the preoperative holding area  A tourniquet was not used  The  left upper extremity was then prepped and drapped in a normal, sterile, orthopedic fashion  After the patient, site, and procedure were once again identified, attention was turned to the left long finger  An incision was made over the flexor tendon sheath at the level of the A1 pulley  Dissection was carried out in-line with the flexor tendon sheath and the radial and ulnar digital artery and nerve were protected  The A1 pulley was identified at the base of the incision    Under direct visualization, the A1 pulley was divided along the midline in its entirety with care taken to avoid injury to the underlying tendon  The tendons were examined to ensure that no further catching, popping, clicking or locking occurred with motion of the finger  At the completion of the procedure, hemostasis was obtained with cautery and direct pressure  The wounds were copiously irrigated with sterile solution  The wounds were closed with Prolene  Sterile dressings were applied, including Xeroform, gauze, tweeners, webril, ACE  Please note, all sponge, needle, and instrument counts were correct prior to closure  Loupe magnification was utilized  The patient tolerated the procedure well       I was present for the entire procedure, A qualified resident physician was not available and A physician assistant was required during the procedure for retraction tissue handling,dissection and suturing    Patient Disposition:  APU and hemodynamically stable    SIGNATURE: Kayla Anderson MD  DATE: 11/26/19  TIME: 8:55 AM

## 2019-12-06 ENCOUNTER — OFFICE VISIT (OUTPATIENT)
Dept: OBGYN CLINIC | Facility: HOSPITAL | Age: 64
End: 2019-12-06

## 2019-12-06 VITALS
SYSTOLIC BLOOD PRESSURE: 127 MMHG | HEART RATE: 80 BPM | BODY MASS INDEX: 35.68 KG/M2 | DIASTOLIC BLOOD PRESSURE: 87 MMHG | WEIGHT: 287 LBS | HEIGHT: 75 IN

## 2019-12-06 DIAGNOSIS — Z98.890 STATUS POST TRIGGER FINGER RELEASE: ICD-10-CM

## 2019-12-06 PROCEDURE — 99024 POSTOP FOLLOW-UP VISIT: CPT | Performed by: ORTHOPAEDIC SURGERY

## 2019-12-06 NOTE — PROGRESS NOTES
Assessment:   Left long trigger finger release 11/26/19  Right long trigger finger release 11/19/19  Intrinsic tightness right long finger    Plan:   Patient will meet with hand therapy for home excise program    Scribe Attestation    I,:   Mariya Dewitt am acting as a scribe while in the presence of the attending physician :        I,:   Bryant Smiley MD personally performed the services described in this documentation    as scribed in my presence :            Follow Up:  PRN      CHIEF COMPLAINT:  Chief Complaint   Patient presents with    Left Middle Finger - Post-op         SUBJECTIVE:  Erwin Shepherd is a 60y o  year old male who presents for follow up after Left long trigger finger release 11/26/19 and Right long trigger finger release 11/19/19  Today patient complains of some tightness in the right long finger         PHYSICAL EXAMINATION:  /87   Pulse 80   Ht 6' 3" (1 905 m)   Wt 130 kg (287 lb)   BMI 35 87 kg/m²   General: well developed and well nourished, alert, oriented times 3 and appears comfortable  Psychiatric: Normal    MUSCULOSKELETAL EXAMINATION:  Incision: Clean, dry, intact  Range of Motion: full ROM left long finger, near full ROM right long finger, intrinsic tightness right long finger, no catching, clicking, or popping  Neurovascular status: Neuro intact, good cap refill  Activity Restrictions: No restrictions  Done today: Sutures out and Steri strips applied      STUDIES REVIEWED:  No Studies to review      PROCEDURES PERFORMED:  Procedures  No Procedures performed today

## 2019-12-09 ENCOUNTER — ANESTHESIA EVENT (OUTPATIENT)
Dept: GASTROENTEROLOGY | Facility: HOSPITAL | Age: 64
End: 2019-12-09

## 2019-12-09 RX ORDER — SODIUM CHLORIDE 9 MG/ML
125 INJECTION, SOLUTION INTRAVENOUS CONTINUOUS
Status: CANCELLED | OUTPATIENT
Start: 2019-12-09

## 2019-12-09 NOTE — ANESTHESIA PREPROCEDURE EVALUATION
Review of Systems/Medical History  Patient summary reviewed  Chart reviewed  No history of anesthetic complications     Cardiovascular  Exercise tolerance (METS): >4,  Hyperlipidemia, Hypertension ,    Pulmonary  Sleep apnea CPAP,        GI/Hepatic            Endo/Other  Diabetes type 2 ,   Obesity    GYN       Hematology   Musculoskeletal  Back pain , lumbar pain,   Arthritis     Neurology   Psychology           Physical Exam    Airway    Mallampati score: II  TM Distance: >3 FB       Dental   No notable dental hx     Cardiovascular  Rhythm: regular,     Pulmonary  Breath sounds clear to auscultation,     Other Findings        Anesthesia Plan  ASA Score- 3     Anesthesia Type- IV sedation with anesthesia with ASA Monitors  Additional Monitors:   Airway Plan:         Plan Factors-    Induction- intravenous  Postoperative Plan-     Informed Consent- Anesthetic plan and risks discussed with patient  I personally reviewed this patient with the CRNA  Discussed and agreed on the Anesthesia Plan with the CRNA  Robby Mackey

## 2019-12-10 ENCOUNTER — HOSPITAL ENCOUNTER (OUTPATIENT)
Dept: GASTROENTEROLOGY | Facility: HOSPITAL | Age: 64
Setting detail: OUTPATIENT SURGERY
Discharge: HOME | End: 2019-12-10
Attending: SURGERY | Admitting: SURGERY
Payer: COMMERCIAL

## 2019-12-10 ENCOUNTER — ANESTHESIA (OUTPATIENT)
Dept: GASTROENTEROLOGY | Facility: HOSPITAL | Age: 64
End: 2019-12-10

## 2019-12-10 VITALS
BODY MASS INDEX: 35.68 KG/M2 | HEIGHT: 75 IN | DIASTOLIC BLOOD PRESSURE: 84 MMHG | WEIGHT: 287 LBS | SYSTOLIC BLOOD PRESSURE: 117 MMHG | TEMPERATURE: 98.2 F | HEART RATE: 74 BPM | RESPIRATION RATE: 18 BRPM | OXYGEN SATURATION: 95 %

## 2019-12-10 DIAGNOSIS — Z86.010 PERSONAL HISTORY OF COLONIC POLYPS: ICD-10-CM

## 2019-12-10 LAB — GLUCOSE SERPL-MCNC: 119 MG/DL (ref 65–140)

## 2019-12-10 PROCEDURE — 88305 TISSUE EXAM BY PATHOLOGIST: CPT | Performed by: PATHOLOGY

## 2019-12-10 PROCEDURE — 82948 REAGENT STRIP/BLOOD GLUCOSE: CPT

## 2019-12-10 RX ORDER — PROPOFOL 10 MG/ML
INJECTION, EMULSION INTRAVENOUS CONTINUOUS PRN
Status: DISCONTINUED | OUTPATIENT
Start: 2019-12-10 | End: 2019-12-10 | Stop reason: SURG

## 2019-12-10 RX ORDER — PROPOFOL 10 MG/ML
INJECTION, EMULSION INTRAVENOUS AS NEEDED
Status: DISCONTINUED | OUTPATIENT
Start: 2019-12-10 | End: 2019-12-10 | Stop reason: SURG

## 2019-12-10 RX ORDER — SODIUM CHLORIDE 9 MG/ML
INJECTION, SOLUTION INTRAVENOUS CONTINUOUS PRN
Status: DISCONTINUED | OUTPATIENT
Start: 2019-12-10 | End: 2019-12-10 | Stop reason: SURG

## 2019-12-10 RX ADMIN — PROPOFOL 100 MG: 10 INJECTION, EMULSION INTRAVENOUS at 09:17

## 2019-12-10 RX ADMIN — PROPOFOL 120 MCG/KG/MIN: 10 INJECTION, EMULSION INTRAVENOUS at 09:17

## 2019-12-10 RX ADMIN — SODIUM CHLORIDE: 0.9 INJECTION, SOLUTION INTRAVENOUS at 09:08

## 2019-12-10 NOTE — ANESTHESIA POSTPROCEDURE EVALUATION
Post-Op Assessment Note    CV Status:  Stable  Pain Score: 0    Pain management: adequate     Mental Status:  Sleepy   Hydration Status:  Euvolemic   PONV Controlled:  Controlled   Airway Patency:  Patent   Post Op Vitals Reviewed: Yes      Staff: CRNA   Comments: to rn, report to rn,  117/79, 68, 16, 99%            BP      Temp      Pulse     Resp      SpO2

## 2020-01-02 ENCOUNTER — CONSULT (OUTPATIENT)
Dept: ENDOCRINOLOGY | Facility: CLINIC | Age: 65
End: 2020-01-02
Payer: COMMERCIAL

## 2020-01-02 VITALS
HEART RATE: 78 BPM | DIASTOLIC BLOOD PRESSURE: 82 MMHG | SYSTOLIC BLOOD PRESSURE: 120 MMHG | HEIGHT: 75 IN | WEIGHT: 291 LBS | BODY MASS INDEX: 36.18 KG/M2

## 2020-01-02 DIAGNOSIS — E11.9 TYPE 2 DIABETES MELLITUS WITHOUT COMPLICATION, WITHOUT LONG-TERM CURRENT USE OF INSULIN (HCC): Primary | ICD-10-CM

## 2020-01-02 DIAGNOSIS — G47.30 SLEEP APNEA, UNSPECIFIED TYPE: ICD-10-CM

## 2020-01-02 DIAGNOSIS — E78.2 MIXED HYPERLIPIDEMIA: ICD-10-CM

## 2020-01-02 DIAGNOSIS — I10 ESSENTIAL HYPERTENSION: ICD-10-CM

## 2020-01-02 DIAGNOSIS — IMO0002 TYPE 2 DIABETES, UNCONTROLLED, WITH NEUROPATHY: ICD-10-CM

## 2020-01-02 PROCEDURE — 99244 OFF/OP CNSLTJ NEW/EST MOD 40: CPT | Performed by: INTERNAL MEDICINE

## 2020-01-02 RX ORDER — BLOOD-GLUCOSE METER
EACH MISCELLANEOUS
Qty: 1 KIT | Refills: 0 | Status: SHIPPED | OUTPATIENT
Start: 2020-01-02 | End: 2020-07-09 | Stop reason: ALTCHOICE

## 2020-01-02 RX ORDER — LANCETS
EACH MISCELLANEOUS
Qty: 60 EACH | Refills: 3 | Status: SHIPPED | OUTPATIENT
Start: 2020-01-02 | End: 2020-07-09 | Stop reason: ALTCHOICE

## 2020-01-02 RX ORDER — HYDROCODONE BITARTRATE AND ACETAMINOPHEN 5; 325 MG/1; MG/1
TABLET ORAL
COMMUNITY
End: 2020-07-09 | Stop reason: ALTCHOICE

## 2020-01-02 NOTE — PROGRESS NOTES
Duane Ayala 60 y o  male MRN: [id number]    Encounter: [id number]      Assessment/Plan     Assessment: This is a 60y o -year-old male with diabetes with hyperglycemia  Plan:      Diagnoses and all orders for this visit:    Type 2 diabetes mellitus without complication, without long-term current use of insulin ([facility])    Lab Results   Component Value Date    HGBA1C 7 3 (H) 10/31/2019    A1c 7 3%  Continue Farxiga 10 mg daily  Patient is tolerating it well  Discussed the option of starting GLP analogue as well as lifestyle modifications, such as dietary modifications, exercise routine  Patient prefers to focus on dietary changes 1st, will refer him to nutrition therapy as well as for diabetes classes  Will follow-up in 3 months  Also discussed to check blood sugar twice daily, and bring log for his appointment for Diabetes Education,  In next follow-up visit if the A1c is not improved, will consider about starting glucagon like peptide therapy   Also discussed importance of starting exercise as tolerated, slowly  Discussed importance of control of diabetes and effect on complications, such as risk of coronary artery disease stroke, nephropathy, worsening of neuropathy  Also discussed importance of follow-up with Ophthalmology and podiatry    -     Ambulatory referral to Endocrinology  -     glucose blood (ACCU-CHEK JYOTI PLUS) test strip; Use as instructed,  Two times daily  -     Blood Glucose Monitoring Suppl (ACCU-CHEK JYOTI PLUS) w/Device KIT; Dispense 1 kit  -     ACCU-CHEK FASTCLIX LANCETS MISC; Use two times daily  -     Hemoglobin A1C; Future  -     Microalbumin / creatinine urine ratio; Future  -     Lipid panel; Future  -     Basic metabolic panel; Future  -     Ambulatory referral to Diabetic Education; Future  -     Ambulatory referral to medical nutrition therapy for diabetes; Future  -     Ambulatory referral to Podiatry;  Future    Essential hypertension    BP Readings from Last 3 Encounters: 01/02/20 120/82   12/10/19 117/84   12/06/19 127/87    Blood pressure well controlled  Continue current management  Low salt diet    Mixed hyperlipidemia  Currently on Crestor  Discussed about lifestyle modifications  -     Lipid panel; Future    Sleep apnea, unspecified type  Discussed to follow-up with sleep medicine and importance of using CPAP therapy    Type 2 diabetes, uncontrolled, with neuropathy ([address] )  Will refer to podiatry    -     Ambulatory referral to Diabetic Education; Future  -     Ambulatory referral to medical nutrition therapy for diabetes; Future  -     Ambulatory referral to Podiatry; Future          CC: Diabetes    History of Present Illness     HPI:    Liat Alicia is 60 yr old gentleman with PMH of type 2 diabetes,  Hypertension, Hyperlipidemia, lumber disc problems is here for evaluation of type 2 DM  He denies any complications of diabetes, except neuropathy, however he also has lumbar disc problem which could be cause for neuropathy  He also has history of sleep apnea however not using CPAP machine  He does not have a glucometer and does not check blood sugars  His last A1c is 7 3%     He has history of hypertension for which his taking lisinopril 5 mg daily  For hyperlipidemia his taking Crestor 10 mg daily    He has attended nutrition classes, 2-3 years ago  He does not follow any diet plan  Currently not exercising      Wt Readings from Last 3 Encounters:   01/02/20 132 kg (291 lb)   12/10/19 130 kg (287 lb)   12/06/19 130 kg (287 lb)     Review of Systems   Constitutional: Negative for activity change, diaphoresis, fatigue, fever and unexpected weight change  HENT: Negative  Eyes: Negative for visual disturbance  Respiratory: Negative for cough, chest tightness and shortness of breath  Cardiovascular: Negative for chest pain, palpitations and leg swelling  Gastrointestinal: Negative for abdominal pain, blood in stool, constipation, diarrhea, nausea and vomiting  Endocrine: Negative for cold intolerance, heat intolerance, polydipsia, polyphagia and polyuria  Genitourinary: Negative for dysuria, enuresis, frequency and urgency  Musculoskeletal: Negative for arthralgias and myalgias  Skin: Negative for pallor, rash and wound  Allergic/Immunologic: Negative  Neurological: Negative for dizziness, tremors, weakness and numbness  Hematological: Negative  Psychiatric/Behavioral: Negative  Historical Information   Past Medical History:   Diagnosis Date    Diabetes mellitus ([address] )     Headache     High cholesterol     Hypertension     Kidney stones     Sleep apnea      Past Surgical History:   Procedure Laterality Date    COLONOSCOPY N/A 11/22/2016    Procedure: COLONOSCOPY;  Surgeon: Poly Mckinney MD;  [facility];   Service:     DEBRIDEMENT TENNIS ELBOW  2006    HERNIA REPAIR  9654    x2, umbilical and right side per patient   [address] Right 11/19/2019    Procedure: RELEASE TRIGGER FINGER-right long finger;  Surgeon: Mariaelena Fabian MD;  [facility];  Service: Orthopedics    ND INCISE FINGER TENDON SHEATH Left 11/26/2019    Procedure: RELEASE TRIGGER FINGER-left long finger;  Surgeon: Mariaelena Fabian MD;  [facility];  Service: Orthopedics    UVULOPALATOPHARYNGOPLASTY  2004     Social History   Social History     Substance and Sexual Activity   Alcohol Use Yes    Comment: rarely     Social History     Substance and Sexual Activity   Drug Use No     Social History     Tobacco Use   Smoking Status Never Smoker   Smokeless Tobacco Never Used     Family History:   Family History   Family history unknown: Yes       Meds/Allergies   Current Outpatient Medications   Medication Sig Dispense Refill    acetaminophen (TYLENOL 8 HOUR) 650 mg CR tablet Take 1 tablet (650 mg total) by mouth every 8 (eight) hours 15 tablet 0    diclofenac sodium (VOLTAREN) 50 mg EC tablet Take 1 tablet (50 mg total) by mouth 2 (two) times a day as needed (pain) 180 tablet 2    FARXIGA 5 MG TABS TAKE 1 TABLET BY MOUTH EVERY DAY 90 tablet 1    lisinopril (ZESTRIL) 5 mg tablet TAKE 1 TABLET EVERY DAY 90 tablet 2    MICROLET LANCETS MISC Microlet Lancet      rosuvastatin (CRESTOR) 10 MG tablet Take 1 tablet (10 mg total) by mouth daily 90 tablet 3    ACCU-CHEK FASTCLIX LANCETS MISC Use two times daily 60 each 3    Blood Glucose Monitoring Suppl (ACCU-CHEK JYOTI PLUS) w/Device KIT Dispense 1 kit 1 kit 0    glucose blood (ACCU-CHEK JYOTI PLUS) test strip Use as instructed,  Two times daily 60 each 3    HYDROcodone-acetaminophen (NORCO) 5-325 mg per tablet hydrocodone 5 mg-acetaminophen 325 mg tablet       No current facility-administered medications for this visit  Allergies   Allergen Reactions    Tramadol Itching       Objective   Vitals: Blood pressure 120/82, pulse 78, height 6' 3" (1 905 m), weight 132 kg (291 lb)  Physical Exam   Constitutional: He is oriented to person, place, and time  He appears well-developed and well-nourished  No distress  HENT:   Head: Normocephalic and atraumatic  Eyes: Pupils are equal, round, and reactive to light  EOM are normal    Neck: Normal range of motion  Neck supple  No thyromegaly present  Cardiovascular: Normal rate, regular rhythm and normal heart sounds  Pulses are no weak pulses  Pulses:       Dorsalis pedis pulses are 1+ on the right side, and 1+ on the left side  Posterior tibial pulses are 1+ on the right side, and 1+ on the left side  Pulmonary/Chest: Effort normal and breath sounds normal    Abdominal: Soft  Bowel sounds are normal    Musculoskeletal: Normal range of motion  He exhibits no edema or deformity  Feet:   Right Foot:   Skin Integrity: Positive for dry skin  Negative for ulcer, skin breakdown, erythema, warmth or callus  Left Foot:   Skin Integrity: Positive for dry skin     Neurological: He is alert ALT 50 10/31/2019 08:50 AM    Albumin 3 9 10/31/2019 08:50 AM    HDL, Direct 60 10/31/2019 08:50 AM    HDL, Direct 61 (H) 06/28/2019 08:14 AM    HDL, Direct 56 02/25/2019 07:26 AM    Triglycerides 153 (H) 10/31/2019 08:50 AM    Triglycerides 126 06/28/2019 08:14 AM    Triglycerides 182 (H) 02/25/2019 07:26 AM           Imaging Studies: I have personally reviewed pertinent reports  Portions of the record may have been created with voice recognition software  Occasional wrong word or "sound a like" substitutions may have occurred due to the inherent limitations of voice recognition software  Read the chart carefully and recognize, using context, where substitutions have occurred

## 2020-01-27 DIAGNOSIS — I10 ESSENTIAL HYPERTENSION: ICD-10-CM

## 2020-01-27 PROCEDURE — 4010F ACE/ARB THERAPY RXD/TAKEN: CPT | Performed by: INTERNAL MEDICINE

## 2020-01-27 RX ORDER — LISINOPRIL 5 MG/1
5 TABLET ORAL DAILY
Qty: 90 TABLET | Refills: 2 | Status: SHIPPED | OUTPATIENT
Start: 2020-01-27 | End: 2020-10-08 | Stop reason: SDUPTHER

## 2020-02-26 RX ORDER — UBIDECARENONE 75 MG
CAPSULE ORAL
COMMUNITY
Start: 2020-01-04

## 2020-02-27 ENCOUNTER — OFFICE VISIT (OUTPATIENT)
Dept: OBGYN CLINIC | Facility: HOSPITAL | Age: 65
End: 2020-02-27
Payer: COMMERCIAL

## 2020-02-27 VITALS
DIASTOLIC BLOOD PRESSURE: 86 MMHG | HEART RATE: 62 BPM | BODY MASS INDEX: 35.77 KG/M2 | WEIGHT: 286.2 LBS | SYSTOLIC BLOOD PRESSURE: 130 MMHG

## 2020-02-27 DIAGNOSIS — M70.62 GREATER TROCHANTERIC BURSITIS, LEFT: Primary | ICD-10-CM

## 2020-02-27 PROCEDURE — 99213 OFFICE O/P EST LOW 20 MIN: CPT | Performed by: ORTHOPAEDIC SURGERY

## 2020-02-27 PROCEDURE — 20610 DRAIN/INJ JOINT/BURSA W/O US: CPT | Performed by: ORTHOPAEDIC SURGERY

## 2020-02-27 PROCEDURE — 1036F TOBACCO NON-USER: CPT | Performed by: ORTHOPAEDIC SURGERY

## 2020-02-27 PROCEDURE — 3079F DIAST BP 80-89 MM HG: CPT | Performed by: ORTHOPAEDIC SURGERY

## 2020-02-27 PROCEDURE — 2022F DILAT RTA XM EVC RTNOPTHY: CPT | Performed by: ORTHOPAEDIC SURGERY

## 2020-02-27 PROCEDURE — 3075F SYST BP GE 130 - 139MM HG: CPT | Performed by: ORTHOPAEDIC SURGERY

## 2020-02-27 RX ORDER — METHYLPREDNISOLONE ACETATE 40 MG/ML
2 INJECTION, SUSPENSION INTRA-ARTICULAR; INTRALESIONAL; INTRAMUSCULAR; SOFT TISSUE
Status: COMPLETED | OUTPATIENT
Start: 2020-02-27 | End: 2020-02-27

## 2020-02-27 RX ORDER — BUPIVACAINE HYDROCHLORIDE 5 MG/ML
2 INJECTION, SOLUTION EPIDURAL; INTRACAUDAL
Status: COMPLETED | OUTPATIENT
Start: 2020-02-27 | End: 2020-02-27

## 2020-02-27 RX ADMIN — METHYLPREDNISOLONE ACETATE 2 ML: 40 INJECTION, SUSPENSION INTRA-ARTICULAR; INTRALESIONAL; INTRAMUSCULAR; SOFT TISSUE at 14:02

## 2020-02-27 RX ADMIN — BUPIVACAINE HYDROCHLORIDE 2 ML: 5 INJECTION, SOLUTION EPIDURAL; INTRACAUDAL at 14:02

## 2020-02-27 NOTE — ASSESSMENT & PLAN NOTE
Patient states that since he started his exercise program he is having less problems with low back pain and discomfort  Patient is encouraged to continue his exercise program and hopefully along with weight loss and increased muscle tone he will have less difficulties with his chronic back pain  Island Pedicle Flap-Requiring Vessel Identification Text: The defect edges were debeveled with a #15 scalpel blade.  Given the location of the defect, shape of the defect and the proximity to free margins an island pedicle advancement flap was deemed most appropriate.  Using a sterile surgical marker, an appropriate advancement flap was drawn, based on the axial vessel mentioned above, incorporating the defect, outlining the appropriate donor tissue and placing the expected incisions within the relaxed skin tension lines where possible.    The area thus outlined was incised deep to adipose tissue with a #15 scalpel blade.  The skin margins were undermined to an appropriate distance in all directions around the primary defect and laterally outward around the island pedicle utilizing iris scissors.  There was minimal undermining beneath the pedicle flap.

## 2020-02-27 NOTE — PROGRESS NOTES
Assessment:  1  Greater trochanteric bursitis, left         Plan:     Patient has left greater trochanteric bursitis    Patient received Left greater trochanteric bursitis injection in the office today   Recommended to patch to use OTC Lidocaine patch for pain    Continue with home stretching and strengthening exercises   Follow up in 3 months          The above stated was discussed in layman's terms and the patient expressed understanding  All questions were answered to the patient's satisfaction  Subjective:   Hiren Silverio is a 63 y o  male who presents today follow up for left greater trochanteric bursitis CSI on 11/21/19 and states he had about 75-80% relief from injection  He also states he has been doing stretching and strengthening exercises  He is having pain over lateral aspect of the left hip and radiating pain down to the lateral knee  Pain is worse laying on the left side  He is on Diclofenac 75 mg for sciatica  Review of systems negative unless otherwise specified in HPI    Past Medical History:   Diagnosis Date    Diabetes mellitus ([address] )     Headache     High cholesterol     Hypertension     Kidney stones     Sleep apnea        Past Surgical History:   Procedure Laterality Date    COLONOSCOPY N/A 11/22/2016    Procedure: COLONOSCOPY;  Surgeon: Raffi Kidd MD;  Location: [facility];   Service:     DEBRIDEMENT TENNIS ELBOW  2006    HERNIA REPAIR  [id number]    x2, umbilical and right side per patient   [address] Right 11/19/2019    Procedure: RELEASE TRIGGER FINGER-right long finger;  Surgeon: Kayla Anderson MD;  [facility]: [facility];  Service: Orthopedics    NH INCISE FINGER TENDON SHEATH Left 11/26/2019    Procedure: RELEASE TRIGGER FINGER-left long finger;  Surgeon: Kayla Anderson MD;  [facility];  Service: Orthopedics    UVULOPALATOPHARYNGOPLASTY  2004       Family History   Family history unknown: Yes       Social History     Occupational History    Not on file   Tobacco Use    Smoking status: Never Smoker    Smokeless tobacco: Never Used   Substance and Sexual Activity    Alcohol use: Yes     Comment: rarely    Drug use: No    Sexual activity: Not on file         Current Outpatient Medications:     ACCU-CHEK FASTCLIX LANCETS MISC, Use two times daily, Disp: 60 each, Rfl: 3    acetaminophen (TYLENOL 8 HOUR) 650 mg CR tablet, Take 1 tablet (650 mg total) by mouth every 8 (eight) hours, Disp: 15 tablet, Rfl: 0    B Complex-Biotin-FA (B-100 B-COMPLEX) TABS, , Disp: , Rfl:     Blood Glucose Monitoring Suppl (ACCU-CHEK JYOTI PLUS) w/Device KIT, Dispense 1 kit, Disp: 1 kit, Rfl: 0    Coenzyme Q10 (CO Q-10) 200 MG CAPS, , Disp: , Rfl:     diclofenac sodium (VOLTAREN) 50 mg EC tablet, Take 1 tablet (50 mg total) by mouth 2 (two) times a day as needed (pain), Disp: 180 tablet, Rfl: 2    FARXIGA 5 MG TABS, TAKE 1 TABLET BY MOUTH EVERY DAY, Disp: 90 tablet, Rfl: 1    glucose blood (ACCU-CHEK JYOTI PLUS) test strip, Use as instructed,  Two times daily, Disp: 60 each, Rfl: 3    HYDROcodone-acetaminophen (NORCO) 5-325 mg per tablet, hydrocodone 5 mg-acetaminophen 325 mg tablet, Disp: , Rfl:     lisinopril (ZESTRIL) 5 mg tablet, Take 1 tablet (5 mg total) by mouth daily, Disp: 90 tablet, Rfl: 2    MICROLET LANCETS MISC, Microlet Lancet, Disp: , Rfl:     rosuvastatin (CRESTOR) 10 MG tablet, Take 1 tablet (10 mg total) by mouth daily, Disp: 90 tablet, Rfl: 3    Allergies   Allergen Reactions    Tramadol Itching            Vitals:    02/27/20 1306   BP: 130/86   Pulse: 62       Objective:            Physical Exam  · General: Awake, Alert, Oriented  · Eyes: Pupils equal, round and reactive to light  · Heart: regular rate and rhythm  · Lungs: No audible wheezing  · Abdomen: soft                    Ortho Exam  Left hip  No lacerations, no abrasions no open wounds  No erythema  No pain with internal rotation  Negative Stinchfield  Tenderness to palpation of the greater trochanteric bursa  Neurovascularly Intact Distally     Diagnostics, reviewed and taken today if performed as documented:    None performed        Procedures, if performed today:    Large joint arthrocentesis: L greater trochanteric bursa  Date/Time: 2/27/2020 2:02 PM  Consent given by: patient  Site marked: site marked  Timeout: Immediately prior to procedure a time out was called to verify the correct patient, procedure, equipment, support staff and site/side marked as required   Supporting Documentation  Indications: pain   Procedure Details  Location: hip - L greater trochanteric bursa  Preparation: Patient was prepped and draped in the usual sterile fashion  Needle size: 22 G  Ultrasound guidance: no  Approach: anterolateral  Medications administered: 2 mL bupivacaine (PF) 0 5 %; 2 mL methylPREDNISolone acetate 40 mg/mL    Patient tolerance: patient tolerated the procedure well with no immediate complications  Dressing:  Sterile dressing applied              Scribe Attestation    I,:   James Mccollum am acting as a scribe while in the presence of the attending physician :        I,:   Madeleine Briones MD personally performed the services described in this documentation    as scribed in my presence :              Portions of the record may have been created with voice recognition software  Occasional wrong word or "sound a like" substitutions may have occurred due to the inherent limitations of voice recognition software  Read the chart carefully and recognize, using context, where substitutions have occurred

## 2020-03-03 ENCOUNTER — OFFICE VISIT (OUTPATIENT)
Dept: INTERNAL MEDICINE CLINIC | Facility: CLINIC | Age: 65
End: 2020-03-03
Payer: COMMERCIAL

## 2020-03-03 VITALS
BODY MASS INDEX: 35.31 KG/M2 | SYSTOLIC BLOOD PRESSURE: 138 MMHG | WEIGHT: 284 LBS | HEIGHT: 75 IN | DIASTOLIC BLOOD PRESSURE: 84 MMHG | HEART RATE: 88 BPM | TEMPERATURE: 97.9 F | OXYGEN SATURATION: 95 %

## 2020-03-03 DIAGNOSIS — E78.2 MIXED HYPERLIPIDEMIA: ICD-10-CM

## 2020-03-03 DIAGNOSIS — Z12.5 PROSTATE CANCER SCREENING: ICD-10-CM

## 2020-03-03 DIAGNOSIS — E66.9 OBESITY (BMI 30-39.9): ICD-10-CM

## 2020-03-03 DIAGNOSIS — I10 ESSENTIAL HYPERTENSION: ICD-10-CM

## 2020-03-03 DIAGNOSIS — G89.29 CHRONIC BILATERAL LOW BACK PAIN WITH LEFT-SIDED SCIATICA: ICD-10-CM

## 2020-03-03 DIAGNOSIS — M54.42 CHRONIC BILATERAL LOW BACK PAIN WITH LEFT-SIDED SCIATICA: ICD-10-CM

## 2020-03-03 DIAGNOSIS — E13.9 DIABETES 1.5, MANAGED AS TYPE 2 (HCC): Primary | ICD-10-CM

## 2020-03-03 DIAGNOSIS — Z00.00 HEALTHCARE MAINTENANCE: ICD-10-CM

## 2020-03-03 PROCEDURE — 99396 PREV VISIT EST AGE 40-64: CPT | Performed by: INTERNAL MEDICINE

## 2020-03-03 NOTE — PROGRESS NOTES
Assessment/Plan:    Healthcare maintenance  Patient is here today for general physical   History of hypertension, hyperlipidemia, exogenous obesity, diabetes mellitus type 2, chronic low back pain  Patient did not have labs performed prior to the visit today but he continues to follow-up with his endocrinologist   Patient states that he is having some decreased with low back pain with a concerted effort for him to lose weight and also started an exercise program and stretching  He denies any chest pain or pressure and no increasing shortness of breath  He has not had a lipid profile performed in a prolonged period of time and this will be obtained in the near future  Patient also needs a PSA  Otherwise patient is up-to-date with all parameters  Underwent physical today in the office no specific abnormalities  Patient refused colon rectal examination stating that within the last year he did have a colonoscopy    Obesity (BMI 30-39  9)  Patient has made a concerted effort to work to lose weight and he continues to watch his diet, decreasing his caloric intake and again he is started on a regular exercise program   Patient is commended on his have hurt and hopefully we will continue to see weight loss  Hyperlipidemia  As mentioned patient has not had a lipid profile performed in extended period of time  Patient was given a slip to have this performed in the near future  We will adjust medication as needed  Patient is working extremely hard on his diet, weight loss and exercise program again is commended for this  Essential hypertension  Patient's blood pressure is controlled with present treatment  Patient will continue present medication and surveillance  We continue also to watch his renal function  We are hoping that with continued weight loss we would consider continue to see stabilization of his blood pressure readings      Lumbago with sciatica, left side  Patient does have a history of chronic low back pain on the left with sciatica  Patient has been seen by Orthopedics, Pain Management and is now working with his chiropractor which seems to be helpful and he is started on at stretching exercise and weight loss program which she feels has been beneficial   Patient was commended on the improvement       Diagnoses and all orders for this visit:    Diabetes 1 5, 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[address] )    Essential hypertension    Mixed hyperlipidemia  -     Lipid panel; Future    Prostate cancer screening  -     PSA, Total Screen; Future    Healthcare maintenance    Obesity (BMI 30-39  9)    Chronic bilateral low back pain with left-sided sciatica          Subjective:      Patient ID: Senait Reid is a 63 y o  male  Patient is a 14-year-old male with a history of hypertension, hyperlipidemia, sleep apnea, morbid obesity, DJD, chronic low back pain, diabetes mellitus type 2 with peripheral neuropathy  Patient is here today for general physical   He did not have complete labs performed prior to the visit today  Patient states he is extremely proud of a self with his exercise and weight loss program   He denies any chest pain or pressure and no increasing shortness of breath  States with his present program he is having less low back pain  The following portions of the patient's history were reviewed and updated as appropriate: He  has a past medical history of Diabetes mellitus ([address] ), Headache, High cholesterol, Hypertension, Kidney stones, Memory loss (2017), Sleep apnea, and Visual impairment (1957)  He   Patient Active Problem List    Diagnosis Date Noted    Greater trochanteric bursitis of both hips 11/05/2019    Trigger finger, left middle finger 08/02/2019    Trigger middle finger of right hand 07/02/2019    Morbid obesity ([address] ) 02/07/2019    Healthcare maintenance 10/09/2018    AIDEE (obstructive sleep apnea) 03/05/2018    Insomnia 03/05/2018    Hypersomnia 03/05/2018    Obesity (BMI 30-39  9) 03/05/2018    Diabetes 1 5, 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[facility] ) 03/05/2018    Sacroiliitis ([facility] ) 02/06/2018    Lumbar degenerative disc disease 10/23/2017    Lumbar herniated disc 10/23/2017    Lumbar spondylosis 10/23/2017    Lumbar radiculopathy 08/29/2017    Myofascial pain 08/29/2017    Lumbago with sciatica, left side 07/31/2017    Diabetes ([facility] ) 09/13/2016    Obesity 06/15/2012    Hyperlipidemia 06/15/2012    Essential hypertension 06/15/2012     He  has a past surgical history that includes Nasal septum surgery (1980); Uvulopalatopharyngoplasty (2004); Debridement tennis elbow (2006); Hernia repair (2011); Colonoscopy (N/A, 11/22/2016); pr incise finger tendon sheath (Right, 11/19/2019); and pr incise finger tendon sheath (Left, 11/26/2019)  His Family history is unknown by patient  He  reports that he has never smoked  He has never used smokeless tobacco  He reports that he drinks alcohol  He reports that he does not use drugs    Current Outpatient Medications   Medication Sig Dispense Refill    ACCU-CHEK FASTCLIX LANCETS MISC Use two times daily 60 each 3    acetaminophen (TYLENOL 8 HOUR) 650 mg CR tablet Take 1 tablet (650 mg total) by mouth every 8 (eight) hours 15 tablet 0    B Complex-Biotin-FA (B-100 B-COMPLEX) TABS       Blood Glucose Monitoring Suppl (ACCU-CHEK JYOTI PLUS) w/Device KIT Dispense 1 kit 1 kit 0    Coenzyme Q10 (CO Q-10) 200 MG CAPS       diclofenac sodium (VOLTAREN) 50 mg EC tablet Take 1 tablet (50 mg total) by mouth 2 (two) times a day as needed (pain) 180 tablet 2    FARXIGA 5 MG TABS TAKE 1 TABLET BY MOUTH EVERY DAY 90 tablet 1    glucose blood (ACCU-CHEK JYOTI PLUS) test strip Use as instructed,  Two times daily 60 each 3    HYDROcodone-acetaminophen (NORCO) 5-325 mg per tablet hydrocodone 5 mg-acetaminophen 325 mg tablet      lisinopril (ZESTRIL) 5 mg tablet Take 1 tablet (5 mg total) by mouth daily 90 tablet 2    MICROLET LANCETS MISC Microlet Lancet      rosuvastatin (CRESTOR) 10 MG tablet Take 1 tablet (10 mg total) by mouth daily 90 tablet 3     No current facility-administered medications for this visit  Current Outpatient Medications on File Prior to Visit   Medication Sig    ACCU-CHEK FASTCLIX LANCETS MISC Use two times daily    acetaminophen (TYLENOL 8 HOUR) 650 mg CR tablet Take 1 tablet (650 mg total) by mouth every 8 (eight) hours    B Complex-Biotin-FA (B-100 B-COMPLEX) TABS     Blood Glucose Monitoring Suppl (ACCU-CHEK JYOTI PLUS) w/Device KIT Dispense 1 kit    Coenzyme Q10 (CO Q-10) 200 MG CAPS     diclofenac sodium (VOLTAREN) 50 mg EC tablet Take 1 tablet (50 mg total) by mouth 2 (two) times a day as needed (pain)    FARXIGA 5 MG TABS TAKE 1 TABLET BY MOUTH EVERY DAY    glucose blood (ACCU-CHEK JYOTI PLUS) test strip Use as instructed,  Two times daily    HYDROcodone-acetaminophen (NORCO) 5-325 mg per tablet hydrocodone 5 mg-acetaminophen 325 mg tablet    lisinopril (ZESTRIL) 5 mg tablet Take 1 tablet (5 mg total) by mouth daily    MICROLET LANCETS MISC Microlet Lancet    rosuvastatin (CRESTOR) 10 MG tablet Take 1 tablet (10 mg total) by mouth daily     No current facility-administered medications on file prior to visit  He is allergic to tramadol       Review of Systems   Constitutional: Positive for activity change (States more physically active with exercise program as per his chiropractor)  Negative for appetite change, chills, diaphoresis, fatigue, fever and unexpected weight change  HENT: Negative  Eyes: Negative  Respiratory: Negative  Cardiovascular: Negative  Gastrointestinal: Negative  Endocrine: Negative  Musculoskeletal: Positive for back pain  Negative for arthralgias, gait problem, joint swelling, myalgias, neck pain and neck stiffness  Skin: Negative  Allergic/Immunologic: Negative  Neurological: Positive for numbness   Negative for dizziness, tremors, seizures, syncope, facial asymmetry, speech difficulty, weakness, light-headedness and headaches  Hematological: Negative  Psychiatric/Behavioral: Negative  Objective:      /84 (BP Location: Left arm, Patient Position: Sitting, Cuff Size: Adult)   Pulse 88   Temp 97 9 °F (36 6 °C) (Tympanic)   Ht 6' 3" (1 905 m)   Wt 129 kg (284 lb)   SpO2 95%   BMI 35 50 kg/m²          Physical Exam   Constitutional: He is oriented to person, place, and time  He appears well-developed and well-nourished  No distress  Cheerful, obese 82-year-old male who is awake alert in no acute distress and oriented x3   HENT:   Head: Normocephalic  Right Ear: External ear normal    Left Ear: External ear normal    Nose: Nose normal    Mouth/Throat: Oropharynx is clear and moist  No oropharyngeal exudate  Crowding of his oral airway, history of sleep apnea   Eyes: Pupils are equal, round, and reactive to light  Conjunctivae and EOM are normal  Right eye exhibits no discharge  Left eye exhibits no discharge  No scleral icterus  Neck: Normal range of motion  Neck supple  No JVD present  No tracheal deviation present  No thyromegaly present  Cardiovascular: Normal rate, regular rhythm, normal heart sounds and intact distal pulses  Exam reveals no gallop and no friction rub  No murmur heard  Pulmonary/Chest: Effort normal and breath sounds normal  No stridor  No respiratory distress  He has no wheezes  He has no rales  He exhibits no tenderness  Abdominal: Soft  Bowel sounds are normal  He exhibits no distension and no mass  There is no tenderness  There is no rebound and no guarding  No hernia  Obese   Genitourinary:   Genitourinary Comments: Defers exam   Musculoskeletal: He exhibits deformity  He exhibits no edema or tenderness  Flattening to his lumbar curve decreased range of motion both actively and passively but no pain with movement today   Lymphadenopathy:     He has no cervical adenopathy     Neurological: He is alert and oriented to person, place, and time  He displays abnormal reflex (Absent patella and Achilles tendon reflexes bilaterally)  A sensory deficit ( peripheral neuropathy bilateral lower extremities from the ankles to his toe) is present  No cranial nerve deficit  He exhibits normal muscle tone  Coordination normal    Skin: Skin is warm and dry  Capillary refill takes less than 2 seconds  No rash noted  He is not diaphoretic  No erythema  No pallor  Psychiatric: He has a normal mood and affect  His behavior is normal  Thought content normal    Nursing note and vitals reviewed  BMI Counseling: Body mass index is 35 5 kg/m²  The BMI is above normal  Nutrition recommendations include reducing portion sizes, decreasing overall calorie intake, 3-5 servings of fruits/vegetables daily, consuming healthier snacks, moderation in carbohydrate intake, increasing intake of lean protein, reducing intake of saturated fat and trans fat and reducing intake of cholesterol  Exercise recommendations include exercising 3-5 times per week

## 2020-03-03 NOTE — ASSESSMENT & PLAN NOTE
Patient is here today for general physical   History of hypertension, hyperlipidemia, exogenous obesity, diabetes mellitus type 2, chronic low back pain  Patient did not have labs performed prior to the visit today but he continues to follow-up with his endocrinologist   Patient states that he is having some decreased with low back pain with a concerted effort for him to lose weight and also started an exercise program and stretching  He denies any chest pain or pressure and no increasing shortness of breath  He has not had a lipid profile performed in a prolonged period of time and this will be obtained in the near future  Patient also needs a PSA  Otherwise patient is up-to-date with all parameters  Underwent physical today in the office no specific abnormalities    Patient refused colon rectal examination stating that within the last year he did have a colonoscopy

## 2020-03-03 NOTE — ASSESSMENT & PLAN NOTE
Patient has made a concerted effort to work to lose weight and he continues to watch his diet, decreasing his caloric intake and again he is started on a regular exercise program   Patient is commended on his have hurt and hopefully we will continue to see weight loss

## 2020-03-03 NOTE — ASSESSMENT & PLAN NOTE
Patient does have a history of chronic low back pain on the left with sciatica    Patient has been seen by Orthopedics, Pain Management and is now working with his chiropractor which seems to be helpful and he is started on at stretching exercise and weight loss program which she feels has been beneficial   Patient was commended on the improvement

## 2020-03-03 NOTE — PROGRESS NOTES
Diabetic Foot Exam    Patient's shoes and socks removed  Right Foot/Ankle   Right Foot Inspection  Skin Exam: skin normal and skin intact no dry skin, no warmth, no callus, no erythema, no maceration, no abnormal color, no pre-ulcer, no ulcer and no callus                          Toe Exam: ROM and strength within normal limitsno swelling, no tenderness, erythema and  no right toe deformity  Sensory   Vibration: diminished  Proprioception: diminished   Monofilament testing: diminished  Vascular  Capillary refills: < 3 seconds  The right DP pulse is 2+  The right PT pulse is 2+  Left Foot/Ankle  Left Foot Inspection  Skin Exam: skin normal and skin intactno dry skin, no warmth, no erythema, no maceration, normal color, no pre-ulcer, no ulcer and no callus                         Toe Exam: ROM and strength within normal limitsno swelling, no tenderness, no erythema and no left toe deformity                   Sensory   Vibration: diminished  Proprioception: diminished  Monofilament: diminished  Vascular  Capillary refills: < 3 seconds  The left DP pulse is 2+  The left PT pulse is 2+  Assign Risk Category:  No deformity present; Loss of protective sensation;  No weak pulses       Risk: 1

## 2020-03-03 NOTE — ASSESSMENT & PLAN NOTE
As mentioned patient has not had a lipid profile performed in extended period of time  Patient was given a slip to have this performed in the near future  We will adjust medication as needed  Patient is working extremely hard on his diet, weight loss and exercise program again is commended for this

## 2020-03-03 NOTE — ASSESSMENT & PLAN NOTE
Patient's blood pressure is controlled with present treatment  Patient will continue present medication and surveillance  We continue also to watch his renal function  We are hoping that with continued weight loss we would consider continue to see stabilization of his blood pressure readings

## 2020-03-03 NOTE — PATIENT INSTRUCTIONS
Calorie Counting Diet   WHAT YOU NEED TO KNOW:   What is a calorie counting diet? It is a meal plan based on counting calories each day to reach a healthy body weight  You will need to eat fewer calories if you are trying to lose weight  Weight loss may decrease your risk for certain health problems or improve your health if you have health problems  Some of these health problems include heart disease, high blood pressure, and diabetes  What foods should I avoid? Your dietitian will tell you if you need to avoid certain foods based on your body weight and health condition  You may need to avoid high-fat foods if you are at risk for or have heart disease  You may need to eat fewer foods from the breads and starches food group if you have diabetes  How many calories are in foods? The following is a list of foods and drinks with the approximate number of calories in each  Check the food label to find the exact number of calories  A dietitian can tell you how many calories you should have from each food group each day    · Carbohydrate:      ¨ ½ of a 3-inch bagel, 1 slice of bread, or ½ of a hamburger bun or hot dog bun (80)    ¨ 1 (8-inch) flour tortilla or ½ cup of cooked rice (100)    ¨ 1 (6-inch) corn tortilla (80)    ¨ 1 (6-inch) pancake or 1 cup of bran flakes cereal (110)    ¨ ½ cup of cooked cereal (80)    ¨ ½ cup of cooked pasta (85)    ¨ 1 ounce of pretzels (100)    ¨ 3 cups of air-popped popcorn without butter or oil (80)    · Dairy:      ¨ 1 cup of skim or 1% milk (90)    ¨ 1 cup of 2% milk (120)    ¨ 1 cup of whole milk (160)    ¨ 1 cup of 2% chocolate milk (220)    ¨ 1 ounce of low-fat cheese with 3 grams of fat per ounce (70)    ¨ 1 ounce of cheddar cheese (114)    ¨ ½ cup of 1% fat cottage cheese (80)    ¨ 1 cup of plain or sugar-free, fat-free yogurt (90)    · Protein foods:      ¨ 3 ounces of fish (not breaded or fried) (95)    ¨ 3 ounces of breaded, fried fish (195)    ¨ ¾ cup of tuna canned in water (105)    ¨ 3 ounces of chicken breast without skin (105)    ¨ 1 fried chicken breast with skin (350)    ¨ ¼ cup of fat free egg substitute (40)    ¨ 1 large egg (75)    ¨ 3 ounces of lean beef or pork (165)    ¨ 3 ounces of fried pork chop or ham (185)    ¨ ½ cup of cooked dried beans, such as kidney, ross, lentils, or navy (115)    ¨ 3 ounces of bologna or lunch meat (225)    ¨ 2 links of breakfast sausage (140)    · Vegetables:      ¨ ½ cup of sliced mushrooms (10)    ¨ 1 cup of salad greens, such as lettuce, spinach, or brenda (15)    ¨ ½ cup of steamed asparagus (20)    ¨ ½ cup of cooked summer squash, zucchini squash, or green or wax beans (25)    ¨ 1 cup of broccoli or cauliflower florets, or 1 medium tomato (25)    ¨ 1 large raw carrot or ½ cup of cooked carrots (40)    ¨ ? of a medium cucumber or 1 stalk of celery (5)    ¨ 1 small baked potato (160)    ¨ 1 cup of breaded, fried vegetables (230)    · Fruit:      ¨ 1 (6-inch) banana (55)     ¨ ½ of a 4-inch grapefruit (55)    ¨ 15 grapes (60)    ¨ 1 medium orange or apple (70)    ¨ 1 large peach (65)    ¨ 1 cup of fresh pineapple chunks (75)    ¨ 1 cup of melon cubes (50)    ¨ 1¼ cups of whole strawberries (45)    ¨ ½ cup of fruit canned in juice (55)    ¨ ½ cup of fruit canned in heavy syrup (110)    ¨ ?  cup of raisins (130)    ¨ ½ cup of unsweetened fruit juice (60)    ¨ ½ cup of grape, cranberry, or prune juice (90)    · Fat:      ¨ 10 peanuts or 2 teaspoons of peanut butter (55)    ¨ 2 tablespoons of avocado or 1 tablespoon of regular salad dressing (45)    ¨ 2 slices of escobar (90)    ¨ 1 teaspoon of oil, such as safflower, canola, corn, or olive oil (45)    ¨ 2 teaspoons of low-fat margarine, or 1 tablespoon of low-fat mayonnaise (50)    ¨ 1 teaspoon of regular margarine (40)    ¨ 1 tablespoon of regular mayonnaise (135)    ¨ 1 tablespoon of cream cheese or 2 tablespoons of low-fat cream cheese (45)    ¨ 2 tablespoons of vegetable shortening (215)    · Dessert and sweets:      ¨ 8 animal crackers or 5 vanilla wafers (80)    ¨ 1 frozen fruit juice bar (80)    ¨ ½ cup of ice milk or low-fat frozen yogurt (90)    ¨ ½ cup of sherbet or sorbet (125)    ¨ ½ cup of sugar-free pudding or custard (60)    ¨ ½ cup of ice cream (140)    ¨ ½ cup of pudding or custard (175)    ¨ 1 (2-inch) square chocolate brownie (185)    · Combination foods:      ¨ Bean burrito made with an 8-inch tortilla, without cheese (275)    ¨ Chicken breast sandwich with lettuce and tomato (325)    ¨ 1 cup of chicken noodle soup (60)    ¨ 1 beef taco (175)    ¨ Regular hamburger with lettuce and tomato (310)    ¨ Regular cheeseburger with lettuce and tomato (410)     ¨ ¼ of a 12-inch cheese pizza (280)    ¨ Fried fish sandwich with lettuce and tomato (425)    ¨ Hot dog and bun (275)    ¨ 1½ cups of macaroni and cheese (310)    ¨ Taco salad with a fried tortilla shell (870)    · Low-calorie foods:      ¨ 1 tablespoon of ketchup or 1 tablespoon of fat free sour cream (15)    ¨ 1 teaspoon of mustard (5)    ¨ ¼ cup of salsa (20)    ¨ 1 large dill pickle (15)    ¨ 1 tablespoon of fat free salad dressing (10)    ¨ 2 teaspoons of low-sugar, light jam or jelly, or 1 tablespoon of sugar-free syrup (15)    ¨ 1 sugar-free popsicle (15)    ¨ 1 cup of club soda, seltzer water, or diet soda (0)  CARE AGREEMENT:   You have the right to help plan your care  Discuss treatment options with your caregivers to decide what care you want to receive  You always have the right to refuse treatment  The above information is an  only  It is not intended as medical advice for individual conditions or treatments  Talk to your doctor, nurse or pharmacist before following any medical regimen to see if it is safe and effective for you  © 2017 [employer] Information is for End User's use only and may not be sold, redistributed or otherwise used for commercial purposes   All illustrations and images included in CareNotes® are the copyrighted property of [employer]  or [employer]

## 2020-04-17 DIAGNOSIS — E11.9 TYPE 2 DIABETES MELLITUS WITHOUT COMPLICATION, WITHOUT LONG-TERM CURRENT USE OF INSULIN (HCC): ICD-10-CM

## 2020-04-17 DIAGNOSIS — M46.1 SACROILIITIS (HCC): ICD-10-CM

## 2020-04-17 RX ORDER — DAPAGLIFLOZIN 5 MG/1
5 TABLET, FILM COATED ORAL DAILY
Qty: 90 TABLET | Refills: 0 | Status: SHIPPED | OUTPATIENT
Start: 2020-04-17 | End: 2020-07-12

## 2020-05-14 DIAGNOSIS — E11.9 TYPE 2 DIABETES MELLITUS WITHOUT COMPLICATION, WITHOUT LONG-TERM CURRENT USE OF INSULIN (HCC): ICD-10-CM

## 2020-05-29 ENCOUNTER — LAB (OUTPATIENT)
Dept: LAB | Age: 65
End: 2020-05-29
Payer: COMMERCIAL

## 2020-05-29 DIAGNOSIS — E78.2 MIXED HYPERLIPIDEMIA: ICD-10-CM

## 2020-05-29 DIAGNOSIS — Z12.5 PROSTATE CANCER SCREENING: ICD-10-CM

## 2020-05-29 DIAGNOSIS — E11.9 TYPE 2 DIABETES MELLITUS WITHOUT COMPLICATION, WITHOUT LONG-TERM CURRENT USE OF INSULIN (HCC): ICD-10-CM

## 2020-05-29 LAB
ANION GAP SERPL CALCULATED.3IONS-SCNC: 4 MMOL/L (ref 4–13)
BUN SERPL-MCNC: 18 MG/DL (ref 5–25)
CALCIUM SERPL-MCNC: 9.9 MG/DL (ref 8.3–10.1)
CHLORIDE SERPL-SCNC: 106 MMOL/L (ref 100–108)
CHOLEST SERPL-MCNC: 188 MG/DL (ref 50–200)
CO2 SERPL-SCNC: 31 MMOL/L (ref 21–32)
CREAT SERPL-MCNC: 0.85 MG/DL (ref 0.6–1.3)
CREAT UR-MCNC: 150 MG/DL
EST. AVERAGE GLUCOSE BLD GHB EST-MCNC: 157 MG/DL
GFR SERPL CREATININE-BSD FRML MDRD: 93 ML/MIN/1.73SQ M
GLUCOSE P FAST SERPL-MCNC: 111 MG/DL (ref 65–99)
HBA1C MFR BLD: 7.1 %
HDLC SERPL-MCNC: 64 MG/DL
LDLC SERPL CALC-MCNC: 102 MG/DL (ref 0–100)
MICROALBUMIN UR-MCNC: 38.5 MG/L (ref 0–20)
MICROALBUMIN/CREAT 24H UR: 26 MG/G CREATININE (ref 0–30)
NONHDLC SERPL-MCNC: 124 MG/DL
POTASSIUM SERPL-SCNC: 4.7 MMOL/L (ref 3.5–5.3)
PSA SERPL-MCNC: 1 NG/ML (ref 0–4)
SODIUM SERPL-SCNC: 141 MMOL/L (ref 136–145)
TRIGL SERPL-MCNC: 108 MG/DL

## 2020-05-29 PROCEDURE — 36415 COLL VENOUS BLD VENIPUNCTURE: CPT

## 2020-05-29 PROCEDURE — G0103 PSA SCREENING: HCPCS

## 2020-05-29 PROCEDURE — 83036 HEMOGLOBIN GLYCOSYLATED A1C: CPT

## 2020-05-29 PROCEDURE — 80048 BASIC METABOLIC PNL TOTAL CA: CPT

## 2020-05-29 PROCEDURE — 80061 LIPID PANEL: CPT

## 2020-05-29 PROCEDURE — 82043 UR ALBUMIN QUANTITATIVE: CPT | Performed by: INTERNAL MEDICINE

## 2020-05-29 PROCEDURE — 3051F HG A1C>EQUAL 7.0%<8.0%: CPT | Performed by: INTERNAL MEDICINE

## 2020-05-29 PROCEDURE — 3061F NEG MICROALBUMINURIA REV: CPT | Performed by: INTERNAL MEDICINE

## 2020-05-29 PROCEDURE — 82570 ASSAY OF URINE CREATININE: CPT | Performed by: INTERNAL MEDICINE

## 2020-06-02 ENCOUNTER — TELEPHONE (OUTPATIENT)
Dept: ENDOCRINOLOGY | Facility: CLINIC | Age: 65
End: 2020-06-02

## 2020-06-03 ENCOUNTER — TELEPHONE (OUTPATIENT)
Dept: ENDOCRINOLOGY | Facility: CLINIC | Age: 65
End: 2020-06-03

## 2020-06-03 ENCOUNTER — OFFICE VISIT (OUTPATIENT)
Dept: ENDOCRINOLOGY | Facility: CLINIC | Age: 65
End: 2020-06-03
Payer: COMMERCIAL

## 2020-06-03 VITALS
SYSTOLIC BLOOD PRESSURE: 122 MMHG | DIASTOLIC BLOOD PRESSURE: 80 MMHG | BODY MASS INDEX: 33.87 KG/M2 | HEART RATE: 78 BPM | TEMPERATURE: 97.4 F | WEIGHT: 271 LBS

## 2020-06-03 DIAGNOSIS — I10 ESSENTIAL HYPERTENSION: ICD-10-CM

## 2020-06-03 DIAGNOSIS — E11.9 TYPE 2 DIABETES MELLITUS WITHOUT COMPLICATION, WITHOUT LONG-TERM CURRENT USE OF INSULIN (HCC): Primary | ICD-10-CM

## 2020-06-03 DIAGNOSIS — E78.2 MIXED HYPERLIPIDEMIA: ICD-10-CM

## 2020-06-03 PROCEDURE — 2022F DILAT RTA XM EVC RTNOPTHY: CPT | Performed by: PHYSICIAN ASSISTANT

## 2020-06-03 PROCEDURE — 99214 OFFICE O/P EST MOD 30 MIN: CPT | Performed by: PHYSICIAN ASSISTANT

## 2020-06-03 PROCEDURE — 3051F HG A1C>EQUAL 7.0%<8.0%: CPT | Performed by: PHYSICIAN ASSISTANT

## 2020-06-03 PROCEDURE — 3079F DIAST BP 80-89 MM HG: CPT | Performed by: PHYSICIAN ASSISTANT

## 2020-06-03 PROCEDURE — 3074F SYST BP LT 130 MM HG: CPT | Performed by: PHYSICIAN ASSISTANT

## 2020-06-03 PROCEDURE — 3060F POS MICROALBUMINURIA REV: CPT | Performed by: PHYSICIAN ASSISTANT

## 2020-06-03 PROCEDURE — 1036F TOBACCO NON-USER: CPT | Performed by: PHYSICIAN ASSISTANT

## 2020-07-09 ENCOUNTER — OFFICE VISIT (OUTPATIENT)
Dept: INTERNAL MEDICINE CLINIC | Facility: CLINIC | Age: 65
End: 2020-07-09
Payer: COMMERCIAL

## 2020-07-09 VITALS
HEIGHT: 75 IN | OXYGEN SATURATION: 98 % | HEART RATE: 85 BPM | SYSTOLIC BLOOD PRESSURE: 134 MMHG | DIASTOLIC BLOOD PRESSURE: 82 MMHG | BODY MASS INDEX: 32.58 KG/M2 | TEMPERATURE: 96.6 F | WEIGHT: 262 LBS

## 2020-07-09 DIAGNOSIS — E78.2 MIXED HYPERLIPIDEMIA: ICD-10-CM

## 2020-07-09 DIAGNOSIS — I10 ESSENTIAL HYPERTENSION: ICD-10-CM

## 2020-07-09 DIAGNOSIS — G47.33 OSA (OBSTRUCTIVE SLEEP APNEA): Primary | ICD-10-CM

## 2020-07-09 DIAGNOSIS — E13.9 DIABETES 1.5, MANAGED AS TYPE 2 (HCC): ICD-10-CM

## 2020-07-09 DIAGNOSIS — E66.01 MORBID OBESITY (HCC): ICD-10-CM

## 2020-07-09 DIAGNOSIS — M54.16 LUMBAR RADICULOPATHY: ICD-10-CM

## 2020-07-09 PROCEDURE — 99214 OFFICE O/P EST MOD 30 MIN: CPT | Performed by: INTERNAL MEDICINE

## 2020-07-09 PROCEDURE — 3008F BODY MASS INDEX DOCD: CPT | Performed by: INTERNAL MEDICINE

## 2020-07-09 PROCEDURE — 3079F DIAST BP 80-89 MM HG: CPT | Performed by: INTERNAL MEDICINE

## 2020-07-09 PROCEDURE — 3051F HG A1C>EQUAL 7.0%<8.0%: CPT | Performed by: INTERNAL MEDICINE

## 2020-07-09 PROCEDURE — 3075F SYST BP GE 130 - 139MM HG: CPT | Performed by: INTERNAL MEDICINE

## 2020-07-09 PROCEDURE — 3060F POS MICROALBUMINURIA REV: CPT | Performed by: INTERNAL MEDICINE

## 2020-07-09 PROCEDURE — 2022F DILAT RTA XM EVC RTNOPTHY: CPT | Performed by: INTERNAL MEDICINE

## 2020-07-09 PROCEDURE — 1036F TOBACCO NON-USER: CPT | Performed by: INTERNAL MEDICINE

## 2020-07-09 NOTE — ASSESSMENT & PLAN NOTE
History of hyperlipidemia  He remains on Crestor 10 mg a day  His cholesterol has been stable  Again he is working on his diet and trying to limit his intake of fats and cholesterol with his diet  We will continue to monitor his lipid profile and make adjustment to medication depending on the results

## 2020-07-09 NOTE — ASSESSMENT & PLAN NOTE
Patient states that his chronic back pain seems to be under control  He continues to take the Voltaren on a daily basis which he states is helpful  We do have concerns with this medication to make sure that his renal function remains stable in light of diabetes  Patient states he is working on an exercise program that seems to help and also is noted is working very hard at weight loss which also may be helpful

## 2020-07-09 NOTE — PROGRESS NOTES
Assessment/Plan:    Diabetes 1 5, 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[facility] [address] )    Lab Results   Component Value Date    HGBA1C 7 1 (H) 05/29/2020    Patient continues to follow-up with endocrinology  As noted last reading shows a hemoglobin A1c of 7 1  Patient states further that he is actively working in an exercise program a looking at ways that in order to lose weight  He is commended on his endeavors  They continue to monitor his sugar levels and modify treatment depending on the results of testing  AIDEE (obstructive sleep apnea)  Longstanding history of obstructive sleep apnea  Patient is not using his CPAP device stating this is intolerable to wear and disrupts his sleep pattern  Patient did discussed today some of the new device is that her implantable for him to possibly wear at night or use at night for his sleep apnea  He will have a referral to be seen by in ears nose and throat physician that performs the implantation of this particular device to see if he is a candidate  Essential hypertension  Blood pressure showing adequate control with present treatment  Patient will continue present medication and surveillance  Patient states that occasionally has some lightheadedness if he is moving to quickly  This may be multifactorial and may be secondary to some dehydration with the patient being on a diabetic medication that increases urination  The patient was told the importance of keeping positive fluid balance  He had no tilt on examination today    Lumbar radiculopathy  Patient states that his chronic back pain seems to be under control  He continues to take the Voltaren on a daily basis which he states is helpful  We do have concerns with this medication to make sure that his renal function remains stable in light of diabetes  Patient states he is working on an exercise program that seems to help and also is noted is working very hard at weight loss which also may be helpful      Hyperlipidemia  History of hyperlipidemia  He remains on Crestor 10 mg a day  His cholesterol has been stable  Again he is working on his diet and trying to limit his intake of fats and cholesterol with his diet  We will continue to monitor his lipid profile and make adjustment to medication depending on the results  Morbid obesity ([facility] [address] )  With the patient's BMI he is considered morbidly obese  Again patient has started to work harder on his diet, weight loss program, exercise in order to help lose weight again  Again he is commended on the fact that he has lost approximately 9 lb since his last evaluation 1 month ago  Patient is urged to continue with present dietary plans and exercise  Diagnoses and all orders for this visit:    AIDEE (obstructive sleep apnea)  -     Ambulatory Referral to Otolaryngology; Future    Lumbar radiculopathy    Essential hypertension  -     Lipid panel; Future    Mixed hyperlipidemia  -     Lipid panel; Future    Morbid obesity ([facility] [address] )    Diabetes 1 5, 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[facility] )          Subjective:      Patient ID: Shira Gan is a 63 y o  male  82-year-old male history of multiple medical problems including hypertension, hyperlipidemia, obstructive sleep apnea which is untreated this point time, exogenous obesity, chronic low back pain, diabetes mellitus type 2  Patient is here due for routine follow-up after four-month period of time  He continues to follow-up with his endocrinologist and did have lab testing checking on his blood sugar which is under acceptable control at this point time  Patient states in general he is doing relatively well  Has started weight loss and exercise program and has been mildly successful  He continues to work on exercises for his back but he states he still needs to take the Voltaren in order to help with his discomfort  He denies any chest pain or pressure and no increasing shortness of breath with exertion    He does wanted evaluation by the ears nose and throat physician for his sleep apnea as to whether there is any alternative therapy pre as far as treatment is concerned  The following portions of the patient's history were reviewed and updated as appropriate: He  has a past medical history of Diabetes mellitus ([facility] [address] ), Headache, High cholesterol, Hypertension, Kidney stones, Memory loss (2017), Sleep apnea, and Visual impairment (1957)  He   Patient Active Problem List    Diagnosis Date Noted    Greater trochanteric bursitis of both hips 11/05/2019    Trigger finger, left middle finger 08/02/2019    Trigger middle finger of right hand 07/02/2019    Morbid obesity ([facility] [address] ) 02/07/2019    Healthcare maintenance 10/09/2018    AIDEE (obstructive sleep apnea) 03/05/2018    Insomnia 03/05/2018    Hypersomnia 03/05/2018    Obesity (BMI 30-39 9) 03/05/2018    Diabetes 1 5, 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[facility] ) 03/05/2018    Sacroiliitis ([facility] ) 02/06/2018    Lumbar degenerative disc disease 10/23/2017    Lumbar herniated disc 10/23/2017    Lumbar spondylosis 10/23/2017    Lumbar radiculopathy 08/29/2017    Myofascial pain 08/29/2017    Lumbago with sciatica, left side 07/31/2017    [facility] ([facility] ) 09/13/2016    Obesity 06/15/2012    Hyperlipidemia 06/15/2012    Essential hypertension 06/15/2012     He  has a past surgical history that includes Nasal septum surgery (1980); Uvulopalatopharyngoplasty (2004); Debridement tennis elbow (2006); Hernia repair (2011); Colonoscopy (N/A, 11/22/2016); pr incise finger tendon sheath (Right, 11/19/2019); and pr incise finger tendon sheath (Left, 11/26/2019)  His Family history is unknown by patient  He  reports that he has never smoked  He has never used smokeless tobacco  He reports that he drinks alcohol  He reports that he does not use drugs    Current Outpatient Medications   Medication Sig Dispense Refill    acetaminophen (TYLENOL 8 HOUR) 650 mg CR tablet Take 1 tablet (650 mg total) by mouth every 8 (eight) hours 15 tablet 0  B Complex-Biotin-FA (B-100 B-COMPLEX) TABS       Coenzyme Q10 (CO Q-10) 200 MG CAPS       diclofenac sodium (VOLTAREN) 50 mg EC tablet Take 1 tablet (50 mg total) by mouth 2 (two) times a day as needed (pain) 180 tablet 0    FARXIGA 5 MG TABS Take 1 tablet (5 mg total) by mouth daily 90 tablet 0    glucose blood (Accu-Chek Maida Plus) test strip Use as instructed,  Two times daily 50 each 0    lisinopril (ZESTRIL) 5 mg tablet Take 1 tablet (5 mg total) by mouth daily 90 tablet 2    MICROLET LANCETS MISC Microlet Lancet      rosuvastatin (CRESTOR) 10 MG tablet Take 1 tablet (10 mg total) by mouth daily 90 tablet 3     No current facility-administered medications for this visit  Current Outpatient Medications on File Prior to Visit   Medication Sig    acetaminophen (TYLENOL 8 HOUR) 650 mg CR tablet Take 1 tablet (650 mg total) by mouth every 8 (eight) hours    B Complex-Biotin-FA (B-100 B-COMPLEX) TABS     Coenzyme Q10 (CO Q-10) 200 MG CAPS     diclofenac sodium (VOLTAREN) 50 mg EC tablet Take 1 tablet (50 mg total) by mouth 2 (two) times a day as needed (pain)    FARXIGA 5 MG TABS Take 1 tablet (5 mg total) by mouth daily    glucose blood (Accu-Chek Maida Plus) test strip Use as instructed,  Two times daily    lisinopril (ZESTRIL) 5 mg tablet Take 1 tablet (5 mg total) by mouth daily    MICROLET LANCETS MISC Microlet Lancet    rosuvastatin (CRESTOR) 10 MG tablet Take 1 tablet (10 mg total) by mouth daily    [DISCONTINUED] ACCU-CHEK FASTCLIX LANCETS MISC Use two times daily    [DISCONTINUED] Blood Glucose Monitoring Suppl (ACCU-CHEK MAIDA PLUS) w/Device KIT Dispense 1 kit    [DISCONTINUED] HYDROcodone-acetaminophen (NORCO) 5-325 mg per tablet hydrocodone 5 mg-acetaminophen 325 mg tablet     No current facility-administered medications on file prior to visit  He is allergic to tramadol       Review of Systems   Constitutional: Positive for activity change (States that he is trying to increase his activity level in order to help lose weight)  Negative for appetite change, chills, diaphoresis, fatigue, fever and unexpected weight change  HENT: Negative  Eyes: Negative  Respiratory: Negative  Cardiovascular: Negative  Gastrointestinal: Negative  Endocrine: Negative  Genitourinary: Negative  Musculoskeletal: Positive for back pain  Negative for arthralgias, gait problem, joint swelling, myalgias, neck pain and neck stiffness  Allergic/Immunologic: Negative  Neurological: Positive for light-headedness  Negative for dizziness, tremors, seizures, syncope, facial asymmetry, speech difficulty, numbness and headaches  Hematological: Negative  Psychiatric/Behavioral: Negative  Objective:      /82   Pulse 85   Temp (!) 96 6 °F (35 9 °C)   Ht 6' 3" (1 905 m)   Wt 119 kg (262 lb)   SpO2 98%   BMI 32 75 kg/m²          Physical Exam   Constitutional: He is oriented to person, place, and time  He appears well-developed and well-nourished  No distress  Very pleasant, obese 74-year-old male who is awake alert no acute distress and oriented x3   HENT:   Head: Normocephalic and atraumatic  Right Ear: External ear normal    Left Ear: External ear normal    Nose: Nose normal    Mouth/Throat: No oropharyngeal exudate  Some crowding of his oral airway  Mucous membranes are pink and moist   Eyes: Pupils are equal, round, and reactive to light  Conjunctivae and EOM are normal  Right eye exhibits no discharge  Left eye exhibits no discharge  No scleral icterus  Neck: Normal range of motion  Neck supple  No JVD present  No tracheal deviation present  No thyromegaly present  Cardiovascular: Normal rate, regular rhythm, normal heart sounds and intact distal pulses  Exam reveals no gallop and no friction rub  No murmur heard  Pulmonary/Chest: Effort normal and breath sounds normal  No stridor  No respiratory distress  He has no wheezes  He has no rales   He exhibits no tenderness  Abdominal: Soft  Bowel sounds are normal  He exhibits no distension and no mass  There is no tenderness  There is no rebound and no guarding  No hernia  Obese   Musculoskeletal: He exhibits deformity  He exhibits no edema or tenderness  Patient does have a chronic flattening to his lumbar curve, decreased range of motion actively and passively but no pain with movement today   Lymphadenopathy:     He has no cervical adenopathy  Neurological: He is alert and oriented to person, place, and time  He displays normal reflexes  No cranial nerve deficit or sensory deficit  He exhibits normal muscle tone  Coordination normal    Skin: Skin is warm and dry  Capillary refill takes less than 2 seconds  No rash noted  He is not diaphoretic  No erythema  No pallor  Psychiatric: He has a normal mood and affect  His behavior is normal  Judgment and thought content normal    Nursing note and vitals reviewed

## 2020-07-09 NOTE — ASSESSMENT & PLAN NOTE
Longstanding history of obstructive sleep apnea  Patient is not using his CPAP device stating this is intolerable to wear and disrupts his sleep pattern  Patient did discussed today some of the new device is that her implantable for him to possibly wear at night or use at night for his sleep apnea  He will have a referral to be seen by in ears nose and throat physician that performs the implantation of this particular device to see if he is a candidate

## 2020-07-09 NOTE — ASSESSMENT & PLAN NOTE
Lab Results   Component Value Date    HGBA1C 7 1 (H) 05/29/2020    Patient continues to follow-up with endocrinology  As noted last reading shows a hemoglobin A1c of 7 1  Patient states further that he is actively working in an exercise program a looking at ways that in order to lose weight  He is commended on his endeavors  They continue to monitor his sugar levels and modify treatment depending on the results of testing

## 2020-07-09 NOTE — ASSESSMENT & PLAN NOTE
Blood pressure showing adequate control with present treatment  Patient will continue present medication and surveillance  Patient states that occasionally has some lightheadedness if he is moving to quickly  This may be multifactorial and may be secondary to some dehydration with the patient being on a diabetic medication that increases urination  The patient was told the importance of keeping positive fluid balance    He had no tilt on examination today

## 2020-07-09 NOTE — ASSESSMENT & PLAN NOTE
With the patient's BMI he is considered morbidly obese  Again patient has started to work harder on his diet, weight loss program, exercise in order to help lose weight again  Again he is commended on the fact that he has lost approximately 9 lb since his last evaluation 1 month ago  Patient is urged to continue with present dietary plans and exercise

## 2020-07-12 DIAGNOSIS — E11.9 TYPE 2 DIABETES MELLITUS WITHOUT COMPLICATION, WITHOUT LONG-TERM CURRENT USE OF INSULIN (HCC): ICD-10-CM

## 2020-07-12 RX ORDER — DAPAGLIFLOZIN 5 MG/1
TABLET, FILM COATED ORAL
Qty: 90 TABLET | Refills: 0 | Status: SHIPPED | OUTPATIENT
Start: 2020-07-12 | End: 2020-10-05

## 2020-07-18 DIAGNOSIS — M46.1 SACROILIITIS (HCC): ICD-10-CM

## 2020-10-03 ENCOUNTER — LAB (OUTPATIENT)
Dept: LAB | Age: 65
End: 2020-10-03
Payer: COMMERCIAL

## 2020-10-03 DIAGNOSIS — E11.9 TYPE 2 DIABETES MELLITUS WITHOUT COMPLICATION, WITHOUT LONG-TERM CURRENT USE OF INSULIN (HCC): ICD-10-CM

## 2020-10-03 DIAGNOSIS — I10 ESSENTIAL HYPERTENSION: ICD-10-CM

## 2020-10-03 DIAGNOSIS — E78.2 MIXED HYPERLIPIDEMIA: ICD-10-CM

## 2020-10-03 LAB
ANION GAP SERPL CALCULATED.3IONS-SCNC: 3 MMOL/L (ref 4–13)
BUN SERPL-MCNC: 23 MG/DL (ref 5–25)
CALCIUM SERPL-MCNC: 9.3 MG/DL (ref 8.3–10.1)
CHLORIDE SERPL-SCNC: 109 MMOL/L (ref 100–108)
CHOLEST SERPL-MCNC: 195 MG/DL (ref 50–200)
CO2 SERPL-SCNC: 28 MMOL/L (ref 21–32)
CREAT SERPL-MCNC: 0.93 MG/DL (ref 0.6–1.3)
EST. AVERAGE GLUCOSE BLD GHB EST-MCNC: 134 MG/DL
GFR SERPL CREATININE-BSD FRML MDRD: 87 ML/MIN/1.73SQ M
GLUCOSE P FAST SERPL-MCNC: 108 MG/DL (ref 65–99)
HBA1C MFR BLD: 6.3 %
HDLC SERPL-MCNC: 71 MG/DL
LDLC SERPL CALC-MCNC: 111 MG/DL (ref 0–100)
NONHDLC SERPL-MCNC: 124 MG/DL
POTASSIUM SERPL-SCNC: 4.5 MMOL/L (ref 3.5–5.3)
SODIUM SERPL-SCNC: 140 MMOL/L (ref 136–145)
TRIGL SERPL-MCNC: 66 MG/DL

## 2020-10-03 PROCEDURE — 3044F HG A1C LEVEL LT 7.0%: CPT | Performed by: PHYSICIAN ASSISTANT

## 2020-10-03 PROCEDURE — 36415 COLL VENOUS BLD VENIPUNCTURE: CPT

## 2020-10-03 PROCEDURE — 83036 HEMOGLOBIN GLYCOSYLATED A1C: CPT

## 2020-10-03 PROCEDURE — 80061 LIPID PANEL: CPT

## 2020-10-03 PROCEDURE — 80048 BASIC METABOLIC PNL TOTAL CA: CPT

## 2020-10-04 DIAGNOSIS — E11.9 TYPE 2 DIABETES MELLITUS WITHOUT COMPLICATION, WITHOUT LONG-TERM CURRENT USE OF INSULIN (HCC): ICD-10-CM

## 2020-10-05 RX ORDER — DAPAGLIFLOZIN 5 MG/1
TABLET, FILM COATED ORAL
Qty: 90 TABLET | Refills: 0 | Status: SHIPPED | OUTPATIENT
Start: 2020-10-05 | End: 2020-11-13 | Stop reason: SDUPTHER

## 2020-10-08 ENCOUNTER — OFFICE VISIT (OUTPATIENT)
Dept: ENDOCRINOLOGY | Facility: CLINIC | Age: 65
End: 2020-10-08
Payer: COMMERCIAL

## 2020-10-08 VITALS
HEIGHT: 75 IN | TEMPERATURE: 96.6 F | HEART RATE: 60 BPM | WEIGHT: 262 LBS | BODY MASS INDEX: 32.58 KG/M2 | SYSTOLIC BLOOD PRESSURE: 130 MMHG | DIASTOLIC BLOOD PRESSURE: 82 MMHG

## 2020-10-08 DIAGNOSIS — E78.2 MIXED HYPERLIPIDEMIA: ICD-10-CM

## 2020-10-08 DIAGNOSIS — E78.01 FAMILIAL HYPERCHOLESTEROLEMIA: ICD-10-CM

## 2020-10-08 DIAGNOSIS — I10 ESSENTIAL HYPERTENSION: ICD-10-CM

## 2020-10-08 DIAGNOSIS — E11.9 TYPE 2 DIABETES MELLITUS WITHOUT COMPLICATION, WITHOUT LONG-TERM CURRENT USE OF INSULIN (HCC): Primary | ICD-10-CM

## 2020-10-08 PROCEDURE — 3079F DIAST BP 80-89 MM HG: CPT | Performed by: PHYSICIAN ASSISTANT

## 2020-10-08 PROCEDURE — 1036F TOBACCO NON-USER: CPT | Performed by: PHYSICIAN ASSISTANT

## 2020-10-08 PROCEDURE — 4010F ACE/ARB THERAPY RXD/TAKEN: CPT | Performed by: PHYSICIAN ASSISTANT

## 2020-10-08 PROCEDURE — 99214 OFFICE O/P EST MOD 30 MIN: CPT | Performed by: PHYSICIAN ASSISTANT

## 2020-10-08 RX ORDER — LISINOPRIL 5 MG/1
5 TABLET ORAL DAILY
Qty: 90 TABLET | Refills: 3 | Status: SHIPPED | OUTPATIENT
Start: 2020-10-08 | End: 2021-10-01

## 2020-10-08 RX ORDER — ROSUVASTATIN CALCIUM 10 MG/1
10 TABLET, COATED ORAL DAILY
Qty: 90 TABLET | Refills: 3 | Status: SHIPPED | OUTPATIENT
Start: 2020-10-08 | End: 2021-10-01

## 2020-10-11 DIAGNOSIS — M46.1 SACROILIITIS (HCC): ICD-10-CM

## 2020-11-05 ENCOUNTER — TELEPHONE (OUTPATIENT)
Dept: PAIN MEDICINE | Facility: CLINIC | Age: 65
End: 2020-11-05

## 2020-11-12 ENCOUNTER — OFFICE VISIT (OUTPATIENT)
Dept: INTERNAL MEDICINE CLINIC | Facility: CLINIC | Age: 65
End: 2020-11-12
Payer: COMMERCIAL

## 2020-11-12 VITALS
BODY MASS INDEX: 33.07 KG/M2 | HEART RATE: 79 BPM | WEIGHT: 266 LBS | HEIGHT: 75 IN | DIASTOLIC BLOOD PRESSURE: 82 MMHG | SYSTOLIC BLOOD PRESSURE: 136 MMHG | TEMPERATURE: 96.2 F | OXYGEN SATURATION: 96 %

## 2020-11-12 DIAGNOSIS — Z23 ENCOUNTER FOR IMMUNIZATION: ICD-10-CM

## 2020-11-12 DIAGNOSIS — G89.29 CHRONIC BILATERAL LOW BACK PAIN WITH LEFT-SIDED SCIATICA: ICD-10-CM

## 2020-11-12 DIAGNOSIS — M54.42 CHRONIC BILATERAL LOW BACK PAIN WITH LEFT-SIDED SCIATICA: ICD-10-CM

## 2020-11-12 DIAGNOSIS — Z12.5 PROSTATE CANCER SCREENING: ICD-10-CM

## 2020-11-12 DIAGNOSIS — E66.9 OBESITY (BMI 30-39.9): ICD-10-CM

## 2020-11-12 DIAGNOSIS — E13.9 DIABETES 1.5, MANAGED AS TYPE 2 (HCC): Primary | ICD-10-CM

## 2020-11-12 DIAGNOSIS — I10 ESSENTIAL HYPERTENSION: ICD-10-CM

## 2020-11-12 DIAGNOSIS — E78.2 MIXED HYPERLIPIDEMIA: ICD-10-CM

## 2020-11-12 DIAGNOSIS — Z12.11 COLON CANCER SCREENING: ICD-10-CM

## 2020-11-12 DIAGNOSIS — Z00.00 HEALTHCARE MAINTENANCE: ICD-10-CM

## 2020-11-12 PROCEDURE — 90750 HZV VACC RECOMBINANT IM: CPT

## 2020-11-12 PROCEDURE — 1036F TOBACCO NON-USER: CPT | Performed by: INTERNAL MEDICINE

## 2020-11-12 PROCEDURE — 3075F SYST BP GE 130 - 139MM HG: CPT | Performed by: INTERNAL MEDICINE

## 2020-11-12 PROCEDURE — 3008F BODY MASS INDEX DOCD: CPT | Performed by: INTERNAL MEDICINE

## 2020-11-12 PROCEDURE — 90471 IMMUNIZATION ADMIN: CPT

## 2020-11-12 PROCEDURE — 3079F DIAST BP 80-89 MM HG: CPT | Performed by: INTERNAL MEDICINE

## 2020-11-12 PROCEDURE — 99214 OFFICE O/P EST MOD 30 MIN: CPT | Performed by: INTERNAL MEDICINE

## 2020-11-13 DIAGNOSIS — E11.9 TYPE 2 DIABETES MELLITUS WITHOUT COMPLICATION, WITHOUT LONG-TERM CURRENT USE OF INSULIN (HCC): ICD-10-CM

## 2020-11-13 DIAGNOSIS — M46.1 SACROILIITIS (HCC): ICD-10-CM

## 2020-11-13 RX ORDER — BLOOD SUGAR DIAGNOSTIC
STRIP MISCELLANEOUS
Qty: 200 EACH | Refills: 1 | Status: SHIPPED | OUTPATIENT
Start: 2020-11-13 | End: 2021-03-18 | Stop reason: ALTCHOICE

## 2020-11-16 RX ORDER — DAPAGLIFLOZIN 5 MG/1
5 TABLET, FILM COATED ORAL DAILY
Qty: 90 TABLET | Refills: 0 | Status: SHIPPED | OUTPATIENT
Start: 2020-11-16 | End: 2021-04-27

## 2021-03-10 DIAGNOSIS — Z23 ENCOUNTER FOR IMMUNIZATION: ICD-10-CM

## 2021-03-13 ENCOUNTER — APPOINTMENT (OUTPATIENT)
Dept: LAB | Age: 66
End: 2021-03-13
Payer: COMMERCIAL

## 2021-03-13 DIAGNOSIS — Z12.5 PROSTATE CANCER SCREENING: ICD-10-CM

## 2021-03-13 DIAGNOSIS — Z12.11 COLON CANCER SCREENING: ICD-10-CM

## 2021-03-13 DIAGNOSIS — E78.2 MIXED HYPERLIPIDEMIA: ICD-10-CM

## 2021-03-13 DIAGNOSIS — I10 ESSENTIAL HYPERTENSION: ICD-10-CM

## 2021-03-13 DIAGNOSIS — Z00.00 HEALTHCARE MAINTENANCE: ICD-10-CM

## 2021-03-13 DIAGNOSIS — E11.9 TYPE 2 DIABETES MELLITUS WITHOUT COMPLICATION, WITHOUT LONG-TERM CURRENT USE OF INSULIN (HCC): ICD-10-CM

## 2021-03-13 LAB
ALBUMIN SERPL BCP-MCNC: 4 G/DL (ref 3.5–5)
ALP SERPL-CCNC: 51 U/L (ref 46–116)
ALT SERPL W P-5'-P-CCNC: 35 U/L (ref 12–78)
ANION GAP SERPL CALCULATED.3IONS-SCNC: 8 MMOL/L (ref 4–13)
AST SERPL W P-5'-P-CCNC: 15 U/L (ref 5–45)
BACTERIA UR QL AUTO: NORMAL /HPF
BASOPHILS # BLD AUTO: 0.03 THOUSANDS/ΜL (ref 0–0.1)
BASOPHILS NFR BLD AUTO: 0 % (ref 0–1)
BILIRUB SERPL-MCNC: 0.7 MG/DL (ref 0.2–1)
BILIRUB UR QL STRIP: NEGATIVE
BUN SERPL-MCNC: 17 MG/DL (ref 5–25)
CALCIUM SERPL-MCNC: 9.1 MG/DL (ref 8.3–10.1)
CHLORIDE SERPL-SCNC: 104 MMOL/L (ref 100–108)
CHOLEST SERPL-MCNC: 214 MG/DL (ref 50–200)
CLARITY UR: CLEAR
CO2 SERPL-SCNC: 29 MMOL/L (ref 21–32)
COLOR UR: YELLOW
CREAT SERPL-MCNC: 0.89 MG/DL (ref 0.6–1.3)
CREAT UR-MCNC: 126 MG/DL
EOSINOPHIL # BLD AUTO: 0.12 THOUSAND/ΜL (ref 0–0.61)
EOSINOPHIL NFR BLD AUTO: 2 % (ref 0–6)
ERYTHROCYTE [DISTWIDTH] IN BLOOD BY AUTOMATED COUNT: 13.3 % (ref 11.6–15.1)
EST. AVERAGE GLUCOSE BLD GHB EST-MCNC: 177 MG/DL
GFR SERPL CREATININE-BSD FRML MDRD: 90 ML/MIN/1.73SQ M
GLUCOSE P FAST SERPL-MCNC: 148 MG/DL (ref 65–99)
GLUCOSE UR STRIP-MCNC: ABNORMAL MG/DL
HBA1C MFR BLD: 7.8 %
HCT VFR BLD AUTO: 49 % (ref 36.5–49.3)
HDLC SERPL-MCNC: 64 MG/DL
HEMOCCULT STL QL IA: POSITIVE
HGB BLD-MCNC: 16.3 G/DL (ref 12–17)
HGB UR QL STRIP.AUTO: NEGATIVE
IMM GRANULOCYTES # BLD AUTO: 0.04 THOUSAND/UL (ref 0–0.2)
IMM GRANULOCYTES NFR BLD AUTO: 1 % (ref 0–2)
KETONES UR STRIP-MCNC: NEGATIVE MG/DL
LDLC SERPL CALC-MCNC: 123 MG/DL (ref 0–100)
LEUKOCYTE ESTERASE UR QL STRIP: ABNORMAL
LYMPHOCYTES # BLD AUTO: 2.05 THOUSANDS/ΜL (ref 0.6–4.47)
LYMPHOCYTES NFR BLD AUTO: 29 % (ref 14–44)
MCH RBC QN AUTO: 28.1 PG (ref 26.8–34.3)
MCHC RBC AUTO-ENTMCNC: 33.3 G/DL (ref 31.4–37.4)
MCV RBC AUTO: 84 FL (ref 82–98)
MICROALBUMIN UR-MCNC: 61.3 MG/L (ref 0–20)
MICROALBUMIN/CREAT 24H UR: 49 MG/G CREATININE (ref 0–30)
MONOCYTES # BLD AUTO: 0.66 THOUSAND/ΜL (ref 0.17–1.22)
MONOCYTES NFR BLD AUTO: 9 % (ref 4–12)
NEUTROPHILS # BLD AUTO: 4.3 THOUSANDS/ΜL (ref 1.85–7.62)
NEUTS SEG NFR BLD AUTO: 59 % (ref 43–75)
NITRITE UR QL STRIP: NEGATIVE
NON-SQ EPI CELLS URNS QL MICRO: NORMAL /HPF
NONHDLC SERPL-MCNC: 150 MG/DL
NRBC BLD AUTO-RTO: 0 /100 WBCS
PH UR STRIP.AUTO: 6.5 [PH]
PLATELET # BLD AUTO: 200 THOUSANDS/UL (ref 149–390)
PMV BLD AUTO: 10.3 FL (ref 8.9–12.7)
POTASSIUM SERPL-SCNC: 4.1 MMOL/L (ref 3.5–5.3)
PROT SERPL-MCNC: 7 G/DL (ref 6.4–8.2)
PROT UR STRIP-MCNC: ABNORMAL MG/DL
PSA SERPL-MCNC: 0.8 NG/ML (ref 0–4)
RBC # BLD AUTO: 5.81 MILLION/UL (ref 3.88–5.62)
RBC #/AREA URNS AUTO: NORMAL /HPF
SODIUM SERPL-SCNC: 141 MMOL/L (ref 136–145)
SP GR UR STRIP.AUTO: >1.045 (ref 1–1.03)
TRIGL SERPL-MCNC: 136 MG/DL
TSH SERPL DL<=0.05 MIU/L-ACNC: 3.32 UIU/ML (ref 0.36–3.74)
UROBILINOGEN UR QL STRIP.AUTO: 0.2 E.U./DL
WBC # BLD AUTO: 7.2 THOUSAND/UL (ref 4.31–10.16)
WBC #/AREA URNS AUTO: NORMAL /HPF

## 2021-03-13 PROCEDURE — 80061 LIPID PANEL: CPT

## 2021-03-13 PROCEDURE — 84443 ASSAY THYROID STIM HORMONE: CPT

## 2021-03-13 PROCEDURE — 82570 ASSAY OF URINE CREATININE: CPT | Performed by: INTERNAL MEDICINE

## 2021-03-13 PROCEDURE — 80053 COMPREHEN METABOLIC PANEL: CPT

## 2021-03-13 PROCEDURE — G0103 PSA SCREENING: HCPCS

## 2021-03-13 PROCEDURE — 3060F POS MICROALBUMINURIA REV: CPT | Performed by: INTERNAL MEDICINE

## 2021-03-13 PROCEDURE — 83036 HEMOGLOBIN GLYCOSYLATED A1C: CPT

## 2021-03-13 PROCEDURE — 82043 UR ALBUMIN QUANTITATIVE: CPT | Performed by: INTERNAL MEDICINE

## 2021-03-13 PROCEDURE — 3051F HG A1C>EQUAL 7.0%<8.0%: CPT | Performed by: INTERNAL MEDICINE

## 2021-03-13 PROCEDURE — 81001 URINALYSIS AUTO W/SCOPE: CPT

## 2021-03-13 PROCEDURE — 85025 COMPLETE CBC W/AUTO DIFF WBC: CPT

## 2021-03-13 PROCEDURE — G0328 FECAL BLOOD SCRN IMMUNOASSAY: HCPCS

## 2021-03-13 PROCEDURE — 36415 COLL VENOUS BLD VENIPUNCTURE: CPT

## 2021-03-16 ENCOUNTER — IMMUNIZATIONS (OUTPATIENT)
Dept: FAMILY MEDICINE CLINIC | Facility: HOSPITAL | Age: 66
End: 2021-03-16

## 2021-03-16 DIAGNOSIS — Z23 ENCOUNTER FOR IMMUNIZATION: Primary | ICD-10-CM

## 2021-03-16 PROCEDURE — 0011A SARS-COV-2 / COVID-19 MRNA VACCINE (MODERNA) 100 MCG: CPT

## 2021-03-16 PROCEDURE — 91301 SARS-COV-2 / COVID-19 MRNA VACCINE (MODERNA) 100 MCG: CPT

## 2021-03-18 ENCOUNTER — OFFICE VISIT (OUTPATIENT)
Dept: INTERNAL MEDICINE CLINIC | Facility: CLINIC | Age: 66
End: 2021-03-18
Payer: COMMERCIAL

## 2021-03-18 VITALS
TEMPERATURE: 97 F | HEART RATE: 76 BPM | DIASTOLIC BLOOD PRESSURE: 80 MMHG | HEIGHT: 75 IN | WEIGHT: 281 LBS | OXYGEN SATURATION: 97 % | BODY MASS INDEX: 34.94 KG/M2 | SYSTOLIC BLOOD PRESSURE: 114 MMHG

## 2021-03-18 DIAGNOSIS — E66.09 CLASS 2 OBESITY DUE TO EXCESS CALORIES WITHOUT SERIOUS COMORBIDITY WITH BODY MASS INDEX (BMI) OF 35.0 TO 35.9 IN ADULT: ICD-10-CM

## 2021-03-18 DIAGNOSIS — I10 ESSENTIAL HYPERTENSION: ICD-10-CM

## 2021-03-18 DIAGNOSIS — R19.5 OCCULT BLOOD IN STOOLS: Primary | ICD-10-CM

## 2021-03-18 DIAGNOSIS — Z23 ENCOUNTER FOR IMMUNIZATION: ICD-10-CM

## 2021-03-18 DIAGNOSIS — E78.2 MIXED HYPERLIPIDEMIA: ICD-10-CM

## 2021-03-18 DIAGNOSIS — M54.16 LUMBAR RADICULOPATHY: ICD-10-CM

## 2021-03-18 DIAGNOSIS — E13.9 DIABETES 1.5, MANAGED AS TYPE 2 (HCC): ICD-10-CM

## 2021-03-18 DIAGNOSIS — G47.33 OSA (OBSTRUCTIVE SLEEP APNEA): ICD-10-CM

## 2021-03-18 DIAGNOSIS — Z00.00 HEALTHCARE MAINTENANCE: ICD-10-CM

## 2021-03-18 PROCEDURE — 90750 HZV VACC RECOMBINANT IM: CPT

## 2021-03-18 PROCEDURE — 90471 IMMUNIZATION ADMIN: CPT

## 2021-03-18 PROCEDURE — 3074F SYST BP LT 130 MM HG: CPT | Performed by: INTERNAL MEDICINE

## 2021-03-18 PROCEDURE — 1036F TOBACCO NON-USER: CPT | Performed by: INTERNAL MEDICINE

## 2021-03-18 PROCEDURE — 99396 PREV VISIT EST AGE 40-64: CPT | Performed by: INTERNAL MEDICINE

## 2021-03-18 PROCEDURE — 3079F DIAST BP 80-89 MM HG: CPT | Performed by: INTERNAL MEDICINE

## 2021-03-18 PROCEDURE — 3008F BODY MASS INDEX DOCD: CPT | Performed by: INTERNAL MEDICINE

## 2021-03-18 NOTE — ASSESSMENT & PLAN NOTE
Lab Results   Component Value Date    HGBA1C 7 8 (H) 03/13/2021     As noted patient's sugar is showing control hemoglobin A1c now to 7 8  Again patient admits that he is not watching his diet nor getting any type of routine exercise which could complement treatment  He is compliant with medication  Again patient will be seen by a dietitian discussed diet methods in order not only to help with sugar control but also weight loss and cholesterol  Diabetic foot exam was performed today showing no abnormalities    He is up-to-date with routine eye screening

## 2021-03-18 NOTE — ASSESSMENT & PLAN NOTE
Patient has tried to use the CPAP device unsuccessfully  Patient states that he is now having better sleep at night with the that his bed elevated which does also help with his back pain    He is encouraged to work at weight loss which could help with his apnea

## 2021-03-18 NOTE — PROGRESS NOTES
Diabetic Foot Exam    Patient's shoes and socks removed  Right Foot/Ankle   Right Foot Inspection  Skin Exam: skin normal and skin intact no dry skin, no warmth, no callus, no erythema, no maceration, no abnormal color, no pre-ulcer, no ulcer and no callus                          Toe Exam: ROM and strength within normal limitsno swelling, no tenderness, erythema and  no right toe deformity  Sensory   Vibration: intact  Proprioception: intact   Monofilament testing: intact  Vascular  Capillary refills: < 3 seconds  The right DP pulse is 2+  The right PT pulse is 2+  Left Foot/Ankle  Left Foot Inspection  Skin Exam: skin normal and skin intactno dry skin, no warmth, no erythema, no maceration, normal color, no pre-ulcer, no ulcer and no callus                         Toe Exam: ROM and strength within normal limitsno swelling, no tenderness, no erythema and no left toe deformity                   Sensory   Vibration: intact  Proprioception: intact  Monofilament: intact  Vascular  Capillary refills: < 3 seconds  The left DP pulse is 2+  The left PT pulse is 2+  Assign Risk Category:  No deformity present; No loss of protective sensation;  No weak pulses       Risk: 0 [place] Pregnancy And Lactation Text: This medication is not safe during pregnancy. It is unknown if this medication is excreted in breast milk.

## 2021-03-18 NOTE — ASSESSMENT & PLAN NOTE
Patient has gained a substantial amount of weight since his last visit  Again states not watching his diet nor getting any regular exercise  He is hoping that with the warmer weather he will be physically active and both he and his wife for seeing a dietitian to discuss modification of his diet in order not only to help with his sugar and cholesterol but also weight loss

## 2021-03-18 NOTE — ASSESSMENT & PLAN NOTE
Blood pressure is controlled, renal function is stable  Patient will continue present medication and surveillance

## 2021-03-18 NOTE — ASSESSMENT & PLAN NOTE
Patient is here today for routine physical   He did have extensive lab testing performed the prior to the visit today we did discuss the results of length  Patient does have some significant abnormalities and most of these can be related to the fact that he is not watching his diet as closely nor getting any exercise  Cholesterol  In sugar or showing poor control and patient states that he does have an appointment be seen by a nutritionist in near future to discuss methods to work on his diet  We will be looking at some type of routine exercise program in order to help also with weight loss  No other abnormality is some blood in the stools and referral has been made to be seen by a gastroenterologist for further evaluation  Patient has no anemia  Patient states in general he is doing relatively well  With keeping the head of his bed elevated he is sleeping better with less back pain  He has been unable to tolerate the CPAP device and the head of the bed elevation may be helpful with his sleep apnea  States less nocturia with change in sleeping position  Patient did undergo full physical exam today in the office  Abnormalities as noted  Patient be seen again in the office in approximately 4 months for re-evaluation and we will check a lipid profile, basic metabolic profile, hemoglobin A1c prior to that visit

## 2021-03-18 NOTE — PROGRESS NOTES
Assessment/Plan:    Healthcare maintenance    Patient is here today for routine physical   He did have extensive lab testing performed the prior to the visit today we did discuss the results of length  Patient does have some significant abnormalities and most of these can be related to the fact that he is not watching his diet as closely nor getting any exercise  Cholesterol  In sugar or showing poor control and patient states that he does have an appointment be seen by a nutritionist in near future to discuss methods to work on his diet  We will be looking at some type of routine exercise program in order to help also with weight loss  No other abnormality is some blood in the stools and referral has been made to be seen by a gastroenterologist for further evaluation  Patient has no anemia  Patient states in general he is doing relatively well  With keeping the head of his bed elevated he is sleeping better with less back pain  He has been unable to tolerate the CPAP device and the head of the bed elevation may be helpful with his sleep apnea  States less nocturia with change in sleeping position  Patient did undergo full physical exam today in the office  Abnormalities as noted  Patient be seen again in the office in approximately 4 months for re-evaluation and we will check a lipid profile, basic metabolic profile, hemoglobin A1c prior to that visit  Diabetes 1 5, 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[facility] )    Lab Results   Component Value Date    HGBA1C 7 8 (H) 03/13/2021     As noted patient's sugar is showing control hemoglobin A1c now to 7 8  Again patient admits that he is not watching his diet nor getting any type of routine exercise which could complement treatment  He is compliant with medication  Again patient will be seen by a dietitian discussed diet methods in order not only to help with sugar control but also weight loss and cholesterol  Diabetic foot exam was performed today showing no abnormalities  He is up-to-date with routine eye screening    AIDEE (obstructive sleep apnea)   Patient has tried to use the CPAP device unsuccessfully  Patient states that he is now having better sleep at night with the that his bed elevated which does also help with his back pain  He is encouraged to work at weight loss which could help with his apnea    Essential hypertension   Blood pressure is controlled, renal function is stable  Patient will continue present medication and surveillance  Lumbar radiculopathy    Patient does have a history of low back pain  States that his biggest problem with sleeping at nighttime and that getting up in the morning was extremely painful  Patient has recently made some modifications with his sleeping  Does have adjustable bed and is able to keep the head of the bed elevated which she states helps with his back pain and discomfort and better sleeping at night  Most likely also helps the patient with his apnea    Hyperlipidemia   Patient is compliant with medication but not with his diet, cholesterol is elevated  Again patient and his wife will be seen by a dietitian to look closely at modification with his diet not only to help with his cholesterol but also weight loss  We will check a lipid profile with his next visit and make changes in medication if necessary if not improved    Obesity   Patient has gained a substantial amount of weight since his last visit  Again states not watching his diet nor getting any regular exercise  He is hoping that with the warmer weather he will be physically active and both he and his wife for seeing a dietitian to discuss modification of his diet in order not only to help with his sugar and cholesterol but also weight loss  Occult blood in stools    Patient has noticed no change with his bowel function  No blood in the stools black stools  Hemoglobin is stable but positive Hemoccult    Will be seen by Gastroenterology for further evaluation       Diagnoses and all orders for this visit:    Occult blood in stools  -     Ambulatory referral to Gastroenterology; Future    Diabetes 1 5, 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[address] )  -     Basic metabolic panel; Future  -     Hemoglobin A1C; Future    Essential hypertension  -     Basic metabolic panel; Future    Lumbar radiculopathy    Mixed hyperlipidemia  -     Lipid panel; Future    Class 2 obesity due to excess calories without serious comorbidity with body mass index (BMI) of 35 0 to 35 9 in adult    AIDEE (obstructive sleep apnea)    Healthcare maintenance          Subjective:      Patient ID: Jez Escalante is a 61 y o  male  81-year-old male history of multiple medical problems who is here today for routine yearly physical   He did have extensive lab testing performed prior to the visit today we did discuss results  Patient states in general he is feeling well but does understand that he has had problems with recent weight gain, not as physically active  The following portions of the patient's history were reviewed and updated as appropriate: He  has a past medical history of Diabetes mellitus ([address] ), Headache, High cholesterol, Hypertension, Kidney stones, Memory loss (2017), Sleep apnea, and Visual impairment (1957)    He   Patient Active Problem List    Diagnosis Date Noted    Occult blood in stools 03/18/2021    Greater trochanteric bursitis of both hips 11/05/2019    Trigger finger, left middle finger 08/02/2019    Trigger middle finger of right hand 07/02/2019    Morbid obesity ([address] ) 02/07/2019    Healthcare maintenance 10/09/2018    AIDEE (obstructive sleep apnea) 03/05/2018    Insomnia 03/05/2018    Hypersomnia 03/05/2018    Obesity (BMI 30-39 9) 03/05/2018    Diabetes 1 5, 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[address] ) 03/05/2018    Sacroiliitis ([address] ) 02/06/2018    Lumbar degenerative disc disease 10/23/2017    Lumbar herniated disc 10/23/2017    Lumbar spondylosis 10/23/2017    Lumbar radiculopathy 08/29/2017    Myofascial pain 08/29/2017    Lumbago with sciatica, left side 07/31/2017    Diabetes ([facility] ) 09/13/2016    Obesity 06/15/2012    Hyperlipidemia 06/15/2012    Essential hypertension 06/15/2012     He  has a past surgical history that includes Nasal septum surgery (1980); Uvulopalatopharyngoplasty (2004); Debridement tennis elbow (2006); Hernia repair (2011); Colonoscopy (N/A, 11/22/2016); pr incise finger tendon sheath (Right, 11/19/2019); and pr incise finger tendon sheath (Left, 11/26/2019)  His Family history is unknown by patient  He  reports that he has never smoked  He has never used smokeless tobacco  He reports current alcohol use  He reports that he does not use drugs  Current Outpatient Medications   Medication Sig Dispense Refill    acetaminophen (TYLENOL 8 HOUR) 650 mg CR tablet Take 1 tablet (650 mg total) by mouth every 8 (eight) hours 15 tablet 0    Coenzyme Q10 (CO Q-10) 200 MG CAPS       diclofenac sodium (VOLTAREN) 50 mg EC tablet Take 1 tablet (50 mg total) by mouth 2 (two) times a day as needed (pain) 180 tablet 0    Farxiga 5 MG TABS Take 1 tablet (5 mg total) by mouth daily 90 tablet 0    lisinopril (ZESTRIL) 5 mg tablet Take 1 tablet (5 mg total) by mouth daily 90 tablet 3    rosuvastatin (CRESTOR) 10 MG tablet Take 1 tablet (10 mg total) by mouth daily 90 tablet 3     No current facility-administered medications for this visit        Current Outpatient Medications on File Prior to Visit   Medication Sig    acetaminophen (TYLENOL 8 HOUR) 650 mg CR tablet Take 1 tablet (650 mg total) by mouth every 8 (eight) hours    Coenzyme Q10 (CO Q-10) 200 MG CAPS     diclofenac sodium (VOLTAREN) 50 mg EC tablet Take 1 tablet (50 mg total) by mouth 2 (two) times a day as needed (pain)    Farxiga 5 MG TABS Take 1 tablet (5 mg total) by mouth daily    lisinopril (ZESTRIL) 5 mg tablet Take 1 tablet (5 mg total) by mouth daily    rosuvastatin (CRESTOR) 10 MG tablet Take 1 tablet (10 mg total) by mouth daily    [DISCONTINUED] B Complex-Biotin-FA (B-100 B-COMPLEX) TABS     [DISCONTINUED] glucose blood (Accu-Chek Maida Plus) test strip USE TWICE A DAY AS DIRECTED    [DISCONTINUED] MICROLET LANCETS MISC Microlet Lancet     No current facility-administered medications on file prior to visit  He is allergic to tramadol       Review of Systems   Constitutional: Positive for activity change (Patient admits that during the fall winter because of the [place] pandemic he has less physically active) and unexpected weight change  Negative for appetite change, chills, diaphoresis, fatigue and fever  HENT: Negative  Eyes: Negative  Respiratory: Negative  Cardiovascular: Negative  Gastrointestinal: Negative  Endocrine: Negative  Genitourinary: Negative  Musculoskeletal: Positive for back pain  Negative for arthralgias, gait problem, joint swelling, myalgias, neck pain and neck stiffness  Skin: Negative  Allergic/Immunologic: Negative  Neurological: Negative  Hematological: Negative  Psychiatric/Behavioral: Negative  Objective:      /80   Pulse 76   Temp (!) 97 °F (36 1 °C) (Tympanic)   Ht 6' 3" (1 905 m)   Wt 127 kg (281 lb)   SpO2 97%   BMI 35 12 kg/m²          Physical Exam  Vitals signs and nursing note reviewed  Constitutional:       General: He is not in acute distress  Appearance: Normal appearance  He is obese  He is not ill-appearing, toxic-appearing or diaphoretic  Comments: Articulate, cheerful, obese in no acute distress and oriented x3   HENT:      Head: Normocephalic and atraumatic  Right Ear: Tympanic membrane, ear canal and external ear normal  There is no impacted cerumen  Left Ear: Tympanic membrane, ear canal and external ear normal  There is no impacted cerumen  Nose: Nose normal  No congestion or rhinorrhea        Mouth/Throat:      Mouth: Mucous membranes are moist       Pharynx: Oropharynx is clear  No oropharyngeal exudate or posterior oropharyngeal erythema  Eyes:      General: No scleral icterus  Right eye: No discharge  Left eye: No discharge  Extraocular Movements: Extraocular movements intact  Conjunctiva/sclera: Conjunctivae normal       Pupils: Pupils are equal, round, and reactive to light  Neck:      Musculoskeletal: Normal range of motion and neck supple  No neck rigidity or muscular tenderness  Vascular: No carotid bruit  Cardiovascular:      Rate and Rhythm: Normal rate and regular rhythm  Pulses: Normal pulses  Heart sounds: Normal heart sounds  No murmur  No friction rub  No gallop  Pulmonary:      Effort: Pulmonary effort is normal  No respiratory distress  Breath sounds: Normal breath sounds  No stridor  No wheezing, rhonchi or rales  Chest:      Chest wall: No tenderness  Abdominal:      General: Bowel sounds are normal  There is no distension  Palpations: Abdomen is soft  There is no mass  Tenderness: There is no abdominal tenderness  There is no right CVA tenderness, left CVA tenderness, guarding or rebound  Hernia: No hernia is present  Comments: Obese   Genitourinary:     Penis: Normal        Scrotum/Testes: Normal       Prostate: Normal       Rectum: Normal  Guaiac result positive  Musculoskeletal:         General: Deformity present  No swelling, tenderness or signs of injury  Right lower leg: No edema  Left lower leg: No edema  Comments: Flattening to lumbar curve with some decreased range of motion both and passively to lumbar spine with movement but no pain with movement today  Lymphadenopathy:      Cervical: No cervical adenopathy  Skin:     General: Skin is warm and dry  Capillary Refill: Capillary refill takes less than 2 seconds  Coloration: Skin is not jaundiced or pale  Findings: No bruising, erythema, lesion or rash     Neurological:      General: No focal deficit present  Mental Status: He is alert and oriented to person, place, and time  Mental status is at baseline  Cranial Nerves: No cranial nerve deficit  Sensory: No sensory deficit  Motor: No weakness  Coordination: Coordination normal       Gait: Gait normal       Deep Tendon Reflexes: Reflexes abnormal (Absent patella and Achilles tendon reflexes bilaterally without change)  Psychiatric:         Mood and Affect: Mood normal          Behavior: Behavior normal          Thought Content:  Thought content normal          Judgment: Judgment normal

## 2021-03-18 NOTE — ASSESSMENT & PLAN NOTE
Patient is compliant with medication but not with his diet, cholesterol is elevated  Again patient and his wife will be seen by a dietitian to look closely at modification with his diet not only to help with his cholesterol but also weight loss    We will check a lipid profile with his next visit and make changes in medication if necessary if not improved

## 2021-03-18 NOTE — ASSESSMENT & PLAN NOTE
Patient has noticed no change with his bowel function  No blood in the stools black stools  Hemoglobin is stable but positive Hemoccult    Will be seen by Gastroenterology for further evaluation

## 2021-04-15 ENCOUNTER — IMMUNIZATIONS (OUTPATIENT)
Dept: FAMILY MEDICINE CLINIC | Facility: HOSPITAL | Age: 66
End: 2021-04-15

## 2021-04-15 DIAGNOSIS — Z23 ENCOUNTER FOR IMMUNIZATION: Primary | ICD-10-CM

## 2021-04-15 PROCEDURE — 91301 SARS-COV-2 / COVID-19 MRNA VACCINE (MODERNA) 100 MCG: CPT

## 2021-04-15 PROCEDURE — 0012A SARS-COV-2 / COVID-19 MRNA VACCINE (MODERNA) 100 MCG: CPT

## 2021-04-19 ENCOUNTER — TELEPHONE (OUTPATIENT)
Dept: ENDOCRINOLOGY | Facility: CLINIC | Age: 66
End: 2021-04-19

## 2021-04-19 DIAGNOSIS — E11.9 TYPE 2 DIABETES MELLITUS WITHOUT COMPLICATION, WITHOUT LONG-TERM CURRENT USE OF INSULIN (HCC): Primary | ICD-10-CM

## 2021-04-19 NOTE — TELEPHONE ENCOUNTER
Marco Solano can u do a referral for  diab education for tomorrows visit   He had an order last year but due to covid didn't come in

## 2021-04-20 ENCOUNTER — OFFICE VISIT (OUTPATIENT)
Dept: DIABETES SERVICES | Facility: CLINIC | Age: 66
End: 2021-04-20
Payer: COMMERCIAL

## 2021-04-20 VITALS — WEIGHT: 282.4 LBS | BODY MASS INDEX: 35.11 KG/M2 | HEIGHT: 75 IN

## 2021-04-20 DIAGNOSIS — E11.9 TYPE 2 DIABETES MELLITUS WITHOUT COMPLICATION, WITHOUT LONG-TERM CURRENT USE OF INSULIN (HCC): ICD-10-CM

## 2021-04-20 PROCEDURE — 97802 MEDICAL NUTRITION INDIV IN: CPT | Performed by: DIETITIAN, REGISTERED

## 2021-04-20 PROCEDURE — 3008F BODY MASS INDEX DOCD: CPT | Performed by: INTERNAL MEDICINE

## 2021-04-20 NOTE — PATIENT INSTRUCTIONS
60 grams of carbohydrates per meal  15 grams of carbohydrates per snack    10 oz of protein per day ( Pages 9-12 in the Portion Book)  5 servings/choices of dietary fats per day (Page 13 in the Portion Book)    3 meals per day, 4-5 hours apart  2 snacks per day, at least 2 hours apart from meals    Use the Portion Book and label reading to determine carbohydrate amounts in food and beverages  Please complete 3 day food record prior to follow-up appointment in 6 weeks

## 2021-04-27 DIAGNOSIS — E11.9 TYPE 2 DIABETES MELLITUS WITHOUT COMPLICATION, WITHOUT LONG-TERM CURRENT USE OF INSULIN (HCC): ICD-10-CM

## 2021-04-27 DIAGNOSIS — M46.1 SACROILIITIS (HCC): ICD-10-CM

## 2021-04-27 RX ORDER — DAPAGLIFLOZIN 5 MG/1
TABLET, FILM COATED ORAL
Qty: 90 TABLET | Refills: 0 | Status: SHIPPED | OUTPATIENT
Start: 2021-04-27 | End: 2021-07-22

## 2021-07-18 ENCOUNTER — APPOINTMENT (OUTPATIENT)
Dept: LAB | Age: 66
End: 2021-07-18
Payer: COMMERCIAL

## 2021-07-18 DIAGNOSIS — I10 ESSENTIAL HYPERTENSION: ICD-10-CM

## 2021-07-18 DIAGNOSIS — E78.2 MIXED HYPERLIPIDEMIA: ICD-10-CM

## 2021-07-18 DIAGNOSIS — E13.9 DIABETES 1.5, MANAGED AS TYPE 2 (HCC): ICD-10-CM

## 2021-07-18 LAB
ANION GAP SERPL CALCULATED.3IONS-SCNC: 4 MMOL/L (ref 4–13)
BUN SERPL-MCNC: 13 MG/DL (ref 5–25)
CALCIUM SERPL-MCNC: 9.3 MG/DL (ref 8.3–10.1)
CHLORIDE SERPL-SCNC: 106 MMOL/L (ref 100–108)
CHOLEST SERPL-MCNC: 232 MG/DL (ref 50–200)
CO2 SERPL-SCNC: 27 MMOL/L (ref 21–32)
CREAT SERPL-MCNC: 0.88 MG/DL (ref 0.6–1.3)
EST. AVERAGE GLUCOSE BLD GHB EST-MCNC: 212 MG/DL
GFR SERPL CREATININE-BSD FRML MDRD: 91 ML/MIN/1.73SQ M
GLUCOSE P FAST SERPL-MCNC: 194 MG/DL (ref 65–99)
HBA1C MFR BLD: 9 %
HDLC SERPL-MCNC: 70 MG/DL
LDLC SERPL CALC-MCNC: 135 MG/DL (ref 0–100)
NONHDLC SERPL-MCNC: 162 MG/DL
POTASSIUM SERPL-SCNC: 4 MMOL/L (ref 3.5–5.3)
SODIUM SERPL-SCNC: 137 MMOL/L (ref 136–145)
TRIGL SERPL-MCNC: 137 MG/DL

## 2021-07-18 PROCEDURE — 80048 BASIC METABOLIC PNL TOTAL CA: CPT

## 2021-07-18 PROCEDURE — 83036 HEMOGLOBIN GLYCOSYLATED A1C: CPT

## 2021-07-18 PROCEDURE — 36415 COLL VENOUS BLD VENIPUNCTURE: CPT

## 2021-07-18 PROCEDURE — 80061 LIPID PANEL: CPT

## 2021-07-21 ENCOUNTER — OFFICE VISIT (OUTPATIENT)
Dept: INTERNAL MEDICINE CLINIC | Facility: CLINIC | Age: 66
End: 2021-07-21
Payer: COMMERCIAL

## 2021-07-21 VITALS
WEIGHT: 281 LBS | TEMPERATURE: 97.6 F | SYSTOLIC BLOOD PRESSURE: 130 MMHG | OXYGEN SATURATION: 97 % | HEIGHT: 75 IN | BODY MASS INDEX: 34.94 KG/M2 | HEART RATE: 80 BPM | DIASTOLIC BLOOD PRESSURE: 80 MMHG

## 2021-07-21 DIAGNOSIS — M54.16 LUMBAR RADICULOPATHY: ICD-10-CM

## 2021-07-21 DIAGNOSIS — I10 ESSENTIAL HYPERTENSION: ICD-10-CM

## 2021-07-21 DIAGNOSIS — E78.2 MIXED HYPERLIPIDEMIA: ICD-10-CM

## 2021-07-21 DIAGNOSIS — G47.33 OSA (OBSTRUCTIVE SLEEP APNEA): ICD-10-CM

## 2021-07-21 DIAGNOSIS — E13.9 DIABETES 1.5, MANAGED AS TYPE 2 (HCC): Primary | ICD-10-CM

## 2021-07-21 DIAGNOSIS — E66.9 OBESITY (BMI 30-39.9): ICD-10-CM

## 2021-07-21 PROCEDURE — 3079F DIAST BP 80-89 MM HG: CPT | Performed by: INTERNAL MEDICINE

## 2021-07-21 PROCEDURE — 99214 OFFICE O/P EST MOD 30 MIN: CPT | Performed by: INTERNAL MEDICINE

## 2021-07-21 PROCEDURE — 3008F BODY MASS INDEX DOCD: CPT | Performed by: INTERNAL MEDICINE

## 2021-07-21 PROCEDURE — 1036F TOBACCO NON-USER: CPT | Performed by: INTERNAL MEDICINE

## 2021-07-21 PROCEDURE — 3075F SYST BP GE 130 - 139MM HG: CPT | Performed by: INTERNAL MEDICINE

## 2021-07-21 PROCEDURE — 3725F SCREEN DEPRESSION PERFORMED: CPT | Performed by: INTERNAL MEDICINE

## 2021-07-21 RX ORDER — GLIMEPIRIDE 1 MG/1
1 TABLET ORAL
Qty: 30 TABLET | Refills: 5 | Status: SHIPPED | OUTPATIENT
Start: 2021-07-21 | End: 2022-01-10 | Stop reason: SDUPTHER

## 2021-07-21 NOTE — PROGRESS NOTES
Assessment/Plan:    Diabetes 1 5, 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[facility] )    Lab Results   Component Value Date    HGBA1C 9 0 (H) 07/18/2021    patient as noted did have labs performed prior to the visit today we did discuss the results  His fasting blood sugars high hemoglobin A1c showing poor control at 9 0  Patient states he is compliant with medication but not with his diet or exercise program and we did reinforce the importance of watching his intake of concentrated sweets, simple carbohydrates and calories overall  Start some type of routine exercise program in order to help with his obesity and control his sugar  Decision today was to add Amaryl 1 mg daily to his regime in order to get his sugars under better control  Patient is intolerant to metformin  A check a fasting blood sugar hemoglobin A1c with his next visit  Essential hypertension    Blood pressure is showing adequate control with present treatment  Patient will continue present medication and surveillance  We will continue also to monitor his renal function  Obesity (BMI 30-39  9)    Patient's weight is stable with no appreciable change from his last visit  He admits that he is more active during the spring and summer working in [employer] but he has not made any significant changes with his dietary intake in order to help with weight loss  Patient states his 1 addiction as chocolate  He was told he needs to work much harder at his caloric intake and start some type of routine exercise program in order to help   facilitate weight loss  AIDEE (obstructive sleep apnea)    History of obstructive sleep apnea  Patient states he cannot tolerate the CPAP device and therefore not using this  We did suggest the follow-up with the sleep specialist but he states this point he will consider this and call us if he changes his mind  Lumbar radiculopathy    Chronic back pain  Patient states that this is stable    With this in mind we did discuss with the patient the importance of starting some type of exercise program which will be beneficial for his sugar, weight loss  Diagnoses and all orders for this visit:    Diabetes 1 5, 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[address] )  -     glimepiride (AMARYL) 1 mg tablet; Take 1 tablet (1 mg total) by mouth daily with breakfast  -     Basic metabolic panel; Future  -     Hemoglobin A1C; Future    Essential hypertension  -     Basic metabolic panel; Future    Lumbar radiculopathy    Mixed hyperlipidemia  -     Lipid panel; Future    Obesity (BMI 30-39 9)    AIDEE (obstructive sleep apnea)          Subjective:      Patient ID: Rush Duffy is a 61 y o  male  22-year-old male history of multiple medical problems as outlined previously including hypertension, chronic low back pain, diabetes mellitus type 2, hyperlipidemia  Patient is here today for a routine follow-up and did have labs performed prior to the visit today we did discuss the results  Patient states in general he is feeling well and offers no new complaints or concerns  We did discuss the labs showing poor control of his blood sugars      The following portions of the patient's history were reviewed and updated as appropriate:   He  has a past medical history of Diabetes mellitus ([address] ), Headache, High cholesterol, Hypertension, Kidney stones, Memory loss (2017), Sleep apnea, and Visual impairment (1957)    He   Patient Active Problem List    Diagnosis Date Noted    Occult blood in stools 03/18/2021    Greater trochanteric bursitis of both hips 11/05/2019    Trigger finger, left middle finger 08/02/2019    Trigger middle finger of right hand 07/02/2019    Morbid obesity ([address] ) 02/07/2019    Healthcare maintenance 10/09/2018    AIDEE (obstructive sleep apnea) 03/05/2018    Insomnia 03/05/2018    Hypersomnia 03/05/2018    Obesity (BMI 30-39 9) 03/05/2018    Diabetes 1 5, 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[address] ) 03/05/2018    Sacroiliitis ([address] ) 02/06/2018    Lumbar degenerative disc disease 10/23/2017    Lumbar herniated disc 10/23/2017    Lumbar spondylosis 10/23/2017    Lumbar radiculopathy 08/29/2017    Myofascial pain 08/29/2017    Lumbago with sciatica, left side 07/31/2017    Diabetes ([facility] ) 09/13/2016    Obesity 06/15/2012    Hyperlipidemia 06/15/2012    Essential hypertension 06/15/2012     He  has a past surgical history that includes Nasal septum surgery (1980); Uvulopalatopharyngoplasty (2004); Debridement tennis elbow (2006); Hernia repair (2011); Colonoscopy (N/A, 11/22/2016); pr incise finger tendon sheath (Right, 11/19/2019); and pr incise finger tendon sheath (Left, 11/26/2019)  His Family history is unknown by patient  He  reports that he has never smoked  He has never used smokeless tobacco  He reports current alcohol use  He reports that he does not use drugs  Current Outpatient Medications   Medication Sig Dispense Refill    acetaminophen (TYLENOL 8 HOUR) 650 mg CR tablet Take 1 tablet (650 mg total) by mouth every 8 (eight) hours 15 tablet 0    Coenzyme Q10 (CO Q-10) 200 MG CAPS       diclofenac sodium (VOLTAREN) 50 mg EC tablet TAKE 1 TABLET (50 MG TOTAL) BY MOUTH 2 (TWO) TIMES A DAY AS NEEDED (PAIN) 180 tablet 0    Farxiga 5 MG TABS TAKE 1 TABLET BY MOUTH EVERY DAY 90 tablet 0    lisinopril (ZESTRIL) 5 mg tablet Take 1 tablet (5 mg total) by mouth daily 90 tablet 3    rosuvastatin (CRESTOR) 10 MG tablet Take 1 tablet (10 mg total) by mouth daily 90 tablet 3    glimepiride (AMARYL) 1 mg tablet Take 1 tablet (1 mg total) by mouth daily with breakfast 30 tablet 5     No current facility-administered medications for this visit       Current Outpatient Medications on File Prior to Visit   Medication Sig    acetaminophen (TYLENOL 8 HOUR) 650 mg CR tablet Take 1 tablet (650 mg total) by mouth every 8 (eight) hours    Coenzyme Q10 (CO Q-10) 200 MG CAPS     diclofenac sodium (VOLTAREN) 50 mg EC tablet TAKE 1 TABLET (50 MG TOTAL) BY MOUTH 2 (TWO) TIMES A DAY AS NEEDED (PAIN)    Farxiga 5 MG TABS TAKE 1 TABLET BY MOUTH EVERY DAY    lisinopril (ZESTRIL) 5 mg tablet Take 1 tablet (5 mg total) by mouth daily    rosuvastatin (CRESTOR) 10 MG tablet Take 1 tablet (10 mg total) by mouth daily     No current facility-administered medications on file prior to visit  He is allergic to tramadol       Review of Systems   Constitutional: Positive for activity change ( statesThat he is more physically active during the spring summer and fall working around the house)  Negative for appetite change, chills, diaphoresis, fatigue, fever and unexpected weight change  HENT: Negative  Eyes: Negative  Respiratory: Negative  Cardiovascular: Negative  Gastrointestinal: Negative  Endocrine: Negative  Genitourinary: Negative  Musculoskeletal: Positive for back pain  Negative for arthralgias, gait problem, joint swelling, myalgias, neck pain and neck stiffness  Skin: Negative  Allergic/Immunologic: Negative  Neurological: Negative  Hematological: Negative  Psychiatric/Behavioral: Negative  Objective:      /80   Pulse 80   Temp 97 6 °F (36 4 °C)   Ht 6' 3" (1 905 m)   Wt 127 kg (281 lb)   SpO2 97%   BMI 35 12 kg/m²          Physical Exam  Vitals and nursing note reviewed  Constitutional:       General: He is not in acute distress  Appearance: Normal appearance  He is obese  He is not ill-appearing, toxic-appearing or diaphoretic  Comments: Pleasant, articulate, obese 32-year-old male who is awake alert no acute distress and oriented x3   HENT:      Head: Normocephalic and atraumatic  Right Ear: Tympanic membrane, ear canal and external ear normal  There is no impacted cerumen  Left Ear: Tympanic membrane, ear canal and external ear normal  There is no impacted cerumen  Nose: Nose normal  No congestion or rhinorrhea  Mouth/Throat:      Mouth: Mucous membranes are moist       Pharynx: Oropharynx is clear   No oropharyngeal exudate or posterior oropharyngeal erythema  Eyes:      General: No scleral icterus  Right eye: No discharge  Left eye: No discharge  Extraocular Movements: Extraocular movements intact  Conjunctiva/sclera: Conjunctivae normal       Pupils: Pupils are equal, round, and reactive to light  Neck:      Vascular: No carotid bruit  Cardiovascular:      Rate and Rhythm: Normal rate and regular rhythm  Pulses: Normal pulses  Heart sounds: No murmur heard  No friction rub  No gallop  Pulmonary:      Effort: Pulmonary effort is normal  No respiratory distress  Breath sounds: Normal breath sounds  No stridor  No wheezing, rhonchi or rales  Chest:      Chest wall: No tenderness  Abdominal:      General: Bowel sounds are normal  There is no distension  Palpations: Abdomen is soft  There is no mass  Tenderness: There is no abdominal tenderness  There is no right CVA tenderness, left CVA tenderness, guarding or rebound  Hernia: No hernia is present  Comments: Obese   Musculoskeletal:         General: Deformity present  No swelling, tenderness or signs of injury  Normal range of motion  Cervical back: Normal range of motion and neck supple  No rigidity or tenderness  Right lower leg: No edema  Left lower leg: No edema  Lymphadenopathy:      Cervical: No cervical adenopathy  Skin:     General: Skin is warm and dry  Capillary Refill: Capillary refill takes less than 2 seconds  Coloration: Skin is not jaundiced or pale  Findings: No bruising, erythema, lesion or rash  Neurological:      General: No focal deficit present  Mental Status: He is alert and oriented to person, place, and time  Mental status is at baseline  Cranial Nerves: No cranial nerve deficit  Sensory: Sensory deficit ( chronic peripheral neuropathy lower extremities without change) present  Motor: No weakness  Coordination: Coordination normal       Gait: Gait normal       Deep Tendon Reflexes: Reflexes normal    Psychiatric:         Mood and Affect: Mood normal          Behavior: Behavior normal          Thought Content: Thought content normal          Judgment: Judgment normal          BMI Counseling: Body mass index is 35 12 kg/m²  The BMI is above normal  Nutrition recommendations include reducing portion sizes, decreasing overall calorie intake, 3-5 servings of fruits/vegetables daily, consuming healthier snacks, moderation in carbohydrate intake, increasing intake of lean protein, reducing intake of saturated fat and trans fat and reducing intake of cholesterol  Exercise recommendations include exercising 3-5 times per week

## 2021-07-21 NOTE — PATIENT INSTRUCTIONS
Obesity   AMBULATORY CARE:   Obesity  is when your body mass index (BMI) is greater than 30  Your healthcare provider will use your height and weight to measure your BMI  The risks of obesity include  many health problems, such as injuries or physical disability  You may need tests to check for the following:  · Diabetes    · High blood pressure or high cholesterol    · Heart disease    · Gallbladder or liver disease    · Cancer of the colon, breast, prostate, liver, or kidney    · Sleep apnea    · Arthritis or gout    Seek care immediately if:   · You have a severe headache, confusion, or difficulty speaking  · You have weakness on one side of your body  · You have chest pain, sweating, or shortness of breath  Contact your healthcare provider if:   · You have symptoms of gallbladder or liver disease, such as pain in your upper abdomen  · You have knee or hip pain and discomfort while walking  · You have symptoms of diabetes, such as intense hunger and thirst, and frequent urination  · You have symptoms of sleep apnea, such as snoring or daytime sleepiness  · You have questions or concerns about your condition or care  Treatment for obesity  focuses on helping you lose weight to improve your health  Even a small decrease in BMI can reduce the risk for many health problems  Your healthcare provider will help you set a weight-loss goal   · Lifestyle changes  are the first step in treating obesity  These include making healthy food choices and getting regular physical activity  Your healthcare provider may suggest a weight-loss program that involves coaching, education, and therapy  · Medicine  may help you lose weight when it is used with a healthy diet and physical activity  · Surgery  can help you lose weight if you are very obese and have other health problems  There are several types of weight-loss surgery  Ask your healthcare provider for more information      Be successful losing weight:   · Set small, realistic goals  An example of a small goal is to walk for 20 minutes 5 days a week  Anther goal is to lose 5% of your body weight  · Tell friends, family members, and coworkers about your goals  and ask for their support  Ask a friend to lose weight with you, or join a weight-loss support group  · Identify foods or triggers that may cause you to overeat , and find ways to avoid them  Remove tempting high-calorie foods from your home and workplace  Place a bowl of fresh fruit on your kitchen counter  If stress causes you to eat, then find other ways to cope with stress  · Keep a diary to track what you eat and drink  Also write down how many minutes of physical activity you do each day  Weigh yourself once a week and record it in your diary  Eating changes: You will need to eat 500 to 1,000 fewer calories each day than you currently eat to lose 1 to 2 pounds a week  The following changes will help you cut calories:  · Eat smaller portions  Use small plates, no larger than 9 inches in diameter  Fill your plate half full of fruits and vegetables  Measure your food using measuring cups until you know what a serving size looks like  · Eat 3 meals and 1 or 2 snacks each day  Plan your meals in advance  Chela aPlacios and eat at home most of the time  Eat slowly  Do not skip meals  Skipping meals can lead to overeating later in the day  This can make it harder for you to lose weight  Talk with a dietitian to help you make a meal plan and schedule that is right for you  · Eat fruits and vegetables at every meal   They are low in calories and high in fiber, which makes you feel full  Do not add butter, margarine, or cream sauce to vegetables  Use herbs to season steamed vegetables  · Eat less fat and fewer fried foods  Eat more baked or grilled chicken and fish  These protein sources are lower in calories and fat than red meat  Limit fast food   Dress your salads with olive oil and vinegar instead of bottled dressing  · Limit the amount of sugar you eat  Do not drink sugary beverages  Limit alcohol  Activity changes:  Physical activity is good for your body in many ways  It helps you burn calories and build strong muscles  It decreases stress and depression, and improves your mood  It can also help you sleep better  Talk to your healthcare provider before you begin an exercise program   · Exercise for at least 30 minutes 5 days a week  Start slowly  Set aside time each day for physical activity that you enjoy and that is convenient for you  It is best to do both weight training and an activity that increases your heart rate, such as walking, bicycling, or swimming  · Find ways to be more active  Do yard work and housecleaning  Walk up the stairs instead of using elevators  Spend your leisure time going to events that require walking, such as outdoor festivals or fairs  This extra physical activity can help you lose weight and keep it off  Follow up with your healthcare provider as directed: You may need to meet with a dietitian  Write down your questions so you remember to ask them during your visits  © Copyright [employer] 2021 Information is for End User's use only and may not be sold, redistributed or otherwise used for commercial purposes  All illustrations and images included in CareNotes® are the copyrighted property of A D A M , Inc  or [facility]   The above information is an  only  It is not intended as medical advice for individual conditions or treatments  Talk to your doctor, nurse or pharmacist before following any medical regimen to see if it is safe and effective for you  Heart Healthy Diet   AMBULATORY CARE:   A heart healthy diet  is an eating plan low in unhealthy fats and sodium (salt)  The plan is high in healthy fats and fiber   A heart healthy diet helps improve your cholesterol levels and lowers your risk for heart disease and stroke  A dietitian will teach you how to read and understand food labels  Heart healthy diet guidelines to follow:   · Choose foods that contain healthy fats  ? Unsaturated fats  include monounsaturated and polyunsaturated fats  Unsaturated fat is found in foods such as soybean, canola, olive, corn, and safflower oils  It is also found in soft tub margarine that is made with liquid vegetable oil  ? Omega-3 fat  is found in certain fish, such as salmon, tuna, and trout, and in walnuts and flaxseed  Eat fish high in omega-3 fats at least 2 times a week  · Get 20 to 30 grams of fiber each day  Fruits, vegetables, whole-grain foods, and legumes (cooked beans) are good sources of fiber  · Limit or do not have unhealthy fats  ? Cholesterol  is found in animal foods, such as eggs and lobster, and in dairy products made from whole milk  Limit cholesterol to less than 200 mg each day  ? Saturated fat  is found in meats, such as escobar and hamburger  It is also found in chicken or turkey skin, whole milk, and butter  Limit saturated fat to less than 7% of your total daily calories  ? Trans fat  is found in packaged foods, such as potato chips and cookies  It is also in hard margarine, some fried foods, and shortening  Do not eat foods that contain trans fats  · Limit sodium as directed  You may be told to limit sodium to 2,000 to 2,300 mg each day  Choose low-sodium or no-salt-added foods  Add little or no salt to food you prepare  Use herbs and spices in place of salt  Include the following in your heart healthy plan:  Ask your dietitian or healthcare provider how many servings to have from each of the following food groups:  · Grains:      ? Whole-wheat breads, cereals, and pastas, and brown rice    ? Low-fat, low-sodium crackers and chips    · Vegetables:      ? Broccoli, green beans, green peas, and spinach    ? Collards, kale, and lima beans    ?  Carrots, sweet potatoes, tomatoes, and peppers    ? Canned vegetables with no salt added    · Fruits:      ? Bananas, peaches, pears, and pineapple    ? Grapes, raisins, and dates    ? Oranges, tangerines, grapefruit, orange juice, and grapefruit juice    ? Apricots, mangoes, melons, and papaya    ? Raspberries and strawberries    ? Canned fruit with no added sugar    · Low-fat dairy:      ? Nonfat (skim) milk, 1% milk, and low-fat almond, cashew, or soy milks fortified with calcium    ? Low-fat cheese, regular or frozen yogurt, and cottage cheese    · Meats and proteins:      ? Lean cuts of beef and pork (loin, leg, round), skinless chicken and turkey    ? Legumes, soy products, egg whites, or nuts    Limit or do not include the following in your heart healthy plan:   · Unhealthy fats and oils:      ? Whole or 2% milk, cream cheese, sour cream, or cheese    ? High-fat cuts of beef (T-bone steaks, ribs), chicken or turkey with skin, and organ meats such as liver    ? Butter, stick margarine, shortening, and cooking oils such as coconut or palm oil    · Foods and liquids high in sodium:      ? Packaged foods, such as frozen dinners, cookies, macaroni and cheese, and cereals with more than 300 mg of sodium per serving    ? Vegetables with added sodium, such as instant potatoes, vegetables with added sauces, or regular canned vegetables    ? Cured or smoked meats, such as hot dogs, escobar, and sausage    ? High-sodium ketchup, barbecue sauce, salad dressing, pickles, olives, soy sauce, or miso    · Foods and liquids high in sugar:      ? Candy, cake, cookies, pies, or doughnuts    ? Soft drinks (soda), sports drinks, or sweetened tea    ? Canned or dry mixes for cakes, soups, sauces, or gravies    Other healthy heart guidelines:   · Do not smoke  Nicotine and other chemicals in cigarettes and cigars can cause lung and heart damage  Ask your healthcare provider for information if you currently smoke and need help to quit   E-cigarettes or smokeless tobacco still contain nicotine  Talk to your healthcare provider before you use these products  · Limit or do not drink alcohol as directed  Alcohol can damage your heart and raise your blood pressure  Your healthcare provider may give you specific daily and weekly limits  The general recommended limit is 1 drink a day for women 21 or older and for men 72 or older  Do not have more than 3 drinks in a day or 7 in a week  The recommended limit is 2 drinks a day for men 24to 59years of age  Do not have more than 4 drinks in a day or 14 in a week  A drink of alcohol is 12 ounces of beer, 5 ounces of wine, or 1½ ounces of liquor  · Exercise regularly  Exercise can help you maintain a healthy weight and improve your blood pressure and cholesterol levels  Regular exercise can also decrease your risk for heart problems  Ask your healthcare provider about the best exercise plan for you  Do not start an exercise program without asking your healthcare provider  Follow up with your doctor or cardiologist as directed:  Write down your questions so you remember to ask them during your visits  © Copyright [employer] 2021 Information is for End User's use only and may not be sold, redistributed or otherwise used for commercial purposes  All illustrations and images included in CareNotes® are the copyrighted property of A D A M , Inc  or [employer]  The above information is an  only  It is not intended as medical advice for individual conditions or treatments  Talk to your doctor, nurse or pharmacist before following any medical regimen to see if it is safe and effective for you  Calorie Counting Diet   WHAT YOU NEED TO KNOW:   What is a calorie counting diet? It is a meal plan based on counting calories each day to reach a healthy body weight  You will need to eat fewer calories if you are trying to lose weight   Weight loss may decrease your risk for certain health problems or improve your health if you have health problems  Some of these health problems include heart disease, high blood pressure, and diabetes  What foods should I avoid? Your dietitian will tell you if you need to avoid certain foods based on your body weight and health condition  You may need to avoid high-fat foods if you are at risk for or have heart disease  You may need to eat fewer foods from the breads and starches food group if you have diabetes  How many calories are in foods? The following is a list of foods and drinks with the approximate number of calories in each  Check the food label to find the exact number of calories  A dietitian can tell you how many calories you should have from each food group each day  · Carbohydrate:      ? ½ of a 3-inch bagel, 1 slice of bread, or ½ of a hamburger bun or hot dog bun (80)    ? 1 (8-inch) flour tortilla or ½ cup of cooked rice (100)    ? 1 (6-inch) corn tortilla (80)    ? 1 (6-inch) pancake or 1 cup of bran flakes cereal (110)    ? ½ cup of cooked cereal (80)    ? ½ cup of cooked pasta (85)    ? 1 ounce of pretzels (100)    ? 3 cups of air-popped popcorn without butter or oil (80)    · Dairy:      ? 1 cup of skim or 1% milk (90)    ? 1 cup of 2% milk (120)    ? 1 cup of whole milk (160)    ? 1 cup of 2% chocolate milk (220)    ? 1 ounce of low-fat cheese with 3 grams of fat per ounce (70)    ? 1 ounce of cheddar cheese (114)    ? ½ cup of 1% fat cottage cheese (80)    ? 1 cup of plain or sugar-free, fat-free yogurt (90)    · Protein foods:      ? 3 ounces of fish (not breaded or fried) (95)    ? 3 ounces of breaded, fried fish (195)    ? ¾ cup of tuna canned in water (105)    ? 3 ounces of chicken breast without skin (105)    ? 1 fried chicken breast with skin (350)    ? ¼ cup of fat free egg substitute (40)    ? 1 large egg (75)    ? 3 ounces of lean beef or pork (165)    ? 3 ounces of fried pork chop or ham (185)    ?  ½ cup of cooked dried beans, such as kidney, ross, lentils, or navy (115)    ? 3 ounces of bologna or lunch meat (225)    ? 2 links of breakfast sausage (140)    · Vegetables:      ? ½ cup of sliced mushrooms (10)    ? 1 cup of salad greens, such as lettuce, spinach, or brenda (15)    ? ½ cup of steamed asparagus (20)    ? ½ cup of cooked summer squash, zucchini squash, or green or wax beans (25)    ? 1 cup of broccoli or cauliflower florets, or 1 medium tomato (25)    ? 1 large raw carrot or ½ cup of cooked carrots (40)    ? ? of a medium cucumber or 1 stalk of celery (5)    ? 1 small baked potato (160)    ? 1 cup of breaded, fried vegetables (230)    · Fruit:      ? 1 (6-inch) banana (55)     ? ½ of a 4-inch grapefruit (55)    ? 15 grapes (60)    ? 1 medium orange or apple (70)    ? 1 large peach (65)    ? 1 cup of fresh pineapple chunks (75)    ? 1 cup of melon cubes (50)    ? 1¼ cups of whole strawberries (45)    ? ½ cup of fruit canned in juice (55)    ? ½ cup of fruit canned in heavy syrup (110)    ? ? cup of raisins (130)    ? ½ cup of unsweetened fruit juice (60)    ? ½ cup of grape, cranberry, or prune juice (90)    · Fat:      ? 10 peanuts or 2 teaspoons of peanut butter (55)    ? 2 tablespoons of avocado or 1 tablespoon of regular salad dressing (45)    ? 2 slices of escobar (90)    ? 1 teaspoon of oil, such as safflower, canola, corn, or olive oil (45)    ? 2 teaspoons of low-fat margarine, or 1 tablespoon of low-fat mayonnaise (50)    ? 1 teaspoon of regular margarine (40)    ? 1 tablespoon of regular mayonnaise (135)    ? 1 tablespoon of cream cheese or 2 tablespoons of low-fat cream cheese (45)    ? 2 tablespoons of vegetable shortening (215)    · Dessert and sweets:      ? 8 animal crackers or 5 vanilla wafers (80)    ? 1 frozen fruit juice bar (80)    ? ½ cup of ice milk or low-fat frozen yogurt (90)    ? ½ cup of sherbet or sorbet (125)    ? ½ cup of sugar-free pudding or custard (60)    ? ½ cup of ice cream (140)    ?  ½ cup of pudding or custard (175)    ? 1 (2-inch) square chocolate brownie (185)    · Combination foods:      ? Bean burrito made with an 8-inch tortilla, without cheese (275)    ? Chicken breast sandwich with lettuce and tomato (325)    ? 1 cup of chicken noodle soup (60)    ? 1 beef taco (175)    ? Regular hamburger with lettuce and tomato (310)    ? Regular cheeseburger with lettuce and tomato (410)     ? ¼ of a 12-inch cheese pizza (280)    ? Fried fish sandwich with lettuce and tomato (425)    ? Hot dog and bun (275)    ? 1½ cups of macaroni and cheese (310)    ? Taco salad with a fried tortilla shell (870)    · Low-calorie foods:      ? 1 tablespoon of ketchup or 1 tablespoon of fat free sour cream (15)    ? 1 teaspoon of mustard (5)    ? ¼ cup of salsa (20)    ? 1 large dill pickle (15)    ? 1 tablespoon of fat free salad dressing (10)    ? 2 teaspoons of low-sugar, light jam or jelly, or 1 tablespoon of sugar-free syrup (15)    ? 1 sugar-free popsicle (15)    ? 1 cup of club soda, seltzer water, or diet soda (0)    CARE AGREEMENT:   You have the right to help plan your care  Discuss treatment options with your healthcare provider to decide what care you want to receive  You always have the right to refuse treatment  The above information is an  only  It is not intended as medical advice for individual conditions or treatments  Talk to your doctor, nurse or pharmacist before following any medical regimen to see if it is safe and effective for you  © Copyright [employer] 2021 Information is for End User's use only and may not be sold, redistributed or otherwise used for commercial purposes   All illustrations and images included in CareNotes® are the copyrighted property of A D A M , Inc  or [address] [place] [address] [employer]

## 2021-07-21 NOTE — PROGRESS NOTES
Diabetic Foot Exam    Patient's shoes and socks removed  Right Foot/Ankle   Right Foot Inspection  Skin Exam: skin normal and skin intact no dry skin, no warmth, no callus, no erythema, no maceration, no abnormal color, no pre-ulcer, no ulcer and no callus                          Toe Exam: ROM and strength within normal limitsno swelling, no tenderness, erythema and  no right toe deformity  Sensory   Vibration: diminished  Proprioception: diminished   Monofilament testing: diminished  Vascular  Capillary refills: < 3 seconds  The right DP pulse is 2+  The right PT pulse is 2+  Left Foot/Ankle  Left Foot Inspection  Skin Exam: skin normal and skin intactno dry skin, no warmth, no erythema, no maceration, normal color, no pre-ulcer, no ulcer and no callus                         Toe Exam: ROM and strength within normal limitsno swelling, no tenderness, no erythema and no left toe deformity                   Sensory   Vibration: diminished  Proprioception: diminished  Monofilament: diminished  Vascular  Capillary refills: < 3 seconds  The left DP pulse is 2+  The left PT pulse is 2+  Assign Risk Category:  No deformity present; Loss of protective sensation;  No weak pulses

## 2021-07-21 NOTE — ASSESSMENT & PLAN NOTE
Blood pressure is showing adequate control with present treatment  Patient will continue present medication and surveillance  We will continue also to monitor his renal function

## 2021-07-21 NOTE — ASSESSMENT & PLAN NOTE
History of obstructive sleep apnea  Patient states he cannot tolerate the CPAP device and therefore not using this  We did suggest the follow-up with the sleep specialist but he states this point he will consider this and call us if he changes his mind

## 2021-07-21 NOTE — ASSESSMENT & PLAN NOTE
Lab Results   Component Value Date    HGBA1C 9 0 (H) 07/18/2021    patient as noted did have labs performed prior to the visit today we did discuss the results  His fasting blood sugars high hemoglobin A1c showing poor control at 9 0  Patient states he is compliant with medication but not with his diet or exercise program and we did reinforce the importance of watching his intake of concentrated sweets, simple carbohydrates and calories overall  Start some type of routine exercise program in order to help with his obesity and control his sugar  Decision today was to add Amaryl 1 mg daily to his regime in order to get his sugars under better control  Patient is intolerant to metformin  A check a fasting blood sugar hemoglobin A1c with his next visit

## 2021-07-21 NOTE — ASSESSMENT & PLAN NOTE
Patient's weight is stable with no appreciable change from his last visit  He admits that he is more active during the spring and summer working in [employer] but he has not made any significant changes with his dietary intake in order to help with weight loss  Patient states his 1 addiction as chocolate  He was told he needs to work much harder at his caloric intake and start some type of routine exercise program in order to help   facilitate weight loss

## 2021-07-21 NOTE — ASSESSMENT & PLAN NOTE
Chronic back pain  Patient states that this is stable  With this in mind we did discuss with the patient the importance of starting some type of exercise program which will be beneficial for his sugar, weight loss

## 2021-07-22 DIAGNOSIS — E11.9 TYPE 2 DIABETES MELLITUS WITHOUT COMPLICATION, WITHOUT LONG-TERM CURRENT USE OF INSULIN (HCC): ICD-10-CM

## 2021-07-22 DIAGNOSIS — M46.1 SACROILIITIS (HCC): ICD-10-CM

## 2021-07-22 RX ORDER — DAPAGLIFLOZIN 5 MG/1
TABLET, FILM COATED ORAL
Qty: 90 TABLET | Refills: 0 | Status: SHIPPED | OUTPATIENT
Start: 2021-07-22 | End: 2021-10-14

## 2021-10-01 DIAGNOSIS — E78.01 FAMILIAL HYPERCHOLESTEROLEMIA: ICD-10-CM

## 2021-10-01 DIAGNOSIS — I10 ESSENTIAL HYPERTENSION: ICD-10-CM

## 2021-10-01 RX ORDER — ROSUVASTATIN CALCIUM 10 MG/1
TABLET, COATED ORAL
Qty: 90 TABLET | Refills: 3 | Status: SHIPPED | OUTPATIENT
Start: 2021-10-01

## 2021-10-01 RX ORDER — LISINOPRIL 5 MG/1
TABLET ORAL
Qty: 90 TABLET | Refills: 3 | Status: SHIPPED | OUTPATIENT
Start: 2021-10-01

## 2021-10-14 DIAGNOSIS — M46.1 SACROILIITIS (HCC): ICD-10-CM

## 2021-10-14 DIAGNOSIS — E11.9 TYPE 2 DIABETES MELLITUS WITHOUT COMPLICATION, WITHOUT LONG-TERM CURRENT USE OF INSULIN (HCC): ICD-10-CM

## 2021-10-14 RX ORDER — DAPAGLIFLOZIN 5 MG/1
TABLET, FILM COATED ORAL
Qty: 90 TABLET | Refills: 0 | Status: SHIPPED | OUTPATIENT
Start: 2021-10-14 | End: 2022-01-11

## 2021-11-19 ENCOUNTER — APPOINTMENT (OUTPATIENT)
Dept: LAB | Age: 66
End: 2021-11-19
Payer: COMMERCIAL

## 2021-11-19 DIAGNOSIS — E13.9 DIABETES 1.5, MANAGED AS TYPE 2 (HCC): ICD-10-CM

## 2021-11-19 DIAGNOSIS — I10 ESSENTIAL HYPERTENSION: ICD-10-CM

## 2021-11-19 DIAGNOSIS — E78.2 MIXED HYPERLIPIDEMIA: ICD-10-CM

## 2021-11-19 LAB
ANION GAP SERPL CALCULATED.3IONS-SCNC: 6 MMOL/L (ref 4–13)
BUN SERPL-MCNC: 14 MG/DL (ref 5–25)
CALCIUM SERPL-MCNC: 9.1 MG/DL (ref 8.3–10.1)
CHLORIDE SERPL-SCNC: 104 MMOL/L (ref 100–108)
CHOLEST SERPL-MCNC: 203 MG/DL
CO2 SERPL-SCNC: 28 MMOL/L (ref 21–32)
CREAT SERPL-MCNC: 0.78 MG/DL (ref 0.6–1.3)
EST. AVERAGE GLUCOSE BLD GHB EST-MCNC: 220 MG/DL
GFR SERPL CREATININE-BSD FRML MDRD: 95 ML/MIN/1.73SQ M
GLUCOSE P FAST SERPL-MCNC: 146 MG/DL (ref 65–99)
HBA1C MFR BLD: 9.3 %
HDLC SERPL-MCNC: 61 MG/DL
LDLC SERPL CALC-MCNC: 105 MG/DL (ref 0–100)
NONHDLC SERPL-MCNC: 142 MG/DL
POTASSIUM SERPL-SCNC: 3.6 MMOL/L (ref 3.5–5.3)
SODIUM SERPL-SCNC: 138 MMOL/L (ref 136–145)
TRIGL SERPL-MCNC: 186 MG/DL

## 2021-11-19 PROCEDURE — 80061 LIPID PANEL: CPT

## 2021-11-19 PROCEDURE — 83036 HEMOGLOBIN GLYCOSYLATED A1C: CPT

## 2021-11-19 PROCEDURE — 36415 COLL VENOUS BLD VENIPUNCTURE: CPT

## 2021-11-19 PROCEDURE — 3046F HEMOGLOBIN A1C LEVEL >9.0%: CPT | Performed by: INTERNAL MEDICINE

## 2021-11-19 PROCEDURE — 80048 BASIC METABOLIC PNL TOTAL CA: CPT

## 2021-11-24 ENCOUNTER — OFFICE VISIT (OUTPATIENT)
Dept: INTERNAL MEDICINE CLINIC | Facility: CLINIC | Age: 66
End: 2021-11-24
Payer: COMMERCIAL

## 2021-11-24 VITALS
RESPIRATION RATE: 16 BRPM | OXYGEN SATURATION: 98 % | SYSTOLIC BLOOD PRESSURE: 120 MMHG | HEART RATE: 72 BPM | TEMPERATURE: 96.6 F | BODY MASS INDEX: 34.44 KG/M2 | WEIGHT: 277 LBS | DIASTOLIC BLOOD PRESSURE: 82 MMHG | HEIGHT: 75 IN

## 2021-11-24 DIAGNOSIS — E78.2 MIXED HYPERLIPIDEMIA: ICD-10-CM

## 2021-11-24 DIAGNOSIS — E66.01 MORBID OBESITY (HCC): ICD-10-CM

## 2021-11-24 DIAGNOSIS — I10 ESSENTIAL HYPERTENSION: ICD-10-CM

## 2021-11-24 DIAGNOSIS — E13.9 DIABETES 1.5, MANAGED AS TYPE 2 (HCC): Primary | ICD-10-CM

## 2021-11-24 DIAGNOSIS — M54.16 LUMBAR RADICULOPATHY: ICD-10-CM

## 2021-11-24 PROCEDURE — 3074F SYST BP LT 130 MM HG: CPT | Performed by: INTERNAL MEDICINE

## 2021-11-24 PROCEDURE — 3079F DIAST BP 80-89 MM HG: CPT | Performed by: INTERNAL MEDICINE

## 2021-11-24 PROCEDURE — 1036F TOBACCO NON-USER: CPT | Performed by: INTERNAL MEDICINE

## 2021-11-24 PROCEDURE — 99214 OFFICE O/P EST MOD 30 MIN: CPT | Performed by: INTERNAL MEDICINE

## 2021-11-24 PROCEDURE — 3008F BODY MASS INDEX DOCD: CPT | Performed by: INTERNAL MEDICINE

## 2022-01-10 DIAGNOSIS — E13.9 DIABETES 1.5, MANAGED AS TYPE 2 (HCC): ICD-10-CM

## 2022-01-10 RX ORDER — GLIMEPIRIDE 1 MG/1
1 TABLET ORAL
Qty: 30 TABLET | Refills: 0 | Status: SHIPPED | OUTPATIENT
Start: 2022-01-10 | End: 2022-01-29 | Stop reason: SDUPTHER

## 2022-01-11 DIAGNOSIS — M46.1 SACROILIITIS (HCC): ICD-10-CM

## 2022-01-11 DIAGNOSIS — E11.9 TYPE 2 DIABETES MELLITUS WITHOUT COMPLICATION, WITHOUT LONG-TERM CURRENT USE OF INSULIN (HCC): ICD-10-CM

## 2022-01-11 RX ORDER — DAPAGLIFLOZIN 5 MG/1
TABLET, FILM COATED ORAL
Qty: 90 TABLET | Refills: 0 | Status: SHIPPED | OUTPATIENT
Start: 2022-01-11 | End: 2022-02-14

## 2022-01-29 DIAGNOSIS — E13.9 DIABETES 1.5, MANAGED AS TYPE 2 (HCC): ICD-10-CM

## 2022-01-31 RX ORDER — GLIMEPIRIDE 1 MG/1
1 TABLET ORAL
Qty: 30 TABLET | Refills: 0 | Status: SHIPPED | OUTPATIENT
Start: 2022-01-31 | End: 2022-02-26

## 2022-02-14 ENCOUNTER — OFFICE VISIT (OUTPATIENT)
Dept: ENDOCRINOLOGY | Facility: CLINIC | Age: 67
End: 2022-02-14
Payer: COMMERCIAL

## 2022-02-14 VITALS
WEIGHT: 280 LBS | HEART RATE: 72 BPM | BODY MASS INDEX: 34.82 KG/M2 | DIASTOLIC BLOOD PRESSURE: 90 MMHG | HEIGHT: 75 IN | SYSTOLIC BLOOD PRESSURE: 140 MMHG | TEMPERATURE: 97 F

## 2022-02-14 DIAGNOSIS — E78.2 MIXED HYPERLIPIDEMIA: ICD-10-CM

## 2022-02-14 DIAGNOSIS — I10 ESSENTIAL HYPERTENSION: ICD-10-CM

## 2022-02-14 DIAGNOSIS — E11.65 TYPE 2 DIABETES MELLITUS WITH HYPERGLYCEMIA, WITHOUT LONG-TERM CURRENT USE OF INSULIN (HCC): Primary | ICD-10-CM

## 2022-02-14 PROCEDURE — 3080F DIAST BP >= 90 MM HG: CPT | Performed by: PHYSICIAN ASSISTANT

## 2022-02-14 PROCEDURE — 99214 OFFICE O/P EST MOD 30 MIN: CPT | Performed by: PHYSICIAN ASSISTANT

## 2022-02-14 PROCEDURE — 3008F BODY MASS INDEX DOCD: CPT | Performed by: PHYSICIAN ASSISTANT

## 2022-02-14 PROCEDURE — 1036F TOBACCO NON-USER: CPT | Performed by: PHYSICIAN ASSISTANT

## 2022-02-14 PROCEDURE — 3077F SYST BP >= 140 MM HG: CPT | Performed by: PHYSICIAN ASSISTANT

## 2022-02-14 RX ORDER — DAPAGLIFLOZIN 10 MG/1
10 TABLET, FILM COATED ORAL DAILY
Qty: 90 TABLET | Refills: 1 | Status: SHIPPED | OUTPATIENT
Start: 2022-02-14

## 2022-02-14 NOTE — PROGRESS NOTES
Patient Progress Note      CC: DM      Referring Provider  No referring provider defined for this encounter  History of Present Illness:   Emile Evangelista is a 74 y o  male with a history of type 2 diabetes without long term use of insulin  Diabetes course has been stable  Denies recent illness or hospitalizations  Denies recent severe hypoglycemic or severe hyperglycemic episodes  Denies any issues with his current regimen  Home glucose monitoring: are performed regularly     No log or meter today   Home blood glucose readings: readings range from 170-210 in the mornings      Current regimen: Farxiga 5 mg daily, glimepiride 1 mg with breakfast  compliant most of the time, denies any side effects from medications  Hypoglycemic episodes: No, never  Treatment of hypoglycemia: discussed treatment     Diabetes education: No   Diet: 3 meals per day, 3-4 snacks per day  Timing of meals is predictable  Diabetic diet compliance: not compliant much of the time, especially with snacks  Activity: Daily activity is predictable: Yes  Exercises 1-3 times a week  Ophthamology: goes to [facility]); reports he went Dec 2021 or Jan 2022  Podiatry: diabetic foot exam UTD, March 2021     Has hypertension: on ACE inhibitor/ARB, compliant most of the time  Has hyperlipidemia: on statin - tolerating well, no myalgias  compliant most of the time, denies any side effects from medications  Thyroid disorders: No  History of pancreatitis: No    Patient Active Problem List   Diagnosis    Lumbago with sciatica, left side    Lumbar degenerative disc disease    Lumbar herniated disc    Lumbar radiculopathy    Lumbar spondylosis    Myofascial pain    Obesity    [facility]    Hyperlipidemia    Diabetes ([facility]    Essential hypertension    AIDEE (obstructive sleep apnea)    Insomnia    Hypersomnia    Obesity (BMI 30-39  9)    Diabetes 1 5, 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[address] )   [address] maintenance  Morbid obesity (HCC)    Trigger middle finger of right hand    Trigger finger, left middle finger    Greater trochanteric bursitis of both hips    Occult blood in stools      Past Medical History:   Diagnosis Date    Diabetes mellitus ([address] )     Headache     High cholesterol     Hypertension     Kidney stones     Memory loss 2017    due to Gabapentin    Sleep apnea     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      Past Surgical History:   Procedure Laterality Date    COLONOSCOPY N/A 11/22/2016    Procedure: COLONOSCOPY;  Surgeon: Mackenzie Rene MD;  [facility]: [facility];   Service:     DEBRIDEMENT TENNIS ELBOW  2006    HERNIA REPAIR  1613    x2, umbilical and right side per patient    NASAL SEPTUM SURGERY  1980    ME INCISE FINGER TENDON SHEATH Right 11/19/2019    Procedure: RELEASE TRIGGER FINGER-right long finger;  Surgeon: Iam Ambrose MD;  [facility];  Service: Orthopedics    ME INCISE FINGER TENDON SHEATH Left 11/26/2019    Procedure: RELEASE TRIGGER FINGER-left long finger;  Surgeon: Iam Ambrose MD;  [facility];  Service: Orthopedics    UVULOPALATOPHARYNGOPLASTY  2004      Family History   Family history unknown: Yes     Social History     Tobacco Use    Smoking status: Never Smoker    Smokeless tobacco: Never Used   Substance Use Topics    Alcohol use: Yes     Comment: rarely     Allergies   Allergen Reactions    Tramadol Itching         Current Outpatient Medications:     acetaminophen (TYLENOL 8 HOUR) 650 mg CR tablet, Take 1 tablet (650 mg total) by mouth every 8 (eight) hours, Disp: 15 tablet, Rfl: 0    Coenzyme Q10 (CO Q-10) 200 MG CAPS, , Disp: , Rfl:     diclofenac sodium (VOLTAREN) 50 mg EC tablet, TAKE 1 TABLET (50 MG TOTAL) BY MOUTH 2 (TWO) TIMES A DAY AS NEEDED (PAIN), Disp: 180 tablet, Rfl: 0    glimepiride (AMARYL) 1 mg tablet, Take 1 tablet (1 mg total) by mouth daily with breakfast, Disp: 30 tablet, Rfl: 0    lisinopril (ZESTRIL) 5 mg tablet, TAKE 1 TABLET BY MOUTH EVERY DAY, Disp: 90 tablet, Rfl: 3    rosuvastatin (CRESTOR) 10 MG tablet, TAKE 1 TABLET BY MOUTH EVERY DAY, Disp: 90 tablet, Rfl: 3    Dapagliflozin Propanediol (Farxiga) 10 MG TABS, Take 1 tablet (10 mg total) by mouth daily, Disp: 90 tablet, Rfl: 1  Review of Systems   Constitutional: Positive for fatigue  Negative for activity change, appetite change and unexpected weight change  HENT: Negative for trouble swallowing  Eyes: Negative for visual disturbance  Respiratory: Negative for shortness of breath  Cardiovascular: Negative for chest pain and palpitations  Gastrointestinal: Negative for constipation and diarrhea  Endocrine: Negative for polydipsia and polyuria  Genitourinary: Positive for frequency  Musculoskeletal: Negative  Skin: Negative for wound  Neurological: Positive for numbness  Psychiatric/Behavioral: Negative  Physical Exam:  Body mass index is 35 kg/m²  /90   Pulse 72   Temp (!) 97 °F (36 1 °C) (Tympanic)   Ht 6' 3" (1 905 m)   Wt 127 kg (280 lb)   BMI 35 00 kg/m²    Wt Readings from Last 3 Encounters:   02/14/22 127 kg (280 lb)   11/24/21 126 kg (277 lb)   07/21/21 127 kg (281 lb)       Physical Exam  Vitals and nursing note reviewed  Constitutional:       Appearance: He is well-developed  HENT:      Head: Normocephalic  Eyes:      General: No scleral icterus  Pupils: Pupils are equal, round, and reactive to light  Neck:      Thyroid: No thyromegaly  Cardiovascular:      Rate and Rhythm: Normal rate and regular rhythm  Pulses:           Radial pulses are 2+ on the right side and 2+ on the left side  Heart sounds: No murmur heard  Pulmonary:      Effort: Pulmonary effort is normal  No respiratory distress  Breath sounds: Normal breath sounds  No wheezing  Musculoskeletal:      Cervical back: Neck supple  Skin:     General: Skin is warm and dry     Neurological:      Mental Status: He is alert  Patient's shoes and socks were not removed  Labs:   Component      Latest Ref Rng & Units 7/18/2021 11/19/2021   Sodium      136 - 145 mmol/L  138   Potassium      3 5 - 5 3 mmol/L  3 6   Chloride      100 - 108 mmol/L  104   CO2      21 - 32 mmol/L  28   Anion Gap      4 - 13 mmol/L  6   BUN      5 - 25 mg/dL  14   Creatinine      0 60 - 1 30 mg/dL  0 78   GLUCOSE FASTING      65 - 99 mg/dL  146 (H)   Calcium      8 3 - 10 1 mg/dL  9 1   eGFR      ml/min/1 73sq m  95   Cholesterol      See Comment mg/dL 232 (H) 203 (H)   Triglycerides      See Comment mg/dL 137 186 (H)   HDL      >=40 mg/dL 70 61   LDL Calculated      0 - 100 mg/dL 135 (H) 105 (H)   Non-HDL Cholesterol      mg/dl 162 142   Hemoglobin A1C      Normal 3 8-5 6%; PreDiabetic 5 7-6 4%; Diabetic >=6 5%; Glycemic control for adults with diabetes <7 0% % 9 0 (H) 9 3 (H)   eAG, EST AVG Glucose      mg/dl 212 220     Plan:    Diagnoses and all orders for this visit:    Type 2 diabetes mellitus with hyperglycemia, without long-term current use of insulin (HCC)  HGA1C 9 3%  Due now  Treatment regimen: due to elevated morning BG, increase Farxiga to 10 mg daily  Episodes of hypoglycemia can lead to permanent disability and death  Discussed risks/complications associated with uncontrolled diabetes  Advised to adhere to diabetic diet, and recommended staying active/exercising routinely as tolerated  Keep carbohydrates consistent to limit blood glucose fluctuations  Advised to call if blood sugars less than 70 mg/dl or over 300 mg/dl  Check blood glucose 2 times a day  Discussed symptoms and treatment of hypoglycemia  Recommended routine follow-up with podiatry and ophthalmology  Send log in 2 weeks  Ordered blood work to complete prior to next visit  -     Hemoglobin A1C; Future  -     Basic metabolic panel; Future  -     Microalbumin / creatinine urine ratio;  Future  -     Hemoglobin A1C; Future  -     Basic metabolic panel; Future  -     Dapagliflozin Propanediol (Farxiga) 10 MG TABS; Take 1 tablet (10 mg total) by mouth daily    Essential hypertension  Blood pressure elevated  Advised to monitor BP at home and follow-up with PCP if remaining elevated  For now continue current treatment   -     Basic metabolic panel; Future  -     Basic metabolic panel; Future    Mixed hyperlipidemia  LDL previously 105  Continue statin therapy   Managed by PCP        Discussed with the patient diagnosis and treatment and all questions fully answered  He will call me if any problems arise  Counseled patient on diagnostic results, prognosis, risk and benefit of treatment options, instruction for management, importance of treatment compliance, risk factor reduction and impressions        Maddie Kidd PA-C

## 2022-02-14 NOTE — PATIENT INSTRUCTIONS
Hypoglycemia instructions   Velma Vaca  2/14/2022  [id number]    Low Blood Sugar    Steps to treat low blood sugar  1  Test blood sugar if you have symptoms of low blood sugar:   Low Blood Sugar Symptoms:  o Sweaty  o Dizzy  o Rapid heartbeat  o Shaky  o Bad mood  o Hungry      2  Treat blood sugar less than 70 with 15 grams of fast-acting carbohydrate:   Examples of 15 grams Fast-Acting Carbohydrate:  o 4 oz juice  o 4 oz regular soda  o 3-4 glucose tablets (chew)  o 3-4 hard candies (chew)          3  Wait 15 minutes and test your blood sugar again     4   If blood sugar is less than 100, repeat steps 2-3     5  When your blood sugar is 100 or more, eat a snack if it will be longer than one hour until your next meal  The snack should be 15 grams of carbohydrate and a protein:   Examples of snacks:  o ½ sandwich  o 6 crackers with cheese  o Piece of fruit with cheese or peanut butter  o 6 crackers with peanut butter

## 2022-02-26 DIAGNOSIS — E13.9 DIABETES 1.5, MANAGED AS TYPE 2 (HCC): ICD-10-CM

## 2022-02-26 RX ORDER — GLIMEPIRIDE 1 MG/1
TABLET ORAL
Qty: 30 TABLET | Refills: 0 | Status: SHIPPED | OUTPATIENT
Start: 2022-02-26 | End: 2022-03-17 | Stop reason: SDUPTHER

## 2022-03-17 DIAGNOSIS — E13.9 DIABETES 1.5, MANAGED AS TYPE 2 (HCC): ICD-10-CM

## 2022-03-17 RX ORDER — GLIMEPIRIDE 1 MG/1
1 TABLET ORAL
Qty: 30 TABLET | Refills: 0 | Status: SHIPPED | OUTPATIENT
Start: 2022-03-17 | End: 2022-03-18

## 2022-03-18 RX ORDER — GLIMEPIRIDE 1 MG/1
1 TABLET ORAL
Qty: 90 TABLET | Refills: 3 | Status: SHIPPED | OUTPATIENT
Start: 2022-03-18 | End: 2022-07-11

## 2022-03-27 ENCOUNTER — APPOINTMENT (OUTPATIENT)
Dept: LAB | Age: 67
End: 2022-03-27
Payer: COMMERCIAL

## 2022-03-27 DIAGNOSIS — E13.9 DIABETES 1.5, MANAGED AS TYPE 2 (HCC): ICD-10-CM

## 2022-03-27 DIAGNOSIS — I10 ESSENTIAL HYPERTENSION: ICD-10-CM

## 2022-03-27 LAB
ANION GAP SERPL CALCULATED.3IONS-SCNC: 7 MMOL/L (ref 4–13)
BUN SERPL-MCNC: 14 MG/DL (ref 5–25)
CALCIUM SERPL-MCNC: 9.4 MG/DL (ref 8.3–10.1)
CHLORIDE SERPL-SCNC: 107 MMOL/L (ref 100–108)
CO2 SERPL-SCNC: 26 MMOL/L (ref 21–32)
CREAT SERPL-MCNC: 0.77 MG/DL (ref 0.6–1.3)
EST. AVERAGE GLUCOSE BLD GHB EST-MCNC: 197 MG/DL
GFR SERPL CREATININE-BSD FRML MDRD: 95 ML/MIN/1.73SQ M
GLUCOSE P FAST SERPL-MCNC: 168 MG/DL (ref 65–99)
HBA1C MFR BLD: 8.5 %
POTASSIUM SERPL-SCNC: 4.1 MMOL/L (ref 3.5–5.3)
SODIUM SERPL-SCNC: 140 MMOL/L (ref 136–145)

## 2022-03-27 PROCEDURE — 80048 BASIC METABOLIC PNL TOTAL CA: CPT

## 2022-03-27 PROCEDURE — 36415 COLL VENOUS BLD VENIPUNCTURE: CPT

## 2022-03-27 PROCEDURE — 83036 HEMOGLOBIN GLYCOSYLATED A1C: CPT

## 2022-03-27 PROCEDURE — 3052F HG A1C>EQUAL 8.0%<EQUAL 9.0%: CPT | Performed by: PHYSICIAN ASSISTANT

## 2022-04-01 ENCOUNTER — OFFICE VISIT (OUTPATIENT)
Dept: INTERNAL MEDICINE CLINIC | Facility: CLINIC | Age: 67
End: 2022-04-01
Payer: COMMERCIAL

## 2022-04-01 VITALS
OXYGEN SATURATION: 97 % | WEIGHT: 290 LBS | HEIGHT: 75 IN | BODY MASS INDEX: 36.06 KG/M2 | RESPIRATION RATE: 17 BRPM | HEART RATE: 85 BPM | DIASTOLIC BLOOD PRESSURE: 84 MMHG | SYSTOLIC BLOOD PRESSURE: 138 MMHG

## 2022-04-01 DIAGNOSIS — I10 ESSENTIAL HYPERTENSION: Primary | ICD-10-CM

## 2022-04-01 DIAGNOSIS — E13.9 DIABETES 1.5, MANAGED AS TYPE 2 (HCC): ICD-10-CM

## 2022-04-01 DIAGNOSIS — Z00.00 HEALTHCARE MAINTENANCE: ICD-10-CM

## 2022-04-01 DIAGNOSIS — M51.36 LUMBAR DEGENERATIVE DISC DISEASE: ICD-10-CM

## 2022-04-01 DIAGNOSIS — Z12.5 PROSTATE CANCER SCREENING: ICD-10-CM

## 2022-04-01 DIAGNOSIS — E66.01 MORBID OBESITY (HCC): ICD-10-CM

## 2022-04-01 PROCEDURE — 1036F TOBACCO NON-USER: CPT | Performed by: INTERNAL MEDICINE

## 2022-04-01 PROCEDURE — 3075F SYST BP GE 130 - 139MM HG: CPT | Performed by: INTERNAL MEDICINE

## 2022-04-01 PROCEDURE — 1101F PT FALLS ASSESS-DOCD LE1/YR: CPT | Performed by: INTERNAL MEDICINE

## 2022-04-01 PROCEDURE — 99214 OFFICE O/P EST MOD 30 MIN: CPT | Performed by: INTERNAL MEDICINE

## 2022-04-01 PROCEDURE — 3079F DIAST BP 80-89 MM HG: CPT | Performed by: INTERNAL MEDICINE

## 2022-04-01 PROCEDURE — 3008F BODY MASS INDEX DOCD: CPT | Performed by: INTERNAL MEDICINE

## 2022-04-01 PROCEDURE — 3288F FALL RISK ASSESSMENT DOCD: CPT | Performed by: INTERNAL MEDICINE

## 2022-04-01 PROCEDURE — 3725F SCREEN DEPRESSION PERFORMED: CPT | Performed by: INTERNAL MEDICINE

## 2022-04-01 RX ORDER — TRIAMCINOLONE ACETONIDE 1 MG/G
CREAM TOPICAL
COMMUNITY
Start: 2022-01-31

## 2022-04-01 RX ORDER — KETOCONAZOLE 20 MG/G
CREAM TOPICAL
COMMUNITY
Start: 2022-01-31

## 2022-04-01 NOTE — ASSESSMENT & PLAN NOTE
Patient is overdue for routine physical   He was given a slip for complete labs to be performed prior to his next visit 4 months  Patient was told in the interim if any new problems or concerns to please call

## 2022-04-01 NOTE — ASSESSMENT & PLAN NOTE
The patient has reestablish care with endocrinology  They have made some modification of treatment with his last visit  They have increased his Farxiga 10 mg daily  Patient relates that he is trying to watch his caloric intake but recent hemoglobin A1c is still showing poor control at 8 5  Is a appointment to be seen again by endocrinology the future and most likely will have some changes medication  The we did discuss with the patient importance watching his caloric intake and he hopes to restart some type of routine exercise program with the warmer weather  A diabetic foot exam was performed today    Continue to follow-up with ophthalmology  Lab Results   Component Value Date    HGBA1C 8 5 (H) 03/27/2022

## 2022-04-01 NOTE — ASSESSMENT & PLAN NOTE
The patient's blood pressure continues to show adequate control with present treatment  Patient will continue present medication and surveillance  We will also continue to watch his kidney function

## 2022-04-01 NOTE — PROGRESS NOTES
Diabetic Foot Exam    Patient's shoes and socks removed  Right Foot/Ankle   Right Foot Inspection  Skin Exam: skin normal and skin intact  No dry skin, no warmth, no callus, no erythema, no maceration, no abnormal color, no pre-ulcer, no ulcer and no callus  Toe Exam: ROM and strength within normal limits  No swelling, no tenderness, erythema and  no right toe deformity    Sensory   Vibration: absent  Proprioception: absent  Monofilament testing: absent    Vascular  Capillary refills: < 3 seconds  The right DP pulse is 2+  The right PT pulse is 2+  Left Foot/Ankle  Left Foot Inspection  Skin Exam: skin normal and skin intact  No dry skin, no warmth, no erythema, no maceration, normal color, no pre-ulcer, no ulcer and no callus  Toe Exam: ROM and strength within normal limits  No swelling, no tenderness, no erythema and no left toe deformity  Sensory   Vibration: absent  Proprioception: absent  Monofilament testing: absent    Vascular  Capillary refills: < 3 seconds  The left DP pulse is 2+  The left PT pulse is 2+       Assign Risk Category  No deformity present  Loss of protective sensation  No weak pulses  Risk: 1

## 2022-04-01 NOTE — PROGRESS NOTES
Assessment/Plan:    Essential hypertension  The patient's blood pressure continues to show adequate control with present treatment  Patient will continue present medication and surveillance  We will also continue to watch his kidney function  Diabetes 1 5, managed as type 2 [facility])  The patient has reestablish care with endocrinology  They have made some modification of treatment with his last visit  They have increased his Farxiga 10 mg daily  Patient relates that he is trying to watch his caloric intake but recent hemoglobin A1c is still showing poor control at 8 5  Is a appointment to be seen again by endocrinology the future and most likely will have some changes medication  The we did discuss with the patient importance watching his caloric intake and he hopes to restart some type of routine exercise program with the warmer weather  A diabetic foot exam was performed today  Continue to follow-up with ophthalmology  Lab Results   Component Value Date    HGBA1C 8 5 (H) 03/27/2022       Morbid obesity ([address] )  With the patient's BMI he is considered morbidly obese  He has gained approximately 13 lb since his last visit in November  States that he is not always perfect a as far as watching his caloric intake  During the fall and winter minimal exercise  Patient intends to make concerted effort to look more closely at his caloric intake and plans to be more physically active during the spring and summer of this year  Healthcare maintenance  Patient is overdue for routine physical   He was given a slip for complete labs to be performed prior to his next visit 4 months  Patient was told in the interim if any new problems or concerns to please call  Lumbar degenerative disc disease  Patient does have a history of chronic low pain  Relates that he has been doing therapeutic exercise at home which is been helpful  No acute exacerbations or worsening pain         Diagnoses and all orders for this visit:    Essential hypertension  -     TSH, 3rd generation with Free T4 reflex; Future    Diabetes 1 5, 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[address] )  -     Hemoglobin A1C; Future  -     Microalbumin / creatinine urine ratio  -     TSH, 3rd generation with Free T4 reflex; Future    Healthcare maintenance  -     Comprehensive metabolic panel; Future  -     CBC and differential; Future  -     Lipid panel; Future  -     UA (URINE) with reflex to Scope; Future    Morbid obesity (HCC)  -     TSH, 3rd generation with Free T4 reflex; Future    Prostate cancer screening  -     PSA, Total Screen; Future    Lumbar degenerative disc disease    Other orders  -     ketoconazole (NIZORAL) 2 % cream; APPLY TWICE DAILY TO AFFECTED AREA ON THE FACE ONGOING  -     triamcinolone (KENALOG) 0 1 % cream; APPLY TWICE DAILY TO AFFECTED AREA ON THE FACE X 7 DAYS MAX AS NEEDED  Subjective:      Patient ID: Jez Escalante is a 74 y o  male  Patient is a 72-year-old male history of medical problems as outlined previously was here today for routine follow-up  He did have labs performed prior to the visit today we did discuss the results  Patient states in general he is doing well although he has been gaining some weight  Intends to restart increasing physical exercise in the next few months  No new complaints the      The following portions of the patient's history were reviewed and updated as appropriate:   He  has a past medical history of Arthritis (2018), Diabetes mellitus ([address] ), GERD (gastroesophageal reflux disease), Headache, Headache(784 0), High cholesterol, Hypertension, Kidney stone (2005), Kidney stones, Memory loss (2017), Obesity, Sleep apnea, and Visual impairment (1957)    He   Patient Active Problem List    Diagnosis Date Noted    Occult blood in stools 03/18/2021    Greater trochanteric bursitis of both hips 11/05/2019    Trigger finger, left middle finger 08/02/2019    Trigger middle finger of right hand 07/02/2019    Morbid obesity ([facility] ) 02/07/2019    Healthcare maintenance 10/09/2018    AIDEE (obstructive sleep apnea) 03/05/2018    Insomnia 03/05/2018    Hypersomnia 03/05/2018    Obesity (BMI 30-39 9) 03/05/2018    Diabetes 1 5, 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[facility] ) 03/05/2018    Sacroiliitis ([place] [address] ) 02/06/2018    Lumbar degenerative disc disease 10/23/2017    Lumbar herniated disc 10/23/2017    Lumbar spondylosis 10/23/2017    Lumbar radiculopathy 08/29/2017    Myofascial pain 08/29/2017    Lumbago with sciatica, left side 07/31/2017    Diabetes ([place] [address] ) 09/13/2016    Obesity 06/15/2012    Hyperlipidemia 06/15/2012    Essential hypertension 06/15/2012     He  has a past surgical history that includes Nasal septum surgery (1980); Uvulopalatopharyngoplasty (2004); Debridement tennis elbow (2006); Hernia repair (2011); Colonoscopy (N/A, 11/22/2016); pr incise finger tendon sheath (Right, 11/19/2019); pr incise finger tendon sheath (Left, 11/26/2019); Eye surgery (2008); and Knee surgery (2000)  His Family history is unknown by patient  He  reports that he has never smoked  He has never used smokeless tobacco  He reports current alcohol use  He reports that he does not use drugs    Current Outpatient Medications   Medication Sig Dispense Refill    acetaminophen (TYLENOL 8 HOUR) 650 mg CR tablet Take 1 tablet (650 mg total) by mouth every 8 (eight) hours 15 tablet 0    Coenzyme Q10 (CO Q-10) 200 MG CAPS       Dapagliflozin Propanediol (Farxiga) 10 MG TABS Take 1 tablet (10 mg total) by mouth daily 90 tablet 1    diclofenac sodium (VOLTAREN) 50 mg EC tablet TAKE 1 TABLET (50 MG TOTAL) BY MOUTH 2 (TWO) TIMES A DAY AS NEEDED (PAIN) 180 tablet 0    glimepiride (AMARYL) 1 mg tablet Take 1 tablet (1 mg total) by mouth daily with breakfast 90 tablet 3    ketoconazole (NIZORAL) 2 % cream APPLY TWICE DAILY TO AFFECTED AREA ON THE FACE ONGOING      lisinopril (ZESTRIL) 5 mg tablet TAKE 1 TABLET BY MOUTH EVERY DAY 90 tablet 3    rosuvastatin (CRESTOR) 10 MG tablet TAKE 1 TABLET BY MOUTH EVERY DAY 90 tablet 3    triamcinolone (KENALOG) 0 1 % cream APPLY TWICE DAILY TO AFFECTED AREA ON THE FACE X 7 DAYS MAX AS NEEDED  No current facility-administered medications for this visit  Current Outpatient Medications on File Prior to Visit   Medication Sig    acetaminophen (TYLENOL 8 HOUR) 650 mg CR tablet Take 1 tablet (650 mg total) by mouth every 8 (eight) hours    Coenzyme Q10 (CO Q-10) 200 MG CAPS     Dapagliflozin Propanediol (Farxiga) 10 MG TABS Take 1 tablet (10 mg total) by mouth daily    diclofenac sodium (VOLTAREN) 50 mg EC tablet TAKE 1 TABLET (50 MG TOTAL) BY MOUTH 2 (TWO) TIMES A DAY AS NEEDED (PAIN)    glimepiride (AMARYL) 1 mg tablet Take 1 tablet (1 mg total) by mouth daily with breakfast    ketoconazole (NIZORAL) 2 % cream APPLY TWICE DAILY TO AFFECTED AREA ON THE FACE ONGOING    lisinopril (ZESTRIL) 5 mg tablet TAKE 1 TABLET BY MOUTH EVERY DAY    rosuvastatin (CRESTOR) 10 MG tablet TAKE 1 TABLET BY MOUTH EVERY DAY    triamcinolone (KENALOG) 0 1 % cream APPLY TWICE DAILY TO AFFECTED AREA ON THE FACE X 7 DAYS MAX AS NEEDED  No current facility-administered medications on file prior to visit  He is allergic to tramadol       Review of Systems   Constitutional: Positive for unexpected weight change  Negative for activity change, appetite change, chills, diaphoresis, fatigue and fever  HENT: Negative  Eyes: Negative  Respiratory: Negative  Cardiovascular: Negative  Gastrointestinal: Negative  Endocrine: Negative  Genitourinary: Positive for difficulty urinating (Nocturia of 3-4 times per night without change) and enuresis  Negative for decreased urine volume, dysuria, flank pain, frequency, genital sores, hematuria, penile discharge, penile pain, penile swelling, scrotal swelling, testicular pain and urgency  Musculoskeletal: Positive for back pain   Negative for arthralgias, gait problem, joint swelling, myalgias, neck pain and neck stiffness  Allergic/Immunologic: Negative  Neurological: Negative  Hematological: Negative  Psychiatric/Behavioral: Negative  Objective:      /84   Pulse 85   Resp 17   Ht 6' 3" (1 905 m)   Wt 132 kg (290 lb)   SpO2 97%   BMI 36 25 kg/m²          Physical Exam  Vitals and nursing note reviewed  Constitutional:       General: He is not in acute distress  Appearance: Normal appearance  He is obese  He is not ill-appearing, toxic-appearing or diaphoretic  Comments: Pleasant obese 79-year-old male who is awake alert no acute distress and oriented x3   HENT:      Head: Normocephalic and atraumatic  Right Ear: Tympanic membrane, ear canal and external ear normal  There is no impacted cerumen  Left Ear: Tympanic membrane, ear canal and external ear normal  There is no impacted cerumen  Nose: Nose normal  No congestion or rhinorrhea  Mouth/Throat:      Mouth: Mucous membranes are moist       Pharynx: Oropharynx is clear  No oropharyngeal exudate or posterior oropharyngeal erythema  Eyes:      General: No scleral icterus  Right eye: No discharge  Left eye: No discharge  Extraocular Movements: Extraocular movements intact  Conjunctiva/sclera: Conjunctivae normal       Pupils: Pupils are equal, round, and reactive to light  Neck:      Vascular: No carotid bruit  Cardiovascular:      Rate and Rhythm: Normal rate and regular rhythm  Pulses: Normal pulses  Heart sounds: Normal heart sounds  No murmur heard  No friction rub  No gallop  Pulmonary:      Effort: Pulmonary effort is normal  No respiratory distress  Breath sounds: Normal breath sounds  No stridor  No wheezing, rhonchi or rales  Chest:      Chest wall: No tenderness  Abdominal:      General: Bowel sounds are normal  There is no distension  Palpations: Abdomen is soft  There is no mass  Tenderness:  There is no abdominal tenderness  There is no right CVA tenderness, left CVA tenderness, guarding or rebound  Hernia: No hernia is present  Comments: Obese   Musculoskeletal:         General: No swelling, tenderness, deformity or signs of injury  Normal range of motion  Cervical back: Normal range of motion and neck supple  No rigidity or tenderness  Right lower leg: No edema  Left lower leg: No edema  Lymphadenopathy:      Cervical: No cervical adenopathy  Skin:     General: Skin is warm and dry  Coloration: Skin is not jaundiced or pale  Findings: No bruising, erythema, lesion or rash  Neurological:      Mental Status: He is alert  Mental status is at baseline  Cranial Nerves: No cranial nerve deficit  Sensory: Sensory deficit present  Motor: No weakness  Coordination: Coordination normal       Gait: Gait normal       Deep Tendon Reflexes: Reflexes abnormal       Comments: Paresthesia feet and toes bilaterally  Absent patella and Achilles tendon reflexes   Psychiatric:         Mood and Affect: Mood normal          Behavior: Behavior normal          Thought Content:  Thought content normal          Judgment: Judgment normal

## 2022-04-01 NOTE — ASSESSMENT & PLAN NOTE
With the patient's BMI he is considered morbidly obese  He has gained approximately 13 lb since his last visit in November  States that he is not always perfect a as far as watching his caloric intake  During the fall and winter minimal exercise  Patient intends to make concerted effort to look more closely at his caloric intake and plans to be more physically active during the spring and summer of this year

## 2022-04-01 NOTE — ASSESSMENT & PLAN NOTE
Patient does have a history of chronic low pain  Relates that he has been doing therapeutic exercise at home which is been helpful  No acute exacerbations or worsening pain

## 2022-07-06 ENCOUNTER — APPOINTMENT (OUTPATIENT)
Dept: LAB | Age: 67
End: 2022-07-06
Payer: COMMERCIAL

## 2022-07-06 ENCOUNTER — PATIENT MESSAGE (OUTPATIENT)
Dept: INTERNAL MEDICINE CLINIC | Facility: CLINIC | Age: 67
End: 2022-07-06

## 2022-07-06 ENCOUNTER — TELEPHONE (OUTPATIENT)
Dept: INTERNAL MEDICINE CLINIC | Facility: CLINIC | Age: 67
End: 2022-07-06

## 2022-07-06 DIAGNOSIS — I10 ESSENTIAL HYPERTENSION: ICD-10-CM

## 2022-07-06 DIAGNOSIS — Z00.00 HEALTHCARE MAINTENANCE: ICD-10-CM

## 2022-07-06 DIAGNOSIS — E13.9 DIABETES 1.5, MANAGED AS TYPE 2 (HCC): ICD-10-CM

## 2022-07-06 DIAGNOSIS — E11.65 TYPE 2 DIABETES MELLITUS WITH HYPERGLYCEMIA, WITHOUT LONG-TERM CURRENT USE OF INSULIN (HCC): ICD-10-CM

## 2022-07-06 DIAGNOSIS — E66.01 MORBID OBESITY (HCC): ICD-10-CM

## 2022-07-06 DIAGNOSIS — Z12.5 PROSTATE CANCER SCREENING: ICD-10-CM

## 2022-07-06 DIAGNOSIS — Z12.11 COLON CANCER SCREENING: Primary | ICD-10-CM

## 2022-07-06 DIAGNOSIS — Z12.11 COLON CANCER SCREENING: ICD-10-CM

## 2022-07-06 LAB
ALBUMIN SERPL BCP-MCNC: 3.6 G/DL (ref 3.5–5)
ALP SERPL-CCNC: 46 U/L (ref 46–116)
ALT SERPL W P-5'-P-CCNC: 39 U/L (ref 12–78)
ANION GAP SERPL CALCULATED.3IONS-SCNC: 4 MMOL/L (ref 4–13)
AST SERPL W P-5'-P-CCNC: 17 U/L (ref 5–45)
BACTERIA UR QL AUTO: NORMAL /HPF
BASOPHILS # BLD AUTO: 0.04 THOUSANDS/ΜL (ref 0–0.1)
BASOPHILS NFR BLD AUTO: 1 % (ref 0–1)
BILIRUB SERPL-MCNC: 0.53 MG/DL (ref 0.2–1)
BILIRUB UR QL STRIP: NEGATIVE
BUN SERPL-MCNC: 16 MG/DL (ref 5–25)
CALCIUM SERPL-MCNC: 9.2 MG/DL (ref 8.3–10.1)
CHLORIDE SERPL-SCNC: 106 MMOL/L (ref 100–108)
CHOLEST SERPL-MCNC: 209 MG/DL
CLARITY UR: CLEAR
CO2 SERPL-SCNC: 28 MMOL/L (ref 21–32)
COLOR UR: ABNORMAL
CREAT SERPL-MCNC: 0.73 MG/DL (ref 0.6–1.3)
CREAT UR-MCNC: 121 MG/DL
EOSINOPHIL # BLD AUTO: 0.18 THOUSAND/ΜL (ref 0–0.61)
EOSINOPHIL NFR BLD AUTO: 3 % (ref 0–6)
ERYTHROCYTE [DISTWIDTH] IN BLOOD BY AUTOMATED COUNT: 13.2 % (ref 11.6–15.1)
EST. AVERAGE GLUCOSE BLD GHB EST-MCNC: 194 MG/DL
GFR SERPL CREATININE-BSD FRML MDRD: 97 ML/MIN/1.73SQ M
GLUCOSE P FAST SERPL-MCNC: 179 MG/DL (ref 65–99)
GLUCOSE UR STRIP-MCNC: ABNORMAL MG/DL
HBA1C MFR BLD: 8.4 %
HCT VFR BLD AUTO: 50 % (ref 36.5–49.3)
HDLC SERPL-MCNC: 56 MG/DL
HEMOCCULT STL QL IA: POSITIVE
HGB BLD-MCNC: 16.9 G/DL (ref 12–17)
HGB UR QL STRIP.AUTO: NEGATIVE
IMM GRANULOCYTES # BLD AUTO: 0.03 THOUSAND/UL (ref 0–0.2)
IMM GRANULOCYTES NFR BLD AUTO: 0 % (ref 0–2)
KETONES UR STRIP-MCNC: NEGATIVE MG/DL
LDLC SERPL CALC-MCNC: 122 MG/DL (ref 0–100)
LEUKOCYTE ESTERASE UR QL STRIP: ABNORMAL
LYMPHOCYTES # BLD AUTO: 1.75 THOUSANDS/ΜL (ref 0.6–4.47)
LYMPHOCYTES NFR BLD AUTO: 25 % (ref 14–44)
MCH RBC QN AUTO: 28.5 PG (ref 26.8–34.3)
MCHC RBC AUTO-ENTMCNC: 33.8 G/DL (ref 31.4–37.4)
MCV RBC AUTO: 84 FL (ref 82–98)
MICROALBUMIN UR-MCNC: 173 MG/L (ref 0–20)
MICROALBUMIN/CREAT 24H UR: 143 MG/G CREATININE (ref 0–30)
MONOCYTES # BLD AUTO: 0.52 THOUSAND/ΜL (ref 0.17–1.22)
MONOCYTES NFR BLD AUTO: 8 % (ref 4–12)
NEUTROPHILS # BLD AUTO: 4.37 THOUSANDS/ΜL (ref 1.85–7.62)
NEUTS SEG NFR BLD AUTO: 63 % (ref 43–75)
NITRITE UR QL STRIP: NEGATIVE
NON-SQ EPI CELLS URNS QL MICRO: NORMAL /HPF
NONHDLC SERPL-MCNC: 153 MG/DL
NRBC BLD AUTO-RTO: 0 /100 WBCS
PH UR STRIP.AUTO: 5.5 [PH]
PLATELET # BLD AUTO: 192 THOUSANDS/UL (ref 149–390)
PMV BLD AUTO: 10.9 FL (ref 8.9–12.7)
POTASSIUM SERPL-SCNC: 4.2 MMOL/L (ref 3.5–5.3)
PROT SERPL-MCNC: 6.8 G/DL (ref 6.4–8.2)
PROT UR STRIP-MCNC: ABNORMAL MG/DL
PSA SERPL-MCNC: 0.7 NG/ML (ref 0–4)
RBC # BLD AUTO: 5.93 MILLION/UL (ref 3.88–5.62)
RBC #/AREA URNS AUTO: NORMAL /HPF
SODIUM SERPL-SCNC: 138 MMOL/L (ref 136–145)
SP GR UR STRIP.AUTO: 1.03 (ref 1–1.03)
TRIGL SERPL-MCNC: 156 MG/DL
TSH SERPL DL<=0.05 MIU/L-ACNC: 3.38 UIU/ML (ref 0.45–4.5)
UROBILINOGEN UR STRIP-ACNC: <2 MG/DL
WBC # BLD AUTO: 6.89 THOUSAND/UL (ref 4.31–10.16)
WBC #/AREA URNS AUTO: NORMAL /HPF

## 2022-07-06 PROCEDURE — 3060F POS MICROALBUMINURIA REV: CPT | Performed by: PHYSICIAN ASSISTANT

## 2022-07-06 PROCEDURE — 83036 HEMOGLOBIN GLYCOSYLATED A1C: CPT

## 2022-07-06 PROCEDURE — G0103 PSA SCREENING: HCPCS

## 2022-07-06 PROCEDURE — 3052F HG A1C>EQUAL 8.0%<EQUAL 9.0%: CPT | Performed by: PHYSICIAN ASSISTANT

## 2022-07-06 PROCEDURE — 82043 UR ALBUMIN QUANTITATIVE: CPT | Performed by: INTERNAL MEDICINE

## 2022-07-06 PROCEDURE — 81001 URINALYSIS AUTO W/SCOPE: CPT

## 2022-07-06 PROCEDURE — G0328 FECAL BLOOD SCRN IMMUNOASSAY: HCPCS

## 2022-07-06 PROCEDURE — 82570 ASSAY OF URINE CREATININE: CPT | Performed by: INTERNAL MEDICINE

## 2022-07-06 PROCEDURE — 85025 COMPLETE CBC W/AUTO DIFF WBC: CPT

## 2022-07-06 PROCEDURE — 80053 COMPREHEN METABOLIC PANEL: CPT

## 2022-07-06 PROCEDURE — 36415 COLL VENOUS BLD VENIPUNCTURE: CPT

## 2022-07-06 PROCEDURE — 84443 ASSAY THYROID STIM HORMONE: CPT

## 2022-07-06 PROCEDURE — 80061 LIPID PANEL: CPT

## 2022-07-06 NOTE — TELEPHONE ENCOUNTER
Patient went for labs and the fecal test was not on  He is still coming Friday for his appointment and he goes to [facility]    Thank you

## 2022-07-08 ENCOUNTER — OFFICE VISIT (OUTPATIENT)
Dept: INTERNAL MEDICINE CLINIC | Facility: CLINIC | Age: 67
End: 2022-07-08
Payer: COMMERCIAL

## 2022-07-08 VITALS
SYSTOLIC BLOOD PRESSURE: 122 MMHG | OXYGEN SATURATION: 98 % | WEIGHT: 280 LBS | DIASTOLIC BLOOD PRESSURE: 86 MMHG | HEIGHT: 75 IN | HEART RATE: 81 BPM | BODY MASS INDEX: 34.82 KG/M2 | TEMPERATURE: 96.5 F

## 2022-07-08 DIAGNOSIS — E13.9 DIABETES 1.5, MANAGED AS TYPE 2 (HCC): ICD-10-CM

## 2022-07-08 DIAGNOSIS — Z00.00 HEALTHCARE MAINTENANCE: ICD-10-CM

## 2022-07-08 DIAGNOSIS — I10 ESSENTIAL HYPERTENSION: ICD-10-CM

## 2022-07-08 DIAGNOSIS — M54.16 LUMBAR RADICULOPATHY: ICD-10-CM

## 2022-07-08 DIAGNOSIS — R19.5 OCCULT BLOOD IN STOOLS: ICD-10-CM

## 2022-07-08 DIAGNOSIS — E66.9 OBESITY (BMI 30-39.9): ICD-10-CM

## 2022-07-08 DIAGNOSIS — E78.2 MIXED HYPERLIPIDEMIA: ICD-10-CM

## 2022-07-08 DIAGNOSIS — K92.1 BLOOD IN STOOL: Primary | ICD-10-CM

## 2022-07-08 PROCEDURE — 99397 PER PM REEVAL EST PAT 65+ YR: CPT | Performed by: INTERNAL MEDICINE

## 2022-07-11 ENCOUNTER — OFFICE VISIT (OUTPATIENT)
Dept: ENDOCRINOLOGY | Facility: CLINIC | Age: 67
End: 2022-07-11
Payer: COMMERCIAL

## 2022-07-11 VITALS
HEART RATE: 85 BPM | WEIGHT: 280 LBS | BODY MASS INDEX: 34.82 KG/M2 | HEIGHT: 75 IN | SYSTOLIC BLOOD PRESSURE: 140 MMHG | DIASTOLIC BLOOD PRESSURE: 84 MMHG

## 2022-07-11 DIAGNOSIS — I10 ESSENTIAL HYPERTENSION: ICD-10-CM

## 2022-07-11 DIAGNOSIS — E11.65 TYPE 2 DIABETES MELLITUS WITH HYPERGLYCEMIA, WITHOUT LONG-TERM CURRENT USE OF INSULIN (HCC): Primary | ICD-10-CM

## 2022-07-11 DIAGNOSIS — E78.2 MIXED HYPERLIPIDEMIA: ICD-10-CM

## 2022-07-11 PROCEDURE — 3079F DIAST BP 80-89 MM HG: CPT | Performed by: PHYSICIAN ASSISTANT

## 2022-07-11 PROCEDURE — 3077F SYST BP >= 140 MM HG: CPT | Performed by: PHYSICIAN ASSISTANT

## 2022-07-11 PROCEDURE — 99214 OFFICE O/P EST MOD 30 MIN: CPT | Performed by: PHYSICIAN ASSISTANT

## 2022-07-11 RX ORDER — GLIMEPIRIDE 1 MG/1
1 TABLET ORAL 2 TIMES DAILY WITH MEALS
Qty: 180 TABLET | Refills: 1 | Status: SHIPPED | OUTPATIENT
Start: 2022-07-11 | End: 2022-08-11 | Stop reason: SDUPTHER

## 2022-07-11 NOTE — PROGRESS NOTES
Patient Progress Note      CC: DM      Referring Provider  No referring provider defined for this encounter  History of Present Illness:   Cherl Rinne is a 74 y o  male with a history of type 2 diabetes without long term use of insulin  Diabetes course has been stable  Denies recent illness or hospitalizations  Denies recent severe hypoglycemic or severe hyperglycemic episodes  Denies any issues with his current regimen  Home glucose monitoring: are performed regularly     Home blood glucose readings: readings range from 170-210s in the mornings      Current regimen: Farxiga 10 mg daily, glimepiride 1 mg with breakfast  compliant most of the time, denies any side effects from medications  Hypoglycemic episodes: No, never  Treatment of hypoglycemia: discussed treatment     Diabetes education: Yes  Diet: 3 meals per day, 4-5 snacks per day  Timing of meals is predictable  Diabetic diet compliance: not compliant much of the time  States "sweets are my downfall "  Activity: Daily activity is predictable: Yes  Tries to exercise daily during the warmer weather  Ophthamology: goes to [facility]); reports he went Dec 2021 or Jan 2022  Podiatry: diabetic foot exam UTD, April 2022     Has hypertension: on ACE inhibitor/ARB, compliant most of the time  Has hyperlipidemia: on statin - tolerating well, no myalgias  compliant most of the time, denies any side effects from medications  Thyroid disorders: No  History of pancreatitis: No    Patient Active Problem List   Diagnosis    Lumbago with sciatica, left side    Lumbar degenerative disc disease    Lumbar herniated disc    Lumbar radiculopathy    Lumbar spondylosis    Myofascial pain    Obesity    [facility]    Hyperlipidemia    Diabetes ([facility]    Essential hypertension    AIDEE (obstructive sleep apnea)    Insomnia    Hypersomnia    Obesity (BMI 30-39  9)    Diabetes 1 5, managed as type 2 [place] [id number] maintenance    Morbid obesity (HCC)    Trigger middle finger of right hand    Trigger finger, left middle finger    Greater trochanteric bursitis of both hips    Occult blood in stools    Blood in stool      Past Medical History:   Diagnosis Date    Arthritis 2018    low back pain    Diabetes mellitus ([address] )     GERD (gastroesophageal reflux disease)     Headache     Headache(784 0)     High cholesterol     Hypertension     Kidney stone 2005    Kidney stones     Memory loss 2017    due to Gabapentin    Obesity     Sleep apnea     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      Past Surgical History:   Procedure Laterality Date    COLONOSCOPY N/A 11/22/2016    Procedure: COLONOSCOPY;  Surgeon: James Rolle MD;  Location: [facility];   Service:     DEBRIDEMENT TENNIS ELBOW  2006    EYE SURGERY  2008    laser holes to relieve pressure    HERNIA REPAIR  0837    x2, umbilical and right side per patient   [address]    meniscus respair    NASAL SEPTUM SURGERY  1980    GA INCISE FINGER TENDON SHEATH Right 11/19/2019    Procedure: RELEASE TRIGGER FINGER-right long finger;  Surgeon: Carolin Stafford MD;  [facility];  Service: Orthopedics    GA INCISE FINGER TENDON SHEATH Left 11/26/2019    Procedure: RELEASE TRIGGER FINGER-left long finger;  Surgeon: Carolin Stafford MD;  [facility];  Service: Orthopedics    UVULOPALATOPHARYNGOPLASTY  2004      Family History   Family history unknown: Yes     Social History     Tobacco Use    Smoking status: Never Smoker    Smokeless tobacco: Never Used   Substance Use Topics    Alcohol use: Yes     Comment: rarely     Allergies   Allergen Reactions    Tramadol Itching         Current Outpatient Medications:     acetaminophen (TYLENOL 8 HOUR) 650 mg CR tablet, Take 1 tablet (650 mg total) by mouth every 8 (eight) hours, Disp: 15 tablet, Rfl: 0    Coenzyme Q10 (CO Q-10) 200 MG CAPS, , Disp: , Rfl:     Dapagliflozin Propanediol (Farxiga) 10 MG TABS, Take 1 tablet (10 mg total) by mouth daily, Disp: 90 tablet, Rfl: 1    diclofenac sodium (VOLTAREN) 50 mg EC tablet, TAKE 1 TABLET (50 MG TOTAL) BY MOUTH 2 (TWO) TIMES A DAY AS NEEDED (PAIN), Disp: 180 tablet, Rfl: 0    glimepiride (AMARYL) 1 mg tablet, Take 1 tablet (1 mg total) by mouth 2 (two) times a day with meals, Disp: 180 tablet, Rfl: 1    ketoconazole (NIZORAL) 2 % cream, APPLY TWICE DAILY TO AFFECTED AREA ON THE FACE ONGOING, Disp: , Rfl:     lisinopril (ZESTRIL) 5 mg tablet, TAKE 1 TABLET BY MOUTH EVERY DAY, Disp: 90 tablet, Rfl: 3    rosuvastatin (CRESTOR) 10 MG tablet, TAKE 1 TABLET BY MOUTH EVERY DAY, Disp: 90 tablet, Rfl: 3    triamcinolone (KENALOG) 0 1 % cream, APPLY TWICE DAILY TO AFFECTED AREA ON THE FACE X 7 DAYS MAX AS NEEDED , Disp: , Rfl:   Review of Systems   Constitutional: Negative for activity change, appetite change, fatigue and unexpected weight change  HENT: Negative for trouble swallowing  Eyes: Negative for visual disturbance  Respiratory: Negative for shortness of breath  Cardiovascular: Negative for chest pain and palpitations  Gastrointestinal: Negative for constipation and diarrhea  Endocrine: Negative for polydipsia and polyuria  Musculoskeletal: Positive for arthralgias (occasional arthritis related pain)  Skin: Negative for wound  Neurological: Positive for numbness  Psychiatric/Behavioral: Negative  Physical Exam:  Body mass index is 35 kg/m²  /84   Pulse 85   Ht 6' 3" (1 905 m)   Wt 127 kg (280 lb)   BMI 35 00 kg/m²    Wt Readings from Last 3 Encounters:   07/11/22 127 kg (280 lb)   07/08/22 127 kg (280 lb)   04/01/22 132 kg (290 lb)       Physical Exam  Vitals and nursing note reviewed  Constitutional:       Appearance: He is well-developed  HENT:      Head: Normocephalic  Eyes:      General: No scleral icterus  Pupils: Pupils are equal, round, and reactive to light     Neck:      Thyroid: No thyromegaly  Cardiovascular:      Rate and Rhythm: Normal rate and regular rhythm  Pulses:           Radial pulses are 2+ on the right side and 2+ on the left side  Heart sounds: No murmur heard  Pulmonary:      Effort: Pulmonary effort is normal  No respiratory distress  Breath sounds: Normal breath sounds  No wheezing  Musculoskeletal:      Cervical back: Neck supple  Skin:     General: Skin is warm and dry  Neurological:      Mental Status: He is alert  Patient's shoes and socks were not removed  Labs:   Component      Latest Ref Rng & Units 11/19/2021 3/27/2022 7/6/2022   Sodium      136 - 145 mmol/L 138 140 138   Potassium      3 5 - 5 3 mmol/L 3 6 4 1 4 2   Chloride      100 - 108 mmol/L 104 107 106   CO2      21 - 32 mmol/L 28 26 28   Anion Gap      4 - 13 mmol/L 6 7 4   BUN      5 - 25 mg/dL 14 14 16   Creatinine      0 60 - 1 30 mg/dL 0 78 0 77 0 73   GLUCOSE FASTING      65 - 99 mg/dL 146 (H) 168 (H) 179 (H)   Calcium      8 3 - 10 1 mg/dL 9 1 9 4 9 2   AST      5 - 45 U/L   17   ALT      12 - 78 U/L   39   Alkaline Phosphatase      46 - 116 U/L   46   Total Protein      6 4 - 8 2 g/dL   6 8   Albumin      3 5 - 5 0 g/dL   3 6   TOTAL BILIRUBIN      0 20 - 1 00 mg/dL   0 53   eGFR      ml/min/1 73sq m 95 95 97   Cholesterol      See Comment mg/dL 203 (H)  209 (H)   Triglycerides      See Comment mg/dL 186 (H)  156 (H)   HDL      >=40 mg/dL 61  56   LDL Calculated      0 - 100 mg/dL 105 (H)  122 (H)   Non-HDL Cholesterol      mg/dl 142  153   EXT Creatinine Urine      mg/dL   121 0   MICROALBUM ,U,RANDOM      0 0 - 20 0 mg/L   173 0 (H)   MICROALBUMIN/CREATININE RATIO      0 - 30 mg/g creatinine   143 (H)   Hemoglobin A1C      Normal 3 8-5 6%; PreDiabetic 5 7-6 4%;  Diabetic >=6 5%; Glycemic control for adults with diabetes <7 0% % 9 3 (H) 8 5 (H) 8 4 (H)   eAG, EST AVG Glucose      mg/dl 220 197 194   TSH 3RD GENERATON      0 450 - 4 500 uIU/mL   3 380 Plan:    Diagnoses and all orders for this visit:    Type 2 diabetes mellitus with hyperglycemia, without long-term current use of insulin ([facility] [address] )  HGA1C 8 4%  Improved  Treatment regimen: increase glimepiride to 1 mg BID with meals, continue Farxiga  Episodes of hypoglycemia can lead to permanent disability and death  Discussed risks/complications associated with uncontrolled diabetes  Advised to adhere to diabetic diet, and recommended staying active/exercising routinely as tolerated  Keep carbohydrates consistent to limit blood glucose fluctuations  Advised to call if blood sugars less than 70 mg/dl or over 300 mg/dl  Check blood glucose 1-2 times a day  Discussed symptoms and treatment of hypoglycemia  Recommended routine follow-up with podiatry and ophthalmology  Send log in 2 weeks  Ordered blood work to complete prior to next visit  -     Hemoglobin A1C; Future  -     Basic metabolic panel; Future  -     glimepiride (AMARYL) 1 mg tablet; Take 1 tablet (1 mg total) by mouth 2 (two) times a day with meals    Essential hypertension  Blood pressure adequately controlled, continue current treatment  -     Basic metabolic panel; Future    Mixed hyperlipidemia    Continue statin therapy         Discussed with the patient diagnosis and treatment and all questions fully answered  He will call me if any problems arise  Counseled patient on diagnostic results, prognosis, risk and benefit of treatment options, instruction for management, importance of treatment compliance, risk factor reduction and impressions        Maddie Kidd PA-C

## 2022-07-11 NOTE — PATIENT INSTRUCTIONS
Hypoglycemia instructions   [facility]  7/11/2022  [id number]    Low Blood Sugar    Steps to treat low blood sugar  1  Test blood sugar if you have symptoms of low blood sugar:   Low Blood Sugar Symptoms:  o Sweaty  o Dizzy  o Rapid heartbeat  o Shaky  o Bad mood  o Hungry      2  Treat blood sugar less than 70 with 15 grams of fast-acting carbohydrate:   Examples of 15 grams Fast-Acting Carbohydrate:  o 4 oz juice  o 4 oz regular soda  o 3-4 glucose tablets (chew)  o 3-4 hard candies (chew)          3  Wait 15 minutes and test your blood sugar again     4   If blood sugar is less than 100, repeat steps 2-3     5  When your blood sugar is 100 or more, eat a snack if it will be longer than one hour until your next meal  The snack should be 15 grams of carbohydrate and a protein:   Examples of snacks:  o ½ sandwich  o 6 crackers with cheese  o Piece of fruit with cheese or peanut butter  o 6 crackers with peanut butter

## 2022-07-14 NOTE — ASSESSMENT & PLAN NOTE
With the patient's BMI he is considered obese  No significant weight loss or gain since his last visit  Again he is struggling always with his diet and is trying to stay more physically active during the summer months  Patient was told to look very closely at his calorie intake on a daily basis and ways to reduce this in order to help lose weight    Make sure he is contributing by working on a routine exercise program

## 2022-07-14 NOTE — PROGRESS NOTES
Diabetic Foot Exam    Patient's shoes and socks removed  Right Foot/Ankle   Right Foot Inspection  Skin Exam: skin normal and skin intact  No dry skin, no warmth, no callus, no erythema, no maceration, no abnormal color, no pre-ulcer, no ulcer and no callus  Toe Exam: ROM and strength within normal limits, swelling (A trace edema bilateral lower extremities) and right toe deformity ( flat feet)  No tenderness and erythema    Sensory   Vibration: diminished  Proprioception: diminished  Monofilament testing: diminished    Vascular  Capillary refills: < 3 seconds  The right DP pulse is 2+  The right PT pulse is 2+  Left Foot/Ankle  Left Foot Inspection  Skin Exam: skin normal and skin intact  No dry skin, no warmth, no erythema, no maceration, normal color, no pre-ulcer, no ulcer and no callus  Toe Exam: ROM and strength within normal limits, swelling and left toe deformity  No tenderness and no erythema  Sensory   Vibration: diminished  Proprioception: diminished  Monofilament testing: diminished    Vascular  Capillary refills: < 3 seconds  The left DP pulse is 2+  The left PT pulse is 2+       Assign Risk Category  Deformity present  Loss of protective sensation  No weak pulses  Risk: 2

## 2022-07-14 NOTE — ASSESSMENT & PLAN NOTE
The as noted patient's hemoglobin A1c is elevated a point for  Patient is still struggling with his diet and continues to follow-up with his endocrinologist and they continue to modify treatment in order to show better control of his blood sugar  Patient admits that he is not compliant always with his diet and his limited with his exercise capacity  Diabetic foot exam was performed today and patient does have neuropathy as previously noted  Urged the patient to continue to work harder on blood sugar control    Suggested again patient has follow-up are regular basis with his ophthalmologist  Lab Results   Component Value Date    HGBA1C 8 4 (H) 07/06/2022

## 2022-07-14 NOTE — ASSESSMENT & PLAN NOTE
Medicare wellness visit  Patient will continue medication as prescribed previously  We did discuss results of all lab testing prior to his visit today with the 1 abnormality noted with heme-positive stools and he will be seen by his colon rectal specialist   Patient also has continued poor control of his blood sugars and continues to follow-up with endocrinology  Did undergo full physical exam today in the office including colon rectal exam today

## 2022-07-14 NOTE — PROGRESS NOTES
Assessment/Plan:    No problem-specific Assessment & Plan notes found for this encounter  Diagnoses and all orders for this visit:    Blood in stool  -     Ambulatory Referral to Colorectal Surgery; Future    Diabetes 1 5, 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[address] )  -     Hemoglobin A1C; Future    Obesity (BMI 30-39  9)    Essential hypertension  -     Basic metabolic panel; Future    Mixed hyperlipidemia    Lumbar radiculopathy    Healthcare maintenance    Occult blood in stools          Subjective:      Patient ID: García Ovalle is a 74 y o  male  46-year-old male here today for routine physical   Patient relates that he is doing relatively well but still is not been losing weight  Still has low back pain and discomfort  He did have labs performed prior to the visit today and we did discuss the results of length with the patient  The following portions of the patient's history were reviewed and updated as appropriate:   He  has a past medical history of Arthritis (2018), Diabetes mellitus ([address] ), GERD (gastroesophageal reflux disease), Headache, Headache(784 0), High cholesterol, Hypertension, Kidney stone (2005), Kidney stones, Memory loss (2017), Obesity, Sleep apnea, and Visual impairment (1957)    He   Patient Active Problem List    Diagnosis Date Noted    Blood in stool 07/08/2022    Occult blood in stools 03/18/2021    Greater trochanteric bursitis of both hips 11/05/2019    Trigger finger, left middle finger 08/02/2019    Trigger middle finger of right hand 07/02/2019    Morbid obesity ([address] ) 02/07/2019    Healthcare maintenance 10/09/2018    AIDEE (obstructive sleep apnea) 03/05/2018    Insomnia 03/05/2018    Hypersomnia 03/05/2018    Obesity (BMI 30-39 9) 03/05/2018    Diabetes 1 5, 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[address] ) 03/05/2018    Sacroiliitis ([address] ) 02/06/2018    Lumbar degenerative disc disease 10/23/2017    Lumbar herniated disc 10/23/2017    Lumbar spondylosis 10/23/2017    Lumbar radiculopathy 08/29/2017    Myofascial pain 08/29/2017    Lumbago with sciatica, left side 07/31/2017    Diabetes ([facility] ) 09/13/2016    Obesity 06/15/2012    Hyperlipidemia 06/15/2012    Essential hypertension 06/15/2012     He  has a past surgical history that includes Nasal septum surgery (1980); Uvulopalatopharyngoplasty (2004); Debridement tennis elbow (2006); Hernia repair (2011); Colonoscopy (N/A, 11/22/2016); pr incise finger tendon sheath (Right, 11/19/2019); pr incise finger tendon sheath (Left, 11/26/2019); Eye surgery (2008); and Knee surgery (2000)  His Family history is unknown by patient  He  reports that he has never smoked  He has never used smokeless tobacco  He reports current alcohol use  He reports that he does not use drugs  Current Outpatient Medications   Medication Sig Dispense Refill    acetaminophen (TYLENOL 8 HOUR) 650 mg CR tablet Take 1 tablet (650 mg total) by mouth every 8 (eight) hours 15 tablet 0    Coenzyme Q10 (CO Q-10) 200 MG CAPS       Dapagliflozin Propanediol (Farxiga) 10 MG TABS Take 1 tablet (10 mg total) by mouth daily 90 tablet 1    diclofenac sodium (VOLTAREN) 50 mg EC tablet TAKE 1 TABLET (50 MG TOTAL) BY MOUTH 2 (TWO) TIMES A DAY AS NEEDED (PAIN) 180 tablet 0    ketoconazole (NIZORAL) 2 % cream APPLY TWICE DAILY TO AFFECTED AREA ON THE FACE ONGOING      lisinopril (ZESTRIL) 5 mg tablet TAKE 1 TABLET BY MOUTH EVERY DAY 90 tablet 3    rosuvastatin (CRESTOR) 10 MG tablet TAKE 1 TABLET BY MOUTH EVERY DAY 90 tablet 3    triamcinolone (KENALOG) 0 1 % cream APPLY TWICE DAILY TO AFFECTED AREA ON THE FACE X 7 DAYS MAX AS NEEDED   glimepiride (AMARYL) 1 mg tablet Take 1 tablet (1 mg total) by mouth 2 (two) times a day with meals 180 tablet 1     No current facility-administered medications for this visit       Current Outpatient Medications on File Prior to Visit   Medication Sig    acetaminophen (TYLENOL 8 HOUR) 650 mg CR tablet Take 1 tablet (650 mg total) by mouth every 8 (eight) hours    Coenzyme Q10 (CO Q-10) 200 MG CAPS     Dapagliflozin Propanediol (Farxiga) 10 MG TABS Take 1 tablet (10 mg total) by mouth daily    diclofenac sodium (VOLTAREN) 50 mg EC tablet TAKE 1 TABLET (50 MG TOTAL) BY MOUTH 2 (TWO) TIMES A DAY AS NEEDED (PAIN)    ketoconazole (NIZORAL) 2 % cream APPLY TWICE DAILY TO AFFECTED AREA ON THE FACE ONGOING    lisinopril (ZESTRIL) 5 mg tablet TAKE 1 TABLET BY MOUTH EVERY DAY    rosuvastatin (CRESTOR) 10 MG tablet TAKE 1 TABLET BY MOUTH EVERY DAY    triamcinolone (KENALOG) 0 1 % cream APPLY TWICE DAILY TO AFFECTED AREA ON THE FACE X 7 DAYS MAX AS NEEDED  No current facility-administered medications on file prior to visit  He is allergic to tramadol       Review of Systems   Constitutional: Negative  HENT: Negative  Eyes: Negative  Respiratory: Negative  Cardiovascular: Negative  Gastrointestinal: Negative  Heme test positive stools   Endocrine: Negative  Genitourinary: Negative  Musculoskeletal: Positive for back pain  Negative for arthralgias, gait problem, joint swelling, myalgias, neck pain and neck stiffness  Skin: Negative  Allergic/Immunologic: Negative  Neurological: Negative  Hematological: Negative  Psychiatric/Behavioral: Negative  Objective:      /86 (BP Location: Left arm, Patient Position: Sitting)   Pulse 81   Temp (!) 96 5 °F (35 8 °C) (Tympanic)   Ht 6' 3" (1 905 m)   Wt 127 kg (280 lb)   SpO2 98%   BMI 35 00 kg/m²          Physical Exam  Vitals and nursing note reviewed  Constitutional:       General: He is not in acute distress  Appearance: Normal appearance  He is obese  He is not ill-appearing, toxic-appearing or diaphoretic  Comments: Pleasant, obese 19-year-old male who is awake alert in no acute distress oriented x3   HENT:      Head: Normocephalic and atraumatic        Right Ear: Tympanic membrane, ear canal and external ear normal  There is no impacted cerumen  Left Ear: Tympanic membrane, ear canal and external ear normal  There is no impacted cerumen  Nose: Nose normal  No congestion or rhinorrhea  Mouth/Throat:      Mouth: Mucous membranes are moist       Pharynx: Oropharynx is clear  No oropharyngeal exudate or posterior oropharyngeal erythema  Eyes:      General: No scleral icterus  Right eye: No discharge  Left eye: No discharge  Extraocular Movements: Extraocular movements intact  Conjunctiva/sclera: Conjunctivae normal       Pupils: Pupils are equal, round, and reactive to light  Neck:      Vascular: No carotid bruit  Cardiovascular:      Rate and Rhythm: Normal rate and regular rhythm  Pulses: Normal pulses  Heart sounds: Normal heart sounds  No murmur heard  No friction rub  No gallop  Pulmonary:      Effort: Pulmonary effort is normal  No respiratory distress  Breath sounds: Normal breath sounds  No stridor  No wheezing, rhonchi or rales  Chest:      Chest wall: No tenderness  Abdominal:      General: Bowel sounds are normal  There is no distension  Palpations: Abdomen is soft  There is no mass  Tenderness: There is no abdominal tenderness  There is no right CVA tenderness, left CVA tenderness, guarding or rebound  Hernia: No hernia is present  Genitourinary:     Penis: Normal        Testes: Normal       Prostate: Normal       Rectum: Normal  Guaiac result positive  Musculoskeletal:         General: Deformity ( arthritic changes as previously with nothing acute) present  No swelling, tenderness or signs of injury  Cervical back: Normal range of motion and neck supple  No rigidity or tenderness  Right lower leg: Edema (Trace edema bilateral lower extremities) present  Left lower leg: Edema present  Lymphadenopathy:      Cervical: No cervical adenopathy  Skin:     General: Skin is warm and dry        Capillary Refill: Capillary refill takes less than 2 seconds  Coloration: Skin is not jaundiced or pale  Findings: No bruising, erythema, lesion or rash  Neurological:      Mental Status: He is alert and oriented to person, place, and time  Mental status is at baseline  Cranial Nerves: No cranial nerve deficit  Sensory: Sensory deficit present  Motor: No weakness  Coordination: Coordination normal       Gait: Gait normal       Deep Tendon Reflexes: Reflexes normal    Psychiatric:         Mood and Affect: Mood normal          Behavior: Behavior normal          Thought Content:  Thought content normal          Judgment: Judgment normal

## 2022-07-14 NOTE — ASSESSMENT & PLAN NOTE
History of hyperlipidemia  He remains on Crestor 10 mg daily  Again as previously noted admits not always compliant with his diet  Urged to look closely at his intake of fats and cholesterol not only to control cholesterol but also to help with weight loss

## 2022-07-25 ENCOUNTER — TELEPHONE (OUTPATIENT)
Dept: ENDOCRINOLOGY | Facility: CLINIC | Age: 67
End: 2022-07-25

## 2022-07-25 NOTE — TELEPHONE ENCOUNTER
BG level mostly in the mid to high 100s in the mornings  If currently taking glimepiride 1 mg at breakfast and dinner, then continue 1 tablet at breakfast but take 2 tablets with dinner (total 2 mg)

## 2022-07-26 ENCOUNTER — DOCUMENTATION (OUTPATIENT)
Dept: ENDOCRINOLOGY | Facility: CLINIC | Age: 67
End: 2022-07-26

## 2022-08-10 ENCOUNTER — TELEPHONE (OUTPATIENT)
Dept: ENDOCRINOLOGY | Facility: CLINIC | Age: 67
End: 2022-08-10

## 2022-08-10 ENCOUNTER — DOCUMENTATION (OUTPATIENT)
Dept: ENDOCRINOLOGY | Facility: CLINIC | Age: 67
End: 2022-08-10

## 2022-08-10 NOTE — TELEPHONE ENCOUNTER
Reviewed BG readings, most in the high 100s  If currently taking glimepiride 1 mg 1 tablet with breakfast and 2 tablets with dinner, then increase to 2 tablets with breakfast and dinner

## 2022-08-11 DIAGNOSIS — M46.1 SACROILIITIS (HCC): ICD-10-CM

## 2022-08-11 DIAGNOSIS — E11.65 TYPE 2 DIABETES MELLITUS WITH HYPERGLYCEMIA, WITHOUT LONG-TERM CURRENT USE OF INSULIN (HCC): ICD-10-CM

## 2022-08-12 RX ORDER — GLIMEPIRIDE 1 MG/1
1 TABLET ORAL 2 TIMES DAILY WITH MEALS
Qty: 180 TABLET | Refills: 0 | Status: SHIPPED | OUTPATIENT
Start: 2022-08-12 | End: 2022-09-29 | Stop reason: SDUPTHER

## 2022-09-29 DIAGNOSIS — E11.65 TYPE 2 DIABETES MELLITUS WITH HYPERGLYCEMIA, WITHOUT LONG-TERM CURRENT USE OF INSULIN (HCC): ICD-10-CM

## 2022-09-29 RX ORDER — GLIMEPIRIDE 1 MG/1
1 TABLET ORAL 2 TIMES DAILY WITH MEALS
Qty: 180 TABLET | Refills: 0 | Status: SHIPPED | OUTPATIENT
Start: 2022-09-29 | End: 2022-09-29 | Stop reason: SDUPTHER

## 2022-09-29 RX ORDER — GLIMEPIRIDE 1 MG/1
TABLET ORAL
Qty: 360 TABLET | Refills: 1 | Status: SHIPPED | OUTPATIENT
Start: 2022-09-29

## 2022-09-30 DIAGNOSIS — E78.01 FAMILIAL HYPERCHOLESTEROLEMIA: ICD-10-CM

## 2022-09-30 DIAGNOSIS — I10 ESSENTIAL HYPERTENSION: ICD-10-CM

## 2022-09-30 RX ORDER — LISINOPRIL 5 MG/1
TABLET ORAL
Qty: 90 TABLET | Refills: 3 | Status: SHIPPED | OUTPATIENT
Start: 2022-09-30

## 2022-09-30 RX ORDER — ROSUVASTATIN CALCIUM 10 MG/1
TABLET, COATED ORAL
Qty: 90 TABLET | Refills: 3 | Status: SHIPPED | OUTPATIENT
Start: 2022-09-30

## 2022-11-04 ENCOUNTER — APPOINTMENT (OUTPATIENT)
Dept: LAB | Age: 67
End: 2022-11-04

## 2022-11-04 DIAGNOSIS — E13.9 DIABETES 1.5, MANAGED AS TYPE 2 (HCC): ICD-10-CM

## 2022-11-04 DIAGNOSIS — I10 ESSENTIAL HYPERTENSION: ICD-10-CM

## 2022-11-04 LAB
ANION GAP SERPL CALCULATED.3IONS-SCNC: 2 MMOL/L (ref 4–13)
BUN SERPL-MCNC: 15 MG/DL (ref 5–25)
CALCIUM SERPL-MCNC: 9.5 MG/DL (ref 8.3–10.1)
CHLORIDE SERPL-SCNC: 105 MMOL/L (ref 96–108)
CO2 SERPL-SCNC: 31 MMOL/L (ref 21–32)
CREAT SERPL-MCNC: 0.8 MG/DL (ref 0.6–1.3)
EST. AVERAGE GLUCOSE BLD GHB EST-MCNC: 189 MG/DL
GFR SERPL CREATININE-BSD FRML MDRD: 93 ML/MIN/1.73SQ M
GLUCOSE P FAST SERPL-MCNC: 170 MG/DL (ref 65–99)
HBA1C MFR BLD: 8.2 %
POTASSIUM SERPL-SCNC: 4.1 MMOL/L (ref 3.5–5.3)
SODIUM SERPL-SCNC: 138 MMOL/L (ref 135–147)

## 2022-11-08 ENCOUNTER — OFFICE VISIT (OUTPATIENT)
Dept: INTERNAL MEDICINE CLINIC | Facility: CLINIC | Age: 67
End: 2022-11-08

## 2022-11-08 VITALS
HEART RATE: 89 BPM | TEMPERATURE: 98 F | DIASTOLIC BLOOD PRESSURE: 80 MMHG | SYSTOLIC BLOOD PRESSURE: 142 MMHG | HEIGHT: 75 IN | BODY MASS INDEX: 35.56 KG/M2 | WEIGHT: 286 LBS | OXYGEN SATURATION: 95 % | RESPIRATION RATE: 18 BRPM

## 2022-11-08 DIAGNOSIS — I10 ESSENTIAL HYPERTENSION: Primary | ICD-10-CM

## 2022-11-08 DIAGNOSIS — M51.36 LUMBAR DEGENERATIVE DISC DISEASE: ICD-10-CM

## 2022-11-08 DIAGNOSIS — E66.9 OBESITY (BMI 30-39.9): ICD-10-CM

## 2022-11-08 DIAGNOSIS — E13.9 DIABETES 1.5, MANAGED AS TYPE 2 (HCC): ICD-10-CM

## 2022-11-08 NOTE — PATIENT INSTRUCTIONS
Obesity   AMBULATORY CARE:   Obesity  means your body mass index (BMI) is greater than 30  Your healthcare provider will use your height and weight to measure your BMI  The risks of obesity include  many health problems, including injuries or physical disability  · Diabetes (high blood sugar level)    · High blood pressure or high cholesterol    · Heart disease    · Stroke    · Gallbladder or liver disease    · Cancer of the colon, breast, prostate, liver, or kidney    · Sleep apnea    · Arthritis or gout    Screening  is done to check for health conditions before you have signs or symptoms  If you are 28to 79years old, your blood sugar level may be checked every 3 years for signs of prediabetes or diabetes  Your healthcare provider will check your blood pressure at each visit  High blood pressure can lead to a stroke or other problems  Your provider may check for signs of heart disease, cancer, or other health problems  Seek care immediately if:   · You have a severe headache, confusion, or difficulty speaking  · You have weakness on one side of your body  · You have chest pain, sweating, or shortness of breath  Call your doctor if:   · You have symptoms of gallbladder or liver disease, such as pain in your upper abdomen  · You have knee or hip pain and discomfort while walking  · You have symptoms of diabetes, such as intense hunger and thirst, and frequent urination  · You have symptoms of sleep apnea, such as snoring or daytime sleepiness  · You have questions or concerns about your condition or care  Treatment for obesity  focuses on helping you lose weight to improve your health  Even a small decrease in BMI can reduce the risk for many health problems  Your healthcare provider will help you set a weight-loss goal   · Lifestyle changes  are the first step in treating obesity  These include making healthy food choices and getting regular physical activity   Your healthcare provider may suggest a weight-loss program that involves coaching, education, and therapy  · Medicine  may help you lose weight when it is used with a healthy foods and physical activity  · Surgery  can help you lose weight if you are very obese and have other health problems  There are several types of weight-loss surgery  Ask your healthcare provider for more information  Tips for safe weight loss:   · Set small, realistic goals  An example of a small goal is to walk for 20 minutes 5 days a week  Anther goal is to lose 5% of your body weight  · Tell friends, family members, and coworkers about your goals  and ask for their support  Ask a friend to lose weight with you, or join a weight-loss support group  · Identify foods or triggers that may cause you to overeat , and find ways to avoid them  Remove tempting high-calorie foods from your home and workplace  Place a bowl of fresh fruit on your kitchen counter  If stress causes you to eat, then find other ways to cope with stress  A counselor or therapist may be able to help you  · Keep a diary to track what you eat and drink  Also write down how many minutes of physical activity you do each day  Weigh yourself once a week and record it in your diary  Eating changes: You will need to eat 500 to 1,000 fewer calories each day than you currently eat to lose 1 to 2 pounds a week  The following changes will help you cut calories:  · Eat smaller portions  Use small plates, no larger than 9 inches in diameter  Fill your plate half full of fruits and vegetables  Measure your food using measuring cups until you know what a serving size looks like  · Eat 3 meals and 1 or 2 snacks each day  Plan your meals in advance  [place] and eat at home most of the time  Eat slowly  Do not skip meals  Skipping meals can lead to overeating later in the day  This can make it harder for you to lose weight   Talk with a dietitian to help you make a meal plan and schedule that is right for you  · Eat fruits and vegetables at every meal   They are low in calories and high in fiber, which makes you feel full  Do not add butter, margarine, or cream sauce to vegetables  Use herbs to season steamed vegetables  · Eat less fat and fewer fried foods  Eat more baked or grilled chicken and fish  These protein sources are lower in calories and fat than red meat  Limit fast food  Dress your salads with olive oil and vinegar instead of bottled dressing  · Limit the amount of sugar you eat  Do not drink sugary beverages  Limit alcohol  Activity changes:  Physical activity is good for your body in many ways  It helps you burn calories and build strong muscles  It decreases stress and depression, and improves your mood  It can also help you sleep better  Talk to your healthcare provider before you begin an exercise program   · Exercise for at least 30 minutes 5 days a week  Start slowly  Set aside time each day for physical activity that you enjoy and that is convenient for you  It is best to do both weight training and an activity that increases your heart rate, such as walking, bicycling, or swimming  · Find ways to be more active  Do yard work and housecleaning  Walk up the stairs instead of using elevators  Spend your leisure time going to events that require walking, such as outdoor festivals or fairs  This extra physical activity can help you lose weight and keep it off  Follow up with your doctor as directed: You may need to meet with a dietitian  Write down your questions so you remember to ask them during your visits  © Copyright [employer] 2022 Information is for End User's use only and may not be sold, redistributed or otherwise used for commercial purposes  All illustrations and images included in CareNotes® are the copyrighted property of [employer]  or [place]   The above information is an  only   It is not intended as medical advice for individual conditions or treatments  Talk to your doctor, nurse or pharmacist before following any medical regimen to see if it is safe and effective for you  Heart Healthy Diet   AMBULATORY CARE:   A heart healthy diet  is an eating plan low in unhealthy fats and sodium (salt)  The plan is high in healthy fats and fiber  A heart healthy diet helps improve your cholesterol levels and lowers your risk for heart disease and stroke  A dietitian will teach you how to read and understand food labels  Heart healthy diet guidelines to follow:   · Choose foods that contain healthy fats  ? Unsaturated fats  include monounsaturated and polyunsaturated fats  Unsaturated fat is found in foods such as soybean, canola, olive, corn, and safflower oils  It is also found in soft tub margarine that is made with liquid vegetable oil  ? Omega-3 fat  is found in certain fish, such as salmon, tuna, and trout, and in walnuts and flaxseed  Eat fish high in omega-3 fats at least 2 times a week  · Get 20 to 30 grams of fiber each day  Fruits, vegetables, whole-grain foods, and legumes (cooked beans) are good sources of fiber  · Limit or do not have unhealthy fats  ? Cholesterol  is found in animal foods, such as eggs and lobster, and in dairy products made from whole milk  Limit cholesterol to less than 200 mg each day  ? Saturated fat  is found in meats, such as escobar and hamburger  It is also found in chicken or turkey skin, whole milk, and butter  Limit saturated fat to less than 7% of your total daily calories  ? Trans fat  is found in packaged foods, such as potato chips and cookies  It is also in hard margarine, some fried foods, and shortening  Do not eat foods that contain trans fats  · Limit sodium as directed  You may be told to limit sodium to 2,000 to 2,300 mg each day  Choose low-sodium or no-salt-added foods  Add little or no salt to food you prepare   Use herbs and spices in place of salt  Include the following in your heart healthy plan:  Ask your dietitian or healthcare provider how many servings to have from each of the following food groups:  · Grains:      ? Whole-wheat breads, cereals, and pastas, and brown rice    ? Low-fat, low-sodium crackers and chips    · Vegetables:      ? Broccoli, green beans, green peas, and spinach    ? Collards, kale, and lima beans    ? Carrots, sweet potatoes, tomatoes, and peppers    ? Canned vegetables with no salt added    · Fruits:      ? Bananas, peaches, pears, and pineapple    ? Grapes, raisins, and dates    ? Oranges, tangerines, grapefruit, orange juice, and grapefruit juice    ? Apricots, mangoes, melons, and papaya    ? Raspberries and strawberries    ? Canned fruit with no added sugar    · Low-fat dairy:      ? Nonfat (skim) milk, 1% milk, and low-fat almond, cashew, or soy milks fortified with calcium    ? Low-fat cheese, regular or frozen yogurt, and cottage cheese    · Meats and proteins:      ? Lean cuts of beef and pork (loin, leg, round), skinless chicken and turkey    ? Legumes, soy products, egg whites, or nuts    Limit or do not include the following in your heart healthy plan:   · Unhealthy fats and oils:      ? Whole or 2% milk, cream cheese, sour cream, or cheese    ? High-fat cuts of beef (T-bone steaks, ribs), chicken or turkey with skin, and organ meats such as liver    ? Butter, stick margarine, shortening, and cooking oils such as coconut or palm oil    · Foods and liquids high in sodium:      ? Packaged foods, such as frozen dinners, cookies, macaroni and cheese, and cereals with more than 300 mg of sodium per serving    ? Vegetables with added sodium, such as instant potatoes, vegetables with added sauces, or regular canned vegetables    ? Cured or smoked meats, such as hot dogs, escobar, and sausage    ?  High-sodium ketchup, barbecue sauce, salad dressing, pickles, olives, soy sauce, or miso    · Foods and liquids high in sugar:      ? Candy, cake, cookies, pies, or doughnuts    ? Soft drinks (soda), sports drinks, or sweetened tea    ? Canned or dry mixes for cakes, soups, sauces, or gravies    Other healthy heart guidelines:   · Do not smoke  Nicotine and other chemicals in cigarettes and cigars can cause lung and heart damage  Ask your healthcare provider for information if you currently smoke and need help to quit  E-cigarettes or smokeless tobacco still contain nicotine  Talk to your healthcare provider before you use these products  · Limit or do not drink alcohol as directed  Alcohol can damage your heart and raise your blood pressure  Your healthcare provider may give you specific daily and weekly limits  The general recommended limit is 1 drink a day for women 21 or older and for men 72 or older  Do not have more than 3 drinks in a day or 7 in a week  The recommended limit is 2 drinks a day for men 24to 59years of age  Do not have more than 4 drinks in a day or 14 in a week  A drink of alcohol is 12 ounces of beer, 5 ounces of wine, or 1½ ounces of liquor  · Exercise regularly  Exercise can help you maintain a healthy weight and improve your blood pressure and cholesterol levels  Regular exercise can also decrease your risk for heart problems  Ask your healthcare provider about the best exercise plan for you  Do not start an exercise program without asking your healthcare provider  Follow up with your doctor or cardiologist as directed:  Write down your questions so you remember to ask them during your visits  © Copyright [employer] 2022 Information is for End User's use only and may not be sold, redistributed or otherwise used for commercial purposes  All illustrations and images included in CareNotes® are the copyrighted property of [employer]  or [facility] [place]   The above information is an  only   It is not intended as medical advice for individual conditions or treatments  Talk to your doctor, nurse or pharmacist before following any medical regimen to see if it is safe and effective for you  Calorie Counting Diet   WHAT YOU NEED TO KNOW:   What is a calorie counting diet? It is a meal plan based on counting calories each day to reach a healthy body weight  You will need to eat fewer calories if you are trying to lose weight  Weight loss may decrease your risk for certain health problems or improve your health if you have health problems  Some of these health problems include heart disease, high blood pressure, and diabetes  What foods should I avoid? Your dietitian will tell you if you need to avoid certain foods based on your body weight and health condition  You may need to avoid high-fat foods if you are at risk for or have heart disease  You may need to eat fewer foods from the breads and starches food group if you have diabetes  How many calories are in foods? The following is a list of foods and drinks with the approximate number of calories in each  Check the food label to find the exact number of calories  A dietitian can tell you how many calories you should have from each food group each day  · Carbohydrate:      ? ½ of a 3-inch bagel, 1 slice of bread, or ½ of a hamburger bun or hot dog bun (80)    ? 1 (8-inch) flour tortilla or ½ cup of cooked rice (100)    ? 1 (6-inch) corn tortilla (80)    ? 1 (6-inch) pancake or 1 cup of bran flakes cereal (110)    ? ½ cup of cooked cereal (80)    ? ½ cup of cooked pasta (85)    ? 1 ounce of pretzels (100)    ? 3 cups of air-popped popcorn without butter or oil (80)    · Dairy:      ? 1 cup of skim or 1% milk (90)    ? 1 cup of 2% milk (120)    ? 1 cup of whole milk (160)    ? 1 cup of 2% chocolate milk (220)    ? 1 ounce of low-fat cheese with 3 grams of fat per ounce (70)    ? 1 ounce of cheddar cheese (114)    ? ½ cup of 1% fat cottage cheese (80)    ?  1 cup of plain or sugar-free, fat-free yogurt (90)    · Protein foods:      ? 3 ounces of fish (not breaded or fried) (95)    ? 3 ounces of breaded, fried fish (195)    ? ¾ cup of tuna canned in water (105)    ? 3 ounces of chicken breast without skin (105)    ? 1 fried chicken breast with skin (350)    ? ¼ cup of fat free egg substitute (40)    ? 1 large egg (75)    ? 3 ounces of lean beef or pork (165)    ? 3 ounces of fried pork chop or ham (185)    ? ½ cup of cooked dried beans, such as kidney, ross, lentils, or navy (115)    ? 3 ounces of bologna or lunch meat (225)    ? 2 links of breakfast sausage (140)    · Vegetables:      ? ½ cup of sliced mushrooms (10)    ? 1 cup of salad greens, such as lettuce, spinach, or brenda (15)    ? ½ cup of steamed asparagus (20)    ? ½ cup of cooked summer squash, zucchini squash, or green or wax beans (25)    ? 1 cup of broccoli or cauliflower florets, or 1 medium tomato (25)    ? 1 large raw carrot or ½ cup of cooked carrots (40)    ? ? of a medium cucumber or 1 stalk of celery (5)    ? 1 small baked potato (160)    ? 1 cup of breaded, fried vegetables (230)    · Fruit:      ? 1 (6-inch) banana (55)     ? ½ of a 4-inch grapefruit (55)    ? 15 grapes (60)    ? 1 medium orange or apple (70)    ? 1 large peach (65)    ? 1 cup of fresh pineapple chunks (75)    ? 1 cup of melon cubes (50)    ? 1¼ cups of whole strawberries (45)    ? ½ cup of fruit canned in juice (55)    ? ½ cup of fruit canned in heavy syrup (110)    ? ? cup of raisins (130)    ? ½ cup of unsweetened fruit juice (60)    ? ½ cup of grape, cranberry, or prune juice (90)    · Fat:      ? 10 peanuts or 2 teaspoons of peanut butter (55)    ? 2 tablespoons of avocado or 1 tablespoon of regular salad dressing (45)    ? 2 slices of escobar (90)    ? 1 teaspoon of oil, such as safflower, canola, corn, or olive oil (45)    ? 2 teaspoons of low-fat margarine, or 1 tablespoon of low-fat mayonnaise (50)    ? 1 teaspoon of regular margarine (40)    ?  1 tablespoon of regular mayonnaise (135)    ? 1 tablespoon of cream cheese or 2 tablespoons of low-fat cream cheese (45)    ? 2 tablespoons of vegetable shortening (215)    · Dessert and sweets:      ? 8 animal crackers or 5 vanilla wafers (80)    ? 1 frozen fruit juice bar (80)    ? ½ cup of ice milk or low-fat frozen yogurt (90)    ? ½ cup of sherbet or sorbet (125)    ? ½ cup of sugar-free pudding or custard (60)    ? ½ cup of ice cream (140)    ? ½ cup of pudding or custard (175)    ? 1 (2-inch) square chocolate brownie (185)    · Combination foods:      ? Bean burrito made with an 8-inch tortilla, without cheese (275)    ? Chicken breast sandwich with lettuce and tomato (325)    ? 1 cup of chicken noodle soup (60)    ? 1 beef taco (175)    ? Regular hamburger with lettuce and tomato (310)    ? Regular cheeseburger with lettuce and tomato (410)     ? ¼ of a 12-inch cheese pizza (280)    ? Fried fish sandwich with lettuce and tomato (425)    ? Hot dog and bun (275)    ? 1½ cups of macaroni and cheese (310)    ? Taco salad with a fried tortilla shell (870)    · Low-calorie foods:      ? 1 tablespoon of ketchup or 1 tablespoon of fat free sour cream (15)    ? 1 teaspoon of mustard (5)    ? ¼ cup of salsa (20)    ? 1 large dill pickle (15)    ? 1 tablespoon of fat free salad dressing (10)    ? 2 teaspoons of low-sugar, light jam or jelly, or 1 tablespoon of sugar-free syrup (15)    ? 1 sugar-free popsicle (15)    ? 1 cup of club soda, seltzer water, or diet soda (0)    CARE AGREEMENT:   You have the right to help plan your care  Discuss treatment options with your healthcare provider to decide what care you want to receive  You always have the right to refuse treatment  The above information is an  only  It is not intended as medical advice for individual conditions or treatments  Talk to your doctor, nurse or pharmacist before following any medical regimen to see if it is safe and effective for you    © Copyright [employer] 2022 Information is for [employer] use only and may not be sold, redistributed or otherwise used for commercial purposes   All illustrations and images included in CareNotes® are the copyrighted property of A D A M , Inc  or [facility] [place]

## 2022-11-08 NOTE — ASSESSMENT & PLAN NOTE
Longstanding history of hypertension  He remains on medication as previously  Renal function is stable  Patient will continue present medication and surveillance    If no improvement with his blood pressure readings with his next visit most likely will increase his lisinopril to 10 mg daily follow his renal function

## 2022-11-08 NOTE — ASSESSMENT & PLAN NOTE
Patient is continue to follow-up with his endocrinologist   His hemoglobin A1c has come down to 8 2 which is showing some improvement but still not to goal   Patient admits that he is not perfect with his diet and especially while he is at his computer working at home he does snack a lot but intends with his upcoming retired to make some changes  He was told along with watching his caloric intake he needs to make sure that he is involved in some type of routine exercise program which can also promote weight loss  Check a fasting blood sugar hemoglobin A1c with his next visit  Continue to follow-up with endocrinology    Lab Results   Component Value Date    HGBA1C 8 2 (H) 11/04/2022

## 2022-11-08 NOTE — PROGRESS NOTES
Name: Liat Alicia      : 1955      MRN: [id number]  Encounter Provider: Safia Bowen DO  Encounter Date: 2022   Encounter department: [facility] INTERNAL MEDICINE    Assessment & Plan     1  Essential hypertension  Assessment & Plan:  Longstanding history of hypertension  He remains on medication as previously  Renal function is stable  Patient will continue present medication and surveillance  If no improvement with his blood pressure readings with his next visit most likely will increase his lisinopril to 10 mg daily follow his renal function    Orders:  -     Basic metabolic panel; Future  -     Lipid panel; Future    2  Diabetes 1 5, managed as type 2 [facility])  Assessment & Plan:  Patient is continue to follow-up with his endocrinologist   His hemoglobin A1c has come down to 8 2 which is showing some improvement but still not to goal   Patient admits that he is not perfect with his diet and especially while he is at his computer working at home he does snack a lot but intends with his upcoming retired to make some changes  He was told along with watching his caloric intake he needs to make sure that he is involved in some type of routine exercise program which can also promote weight loss  Check a fasting blood sugar hemoglobin A1c with his next visit  Continue to follow-up with endocrinology  Lab Results   Component Value Date    HGBA1C 8 2 (H) 2022       Orders:  -     Hemoglobin A1C; Future    3  Obesity (BMI 30-39  9)  Assessment & Plan: With the patient's BMI he is considered obese  Again admits that he is not compliant with his diet  Does not have any routine exercise program that may be helpful for weight loss  He has already gone to diabetic teaching classes and has discussed with him the importance of watching his caloric intake    He is hoping when he retires in December that he can make some changes as far as diet is concerned and start on some type routine exercise program in order to promote weight loss  4  Lumbar degenerative disc disease  Assessment & Plan:  Patient does have a history of chronic back pain  History of herniated disc in the past   States that his pain is manageable this point in time  Subjective     Patient is a 79-year-old male history of medical problems as outlined previously who is here today for routine follow-up  He did have labs performed prior to visit today we did discuss the results of length with the patient  Patient states in general doing relatively well and he is looking for to his alf in a few months  Review of Systems   Constitutional: Negative  HENT: Negative  Eyes: Negative  Respiratory: Negative  Cardiovascular: Negative  Gastrointestinal: Negative  Endocrine: Negative  Genitourinary: Negative  Musculoskeletal: Positive for back pain (States the back pain has decreased and is stable)  Negative for arthralgias, gait problem, joint swelling, myalgias, neck pain and neck stiffness  Skin: Negative  Allergic/Immunologic: Negative  Neurological: Negative  Hematological: Negative  Psychiatric/Behavioral: Negative  Past Medical History:   Diagnosis Date   • Arthritis 2018    low back pain   • Diabetes mellitus ([address] )    • GERD (gastroesophageal reflux disease)    • Headache    • Headache(784 0)    • High cholesterol    • Hypertension    • Kidney stone 2005   • Kidney stones    • Memory loss 2017    due to Gabapentin   • Obesity    • Sleep apnea    • 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     Past Surgical History:   Procedure Laterality Date   • COLONOSCOPY N/A 11/22/2016    Procedure: COLONOSCOPY;  Surgeon: Félix Brush MD;  [facility];   Service:    • DEBRIDEMENT TENNIS ELBOW  2006   • EYE SURGERY  2008    laser holes to relieve pressure   • HERNIA REPAIR  2417    x2, umbilical and right side per patient   • KNEE SURGERY  2000    meniscus respair   • NASAL SEPTUM SURGERY  1980   • [place] INCISE FINGER TENDON SHEATH Right 11/19/2019    Procedure: RELEASE TRIGGER FINGER-right long finger;  Surgeon: Mari Gonzalez MD;  [facility];  Service: Orthopedics   • OH INCISE FINGER TENDON SHEATH Left 11/26/2019    Procedure: RELEASE TRIGGER FINGER-left long finger;  Surgeon: Mari Gonzalez MD;  [facility];  Service: Orthopedics   • UVULOPALATOPHARYNGOPLASTY  2004     Family History   Family history unknown: Yes     Social History     Socioeconomic History   • Marital status: /Civil Union     Spouse name: None   • Number of children: None   • Years of education: None   • Highest education level: None   Occupational History   • None   Tobacco Use   • Smoking status: Never Smoker   • Smokeless tobacco: Never Used   Substance and Sexual Activity   • Alcohol use: Yes     Comment: rarely   • Drug use: No   • Sexual activity: Never   Other Topics Concern   • None   Social History Narrative   • None     Social Determinants of Health     Financial Resource Strain: Not on file   Food Insecurity: Not on file   Transportation Needs: Not on file   Physical Activity: Not on file   Stress: Not on file   Social Connections: Not on file   Intimate Partner Violence: Not on file   Housing Stability: Not on file     Current Outpatient Medications on File Prior to Visit   Medication Sig   • acetaminophen (TYLENOL 8 HOUR) 650 mg CR tablet Take 1 tablet (650 mg total) by mouth every 8 (eight) hours   • Coenzyme Q10 (CO Q-10) 200 MG CAPS    • dapagliflozin (Farxiga) 10 MG tablet Take 1 tablet (10 mg total) by mouth daily   • diclofenac sodium (VOLTAREN) 50 mg EC tablet Take 1 tablet (50 mg total) by mouth 2 (two) times a day as needed (pain)   • glimepiride (AMARYL) 1 mg tablet Take 2 tablets twice a day with meals   • ketoconazole (NIZORAL) 2 % cream APPLY TWICE DAILY TO AFFECTED AREA ON THE FACE ONGOING   • lisinopril (ZESTRIL) 5 mg tablet TAKE 1 TABLET BY MOUTH EVERY DAY   • rosuvastatin (CRESTOR) 10 MG tablet TAKE 1 TABLET BY MOUTH EVERY DAY   • triamcinolone (KENALOG) 0 1 % cream APPLY TWICE DAILY TO AFFECTED AREA ON THE FACE X 7 DAYS MAX AS NEEDED  Allergies   Allergen Reactions   • Tramadol Itching     Immunization History   Administered Date(s) Administered   • COVID-19 MODERNA VACC 0 5 ML IM 03/16/2021, 04/15/2021, 10/29/2021   • H1N1, All Formulations 12/30/2009   • INFLUENZA 10/28/2015, 09/13/2016, 10/17/2019, 10/13/2020, 10/30/2021   • Influenza Quadrivalent 3 years and older 10/07/2014   • Influenza Quadrivalent Preservative Free 3 years and older IM 10/07/2014   • Influenza Quadrivalent Recombinant Preservative Free IM 10/17/2019, 10/13/2020   • Influenza Quadrivalent, 6-35 Months IM 10/28/2015, 09/13/2016, 09/28/2017   • Influenza, high dose seasonal 0 7 mL 10/30/2021   • Influenza, recombinant, quadrivalent,injectable, preservative free 10/09/2018   • Influenza, seasonal, injectable 1955   • Tuberculin Skin Test-PPD Intradermal 08/27/2015   • Zoster Vaccine Recombinant 11/12/2020, 03/18/2021       Objective     /80 (BP Location: Left arm, Patient Position: Sitting, Cuff Size: Adult)   Pulse 89   Temp 98 °F (36 7 °C) (Tympanic)   Resp 18   Ht 6' 2 5" (1 892 m)   Wt 130 kg (286 lb)   SpO2 95%   BMI 36 23 kg/m²     Physical Exam  Vitals and nursing note reviewed  Constitutional:       General: He is not in acute distress  Appearance: Normal appearance  He is obese  He is not ill-appearing, toxic-appearing or diaphoretic  Comments: Pleasant, cheerful, obese 74 year male who is awake alert in no acute distress oriented x3   HENT:      Head: Normocephalic and atraumatic  Right Ear: Tympanic membrane, ear canal and external ear normal  There is no impacted cerumen  Left Ear: Tympanic membrane and ear canal normal  There is no impacted cerumen  Nose: Nose normal  No congestion or rhinorrhea        Mouth/Throat: Mouth: Mucous membranes are moist       Pharynx: Oropharynx is clear  No oropharyngeal exudate or posterior oropharyngeal erythema  Eyes:      General: No scleral icterus  Right eye: No discharge  Left eye: No discharge  Extraocular Movements: Extraocular movements intact  Conjunctiva/sclera: Conjunctivae normal       Pupils: Pupils are equal, round, and reactive to light  Neck:      Vascular: No carotid bruit  Cardiovascular:      Rate and Rhythm: Normal rate and regular rhythm  Pulses: Normal pulses  Heart sounds: Normal heart sounds  No murmur heard  No friction rub  No gallop  Pulmonary:      Effort: Pulmonary effort is normal  No respiratory distress  Breath sounds: Normal breath sounds  No stridor  No wheezing, rhonchi or rales  Chest:      Chest wall: No tenderness  Abdominal:      General: Bowel sounds are normal  There is no distension  Palpations: Abdomen is soft  There is no mass  Tenderness: There is no abdominal tenderness  There is no right CVA tenderness, left CVA tenderness, guarding or rebound  Hernia: No hernia is present  Musculoskeletal:         General: No swelling, tenderness, deformity or signs of injury  Normal range of motion  Cervical back: Normal range of motion and neck supple  No rigidity or tenderness  Right lower leg: No edema  Left lower leg: No edema  Comments: Arthritic deformity as noted previously with nothing acute, decreased range of motion to lumbar spine both actively and passively but no changes  Lymphadenopathy:      Cervical: No cervical adenopathy  Skin:     General: Skin is warm and dry  Coloration: Skin is not jaundiced or pale  Findings: No bruising, erythema, lesion or rash  Neurological:      General: No focal deficit present  Mental Status: He is alert and oriented to person, place, and time  Mental status is at baseline        Cranial Nerves: No cranial nerve deficit  Sensory: No sensory deficit  Motor: No weakness  Coordination: Coordination normal       Gait: Gait normal       Deep Tendon Reflexes: Reflexes abnormal (Absent patella and Achilles tendon reflexes bilaterally without change)  Psychiatric:         Mood and Affect: Mood normal          Behavior: Behavior normal          Thought Content: Thought content normal          Judgment: Judgment normal        Eros Dangelo, DO  BMI Counseling: Body mass index is 36 23 kg/m²  The BMI is above normal  Nutrition recommendations include reducing portion sizes, decreasing overall calorie intake, 3-5 servings of fruits/vegetables daily, consuming healthier snacks, moderation in carbohydrate intake, increasing intake of lean protein, reducing intake of saturated fat and trans fat and reducing intake of cholesterol  Exercise recommendations include exercising 3-5 times per week

## 2022-11-08 NOTE — ASSESSMENT & PLAN NOTE
Patient does have a history of chronic back pain  History of herniated disc in the past   States that his pain is manageable this point in time

## 2022-11-08 NOTE — ASSESSMENT & PLAN NOTE
With the patient's BMI he is considered obese  Again admits that he is not compliant with his diet  Does not have any routine exercise program that may be helpful for weight loss  He has already gone to diabetic teaching classes and has discussed with him the importance of watching his caloric intake  He is hoping when he retires in December that he can make some changes as far as diet is concerned and start on some type routine exercise program in order to promote weight loss

## 2022-11-09 LAB
LEFT EYE DIABETIC RETINOPATHY: NORMAL
RIGHT EYE DIABETIC RETINOPATHY: NORMAL

## 2022-11-09 PROCEDURE — 2023F DILAT RTA XM W/O RTNOPTHY: CPT | Performed by: INTERNAL MEDICINE

## 2022-11-10 DIAGNOSIS — M46.1 SACROILIITIS (HCC): ICD-10-CM

## 2022-12-17 DIAGNOSIS — E11.65 TYPE 2 DIABETES MELLITUS WITH HYPERGLYCEMIA, WITHOUT LONG-TERM CURRENT USE OF INSULIN (HCC): ICD-10-CM

## 2022-12-21 DIAGNOSIS — M46.1 SACROILIITIS (HCC): ICD-10-CM

## 2022-12-28 ENCOUNTER — OFFICE VISIT (OUTPATIENT)
Dept: ENDOCRINOLOGY | Facility: CLINIC | Age: 67
End: 2022-12-28

## 2022-12-28 VITALS
HEART RATE: 91 BPM | WEIGHT: 288 LBS | BODY MASS INDEX: 36.48 KG/M2 | DIASTOLIC BLOOD PRESSURE: 70 MMHG | SYSTOLIC BLOOD PRESSURE: 142 MMHG

## 2022-12-28 DIAGNOSIS — E66.9 OBESITY (BMI 30-39.9): ICD-10-CM

## 2022-12-28 DIAGNOSIS — E78.2 MIXED HYPERLIPIDEMIA: ICD-10-CM

## 2022-12-28 DIAGNOSIS — I10 ESSENTIAL HYPERTENSION: ICD-10-CM

## 2022-12-28 DIAGNOSIS — E11.65 TYPE 2 DIABETES MELLITUS WITH HYPERGLYCEMIA, WITHOUT LONG-TERM CURRENT USE OF INSULIN (HCC): Primary | ICD-10-CM

## 2022-12-28 LAB — SL AMB POCT GLUCOSE BLD: 230

## 2022-12-28 RX ORDER — SEMAGLUTIDE 1.34 MG/ML
INJECTION, SOLUTION SUBCUTANEOUS
Qty: 4.5 ML | Refills: 1 | Status: SHIPPED | OUTPATIENT
Start: 2022-12-28

## 2022-12-28 NOTE — PROGRESS NOTES
Jacqulynn Lefort 74 y o  male MRN: [id number]    Encounter: [id number]      Assessment/Plan     Assessment: This is a 74y o -year-old male with diabetes with hyperglycemia  Plan:    Diagnoses and all orders for this visit:    Type 2 diabetes mellitus with hyperglycemia, without long-term current use of insulin ([facility] [address] )  Lab Results   Component Value Date    HGBA1C 8 2 (H) 11/04/2022   (8 2%, improved  A1c is not at goal   Goal is 6 5 to 7%  Because about starting Ozempic 0 25 mg once a week, and increasing the dose in 4 weeks if no nausea or vomiting  We will do the diabetes education referral for Ozempic pen teaching  If Ozempic is not covered patient will need teaching for substitute  Continue glimepiride 2 mg twice a day  Continue Farxiga 10 mg daily       -     Semaglutide,0 25 or 0 5MG/DOS, (Ozempic, 0 25 or 0 5 MG/DOSE,) 2 MG/1 5ML SOPN; Inject 0 25 mg once a week in to skin , increase dose to 0 5 mg once a week after 4 weeks  -     Ambulatory referral to Podiatry; Future  -     Ambulatory referral to Diabetic Education; Future  -     Basic metabolic panel; Future  -     Hemoglobin A1C; Future  -     Microalbumin / creatinine urine ratio; Future    Essential hypertension  BP Readings from Last 3 Encounters:   12/28/22 142/70   11/08/22 142/80   07/11/22 140/84   Pressure is elevated  Discussed after starting Ozempic it will also help with lowering of systolic blood pressure  Follow-up with PCP  Mixed hyperlipidemia  Continue Crestor    Obesity (BMI 30-39 )  Start Ozempic  Discussed importance of weight loss, lifestyle modifications, dietary modifications such as cutting down on free sugars, free carbohydrates, as well as saturated fats  Discussed to increase serving of fruits and vegetables in diet(3-5 servings per day)       Ana Garcia    CC: Diabetes    History of Present Illness     HPI:    Jacqulynn Lefort is 63-year-old gentleman with medical history of type 2 diabetes is here for follow-up  Diabetes course has been stable  He denies any recent illness or hospitalization  He denies any complications of diabetes  Checks blood sugars once or twice daily and his blood sugars are usually in the 170-220mg per DL range  Current regimen is Farxiga 10 mg daily and glimepiride 2 mg with breakfast and dinner    Sees ophthalmology regularly  For hypertension, he takes lisinopril 5 mg daily, patient is usually well controlled  Hyperlipidemia he takes Crestor 10 mg daily  He has not seen podiatry  Ref Range & Units 11/4/22  9:14 AM 7/6/22  7:36 AM 3/27/22  9:12 AM 11/19/21 12:46 PM 7/18/21  8:56 AM 3/13/21  9:06 AM 10/3/20  9:00 AM   Sodium 135 - 147 mmol/L 138  138 R  140 R  138 R  137 R  141 R  140 R    Potassium 3 5 - 5 3 mmol/L 4 1  4 2  4 1  3 6  4 0  4 1  4 5 CM    Chloride 96 - 108 mmol/L 105  106 R  107 R  104 R  106 R  104 R  109 High  R    CO2 21 - 32 mmol/L 31  28  26  28  27  29  28    ANION GAP 4 - 13 mmol/L 2 Low   4  7  6  4  8  3 Low     BUN 5 - 25 mg/dL 15  16  14  14  13  17  23    Creatinine 0 60 - 1 30 mg/dL 0 80  0 73 CM  0 77 CM  0 78 CM  0 88 CM  0 89 CM  0 93 CM    Comment: Standardized to IDMS reference method   Glucose, Fasting 65 - 99 mg/dL 170 High   179 High  CM  168 High  CM  146 High  CM  194 High  CM  148 High  CM  108 High  CM    Comment: Specimen collection should occur prior to Sulfasalazine administration due to the potential for falsely depressed results  Specimen collection should occur prior to Sulfapyridine administration due to the potential for falsely elevated results     Calcium 8 3 - 10 1 mg/dL 9 5  9 2  9 4  9 1  9 3  9 1  9 3    eGFR ml/min/1 73sq m 93  97  95  95  91  90  87            Lab Results   Component Value Date    HGBA1C 8 2 (H) 11/04/2022     Component      Latest Ref Rng & Units 11/4/2022   Sodium      135 - 147 mmol/L 138   Potassium      3 5 - 5 3 mmol/L 4 1   Chloride      96 - 108 mmol/L 105   CO2      21 - 32 mmol/L 31   Anion Gap      4 - 13 mmol/L 2 (L)   BUN      5 - 25 mg/dL 15   Creatinine      0 60 - 1 30 mg/dL 0 80   GLUCOSE FASTING      65 - 99 mg/dL 170 (H)   Calcium      8 3 - 10 1 mg/dL 9 5   eGFR      ml/min/1 73sq m 93   Cholesterol      See Comment mg/dL    Triglycerides      See Comment mg/dL    HDL      >=40 mg/dL    LDL Calculated      0 - 100 mg/dL    Non-HDL Cholesterol      mg/dl    EXT Creatinine Urine      mg/dL    MICROALBUM ,U,RANDOM      0 0 - 20 0 mg/L    MICROALBUMIN/CREATININE RATIO      0 - 30 mg/g creatinine    Hemoglobin A1C      Normal 3 8-5 6%; PreDiabetic 5 7-6 4%; Diabetic >=6 5%; Glycemic control for adults with diabetes <7 0% % 8 2 (H)   eAG, EST AVG Glucose      mg/dl 189   TSH 3RD GENERATON      0 450 - 4 500 uIU/mL           Review of Systems   Constitutional: Positive for activity change and fatigue  Negative for diaphoresis, fever and unexpected weight change  HENT: Negative  Eyes: Negative for visual disturbance  Respiratory: Negative for cough, chest tightness and shortness of breath  Cardiovascular: Negative for chest pain, palpitations and leg swelling  Gastrointestinal: Negative for abdominal pain, blood in stool, constipation, diarrhea, nausea and vomiting  Endocrine: Negative for cold intolerance, heat intolerance, polydipsia, polyphagia and polyuria  Genitourinary: Negative for dysuria, enuresis, frequency and urgency  Musculoskeletal: Positive for arthralgias, back pain and myalgias  Skin: Negative for pallor, rash and wound  Allergic/Immunologic: Negative  Neurological: Positive for numbness  Negative for dizziness, tremors and weakness  Hematological: Negative  Psychiatric/Behavioral: Negative  The patient is not nervous/anxious          Historical Information   Past Medical History:   Diagnosis Date   • Arthritis 2018    low back pain   • Diabetes mellitus ([facility])    • GERD (gastroesophageal reflux disease)    • Headache    • Headache(784 0)    • High cholesterol    • Hypertension    • Kidney stone 2005   • Kidney stones    • Memory loss 2017    due to Gabapentin   • Obesity    • Sleep apnea    • 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     Past Surgical History:   Procedure Laterality Date   • COLONOSCOPY N/A 11/22/2016    Procedure: COLONOSCOPY;  Surgeon: Edward Aguilar MD;  [facility];   Service:    • DEBRIDEMENT TENNIS ELBOW  2006   • EYE SURGERY  2008    laser holes to relieve pressure   • HERNIA REPAIR  0783    x2, umbilical and right side per patient   • KNEE SURGERY  2000    meniscus respair   • NASAL SEPTUM SURGERY  1980   • [place] TENDON SHEATH INCISION Right 11/19/2019    Procedure: RELEASE TRIGGER FINGER-right long finger;  Surgeon: Rand Hill MD;  [facility];  Service: Orthopedics   • [place] TENDON SHEATH INCISION Left 11/26/2019    Procedure: RELEASE TRIGGER FINGER-left long finger;  Surgeon: Rand Hill MD;  [facility];  Service: Orthopedics   • UVULOPALATOPHARYNGOPLASTY  2004     Social History   Social History     Substance and Sexual Activity   Alcohol Use Yes    Comment: rarely     Social History     Substance and Sexual Activity   Drug Use No     Social History     Tobacco Use   Smoking Status Never   Smokeless Tobacco Never     Family History:   Family History   Family history unknown: Yes       Meds/Allergies   Current Outpatient Medications   Medication Sig Dispense Refill   • acetaminophen (TYLENOL 8 HOUR) 650 mg CR tablet Take 1 tablet (650 mg total) by mouth every 8 (eight) hours 15 tablet 0   • Coenzyme Q10 (CO Q-10) 200 MG CAPS      • dapagliflozin (Farxiga) 10 MG tablet Take 1 tablet (10 mg total) by mouth daily 90 tablet 0   • diclofenac sodium (VOLTAREN) 50 mg EC tablet TAKE 1 TABLET (50 MG TOTAL) BY MOUTH 2 (TWO) TIMES A DAY AS NEEDED (PAIN) 180 tablet 0   • glimepiride (AMARYL) 1 mg tablet Take 2 tablets twice a day with meals 360 tablet 1   • ketoconazole (NIZORAL) 2 % cream APPLY TWICE DAILY TO AFFECTED AREA ON THE FACE ONGOING     • lisinopril (ZESTRIL) 5 mg tablet TAKE 1 TABLET BY MOUTH EVERY DAY 90 tablet 3   • rosuvastatin (CRESTOR) 10 MG tablet TAKE 1 TABLET BY MOUTH EVERY DAY 90 tablet 3   • Semaglutide,0 25 or 0 5MG/DOS, (Ozempic, 0 25 or 0 5 MG/DOSE,) 2 MG/1 5ML SOPN Inject 0 25 mg once a week in to skin , increase dose to 0 5 mg once a week after 4 weeks 4 5 mL 1   • triamcinolone (KENALOG) 0 1 % cream APPLY TWICE DAILY TO AFFECTED AREA ON THE FACE X 7 DAYS MAX AS NEEDED  No current facility-administered medications for this visit  Allergies   Allergen Reactions   • Tramadol Itching       Objective   Vitals: Blood pressure 142/70, pulse 91, weight 131 kg (288 lb)  Physical Exam  Vitals reviewed  Constitutional:       General: He is not in acute distress  Appearance: Normal appearance  He is well-developed  He is obese  HENT:      Head: Normocephalic and atraumatic  Eyes:      Pupils: Pupils are equal, round, and reactive to light  Neck:      Thyroid: No thyromegaly  Cardiovascular:      Rate and Rhythm: Normal rate and regular rhythm  Heart sounds: Normal heart sounds  Pulmonary:      Effort: Pulmonary effort is normal  No respiratory distress  Breath sounds: Normal breath sounds  Abdominal:      General: Bowel sounds are normal       Palpations: Abdomen is soft  Musculoskeletal:         General: No deformity  Normal range of motion  Cervical back: Normal range of motion and neck supple  Skin:     General: Skin is warm and dry  Findings: No erythema  Neurological:      General: No focal deficit present  Mental Status: He is alert and oriented to person, place, and time  Psychiatric:         Mood and Affect: Mood normal          Behavior: Behavior normal          The history was obtained from the review of the chart, patient      Lab Results:   Lab Results   Component Value Date/Time    Hemoglobin A1C 8 2 (H) 11/04/2022 09:14 AM    Hemoglobin A1C 8 4 (H) 07/06/2022 07:36 AM    Hemoglobin A1C 8 5 (H) 03/27/2022 09:12 AM    WBC 6 89 07/06/2022 07:36 AM    Hemoglobin 16 9 07/06/2022 07:36 AM    Hematocrit 50 0 (H) 07/06/2022 07:36 AM    MCV 84 07/06/2022 07:36 AM    Platelets 958 04/50/2680 07:36 AM    BUN 15 11/04/2022 09:14 AM    BUN 16 07/06/2022 07:36 AM    BUN 14 03/27/2022 09:12 AM    Potassium 4 1 11/04/2022 09:14 AM    Potassium 4 2 07/06/2022 07:36 AM    Potassium 4 1 03/27/2022 09:12 AM    Chloride 105 11/04/2022 09:14 AM    Chloride 106 07/06/2022 07:36 AM    Chloride 107 03/27/2022 09:12 AM    CO2 31 11/04/2022 09:14 AM    CO2 28 07/06/2022 07:36 AM    CO2 26 03/27/2022 09:12 AM    Creatinine 0 80 11/04/2022 09:14 AM    Creatinine 0 73 07/06/2022 07:36 AM    Creatinine 0 77 03/27/2022 09:12 AM    AST 17 07/06/2022 07:36 AM    ALT 39 07/06/2022 07:36 AM    Albumin 3 6 07/06/2022 07:36 AM    HDL, Direct 56 07/06/2022 07:36 AM    Triglycerides 156 (H) 07/06/2022 07:36 AM           Imaging Studies: I have personally reviewed pertinent reports  Portions of the record may have been created with voice recognition software  Occasional wrong word or "sound a like" substitutions may have occurred due to the inherent limitations of voice recognition software  Read the chart carefully and recognize, using context, where substitutions have occurred

## 2022-12-28 NOTE — PATIENT INSTRUCTIONS
Hypoglycemia instructions   Sharyle Hammcarolina  12/28/2022  [id number]    Low Blood Sugar    Steps to treat low blood sugar  1  Test blood sugar if you have symptoms of low blood sugar:   Low Blood Sugar Symptoms:  o Sweaty  o Dizzy  o Rapid heartbeat  o Shaky    o Bad mood  o Hungry      2  Treat blood sugar less than 70 with 15 grams of fast-acting carbohydrate:   Examples of 15 grams Fast-Acting Carbohydrate:  o 4 oz juice  o 4 oz regular soda  o 3-4 glucose tablets (chew)  o 3-4 hard candies (chew)              3    Wait 15 minutes and test your blood sugar again           4   If blood sugar is less than 100, repeat steps 2-3       5  When your blood sugar is 100 or more, eat a snack if it will be longer than one hour until your next meal  The snack should be 15 grams of carbohydrate and a protein:   Examples of snacks:  o ½ sandwich  o 6 crackers with cheese  o Piece of fruit with cheese or peanut butter  o 6 crackers with peanut butter

## 2023-02-02 ENCOUNTER — OFFICE VISIT (OUTPATIENT)
Dept: PODIATRY | Facility: CLINIC | Age: 68
End: 2023-02-02

## 2023-02-02 VITALS
WEIGHT: 285 LBS | HEART RATE: 77 BPM | SYSTOLIC BLOOD PRESSURE: 126 MMHG | DIASTOLIC BLOOD PRESSURE: 83 MMHG | BODY MASS INDEX: 35.43 KG/M2 | HEIGHT: 75 IN

## 2023-02-02 DIAGNOSIS — E11.42 DIABETIC POLYNEUROPATHY ASSOCIATED WITH TYPE 2 DIABETES MELLITUS (HCC): Primary | ICD-10-CM

## 2023-02-02 DIAGNOSIS — E11.65 TYPE 2 DIABETES MELLITUS WITH HYPERGLYCEMIA, WITHOUT LONG-TERM CURRENT USE OF INSULIN (HCC): ICD-10-CM

## 2023-02-02 RX ORDER — NICOTINE POLACRILEX 4 MG/1
GUM, CHEWING ORAL
COMMUNITY
Start: 2022-03-08

## 2023-02-02 NOTE — PROGRESS NOTES
Assessment/Plan:    Discussed principles of diabetic foot care  Vascular status is within normal limits but patient is virtually insensate per [address] monofilament and tuning fork  Urged patient to refrain from walking barefoot  Numbness may be due to his lower back issues but it is most likely secondary to his elevated blood sugars  Urged patient to try to get his numbers longer  Yearly evaluation advised  No problem-specific Assessment & Plan notes found for this encounter  Diagnoses and all orders for this visit:    Diabetic polyneuropathy associated with type 2 diabetes mellitus ([facility] )    Type 2 diabetes mellitus with hyperglycemia, without long-term current use of insulin ([facility] )  -     Ambulatory referral to Podiatry    Other orders  -     diphenhydrAMINE HCl 25 MG CHEW  -     Omeprazole 20 MG TBEC          Subjective:      Patient ID: Beckie Lombard is a 79 y o  male  HPI     Patient, a 60-year-old type II diabetic male presents for diabetic foot exam   Chief complaint is a numb sensation that he has in both feet  He notes that the numbness has been an issue for years  On questioning, he notes that he does have issues with his lower back  I personally reviewed an A1c dated 11/4/2022  It was 8 2  I personally reviewed an A1c dated 7/6/2022  It was 8 4  The following portions of the patient's history were reviewed and updated as appropriate: allergies, current medications, past family history, past medical history, past social history, past surgical history and problem list     Review of Systems   Musculoskeletal: Negative  Neurological: Positive for numbness  Hematological: Negative  Psychiatric/Behavioral: Negative  Objective:      /83   Pulse 77   Ht 6' 2 5" (1 892 m)   Wt 129 kg (285 lb)   BMI 36 10 kg/m²          Physical Exam  Cardiovascular:      Pulses: Pulses are weak             Dorsalis pedis pulses are 2+ on the right side and 2+ on the left side  Posterior tibial pulses are 2+ on the right side and 2+ on the left side  Feet:      Right foot:      Skin integrity: No ulcer, skin breakdown, erythema, warmth, callus or dry skin  Left foot:      Skin integrity: No ulcer, skin breakdown, erythema, warmth, callus or dry skin  Diabetic Foot Exam    Patient's shoes and socks removed  Right Foot/Ankle   Right Foot Inspection  Skin Exam: skin normal and skin intact  No dry skin, no warmth, no callus, no erythema, no maceration, no abnormal color, no pre-ulcer, no ulcer and no callus  Toe Exam: ROM and strength within normal limits  Sensory   Vibration: absent  Proprioception: intact  Monofilament testing: absent    Vascular  Capillary refills: < 3 seconds  The right DP pulse is 2+  The right PT pulse is 2+  Right Toe  - Comprehensive Exam  Ecchymosis: none  Arch: normal  Hammertoes: absent  Claw Toes: absent  Swelling: none   Tenderness: none         Left Foot/Ankle  Left Foot Inspection  Skin Exam: skin normal and skin intact  No dry skin, no warmth, no erythema, no maceration, normal color, no pre-ulcer, no ulcer and no callus  Toe Exam: ROM and strength within normal limits  Sensory   Vibration: absent  Proprioception: intact  Monofilament testing: diminished    Vascular  Capillary refills: < 3 seconds  The left DP pulse is 2+  The left PT pulse is 2+       Left Toe  - Comprehensive Exam  Ecchymosis: none  Arch: normal  Hammertoes: absent  Claw toes: absent  Swelling: none   Tenderness: none           Assign Risk Category  No deformity present  Loss of protective sensation  Weak pulses  Risk: 2

## 2023-03-02 DIAGNOSIS — E11.65 TYPE 2 DIABETES MELLITUS WITH HYPERGLYCEMIA, WITHOUT LONG-TERM CURRENT USE OF INSULIN (HCC): ICD-10-CM

## 2023-03-06 ENCOUNTER — APPOINTMENT (OUTPATIENT)
Dept: LAB | Age: 68
End: 2023-03-06

## 2023-03-06 DIAGNOSIS — E13.9 DIABETES 1.5, MANAGED AS TYPE 2 (HCC): ICD-10-CM

## 2023-03-06 DIAGNOSIS — I10 ESSENTIAL HYPERTENSION: ICD-10-CM

## 2023-03-06 DIAGNOSIS — E11.65 TYPE 2 DIABETES MELLITUS WITH HYPERGLYCEMIA, WITHOUT LONG-TERM CURRENT USE OF INSULIN (HCC): ICD-10-CM

## 2023-03-06 LAB
ANION GAP SERPL CALCULATED.3IONS-SCNC: 2 MMOL/L (ref 4–13)
BUN SERPL-MCNC: 18 MG/DL (ref 5–25)
CALCIUM SERPL-MCNC: 9.6 MG/DL (ref 8.3–10.1)
CHLORIDE SERPL-SCNC: 106 MMOL/L (ref 96–108)
CHOLEST SERPL-MCNC: 198 MG/DL
CO2 SERPL-SCNC: 29 MMOL/L (ref 21–32)
CREAT SERPL-MCNC: 0.78 MG/DL (ref 0.6–1.3)
EST. AVERAGE GLUCOSE BLD GHB EST-MCNC: 174 MG/DL
GFR SERPL CREATININE-BSD FRML MDRD: 94 ML/MIN/1.73SQ M
GLUCOSE P FAST SERPL-MCNC: 157 MG/DL (ref 65–99)
HBA1C MFR BLD: 7.7 %
HDLC SERPL-MCNC: 59 MG/DL
LDLC SERPL CALC-MCNC: 115 MG/DL (ref 0–100)
NONHDLC SERPL-MCNC: 139 MG/DL
POTASSIUM SERPL-SCNC: 4.3 MMOL/L (ref 3.5–5.3)
SODIUM SERPL-SCNC: 137 MMOL/L (ref 135–147)
TRIGL SERPL-MCNC: 118 MG/DL

## 2023-03-09 ENCOUNTER — OFFICE VISIT (OUTPATIENT)
Dept: INTERNAL MEDICINE CLINIC | Facility: CLINIC | Age: 68
End: 2023-03-09

## 2023-03-09 VITALS
OXYGEN SATURATION: 93 % | HEART RATE: 69 BPM | DIASTOLIC BLOOD PRESSURE: 94 MMHG | SYSTOLIC BLOOD PRESSURE: 142 MMHG | HEIGHT: 75 IN | WEIGHT: 282.4 LBS | BODY MASS INDEX: 35.11 KG/M2 | TEMPERATURE: 96.5 F

## 2023-03-09 DIAGNOSIS — E66.9 OBESITY (BMI 30-39.9): ICD-10-CM

## 2023-03-09 DIAGNOSIS — I10 ESSENTIAL HYPERTENSION: Primary | ICD-10-CM

## 2023-03-09 DIAGNOSIS — E78.2 MIXED HYPERLIPIDEMIA: ICD-10-CM

## 2023-03-09 DIAGNOSIS — E13.9 DIABETES 1.5, MANAGED AS TYPE 2 (HCC): ICD-10-CM

## 2023-03-09 RX ORDER — LISINOPRIL AND HYDROCHLOROTHIAZIDE 12.5; 1 MG/1; MG/1
1 TABLET ORAL DAILY
Qty: 30 TABLET | Refills: 5 | Status: SHIPPED | OUTPATIENT
Start: 2023-03-09

## 2023-03-09 NOTE — ASSESSMENT & PLAN NOTE
Blood pressure is showing an adequate control  Diastolic is in the 40C  This was rechecked after the patient was allowed to sit and relax and his diastolic was still elevated  Decision today will be to add a small amount of diuretic to his medication to see if this helps  Recheck blood pressure in a few weeks, continue to monitor renal status

## 2023-03-09 NOTE — PROGRESS NOTES
Name: Nandini Xiao      : 1955      MRN: [id number]  Encounter Provider: Vahid Nuñez DO  Encounter Date: 3/9/2023   Encounter department: [facility]    Assessment & Plan     1  Essential hypertension  Assessment & Plan:  Blood pressure is showing an adequate control  Diastolic is in the 22J  This was rechecked after the patient was allowed to sit and relax and his diastolic was still elevated  Decision today will be to add a small amount of diuretic to his medication to see if this helps  Recheck blood pressure in a few weeks, continue to monitor renal status  Orders:  -     lisinopril-hydrochlorothiazide (PRINZIDE,ZESTORETIC) 10-12 5 MG per tablet; Take 1 tablet by mouth daily    2  Diabetes 1 5, managed as type 2 [facility])  Assessment & Plan:  Patient is establish care with endocrinology  They have attempted to make some changes with his medication does  Had ordered the patient to start Ozempic but this was not started because of the cost of the medication  Patient has been checking his blood sugars at least 3 times per day and states this is helped him manage his blood sugars much better and he realizes that he is needed to make some changes with his diet  With this he is not eating between meals and also is not eating after dinner and he states that this made it significant improvement in his blood sugar control  Hemoglobin A1c has gone down and patient has lost some weight  He will continue to follow-up with endocrinology  Lab Results   Component Value Date    HGBA1C 7 7 (H) 2023         3  Obesity (BMI 30-39  9)  Assessment & Plan: With the patient's BMI he is considered obese  He realizes that changing his diet has made some difference as far as weight loss but not dramatic  Intends to keep a closer eye on his dietary intake and is working towards further weight gain in the future    Check a weight when he returns to the office in a few weeks for reevaluation      4  Mixed hyperlipidemia  Assessment & Plan:  Patient has a history of hyperlipidemia  Remains on statin Crestor 10 mg daily  Recheck cholesterol levels and make further modification of treatment if necessary  Again patient has become more aware of his diet not only how influence his weight but also his sugar and cholesterol             Subjective     Patient is a 73-year-old male who is here today for routine follow-up history of medical problems as outlined previously including hypertension, diabetes, exogenous obesity, hyperlipidemia, chronic back pain  Patient states he retired as of January 1 and 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  States that he is working with an endocrinologist to get better control of his blood sugars and he is watching his blood sugar readings on a more chronic basis daily  Looking more closely at his dietary intake  Review of Systems   Constitutional: Negative  HENT: Negative  Eyes: Negative  Respiratory: Negative  Cardiovascular: Negative  Gastrointestinal: Negative  Endocrine: Negative  Genitourinary: Negative  Musculoskeletal: Negative  Skin: Negative  Allergic/Immunologic: Negative  Neurological: Negative  Hematological: Negative  Psychiatric/Behavioral: Negative  Past Medical History:   Diagnosis Date   • Arthritis 2018    low back pain   • Diabetes mellitus ([facility] )    • GERD (gastroesophageal reflux disease)    • Headache    • Headache(784 0)    • High cholesterol    • Hypertension    • Ingrown toenail big toe - several times   • Kidney stone 2005   • Kidney stones    • Memory loss 2017    due to Gabapentin   • Neuropathy in diabetes ([facility] ) 2003   • Obesity    • Sleep apnea    • 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     Past Surgical History:   Procedure Laterality Date   • COLONOSCOPY N/A 11/22/2016    Procedure: COLONOSCOPY;  Surgeon: Sabine Robin MD;  Location: [facility];   Service:    • DEBRIDEMENT TENNIS ELBOW  2006   • EYE SURGERY  2008    laser holes to relieve pressure   • HERNIA REPAIR  2255    x2, umbilical and right side per patient   • KNEE SURGERY  2000    meniscus respair   • NASAL SEPTUM SURGERY  1980   • IN TENDON SHEATH INCISION Right 11/19/2019    Procedure: RELEASE TRIGGER FINGER-right long finger;  Surgeon: Willie Holguin MD;  Location: [facility];  Service: Orthopedics   • IN TENDON SHEATH INCISION Left 11/26/2019    Procedure: RELEASE TRIGGER FINGER-left long finger;  Surgeon: Willie Holguin MD;  Location: [facility];  Service: Orthopedics   • TONSILLECTOMY  1961   • UVULOPALATOPHARYNGOPLASTY  2004     Family History   Problem Relation Age of Onset   • Arthritis Mother         mostly in her back     Social History     Socioeconomic History   • Marital status: /Civil Union     Spouse name: None   • Number of children: None   • Years of education: None   • Highest education level: None   Occupational History   • None   Tobacco Use   • Smoking status: Never   • Smokeless tobacco: Never   Substance and Sexual Activity   • Alcohol use: Yes     Comment: only occasionally   • Drug use: No   • Sexual activity: Never   Other Topics Concern   • None   Social History Narrative   • None     Social Determinants of Health     Financial Resource Strain: Not on file   Food Insecurity: Not on file   Transportation Needs: Not on file   Physical Activity: Not on file   Stress: Not on file   Social Connections: Not on file   Intimate Partner Violence: Not on file   Housing Stability: Not on file     Current Outpatient Medications on File Prior to Visit   Medication Sig   • acetaminophen (TYLENOL 8 HOUR) 650 mg CR tablet Take 1 tablet (650 mg total) by mouth every 8 (eight) hours   • Coenzyme Q10 (CO Q-10) 200 MG CAPS    • dapagliflozin (Farxiga) 10 MG tablet Take 1 tablet (10 mg total) by mouth daily   • diclofenac sodium (VOLTAREN) 50 mg EC tablet TAKE 1 TABLET (50 MG TOTAL) BY MOUTH 2 (TWO) TIMES A DAY AS NEEDED (PAIN)   • diphenhydrAMINE HCl 25 MG CHEW    • glimepiride (AMARYL) 1 mg tablet Take 2 tablets twice a day with meals   • ketoconazole (NIZORAL) 2 % cream APPLY TWICE DAILY TO AFFECTED AREA ON THE FACE ONGOING   • Omeprazole 20 MG TBEC    • rosuvastatin (CRESTOR) 10 MG tablet TAKE 1 TABLET BY MOUTH EVERY DAY   • semaglutide, 0 25 or 0 5 mg/dose, (Ozempic, 0 25 or 0 5 MG/DOSE,) 2 mg/1 5 mL injection pen Inject 0 25 mg once a week in to skin , increase dose to 0 5 mg once a week after 4 weeks   • triamcinolone (KENALOG) 0 1 % cream APPLY TWICE DAILY TO AFFECTED AREA ON THE FACE X 7 DAYS MAX AS NEEDED  • [DISCONTINUED] lisinopril (ZESTRIL) 5 mg tablet TAKE 1 TABLET BY MOUTH EVERY DAY     Allergies   Allergen Reactions   • Tramadol Itching     Immunization History   Administered Date(s) Administered   • COVID-19 MODERNA VACC 0 5 ML IM 03/16/2021, 04/15/2021, 10/29/2021   • H1N1, All Formulations 12/30/2009   • INFLUENZA 10/28/2015, 09/13/2016, 10/17/2019, 10/13/2020, 10/30/2021   • Influenza Quadrivalent 3 years and older 10/07/2014   • Influenza Quadrivalent Preservative Free 3 years and older IM 10/07/2014   • Influenza Quadrivalent Recombinant Preservative Free IM 10/17/2019, 10/13/2020   • Influenza Quadrivalent, 6-35 Months IM 10/28/2015, 09/13/2016, 09/28/2017   • Influenza, high dose seasonal 0 7 mL 10/30/2021   • Influenza, recombinant, quadrivalent,injectable, preservative free 10/09/2018   • Influenza, seasonal, injectable 1955   • Tuberculin Skin Test-PPD Intradermal 08/27/2015   • Zoster Vaccine Recombinant 11/12/2020, 03/18/2021       Objective     /94 (BP Location: Left arm, Patient Position: Sitting)   Pulse 69   Temp (!) 96 5 °F (35 8 °C) (Tympanic)   Ht 6' 2 5" (1 892 m)   Wt 128 kg (282 lb 6 4 oz)   SpO2 93%   BMI 35 77 kg/m²     Physical Exam  Vitals and nursing note reviewed  Constitutional:       General: He is not in acute distress       Appearance: Normal appearance  He is obese  He is not ill-appearing, toxic-appearing or diaphoretic  Comments: Pleasant, cheerful, obese 74 year male who is awake alert in no acute distress oriented x3   HENT:      Head: Normocephalic and atraumatic  Right Ear: Tympanic membrane, ear canal and external ear normal  There is no impacted cerumen  Left Ear: Tympanic membrane and ear canal normal  There is no impacted cerumen  Nose: Nose normal  No congestion or rhinorrhea  Mouth/Throat:      Mouth: Mucous membranes are moist       Pharynx: Oropharynx is clear  No oropharyngeal exudate or posterior oropharyngeal erythema  Eyes:      General: No scleral icterus  Right eye: No discharge  Left eye: No discharge  Extraocular Movements: Extraocular movements intact  Conjunctiva/sclera: Conjunctivae normal       Pupils: Pupils are equal, round, and reactive to light  Neck:      Vascular: No carotid bruit  Cardiovascular:      Rate and Rhythm: Normal rate and regular rhythm  Pulses: Normal pulses  Heart sounds: Normal heart sounds  No murmur heard  No friction rub  No gallop  Pulmonary:      Effort: Pulmonary effort is normal  No respiratory distress  Breath sounds: Normal breath sounds  No stridor  No wheezing, rhonchi or rales  Chest:      Chest wall: No tenderness  Abdominal:      General: Bowel sounds are normal  There is no distension  Palpations: Abdomen is soft  There is no mass  Tenderness: There is no abdominal tenderness  There is no right CVA tenderness, left CVA tenderness, guarding or rebound  Hernia: No hernia is present  Musculoskeletal:         General: No swelling, tenderness, deformity or signs of injury  Normal range of motion  Cervical back: Normal range of motion and neck supple  No rigidity or tenderness  Right lower leg: No edema  Left lower leg: No edema        Comments: Arthritic deformity as noted previously with nothing acute, decreased range of motion to lumbar spine both actively and passively but no changes  Lymphadenopathy:      Cervical: No cervical adenopathy  Skin:     General: Skin is warm and dry  Coloration: Skin is not jaundiced or pale  Findings: No bruising, erythema, lesion or rash  Neurological:      General: No focal deficit present  Mental Status: He is alert and oriented to person, place, and time  Mental status is at baseline  Cranial Nerves: No cranial nerve deficit  Sensory: No sensory deficit  Motor: No weakness  Coordination: Coordination normal       Gait: Gait normal       Deep Tendon Reflexes: Reflexes abnormal (Absent patella and Achilles tendon reflexes bilaterally without change)  Psychiatric:         Mood and Affect: Mood normal          Behavior: Behavior normal          Thought Content:  Thought content normal          Judgment: Judgment normal        Bee Shames, DO

## 2023-03-09 NOTE — ASSESSMENT & PLAN NOTE
Patient is establish care with endocrinology  They have attempted to make some changes with his medication does  Had ordered the patient to start Ozempic but this was not started because of the cost of the medication  Patient has been checking his blood sugars at least 3 times per day and states this is helped him manage his blood sugars much better and he realizes that he is needed to make some changes with his diet  With this he is not eating between meals and also is not eating after dinner and he states that this made it significant improvement in his blood sugar control  Hemoglobin A1c has gone down and patient has lost some weight    He will continue to follow-up with endocrinology  Lab Results   Component Value Date    HGBA1C 7 7 (H) 03/06/2023

## 2023-03-09 NOTE — ASSESSMENT & PLAN NOTE
With the patient's BMI he is considered obese  He realizes that changing his diet has made some difference as far as weight loss but not dramatic  Intends to keep a closer eye on his dietary intake and is working towards further weight gain in the future    Check a weight when he returns to the office in a few weeks for reevaluation

## 2023-03-09 NOTE — ASSESSMENT & PLAN NOTE
Patient has a history of hyperlipidemia  Remains on statin Crestor 10 mg daily  Recheck cholesterol levels and make further modification of treatment if necessary    Again patient has become more aware of his diet not only how influence his weight but also his sugar and cholesterol

## 2023-03-17 DIAGNOSIS — E11.65 TYPE 2 DIABETES MELLITUS WITH HYPERGLYCEMIA, WITHOUT LONG-TERM CURRENT USE OF INSULIN (HCC): ICD-10-CM

## 2023-03-17 RX ORDER — GLIMEPIRIDE 1 MG/1
TABLET ORAL
Qty: 360 TABLET | Refills: 1 | Status: SHIPPED | OUTPATIENT
Start: 2023-03-17

## 2023-03-27 ENCOUNTER — OFFICE VISIT (OUTPATIENT)
Dept: INTERNAL MEDICINE CLINIC | Facility: CLINIC | Age: 68
End: 2023-03-27

## 2023-03-27 VITALS
SYSTOLIC BLOOD PRESSURE: 138 MMHG | HEIGHT: 74 IN | WEIGHT: 273.8 LBS | RESPIRATION RATE: 16 BRPM | HEART RATE: 96 BPM | OXYGEN SATURATION: 97 % | DIASTOLIC BLOOD PRESSURE: 80 MMHG | BODY MASS INDEX: 35.14 KG/M2 | TEMPERATURE: 96 F

## 2023-03-27 DIAGNOSIS — Z12.5 PROSTATE CANCER SCREENING: Primary | ICD-10-CM

## 2023-03-27 DIAGNOSIS — I10 ESSENTIAL HYPERTENSION: ICD-10-CM

## 2023-03-27 DIAGNOSIS — Z00.00 MEDICARE ANNUAL WELLNESS VISIT, INITIAL: ICD-10-CM

## 2023-03-27 DIAGNOSIS — E13.9 DIABETES 1.5, MANAGED AS TYPE 2 (HCC): ICD-10-CM

## 2023-03-27 DIAGNOSIS — E78.2 MIXED HYPERLIPIDEMIA: ICD-10-CM

## 2023-03-27 DIAGNOSIS — E66.9 OBESITY (BMI 30-39.9): ICD-10-CM

## 2023-03-27 DIAGNOSIS — E11.65 TYPE 2 DIABETES MELLITUS WITH HYPERGLYCEMIA, WITHOUT LONG-TERM CURRENT USE OF INSULIN (HCC): ICD-10-CM

## 2023-03-27 RX ORDER — LISINOPRIL AND HYDROCHLOROTHIAZIDE 12.5; 1 MG/1; MG/1
1 TABLET ORAL DAILY
Qty: 90 TABLET | Refills: 3 | Status: SHIPPED | OUTPATIENT
Start: 2023-03-27

## 2023-03-27 NOTE — PROGRESS NOTES
"Answers for HPI/ROS submitted by the patient on 3/25/2023  How would you rate your overall health?: good  Compared to last year, how is your physical health?: slightly better  In general, how satisfied are you with your life?: very satisfied  Compared to last year, how is your eyesight?: same  Compared to last year, how is your hearing?: same  Compared to last year, how is your emotional/mental health?: same  How often is anger a problem for you?: never, rarely  How often do you feel unusually tired/fatigued?: sometimes  In the past 7 days, how much pain have you experienced?: some  If you answered \"some\" or \"a lot\", please rate the severity of your pain on a scale of 1 to 10 (1 being the least severe pain and 10 being the most intense pain)  : 2/10  In the past 6 months, have you lost or gained 10 pounds without trying?: No  One or more falls in the last year: No  Do you have trouble with the stairs inside or outside your home?: No  Does your home have working smoke alarms?: Yes  Does your home have a carbon monoxide monitor?: Yes  Which safety hazards (if any) have you experienced in your home? Please select all that apply : none  How would you describe your current diet?  Please select all that apply : Low Saturated Fat, No Added Salt, Limited junk food  In addition to prescription medications, are you taking any over-the-counter supplements?: Yes  If yes, what supplements are you taking?: acetaminiphen  Can you manage your medications?: Yes  Are you currently taking any opioid medications?: No  Can you walk and transfer into and out of your bed and chair?: Yes  Can you dress and groom yourself?: Yes  Can you bathe or shower yourself?: Yes  Can you feed yourself?: Yes  Can you do your laundry/ housekeeping?: Yes  Can you manage your money, pay your bills, and track your expenses?: Yes  Can you make your own meals?: Yes  Can you do your own shopping?: Yes  Within the last 12 months, have you had any hospitalizations " or Emergency Department visits?: No  Do you have a living will?: Yes  Do you have a Durable POA (Power of ) for healthcare decisions?: Yes  Do you have an Advanced Directive for end of life decisions?: Yes  How often have you used an illegal drug (including marijuana) or a prescription medication for non-medical reasons in the past year?: never  What is the typical number of drinks you consume in a day?: 0  What is the typical number of drinks you consume in a week?: 0  How often did you have a drink containing alcohol in the past year?: monthly or less  How many drinks did you have on a typical day  when you were drinking in the past year?: 0  How often did you have 6 or more drinks on one occasion in the past year?: never    Name: Sandra Clifford      : 1955      MRN: [id number]  Encounter Provider: Olesya Rose DO  Encounter Date: 3/27/2023   Encounter department: [address] [facility]     1  Prostate cancer screening  -     PSA, Total Screen; Future; Expected date: 2023    2  Essential hypertension  Assessment & Plan:  History of hypertension  Blood pressure is showing much improved control since addition of diuretic to his lisinopril  Check on his renal function now  Orders:  -     Basic metabolic panel; Future  -     TSH, 3rd generation with Free T4 reflex; Future  -     Comprehensive metabolic panel; Future; Expected date: 2023  -     CBC and differential; Future; Expected date: 2023  -     Lipid panel; Future; Expected date: 2023  -     UA (URINE) with reflex to Scope; Future; Expected date: 2023  -     lisinopril-hydrochlorothiazide (PRINZIDE,ZESTORETIC) 10-12 5 MG per tablet; Take 1 tablet by mouth daily    3  Obesity (BMI 30-39  9)  Assessment & Plan: With the patient's BMI he is considered obese  He has had a 10 pound weight loss recently but this is secondary to diuresis with him started on hydrochlorothiazide  He states now that he is retired he intends to spend more time working at [employer] and starting on some type of routine exercise program in order to facilitate weight loss  4  Diabetes 1 5, managed as type 2 [facility])  Assessment & Plan:  Patient continues to follow-up with endocrinology  Again has lost 10 pounds but that this most likely secondary to diuresis recently with starting on a diuretic  He states that his endocrinologist tried to start him on Ozempic but because of the cost this was not started  He intends to be more physically active with his [facility] and hopefully we will see some improvement with his blood sugar readings  He is up-to-date with foot exam and eye exam  Lab Results   Component Value Date    HGBA1C 7 7 (H) 03/06/2023         5  Mixed hyperlipidemia  Assessment & Plan:  History of hyperlipidemia  Remains on Crestor 10 mg daily  Again we have reinforced the patient importance of watching his diet including his intake of fats and cholesterol in his diet and calories overall  We will continue to monitor and follow his cholesterol profile and we did remind the patient again the importance not only of diet but exercise  Orders:  -     Lipid panel; Future; Expected date: 06/27/2023    6  Medicare annual wellness visit, initial  Assessment & Plan:  Patient who is recently retired is being seen today for his first Medicare wellness visit  Patient continues to be followed through our office regarding his multiple medical problems  He continues to follow-up with endocrinology regarding his diabetes  Patient in general states he is doing well and was seen recently because of problems with elevated blood pressure and some slight edema to lower extremities  Patient had an adjustment with medication placed on hydrochlorothiazide along with his lisinopril and states he is lost 10 pounds as noted since his last visit    Did have some increased urination but now this is back to normal     Orders:  -     Comprehensive metabolic panel; Future; Expected date: 06/27/2023  -     CBC and differential; Future; Expected date: 06/27/2023  -     Lipid panel; Future; Expected date: 06/27/2023  -     UA (URINE) with reflex to Scope; Future; Expected date: 06/27/2023    7  Type 2 diabetes mellitus with hyperglycemia, without long-term current use of insulin (HCC)  -     dapagliflozin (Farxiga) 10 MG tablet; Take 1 tablet (10 mg total) by mouth daily           Subjective     Patient is a 59-year-old male history of multiple medical problems as outlined previously who is here today for follow-up, ischial Medicare wellness evaluation  Patient was seen recently in the office and we were having problems controlling his blood pressure  Fluid overload  Patient was placed on a diuretic along with his ACE inhibitor and has had a significant weight loss 10 pounds since his last visit  States feels much lighter overall and less shortness of breath  His only complaint at this point time is occasionally sweating not necessarily related to increased physical activity  Review of Systems   Constitutional: Positive for activity change  Negative for appetite change, chills, diaphoresis, fatigue, fever and unexpected weight change  States with that with long term he is more physically active   HENT: Negative  Eyes: Negative  Respiratory: Negative  Cardiovascular: Negative  Gastrointestinal: Negative  Endocrine: Negative  Episodes of sweating even with minimal activity   Genitourinary: Negative  Did have some increased urination initially with diuretic and now he states he is back to normal   Musculoskeletal: Negative  Skin: Negative  Allergic/Immunologic: Negative  Neurological: Negative  Hematological: Negative  Psychiatric/Behavioral: Negative          Past Medical History:   Diagnosis Date   • Arthritis 2018    low back pain   • Diabetes mellitus ([facility] [address] )    • GERD (gastroesophageal reflux disease)    • Headache    • Headache(784 0)    • High cholesterol    • Hypertension    • Ingrown toenail big toe - several times   • Kidney stone 2005   • Kidney stones    • Memory loss 2017    due to Gabapentin   • Neuropathy in diabetes [facility]) 2003   • Obesity    • Sleep apnea    • 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     Past Surgical History:   Procedure Laterality Date   • COLONOSCOPY N/A 11/22/2016    Procedure: COLONOSCOPY;  Surgeon: Augusta Andrea MD;  Location: [facility];   Service:    • DEBRIDEMENT TENNIS ELBOW  2006   • EYE SURGERY  2008    laser holes to relieve pressure   • HERNIA REPAIR  5142    x2, umbilical and right side per patient   • KNEE SURGERY  2000    meniscus respair   • NASAL SEPTUM SURGERY  1980   • NV TENDON SHEATH INCISION Right 11/19/2019    Procedure: RELEASE TRIGGER FINGER-right long finger;  Surgeon: Jada Gill MD;  Location: [facility];  Service: Orthopedics   • NV TENDON SHEATH INCISION Left 11/26/2019    Procedure: RELEASE TRIGGER FINGER-left long finger;  Surgeon: Jada Gill MD;  Location: [facility];  Service: Orthopedics   • TONSILLECTOMY  1961   • UVULOPALATOPHARYNGOPLASTY  2004     Family History   Problem Relation Age of Onset   • Arthritis Mother         mostly in her back     Social History     Socioeconomic History   • Marital status: /Civil Union     Spouse name: None   • Number of children: None   • Years of education: None   • Highest education level: None   Occupational History   • None   Tobacco Use   • Smoking status: Never   • Smokeless tobacco: Never   Substance and Sexual Activity   • Alcohol use: Yes     Comment: only occasionally   • Drug use: No   • Sexual activity: Never   Other Topics Concern   • None   Social History Narrative   • None     Social Determinants of Health     Financial Resource Strain: Low Risk    • Difficulty of Paying Living Expenses: Not hard at all   Food Insecurity: Not on file Transportation Needs: No Transportation Needs   • Lack of Transportation (Medical): No   • Lack of Transportation (Non-Medical): No   Physical Activity: Not on file   Stress: Not on file   Social Connections: Not on file   Intimate Partner Violence: Not on file   Housing Stability: Not on file     Current Outpatient Medications on File Prior to Visit   Medication Sig   • acetaminophen (TYLENOL 8 HOUR) 650 mg CR tablet Take 1 tablet (650 mg total) by mouth every 8 (eight) hours   • Coenzyme Q10 (CO Q-10) 200 MG CAPS    • diclofenac sodium (VOLTAREN) 50 mg EC tablet TAKE 1 TABLET (50 MG TOTAL) BY MOUTH 2 (TWO) TIMES A DAY AS NEEDED (PAIN)   • diphenhydrAMINE HCl 25 MG CHEW    • glimepiride (AMARYL) 1 mg tablet TAKE 2 TABLETS BY MOUTH TWICE A DAY WITH MEALS   • ketoconazole (NIZORAL) 2 % cream APPLY TWICE DAILY TO AFFECTED AREA ON THE FACE ONGOING   • Omeprazole 20 MG TBEC    • rosuvastatin (CRESTOR) 10 MG tablet TAKE 1 TABLET BY MOUTH EVERY DAY   • semaglutide, 0 25 or 0 5 mg/dose, (Ozempic, 0 25 or 0 5 MG/DOSE,) 2 mg/1 5 mL injection pen Inject 0 25 mg once a week in to skin , increase dose to 0 5 mg once a week after 4 weeks   • triamcinolone (KENALOG) 0 1 % cream APPLY TWICE DAILY TO AFFECTED AREA ON THE FACE X 7 DAYS MAX AS NEEDED     • [DISCONTINUED] dapagliflozin (Farxiga) 10 MG tablet Take 1 tablet (10 mg total) by mouth daily   • [DISCONTINUED] lisinopril-hydrochlorothiazide (PRINZIDE,ZESTORETIC) 10-12 5 MG per tablet Take 1 tablet by mouth daily     Allergies   Allergen Reactions   • Tramadol Itching     Immunization History   Administered Date(s) Administered   • COVID-19 MODERNA VACC 0 5 ML IM 03/16/2021, 04/15/2021, 10/29/2021   • H1N1, All Formulations 12/30/2009   • INFLUENZA 10/28/2015, 09/13/2016, 10/17/2019, 10/13/2020, 10/30/2021   • Influenza Quadrivalent 3 years and older 10/07/2014   • Influenza Quadrivalent Preservative Free 3 years and older IM 10/07/2014   • Influenza Quadrivalent Recombinant "Preservative Free IM 10/17/2019, 10/13/2020   • Influenza Quadrivalent, 6-35 Months IM 10/28/2015, 09/13/2016, 09/28/2017   • Influenza, high dose seasonal 0 7 mL 10/30/2021   • Influenza, recombinant, quadrivalent,injectable, preservative free 10/09/2018   • Influenza, seasonal, injectable 1955   • Tuberculin Skin Test-PPD Intradermal 08/27/2015   • Zoster Vaccine Recombinant 11/12/2020, 03/18/2021       Objective     /80   Pulse 96   Temp (!) 96 °F (35 6 °C)   Resp 16   Ht 6' 2\" (1 88 m) Comment: shoes  Wt 124 kg (273 lb 12 8 oz)   SpO2 97%   BMI 35 15 kg/m²     Physical Exam  Vitals and nursing note reviewed  Constitutional:       General: He is not in acute distress  Appearance: Normal appearance  He is obese  He is not ill-appearing, toxic-appearing or diaphoretic  Comments: Pleasant, articulate, obese 62-year-old male who is awake alert in no acute distress and oriented x3   HENT:      Head: Normocephalic and atraumatic  Right Ear: Tympanic membrane, ear canal and external ear normal  There is no impacted cerumen  Left Ear: Tympanic membrane, ear canal and external ear normal  There is no impacted cerumen  Nose: Nose normal  No congestion or rhinorrhea  Mouth/Throat:      Mouth: Mucous membranes are moist       Pharynx: Oropharynx is clear  No oropharyngeal exudate or posterior oropharyngeal erythema  Eyes:      General: No scleral icterus  Right eye: No discharge  Left eye: No discharge  Conjunctiva/sclera: Conjunctivae normal       Pupils: Pupils are equal, round, and reactive to light  Neck:      Vascular: No carotid bruit  Cardiovascular:      Rate and Rhythm: Normal rate  Pulses: Normal pulses  Heart sounds: Normal heart sounds  No murmur heard  No friction rub  No gallop  Pulmonary:      Effort: Pulmonary effort is normal  No respiratory distress  Breath sounds: Normal breath sounds  No stridor   No wheezing, " rhonchi or rales  Chest:      Chest wall: No tenderness  Abdominal:      General: Bowel sounds are normal  There is no distension  Palpations: Abdomen is soft  There is no mass  Tenderness: There is no abdominal tenderness  There is no right CVA tenderness, left CVA tenderness, guarding or rebound  Hernia: No hernia is present  Genitourinary:     Penis: Normal        Testes: Normal       Prostate: Normal       Rectum: Normal    Musculoskeletal:         General: Deformity present  No swelling, tenderness or signs of injury  Normal range of motion  Cervical back: Normal range of motion and neck supple  No rigidity or tenderness  Right lower leg: No edema  Left lower leg: No edema  Comments: Diffuse arthritic changes as previously especially noted flattening to the lumbar curve with decreased range of motion both actively and passively and only minimal discomfort with movement   Lymphadenopathy:      Cervical: No cervical adenopathy  Skin:     General: Skin is warm and dry  Coloration: Skin is not jaundiced or pale  Findings: No bruising, erythema, lesion or rash  Neurological:      General: No focal deficit present  Mental Status: He is alert and oriented to person, place, and time  Mental status is at baseline  Cranial Nerves: No cranial nerve deficit  Sensory: No sensory deficit  Motor: No weakness  Coordination: Coordination normal       Gait: Gait normal       Deep Tendon Reflexes: Reflexes normal    Psychiatric:         Mood and Affect: Mood normal          Behavior: Behavior normal          Thought Content:  Thought content normal          Judgment: Judgment normal        Lisset Hyatt DO

## 2023-03-27 NOTE — ASSESSMENT & PLAN NOTE
Patient who is recently retired is being seen today for his first Medicare wellness visit  Patient continues to be followed through our office regarding his multiple medical problems  He continues to follow-up with endocrinology regarding his diabetes  Patient in general states he is doing well and was seen recently because of problems with elevated blood pressure and some slight edema to lower extremities  Patient had an adjustment with medication placed on hydrochlorothiazide along with his lisinopril and states he is lost 10 pounds as noted since his last visit    Did have some increased urination but now this is back to normal

## 2023-03-27 NOTE — ASSESSMENT & PLAN NOTE
History of hyperlipidemia  Remains on Crestor 10 mg daily  Again we have reinforced the patient importance of watching his diet including his intake of fats and cholesterol in his diet and calories overall  We will continue to monitor and follow his cholesterol profile and we did remind the patient again the importance not only of diet but exercise

## 2023-03-27 NOTE — ASSESSMENT & PLAN NOTE
Patient continues to follow-up with endocrinology  Again has lost 10 pounds but that this most likely secondary to diuresis recently with starting on a diuretic  He states that his endocrinologist tried to start him on Ozempic but because of the cost this was not started  He intends to be more physically active with his senior living and hopefully we will see some improvement with his blood sugar readings    He is up-to-date with foot exam and eye exam  Lab Results   Component Value Date    HGBA1C 7 7 (H) 03/06/2023

## 2023-03-27 NOTE — ASSESSMENT & PLAN NOTE
History of hypertension  Blood pressure is showing much improved control since addition of diuretic to his lisinopril  Check on his renal function now

## 2023-03-27 NOTE — ASSESSMENT & PLAN NOTE
With the patient's BMI he is considered obese  He has had a 10 pound weight loss recently but this is secondary to diuresis with him started on hydrochlorothiazide  He states now that he is retired he intends to spend more time working at [employer] and starting on some type of routine exercise program in order to facilitate weight loss

## 2023-03-27 NOTE — PROGRESS NOTES
Assessment and Plan:     Problem List Items Addressed This Visit    None       Preventive health issues were discussed with patient, and age appropriate screening tests were ordered as noted in patient's After Visit Summary  Personalized health advice and appropriate referrals for health education or preventive services given if needed, as noted in patient's After Visit Summary  History of Present Illness:     Patient presents for a Medicare Wellness Visit    HPI   Patient Care Team:  Avery Nj DO as PCP - Myke Stallworth MD as PCP - Endocrinology (Endocrinology)  DO Ady Rosales MD Noreene Dose, MD as Endoscopist     Review of Systems:     Review of Systems     Problem List:     Patient Active Problem List   Diagnosis   • Lumbago with sciatica, left side   • Lumbar degenerative disc disease   • Lumbar herniated disc   • Lumbar radiculopathy   • Lumbar spondylosis   • Myofascial pain   • Obesity   • Sacroiliitis ([address] )   • Hyperlipidemia   • Diabetes ([address] )   • Essential hypertension   • AIDEE (obstructive sleep apnea)   • Insomnia   • Hypersomnia   • Obesity (BMI 30-39  9)   • Diabetes 1 5, 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[address] )   • Healthcare maintenance   • Morbid obesity ([address] )   • Trigger middle finger of right hand   • Trigger finger, left middle finger   • Greater trochanteric bursitis of both hips   • Occult blood in stools   • Blood in stool   • Obesity (BMI 35 0-39 9 without comorbidity)      Past Medical and Surgical History:     Past Medical History:   Diagnosis Date   • Arthritis 2018    low back pain   • Diabetes mellitus ([address] )    • GERD (gastroesophageal reflux disease)    • Headache    • Headache(784 0)    • High cholesterol    • Hypertension    • Ingrown toenail big toe - several times   • Kidney stone 2005   • Kidney stones    • Memory loss 2017    due to Gabapentin   • Neuropathy in diabetes ([address] ) 2003   • Obesity    • Sleep apnea    • 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     Past Surgical History:   Procedure Laterality Date   • COLONOSCOPY N/A 11/22/2016    Procedure: COLONOSCOPY;  Surgeon: Bubba Romero MD;  Location: [facility]; Service:    • DEBRIDEMENT TENNIS ELBOW  2006   • EYE SURGERY  2008    laser holes to relieve pressure   • HERNIA REPAIR  9478    x2, umbilical and right side per patient   • KNEE SURGERY  2000    meniscus respair   • NASAL SEPTUM SURGERY  1980   • VT TENDON SHEATH INCISION Right 11/19/2019    Procedure: RELEASE TRIGGER FINGER-right long finger;  Surgeon: Radha Jose MD;  Location: [facility];  Service: Orthopedics   • VT TENDON SHEATH INCISION Left 11/26/2019    Procedure: RELEASE TRIGGER FINGER-left long finger;  Surgeon: Radha Jose MD;  Location: [facility];  Service: Orthopedics   • TONSILLECTOMY  1961   • UVULOPALATOPHARYNGOPLASTY  2004      Family History:     Family History   Problem Relation Age of Onset   • Arthritis Mother         mostly in her back      Social History:     Social History     Socioeconomic History   • Marital status: /Civil Union     Spouse name: Not on file   • Number of children: Not on file   • Years of education: Not on file   • Highest education level: Not on file   Occupational History   • Not on file   Tobacco Use   • Smoking status: Never   • Smokeless tobacco: Never   Substance and Sexual Activity   • Alcohol use: Yes     Comment: only occasionally   • Drug use: No   • Sexual activity: Never   Other Topics Concern   • Not on file   Social History Narrative   • Not on file     Social Determinants of Health     Financial Resource Strain: Low Risk    • Difficulty of Paying Living Expenses: Not hard at all   Food Insecurity: Not on file   Transportation Needs: No Transportation Needs   • Lack of Transportation (Medical): No   • Lack of Transportation (Non-Medical):  No   Physical Activity: Not on file   Stress: Not on file   Social Connections: Not on file   Intimate Partner Violence: Not on file   Housing Stability: Not on file      Medications and Allergies:     Current Outpatient Medications   Medication Sig Dispense Refill   • acetaminophen (TYLENOL 8 HOUR) 650 mg CR tablet Take 1 tablet (650 mg total) by mouth every 8 (eight) hours 15 tablet 0   • Coenzyme Q10 (CO Q-10) 200 MG CAPS      • dapagliflozin (Farxiga) 10 MG tablet Take 1 tablet (10 mg total) by mouth daily 90 tablet 0   • diclofenac sodium (VOLTAREN) 50 mg EC tablet TAKE 1 TABLET (50 MG TOTAL) BY MOUTH 2 (TWO) TIMES A DAY AS NEEDED (PAIN) 180 tablet 0   • diphenhydrAMINE HCl 25 MG CHEW      • glimepiride (AMARYL) 1 mg tablet TAKE 2 TABLETS BY MOUTH TWICE A DAY WITH MEALS 360 tablet 1   • ketoconazole (NIZORAL) 2 % cream APPLY TWICE DAILY TO AFFECTED AREA ON THE FACE ONGOING     • lisinopril-hydrochlorothiazide (PRINZIDE,ZESTORETIC) 10-12 5 MG per tablet Take 1 tablet by mouth daily 30 tablet 5   • Omeprazole 20 MG TBEC      • rosuvastatin (CRESTOR) 10 MG tablet TAKE 1 TABLET BY MOUTH EVERY DAY 90 tablet 3   • semaglutide, 0 25 or 0 5 mg/dose, (Ozempic, 0 25 or 0 5 MG/DOSE,) 2 mg/1 5 mL injection pen Inject 0 25 mg once a week in to skin , increase dose to 0 5 mg once a week after 4 weeks 4 5 mL 1   • triamcinolone (KENALOG) 0 1 % cream APPLY TWICE DAILY TO AFFECTED AREA ON THE FACE X 7 DAYS MAX AS NEEDED  No current facility-administered medications for this visit       Allergies   Allergen Reactions   • Tramadol Itching      Immunizations:     Immunization History   Administered Date(s) Administered   • COVID-19 MODERNA VACC 0 5 ML IM 03/16/2021, 04/15/2021, 10/29/2021   • H1N1, All Formulations 12/30/2009   • INFLUENZA 10/28/2015, 09/13/2016, 10/17/2019, 10/13/2020, 10/30/2021   • Influenza Quadrivalent 3 years and older 10/07/2014   • Influenza Quadrivalent Preservative Free 3 years and older IM 10/07/2014   • Influenza Quadrivalent Recombinant Preservative Free IM 10/17/2019, 10/13/2020   • Influenza Quadrivalent, 6-35 Months IM 10/28/2015, 09/13/2016, 09/28/2017   • Influenza, high dose seasonal 0 7 mL 10/30/2021   • Influenza, recombinant, quadrivalent,injectable, preservative free 10/09/2018   • Influenza, seasonal, injectable 1957   • Tuberculin Skin Test-PPD Intradermal 08/27/2015   • Zoster Vaccine Recombinant 11/12/2020, 03/18/2021      Health Maintenance:         Topic Date Due   • Colorectal Cancer Screening  12/10/2029   • Hepatitis C Screening  Completed         Topic Date Due   • Pneumococcal Vaccine: 65+ Years (1 - PCV) Never done   • DTaP,Tdap,and Td Vaccines (1 - Tdap) Never done   • COVID-19 Vaccine (4 - Booster for Crystal English series) 12/24/2021   • Influenza Vaccine (1) 09/01/2022      Medicare Screening Tests and Risk Assessments:     Mehreen Haynes is here for his Subsequent Wellness visit  Health Risk Assessment:   Patient rates overall health as good  Patient feels that their physical health rating is slightly better  Patient is very satisfied with their life  Eyesight was rated as same  Hearing was rated as same  Patient feels that their emotional and mental health rating is same  Patients states they are never, rarely angry  Patient states they are sometimes unusually tired/fatigued  Pain experienced in the last 7 days has been some  Patient's pain rating has been 2/10  Patient states that he has experienced no weight loss or gain in last 6 months  Fall Risk Screening: In the past year, patient has experienced: no history of falling in past year      Home Safety:  Patient does not have trouble with stairs inside or outside of their home  Patient has working smoke alarms and has working carbon monoxide detector  Home safety hazards include: none  Nutrition:   Current diet is Low Saturated Fat, No Added Salt and Limited junk food  Medications:   Patient is currently taking over-the-counter supplements  OTC medications include: acetaminiphen  Patient is able to manage medications       Activities of Daily Living (ADLs)/Instrumental Activities of Daily Living (IADLs):   Walk and transfer into and out of bed and chair?: Yes  Dress and groom yourself?: Yes    Bathe or shower yourself?: Yes    Feed yourself? Yes  Do your laundry/housekeeping?: Yes  Manage your money, pay your bills and track your expenses?: Yes  Make your own meals?: Yes    Do your own shopping?: Yes    Previous Hospitalizations:   Any hospitalizations or ED visits within the last 12 months?: No      Advance Care Planning:   Living will: Yes    Durable POA for healthcare: Yes    Advanced directive: Yes      PREVENTIVE SCREENINGS      Cardiovascular Screening:    General: Screening Not Indicated and History Lipid Disorder      Diabetes Screening:     General: Screening Not Indicated and History Diabetes      Colorectal Cancer Screening:     General: Screening Current      Prostate Cancer Screening:    General: Screening Current      Abdominal Aortic Aneurysm (AAA) Screening:    Risk factors include: age between 73-69 yo        Lung Cancer Screening:     General: Screening Not Indicated      Hepatitis C Screening:    General: Screening Current    Screening, Brief Intervention, and Referral to Treatment (SBIRT)    Screening  Typical number of drinks in a day: 0  Typical number of drinks in a week: 0  Interpretation: Low risk drinking behavior      AUDIT-C Screenin) How often did you have a drink containing alcohol in the past year? monthly or less  2) How many drinks did you have on a typical day when you were drinking in the past year? 0  3) How often did you have 6 or more drinks on one occasion in the past year? never    AUDIT-C Score: 1  Interpretation: Score 0-3 (male): Negative screen for alcohol misuse    Single Item Drug Screening:  How often have you used an illegal drug (including marijuana) or a prescription medication for non-medical reasons in the past year? never    Single Item Drug Screen Score: 0  Interpretation: Negative screen for possible drug use disorder    No results found  Physical Exam:     There were no vitals taken for this visit      Physical Exam     Rodrigez Aus, DO

## 2023-03-27 NOTE — PATIENT INSTRUCTIONS
Medicare Preventive Visit Patient Instructions  Thank you for completing your Welcome to Medicare Visit or Medicare Annual Wellness Visit today  Your next wellness visit will be due in one year (3/27/2024)  The screening/preventive services that you may require over the next 5-10 years are detailed below  Some tests may not apply to you based off risk factors and/or age  Screening tests ordered at today's visit but not completed yet may show as past due  Also, please note that scanned in results may not display below  Preventive Screenings:  Service Recommendations Previous Testing/Comments   Colorectal Cancer Screening  · Colonoscopy    · Fecal Occult Blood Test (FOBT)/Fecal Immunochemical Test (FIT)  · Fecal DNA/Cologuard Test  · Flexible Sigmoidoscopy Age: 39-70 years old   Colonoscopy: every 10 years (May be performed more frequently if at higher risk)  OR  FOBT/FIT: every 1 year  OR  Cologuard: every 3 years  OR  Sigmoidoscopy: every 5 years  Screening may be recommended earlier than age 39 if at higher risk for colorectal cancer  Also, an individualized decision between you and your healthcare provider will decide whether screening between the ages of 74-80 would be appropriate   Colonoscopy: 12/10/2019  FOBT/FIT: 07/06/2022  Cologuard: Not on file  Sigmoidoscopy: Not on file    Screening Current     Prostate Cancer Screening Individualized decision between patient and health care provider in men between ages of 53-78   Medicare will cover every 12 months beginning on the day after your 50th birthday PSA: 0 7 ng/mL     Screening Current     Hepatitis C Screening Once for adults born between 1945 and 1965  More frequently in patients at high risk for Hepatitis C Hep C Antibody: 10/31/2019    Screening Current   Diabetes Screening 1-2 times per year if you're at risk for diabetes or have pre-diabetes Fasting glucose: 157 mg/dL (3/6/2023)  A1C: 7 7 % (3/6/2023)  Screening Not Indicated  History Diabetes Cholesterol Screening Once every 5 years if you don't have a lipid disorder  May order more often based on risk factors  Lipid panel: 03/06/2023  Screening Not Indicated  History Lipid Disorder      Other Preventive Screenings Covered by Medicare:  1  Abdominal Aortic Aneurysm (AAA) Screening: covered once if your at risk  You're considered to be at risk if you have a family history of AAA or a male between the age of 73-68 who smoking at least 100 cigarettes in your lifetime  2  Lung Cancer Screening: covers low dose CT scan once per year if you meet all of the following conditions: (1) Age 50-69; (2) No signs or symptoms of lung cancer; (3) Current smoker or have quit smoking within the last 15 years; (4) You have a tobacco smoking history of at least 20 pack years (packs per day x number of years you smoked); (5) You get a written order from a healthcare provider  3  Glaucoma Screening: covered annually if you're considered high risk: (1) You have diabetes OR (2) Family history of glaucoma OR (3)  aged 48 and older OR (3)  American aged 72 and older  3  Osteoporosis Screening: covered every 2 years if you meet one of the following conditions: (1) Have a vertebral abnormality; (2) On glucocorticoid therapy for more than 3 months; (3) Have primary hyperparathyroidism; (4) On osteoporosis medications and need to assess response to drug therapy  5  HIV Screening: covered annually if you're between the age of 12-76  Also covered annually if you are younger than 13 and older than 72 with risk factors for HIV infection  For pregnant patients, it is covered up to 3 times per pregnancy      Immunizations:  Immunization Recommendations   Influenza Vaccine Annual influenza vaccination during flu season is recommended for all persons aged >= 6 months who do not have contraindications   Pneumococcal Vaccine   * Pneumococcal conjugate vaccine = PCV13 (Prevnar 13), PCV15 (Vaxneuvance), PCV20 (Prevnar 20)  * Pneumococcal polysaccharide vaccine = PPSV23 (Pneumovax) Adults 2364 years old: 1-3 doses may be recommended based on certain risk factors  Adults 72 years old: 1-2 doses may be recommended based off what pneumonia vaccine you previously received   Hepatitis B Vaccine 3 dose series if at intermediate or high risk (ex: diabetes, end stage renal disease, liver disease)   Tetanus (Td) Vaccine - COST NOT COVERED BY MEDICARE PART B Following completion of primary series, a booster dose should be given every 10 years to maintain immunity against tetanus  Td may also be given as tetanus wound prophylaxis  Tdap Vaccine - COST NOT COVERED BY MEDICARE PART B Recommended at least once for all adults  For pregnant patients, recommended with each pregnancy  Shingles Vaccine (Shingrix) - COST NOT COVERED BY MEDICARE PART B  2 shot series recommended in those aged 48 and above     Health Maintenance Due:      Topic Date Due   • Colorectal Cancer Screening  12/10/2029   • Hepatitis C Screening  Completed     Immunizations Due:      Topic Date Due   • Pneumococcal Vaccine: 65+ Years (1 - PCV) Never done   • DTaP,Tdap,and Td Vaccines (1 - Tdap) Never done   • COVID-19 Vaccine (4 - Booster for Moderna series) 12/24/2021   • Influenza Vaccine (1) 09/01/2022     Advance Directives   What are advance directives? Advance directives are legal documents that state your wishes and plans for medical care  These plans are made ahead of time in case you lose your ability to make decisions for yourself  Advance directives can apply to any medical decision, such as the treatments you want, and if you want to donate organs  What are the types of advance directives? There are many types of advance directives, and each state has rules about how to use them  You may choose a combination of any of the following:  · Living will: This is a written record of the treatment you want   You can also choose which treatments you do not want, which to limit, and which to stop at a certain time  This includes surgery, medicine, IV fluid, and tube feedings  · Durable power of  for healthcare [facility]): This is a written record that states who you want to make healthcare choices for you when you are unable to make them for yourself  This person, called a proxy, is usually a family member or a friend  You may choose more than 1 proxy  · Do not resuscitate (DNR) order:  A DNR order is used in case your heart stops beating or you stop breathing  It is a request not to have certain forms of treatment, such as CPR  A DNR order may be included in other types of advance directives  · Medical directive: This covers the care that you want if you are in a coma, near death, or unable to make decisions for yourself  You can list the treatments you want for each condition  Treatment may include pain medicine, surgery, blood transfusions, dialysis, IV or tube feedings, and a ventilator (breathing machine)  · Values history: This document has questions about your views, beliefs, and how you feel and think about life  This information can help others choose the care that you would choose  Why are advance directives important? An advance directive helps you control your care  Although spoken wishes may be used, it is better to have your wishes written down  Spoken wishes can be misunderstood, or not followed  Treatments may be given even if you do not want them  An advance directive may make it easier for your family to make difficult choices about your care  Weight Management   Why it is important to manage your weight:  Being overweight increases your risk of health conditions such as heart disease, high blood pressure, type 2 diabetes, and certain types of cancer  It can also increase your risk for osteoarthritis, sleep apnea, and other respiratory problems  Aim for a slow, steady weight loss   Even a small amount of weight loss can lower your risk of health problems  How to lose weight safely:  A safe and healthy way to lose weight is to eat fewer calories and get regular exercise  You can lose up about 1 pound a week by decreasing the number of calories you eat by 500 calories each day  Healthy meal plan for weight management:  A healthy meal plan includes a variety of foods, contains fewer calories, and helps you stay healthy  A healthy meal plan includes the following:  · Eat whole-grain foods more often  A healthy meal plan should contain fiber  Fiber is the part of grains, fruits, and vegetables that is not broken down by your body  Whole-grain foods are healthy and provide extra fiber in your diet  Some examples of whole-grain foods are whole-wheat breads and pastas, oatmeal, brown rice, and bulgur  · Eat a variety of vegetables every day  Include dark, leafy greens such as spinach, kale, mg greens, and mustard greens  Eat yellow and orange vegetables such as carrots, sweet potatoes, and winter squash  · Eat a variety of fruits every day  Choose fresh or canned fruit (canned in its own juice or light syrup) instead of juice  Fruit juice has very little or no fiber  · Eat low-fat dairy foods  Drink fat-free (skim) milk or 1% milk  Eat fat-free yogurt and low-fat cottage cheese  Try low-fat cheeses such as mozzarella and other reduced-fat cheeses  · Choose meat and other protein foods that are low in fat  Choose beans or other legumes such as split peas or lentils  Choose fish, skinless poultry (chicken or turkey), or lean cuts of red meat (beef or pork)  Before you cook meat or poultry, cut off any visible fat  · Use less fat and oil  Try baking foods instead of frying them  Add less fat, such as margarine, sour cream, regular salad dressing and mayonnaise to foods  Eat fewer high-fat foods  Some examples of high-fat foods include french fries, doughnuts, ice cream, and cakes  · Eat fewer sweets    Limit foods and drinks that are high in sugar  This includes candy, cookies, regular soda, and sweetened drinks  Exercise:  Exercise at least 30 minutes per day on most days of the week  Some examples of exercise include walking, biking, dancing, and swimming  You can also fit in more physical activity by taking the stairs instead of the elevator or parking farther away from stores  Ask your healthcare provider about the best exercise plan for you  © Copyright [employer] 2018 Information is for End User's use only and may not be sold, redistributed or otherwise used for commercial purposes   All illustrations and images included in CareNotes® are the copyrighted property of [employer] , [employer]  or [address] [place], [place] [address]

## 2023-03-29 ENCOUNTER — APPOINTMENT (OUTPATIENT)
Dept: LAB | Age: 68
End: 2023-03-29

## 2023-03-29 DIAGNOSIS — I10 ESSENTIAL HYPERTENSION: ICD-10-CM

## 2023-03-29 LAB
ANION GAP SERPL CALCULATED.3IONS-SCNC: 2 MMOL/L (ref 4–13)
BUN SERPL-MCNC: 25 MG/DL (ref 5–25)
CALCIUM SERPL-MCNC: 9.6 MG/DL (ref 8.3–10.1)
CHLORIDE SERPL-SCNC: 104 MMOL/L (ref 96–108)
CO2 SERPL-SCNC: 31 MMOL/L (ref 21–32)
CREAT SERPL-MCNC: 0.94 MG/DL (ref 0.6–1.3)
EST. AVERAGE GLUCOSE BLD GHB EST-MCNC: 177 MG/DL
GFR SERPL CREATININE-BSD FRML MDRD: 84 ML/MIN/1.73SQ M
GLUCOSE P FAST SERPL-MCNC: 168 MG/DL (ref 65–99)
HBA1C MFR BLD: 7.8 %
POTASSIUM SERPL-SCNC: 4.4 MMOL/L (ref 3.5–5.3)
SODIUM SERPL-SCNC: 137 MMOL/L (ref 135–147)
TSH SERPL DL<=0.05 MIU/L-ACNC: 3.54 UIU/ML (ref 0.45–4.5)

## 2023-04-03 DIAGNOSIS — E11.65 TYPE 2 DIABETES MELLITUS WITH HYPERGLYCEMIA, WITHOUT LONG-TERM CURRENT USE OF INSULIN (HCC): ICD-10-CM

## 2023-04-29 DIAGNOSIS — M46.1 SACROILIITIS (HCC): ICD-10-CM

## 2023-05-23 ENCOUNTER — OFFICE VISIT (OUTPATIENT)
Dept: ENDOCRINOLOGY | Facility: CLINIC | Age: 68
End: 2023-05-23

## 2023-05-23 VITALS
WEIGHT: 265.4 LBS | HEART RATE: 73 BPM | SYSTOLIC BLOOD PRESSURE: 122 MMHG | HEIGHT: 74 IN | BODY MASS INDEX: 34.06 KG/M2 | DIASTOLIC BLOOD PRESSURE: 78 MMHG | OXYGEN SATURATION: 98 %

## 2023-05-23 DIAGNOSIS — E78.2 MIXED HYPERLIPIDEMIA: ICD-10-CM

## 2023-05-23 DIAGNOSIS — I10 ESSENTIAL HYPERTENSION: ICD-10-CM

## 2023-05-23 DIAGNOSIS — E11.65 TYPE 2 DIABETES MELLITUS WITH HYPERGLYCEMIA, WITHOUT LONG-TERM CURRENT USE OF INSULIN (HCC): Primary | ICD-10-CM

## 2023-05-23 RX ORDER — GLIMEPIRIDE 1 MG/1
TABLET ORAL
Qty: 180 TABLET | Refills: 1
Start: 2023-05-23 | End: 2023-05-26 | Stop reason: SDUPTHER

## 2023-05-23 NOTE — PROGRESS NOTES
Patient Progress Note      CC: DM      Referring Provider  No referring provider defined for this encounter  History of Present Illness:   Hung Herrera is a 79 y o  male with a history of type 2 diabetes without long term use of insulin  Diabetes course has been stable  Denies recent illness or hospitalizations  Denies recent severe hypoglycemic or severe hyperglycemic episodes  Denies any issues with his current regimen  Home glucose monitoring: are performed regularly     Home blood glucose readings:  mg/dl     Current regimen: Farxiga 10 mg daily, glimepiride 2 mg BID (taking 1 mg BID), Ozempic 0 5 mg weekly  compliant most of the time, denies any side effects from medications  Hypoglycemic episodes: No, never  Treatment of hypoglycemia: discussed treatment     Diabetes education: Yes  Diet: 3 meals per day, 2-3 snacks per day  Timing of meals is predictable  Diabetic diet compliance: compliant some of the time  Activity: Daily activity is predictable: Yes  Tries to walk a mile a day     Ophthamology: Nov 2022  Podiatry: diabetic foot exam UTD, April 2023     Has hypertension: on ACE inhibitor/ARB, compliant most of the time  Has hyperlipidemia: on statin - tolerating well, no myalgias  compliant most of the time, denies any side effects from medications  Thyroid disorders: No  History of pancreatitis: No    Patient Active Problem List   Diagnosis   • Lumbago with sciatica, left side   • Lumbar degenerative disc disease   • Lumbar herniated disc   • Lumbar radiculopathy   • Lumbar spondylosis   • Myofascial pain   • Obesity   • Sacroiliitis (HCC)   • Hyperlipidemia   • Diabetes ([facility] )   • Essential hypertension   • AIDEE (obstructive sleep apnea)   • Insomnia   • Hypersomnia   • Obesity (BMI 30-39  9)   • Diabetes 1 5, 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[facility] )   • Medicare annual wellness visit, initial   • Morbid obesity ([facility] [address] )   • Trigger middle finger of right hand   • Trigger finger, left middle finger   • Greater trochanteric bursitis of both hips   • Occult blood in stools   • Blood in stool   • Obesity (BMI 35 0-39 9 without comorbidity)      Past Medical History:   Diagnosis Date   • Arthritis 2018    low back pain   • Diabetes mellitus ([facility] [address] )    • GERD (gastroesophageal reflux disease)    • Headache    • Headache(784 0)    • High cholesterol    • Hypertension    • Ingrown toenail big toe - several times   • Kidney stone 2005   • Kidney stones    • Memory loss 2017    due to Gabapentin   • Neuropathy in diabetes ([facility] [address] ) 2003   • Obesity    • Sleep apnea    • 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      Past Surgical History:   Procedure Laterality Date   • COLONOSCOPY N/A 11/22/2016    Procedure: COLONOSCOPY;  Surgeon: Inocente Gaucher, MD;  [facility]: [facility];   Service:    • DEBRIDEMENT TENNIS ELBOW  2006   • EYE SURGERY  2008    laser holes to relieve pressure   • HERNIA REPAIR  2692    x2, umbilical and right side per patient   • KNEE SURGERY  2000    meniscus respair   • NASAL SEPTUM SURGERY  1980   • AR TENDON SHEATH INCISION Right 11/19/2019    Procedure: RELEASE TRIGGER FINGER-right long finger;  Surgeon: Bhavin Cheney MD;  [facility];  Service: Orthopedics   • AR TENDON SHEATH INCISION Left 11/26/2019    Procedure: RELEASE TRIGGER FINGER-left long finger;  Surgeon: Bhavin Cheney MD;  [facility];  Service: Orthopedics   • TONSILLECTOMY  1961   • UVULOPALATOPHARYNGOPLASTY  2004      Family History   Problem Relation Age of Onset   • Arthritis Mother         mostly in her back     Social History     Tobacco Use   • Smoking status: Never   • Smokeless tobacco: Never   Substance Use Topics   • Alcohol use: Yes     Comment: only occasionally     Allergies   Allergen Reactions   • Tramadol Itching         Current Outpatient Medications:   •  acetaminophen (TYLENOL 8 HOUR) 650 mg CR tablet, Take 1 tablet (650 mg total) by mouth every 8 (eight) hours, Disp: 15 tablet, Rfl: 0  •  Coenzyme "Q10 (CO Q-10) 200 MG CAPS, , Disp: , Rfl:   •  dapagliflozin (Farxiga) 10 MG tablet, Take 1 tablet (10 mg total) by mouth daily, Disp: 90 tablet, Rfl: 3  •  diclofenac sodium (VOLTAREN) 50 mg EC tablet, TAKE 1 TABLET BY MOUTH TWICE A DAY AS NEEDED FOR PAIN, Disp: 180 tablet, Rfl: 0  •  glimepiride (AMARYL) 1 mg tablet, TAKE 1 TABLET BY MOUTH TWICE A DAY WITH MEALS, Disp: 180 tablet, Rfl: 1  •  ketoconazole (NIZORAL) 2 % cream, APPLY TWICE DAILY TO AFFECTED AREA ON THE FACE ONGOING, Disp: , Rfl:   •  lisinopril-hydrochlorothiazide (PRINZIDE,ZESTORETIC) 10-12 5 MG per tablet, Take 1 tablet by mouth daily, Disp: 90 tablet, Rfl: 3  •  Omeprazole 20 MG TBEC, , Disp: , Rfl:   •  rosuvastatin (CRESTOR) 10 MG tablet, TAKE 1 TABLET BY MOUTH EVERY DAY, Disp: 90 tablet, Rfl: 3  •  semaglutide, 0 25 or 0 5 mg/dose, (Ozempic, 0 25 or 0 5 MG/DOSE,) 2 mg/3 mL injection pen, Inject 0 5 mg once a week, Disp: 3 mL, Rfl: 5  •  triamcinolone (KENALOG) 0 1 % cream, APPLY TWICE DAILY TO AFFECTED AREA ON THE FACE X 7 DAYS MAX AS NEEDED , Disp: , Rfl:   •  diphenhydrAMINE HCl 25 MG CHEW, , Disp: , Rfl:   Review of Systems   Constitutional: Negative for activity change, appetite change, fatigue and unexpected weight change  HENT: Negative for trouble swallowing  Eyes: Negative for visual disturbance  Respiratory: Negative for shortness of breath  Cardiovascular: Negative for chest pain and palpitations  Gastrointestinal: Negative for constipation and diarrhea  Endocrine: Negative for polydipsia and polyuria  Musculoskeletal: Negative  Skin: Negative for wound  Neurological: Positive for numbness  Psychiatric/Behavioral: Negative  Physical Exam:  Body mass index is 34 08 kg/m²    /78   Pulse 73   Ht 6' 2\" (1 88 m)   Wt 120 kg (265 lb 6 4 oz)   SpO2 98%   BMI 34 08 kg/m²    Wt Readings from Last 3 Encounters:   05/23/23 120 kg (265 lb 6 4 oz)   03/27/23 124 kg (273 lb 12 8 oz)   03/09/23 128 kg (282 lb " 6 4 oz)       Physical Exam  Vitals and nursing note reviewed  Constitutional:       Appearance: He is well-developed  HENT:      Head: Normocephalic  Eyes:      General: No scleral icterus  Pupils: Pupils are equal, round, and reactive to light  Neck:      Thyroid: No thyromegaly  Cardiovascular:      Rate and Rhythm: Normal rate and regular rhythm  Pulses:           Radial pulses are 2+ on the right side and 2+ on the left side  Heart sounds: No murmur heard  Pulmonary:      Effort: Pulmonary effort is normal  No respiratory distress  Breath sounds: Normal breath sounds  No wheezing  Musculoskeletal:      Cervical back: Neck supple  Skin:     General: Skin is warm and dry  Neurological:      Mental Status: He is alert  Patient's shoes and socks were not removed  Labs:   Component      Latest Ref Rng & Units 3/6/2023 3/29/2023   Sodium      135 - 147 mmol/L 137 137   Potassium      3 5 - 5 3 mmol/L 4 3 4 4   Chloride      96 - 108 mmol/L 106 104   CO2      21 - 32 mmol/L 29 31   Anion Gap      4 - 13 mmol/L 2 (L) 2 (L)   BUN      5 - 25 mg/dL 18 25   Creatinine      0 60 - 1 30 mg/dL 0 78 0 94   GLUCOSE FASTING      65 - 99 mg/dL 157 (H) 168 (H)   Calcium      8 3 - 10 1 mg/dL 9 6 9 6   eGFR      ml/min/1 73sq m 94 84   Cholesterol      See Comment mg/dL 198    Triglycerides      See Comment mg/dL 118    HDL      >=40 mg/dL 59    LDL Calculated      0 - 100 mg/dL 115 (H)    Non-HDL Cholesterol      mg/dl 139    Hemoglobin A1C      Normal 3 8-5 6%; PreDiabetic 5 7-6 4%; Diabetic >=6 5%; Glycemic control for adults with diabetes <7 0% % 7 7 (H) 7 8 (H)   eAG, EST AVG Glucose      mg/dl 174 177   TSH 3RD GENERATON      0 450 - 4 500 uIU/mL  3 540       Plan:    Diagnoses and all orders for this visit:    Type 2 diabetes mellitus with hyperglycemia, without long-term current use of insulin (HCC)  HGA1C 7 8%  Due next month    Treatment regimen: continue current treatment  Episodes of hypoglycemia can lead to permanent disability and death  Discussed risks/complications associated with uncontrolled diabetes  Advised to adhere to diabetic diet, and recommended staying active/exercising routinely as tolerated  Keep carbohydrates consistent to limit blood glucose fluctuations  Advised to call if blood sugars less than 70 mg/dl or over 300 mg/dl  Check blood glucose 2 times a day  Discussed symptoms and treatment of hypoglycemia  Recommended routine follow-up with podiatry and ophthalmology  Ordered blood work to complete prior to next visit  -     Hemoglobin A1C; Future  -     Hemoglobin A1C; Future  -     Basic metabolic panel; Future  -     Albumin / creatinine urine ratio; Future  -     glimepiride (AMARYL) 1 mg tablet; TAKE 1 TABLET BY MOUTH TWICE A DAY WITH MEALS    Essential hypertension  Blood pressure well controlled, continue current treatment  -     Basic metabolic panel; Future    Mixed hyperlipidemia  LDL previously 115  On statin therapy   Managed by PCP        Discussed with the patient diagnosis and treatment and all questions fully answered  He will call me if any problems arise  Counseled patient on diagnostic results, prognosis, risk and benefit of treatment options, instruction for management, importance of treatment compliance, risk factor reduction and impressions        Maddie Kidd PA-C

## 2023-05-23 NOTE — PATIENT INSTRUCTIONS
Hypoglycemia instructions   Kaylene Redman  5/23/2023  [id number]    Low Blood Sugar    Steps to treat low blood sugar  1  Test blood sugar if you have symptoms of low blood sugar:   Low Blood Sugar Symptoms:  o Sweaty  o Dizzy  o Rapid heartbeat  o Shaky  o Bad mood  o Hungry      2  Treat blood sugar less than 70 with 15 grams of fast-acting carbohydrate:   Examples of 15 grams Fast-Acting Carbohydrate:  o 4 oz juice  o 4 oz regular soda  o 3-4 glucose tablets (chew)  o 3-4 hard candies (chew)          3  Wait 15 minutes and test your blood sugar again     4   If blood sugar is less than 100, repeat steps 2-3     5  When your blood sugar is 100 or more, eat a snack if it will be longer than one hour until your next meal  The snack should be 15 grams of carbohydrate and a protein:   Examples of snacks:  o ½ sandwich  o 6 crackers with cheese  o Piece of fruit with cheese or peanut butter  o 6 crackers with peanut butter

## 2023-05-26 ENCOUNTER — TELEPHONE (OUTPATIENT)
Dept: ENDOCRINOLOGY | Facility: CLINIC | Age: 68
End: 2023-05-26

## 2023-05-26 DIAGNOSIS — E11.65 TYPE 2 DIABETES MELLITUS WITH HYPERGLYCEMIA, WITHOUT LONG-TERM CURRENT USE OF INSULIN (HCC): ICD-10-CM

## 2023-05-26 PROBLEM — Z00.00 MEDICARE ANNUAL WELLNESS VISIT, INITIAL: Status: RESOLVED | Noted: 2018-10-09 | Resolved: 2023-05-26

## 2023-05-26 RX ORDER — GLIMEPIRIDE 1 MG/1
TABLET ORAL
Qty: 180 TABLET | Refills: 1 | Status: SHIPPED | OUTPATIENT
Start: 2023-05-26

## 2023-05-26 NOTE — TELEPHONE ENCOUNTER
Pt should take amaryl 1 mg twice a day as per Maddie's note ,so RX which she sent is right   Please inform pharmacy to refill what was sent   Thanks     Carroll Rendon

## 2023-05-26 NOTE — TELEPHONE ENCOUNTER
[facility] called, pt has a script for amaryl, it was originally 2 pills 2 times a day, insurance will only pay for 3 a day  I  ee the script is now 1 pill 2 times daily  Please advise  Should pt take 2 or 4? If he needs 4, [facility] asked for a stronger script if possible

## 2023-06-26 ENCOUNTER — APPOINTMENT (OUTPATIENT)
Dept: LAB | Age: 68
End: 2023-06-26
Payer: MEDICARE

## 2023-06-26 DIAGNOSIS — E11.65 TYPE 2 DIABETES MELLITUS WITH HYPERGLYCEMIA, WITHOUT LONG-TERM CURRENT USE OF INSULIN (HCC): ICD-10-CM

## 2023-06-26 LAB
EST. AVERAGE GLUCOSE BLD GHB EST-MCNC: 134 MG/DL
HBA1C MFR BLD: 6.3 %

## 2023-06-26 PROCEDURE — 36415 COLL VENOUS BLD VENIPUNCTURE: CPT

## 2023-06-26 PROCEDURE — 83036 HEMOGLOBIN GLYCOSYLATED A1C: CPT

## 2023-07-19 DIAGNOSIS — M46.1 SACROILIITIS (HCC): ICD-10-CM

## 2023-07-31 ENCOUNTER — RA CDI HCC (OUTPATIENT)
Dept: OTHER | Facility: HOSPITAL | Age: 68
End: 2023-07-31

## 2023-07-31 ENCOUNTER — APPOINTMENT (OUTPATIENT)
Dept: LAB | Age: 68
End: 2023-07-31
Payer: MEDICARE

## 2023-07-31 DIAGNOSIS — I10 ESSENTIAL HYPERTENSION: ICD-10-CM

## 2023-07-31 DIAGNOSIS — Z12.5 PROSTATE CANCER SCREENING: ICD-10-CM

## 2023-07-31 DIAGNOSIS — Z00.00 MEDICARE ANNUAL WELLNESS VISIT, INITIAL: ICD-10-CM

## 2023-07-31 DIAGNOSIS — E78.2 MIXED HYPERLIPIDEMIA: ICD-10-CM

## 2023-07-31 LAB
ALBUMIN SERPL BCP-MCNC: 3.7 G/DL (ref 3.5–5)
ALP SERPL-CCNC: 46 U/L (ref 46–116)
ALT SERPL W P-5'-P-CCNC: 36 U/L (ref 12–78)
ANION GAP SERPL CALCULATED.3IONS-SCNC: 2 MMOL/L
AST SERPL W P-5'-P-CCNC: 15 U/L (ref 5–45)
BACTERIA UR QL AUTO: ABNORMAL /HPF
BASOPHILS # BLD AUTO: 0.04 THOUSANDS/ÂΜL (ref 0–0.1)
BASOPHILS NFR BLD AUTO: 1 % (ref 0–1)
BILIRUB SERPL-MCNC: 0.61 MG/DL (ref 0.2–1)
BILIRUB UR QL STRIP: NEGATIVE
BUN SERPL-MCNC: 24 MG/DL (ref 5–25)
CALCIUM SERPL-MCNC: 9.7 MG/DL (ref 8.3–10.1)
CHLORIDE SERPL-SCNC: 106 MMOL/L (ref 96–108)
CHOLEST SERPL-MCNC: 165 MG/DL
CLARITY UR: CLEAR
CO2 SERPL-SCNC: 32 MMOL/L (ref 21–32)
COLOR UR: YELLOW
CREAT SERPL-MCNC: 0.78 MG/DL (ref 0.6–1.3)
CREAT UR-MCNC: 193 MG/DL
EOSINOPHIL # BLD AUTO: 0.1 THOUSAND/ÂΜL (ref 0–0.61)
EOSINOPHIL NFR BLD AUTO: 1 % (ref 0–6)
ERYTHROCYTE [DISTWIDTH] IN BLOOD BY AUTOMATED COUNT: 13.2 % (ref 11.6–15.1)
GFR SERPL CREATININE-BSD FRML MDRD: 94 ML/MIN/1.73SQ M
GLUCOSE P FAST SERPL-MCNC: 124 MG/DL (ref 65–99)
GLUCOSE UR STRIP-MCNC: ABNORMAL MG/DL
HCT VFR BLD AUTO: 48.7 % (ref 36.5–49.3)
HDLC SERPL-MCNC: 51 MG/DL
HGB BLD-MCNC: 16.5 G/DL (ref 12–17)
HGB UR QL STRIP.AUTO: NEGATIVE
IMM GRANULOCYTES # BLD AUTO: 0.04 THOUSAND/UL (ref 0–0.2)
IMM GRANULOCYTES NFR BLD AUTO: 1 % (ref 0–2)
KETONES UR STRIP-MCNC: NEGATIVE MG/DL
LDLC SERPL CALC-MCNC: 94 MG/DL (ref 0–100)
LEUKOCYTE ESTERASE UR QL STRIP: ABNORMAL
LYMPHOCYTES # BLD AUTO: 1.79 THOUSANDS/ÂΜL (ref 0.6–4.47)
LYMPHOCYTES NFR BLD AUTO: 24 % (ref 14–44)
MCH RBC QN AUTO: 28.5 PG (ref 26.8–34.3)
MCHC RBC AUTO-ENTMCNC: 33.9 G/DL (ref 31.4–37.4)
MCV RBC AUTO: 84 FL (ref 82–98)
MICROALBUMIN UR-MCNC: 40.4 MG/L (ref 0–20)
MICROALBUMIN/CREAT 24H UR: 21 MG/G CREATININE (ref 0–30)
MONOCYTES # BLD AUTO: 0.65 THOUSAND/ÂΜL (ref 0.17–1.22)
MONOCYTES NFR BLD AUTO: 9 % (ref 4–12)
MUCOUS THREADS UR QL AUTO: ABNORMAL
NEUTROPHILS # BLD AUTO: 4.74 THOUSANDS/ÂΜL (ref 1.85–7.62)
NEUTS SEG NFR BLD AUTO: 64 % (ref 43–75)
NITRITE UR QL STRIP: NEGATIVE
NON-SQ EPI CELLS URNS QL MICRO: ABNORMAL /HPF
NONHDLC SERPL-MCNC: 114 MG/DL
NRBC BLD AUTO-RTO: 0 /100 WBCS
PH UR STRIP.AUTO: 5.5 [PH]
PLATELET # BLD AUTO: 194 THOUSANDS/UL (ref 149–390)
PMV BLD AUTO: 10.6 FL (ref 8.9–12.7)
POTASSIUM SERPL-SCNC: 5 MMOL/L (ref 3.5–5.3)
PROT SERPL-MCNC: 7.2 G/DL (ref 6.4–8.4)
PROT UR STRIP-MCNC: ABNORMAL MG/DL
PSA SERPL-MCNC: 1.35 NG/ML (ref 0–4)
RBC # BLD AUTO: 5.78 MILLION/UL (ref 3.88–5.62)
RBC #/AREA URNS AUTO: ABNORMAL /HPF
SODIUM SERPL-SCNC: 140 MMOL/L (ref 135–147)
SP GR UR STRIP.AUTO: 1.03 (ref 1–1.03)
TRIGL SERPL-MCNC: 102 MG/DL
UROBILINOGEN UR STRIP-ACNC: <2 MG/DL
WBC # BLD AUTO: 7.36 THOUSAND/UL (ref 4.31–10.16)
WBC #/AREA URNS AUTO: ABNORMAL /HPF

## 2023-07-31 PROCEDURE — 36415 COLL VENOUS BLD VENIPUNCTURE: CPT

## 2023-07-31 PROCEDURE — 81001 URINALYSIS AUTO W/SCOPE: CPT

## 2023-07-31 PROCEDURE — G0103 PSA SCREENING: HCPCS

## 2023-07-31 PROCEDURE — 85025 COMPLETE CBC W/AUTO DIFF WBC: CPT

## 2023-07-31 PROCEDURE — 80061 LIPID PANEL: CPT

## 2023-07-31 PROCEDURE — 80053 COMPREHEN METABOLIC PANEL: CPT

## 2023-07-31 NOTE — PROGRESS NOTES
[address] [facility] coding opportunities       Chart reviewed, no opportunity found: CHART REVIEWED, NO OPPORTUNITY FOUND      CHF??   Patients Insurance     Medicare Insurance: [employer]

## 2023-08-04 ENCOUNTER — OFFICE VISIT (OUTPATIENT)
Dept: INTERNAL MEDICINE CLINIC | Facility: CLINIC | Age: 68
End: 2023-08-04
Payer: MEDICARE

## 2023-08-04 VITALS
SYSTOLIC BLOOD PRESSURE: 112 MMHG | TEMPERATURE: 98.2 F | WEIGHT: 256.6 LBS | BODY MASS INDEX: 32.95 KG/M2 | HEART RATE: 80 BPM | DIASTOLIC BLOOD PRESSURE: 80 MMHG | OXYGEN SATURATION: 97 %

## 2023-08-04 DIAGNOSIS — E66.01 MORBID OBESITY (HCC): ICD-10-CM

## 2023-08-04 DIAGNOSIS — M54.16 LUMBAR RADICULOPATHY: ICD-10-CM

## 2023-08-04 DIAGNOSIS — I10 ESSENTIAL HYPERTENSION: Primary | ICD-10-CM

## 2023-08-04 DIAGNOSIS — G47.33 OSA (OBSTRUCTIVE SLEEP APNEA): ICD-10-CM

## 2023-08-04 DIAGNOSIS — E13.9 DIABETES 1.5, MANAGED AS TYPE 2 (HCC): ICD-10-CM

## 2023-08-04 PROCEDURE — 99214 OFFICE O/P EST MOD 30 MIN: CPT | Performed by: INTERNAL MEDICINE

## 2023-08-04 NOTE — ASSESSMENT & PLAN NOTE
Patient is blood pressure showing excellent control with present treatment. Patient will continue present medication and surveillance. We also will continue to monitor his renal function and make adjustments with medication if needed in the future.

## 2023-08-04 NOTE — ASSESSMENT & PLAN NOTE
Patient continues to follow-up with endocrinology. He remains on Ozempic for control of his blood sugars which also is showing significant weight loss with the patient. Patient has had no significant side effects from the medication and we will continue to follow-up with his endocrinologist.  Are hoping to see continued improvement with his weight which is not just from medication but a concerted effort with the patient not only with diet but also with exercise.   Lab Results   Component Value Date    HGBA1C 6.3 (H) 06/26/2023

## 2023-08-04 NOTE — ASSESSMENT & PLAN NOTE
Patient does have a history documented of severe obstructive sleep apnea. He has had extreme difficulty tolerating his CPAP and he has seen advertisements for a new method in order to treat with his CPAP which would be inspire.   We have made an appointment for the patient to be seen by ENT physician for evaluation and discussion of whether or not this is an option for this patient

## 2023-08-04 NOTE — ASSESSMENT & PLAN NOTE
Patient does have a history of chronic back pain and discomfort and relates that since he has been losing weight and is now more physically active he has less discomfort to his low back with movement or ambulation.   He was told if any acute exacerbations or difficulties to please call

## 2023-08-04 NOTE — PROGRESS NOTES
Name: Makayla Dominguez      : 1955      MRN: [id number]  Encounter Provider: Rhoda Fairchild DO  Encounter Date: 2023   Encounter department: [facility]    Assessment & Plan     1. Essential hypertension  Assessment & Plan:  Patient is blood pressure showing excellent control with present treatment. Patient will continue present medication and surveillance. We also will continue to monitor his renal function and make adjustments with medication if needed in the future. Orders:  -     Basic metabolic panel; Future    2. Lumbar radiculopathy  Assessment & Plan:  Patient does have a history of chronic back pain and discomfort and relates that since he has been losing weight and is now more physically active he has less discomfort to his low back with movement or ambulation. He was told if any acute exacerbations or difficulties to please call      3. Morbid obesity ([address])  Assessment & Plan:  In the past patient was considered morbidly obese. He has had continued weight loss which is multifactorial both with him taking Ozempic but also being more physically active    He is commended on his endeavors      4. AIDEE (obstructive sleep apnea)  Assessment & Plan:  Patient does have a history documented of severe obstructive sleep apnea. He has had extreme difficulty tolerating his CPAP and he has seen advertisements for a new method in order to treat with his CPAP which would be inspire. We have made an appointment for the patient to be seen by ENT physician for evaluation and discussion of whether or not this is an option for this patient    Orders:  -     Ambulatory Referral to Otolaryngology; Future    5. Diabetes 1.5, managed as type 2 [facility])  Assessment & Plan:  Patient continues to follow-up with endocrinology. He remains on Ozempic for control of his blood sugars which also is showing significant weight loss with the patient.   Patient has had no significant side effects from the medication and we will continue to follow-up with his endocrinologist.  Are hoping to see continued improvement with his weight which is not just from medication but a concerted effort with the patient not only with diet but also with exercise. Lab Results   Component Value Date    HGBA1C 6.3 (H) 06/26/2023                Subjective     Patient is a 61-year-old male history of multiple medical problems who is here today for routine follow-up. He did have extensive lab testing performed prior to the visit today and we did discuss the results at length. Patient states in general he has been feeling well and is very proud of the fact that not only that his sugars are showing better control but he has had still continued weight loss and he states he is feeling more physically active. Review of Systems   Constitutional: Positive for activity change. Negative for appetite change, chills, diaphoresis, fatigue, fever and unexpected weight change. Remains physically active at home. Working in his yard. Better exercise tolerance. Walking daily with his wife at least a mile and a half   HENT: Negative. Eyes: Negative. Respiratory: Negative. History of untreated sleep apnea   Cardiovascular: Negative. Gastrointestinal: Negative. Endocrine: Negative. Genitourinary: Negative. Musculoskeletal: Positive for back pain. Negative for arthralgias, gait problem, joint swelling, myalgias, neck pain and neck stiffness. Skin: Negative. Allergic/Immunologic: Negative. Neurological: Negative. Hematological: Negative. Psychiatric/Behavioral: Negative.         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     Past Surgical History:   Procedure Laterality Date   • COLONOSCOPY N/A 11/22/2016    Procedure: COLONOSCOPY;  Surgeon: Vik Crisostomo MD;  [facility];   Service:    • DEBRIDEMENT TENNIS ELBOW  2006   • EYE SURGERY  2008    laser holes to relieve pressure   • HERNIA REPAIR  0619    x2, umbilical and right side per patient   • KNEE SURGERY  2000    meniscus respair   • NASAL SEPTUM SURGERY  1980   • ID TENDON SHEATH INCISION Right 11/19/2019    Procedure: RELEASE TRIGGER FINGER-right long finger;  Surgeon: Fred Quezada MD;  [facility];  Service: Orthopedics   • ID TENDON SHEATH INCISION Left 11/26/2019    Procedure: RELEASE TRIGGER FINGER-left long finger;  Surgeon: Fred Quezada MD;  [facility];  Service: Orthopedics   • TONSILLECTOMY  1961   • UVULOPALATOPHARYNGOPLASTY  2004     Family History   Problem Relation Age of Onset   • Arthritis Mother         mostly in her back     Social History     Socioeconomic History   • Marital status: /Civil Union     Spouse name: None   • Number of children: None   • Years of education: None   • Highest education level: None   Occupational History   • None   Tobacco Use   • Smoking status: Never   • Smokeless tobacco: Never   Vaping Use   • Vaping Use: Never used   Substance and Sexual Activity   • Alcohol use: Yes     Comment: only occasionally   • Drug use: No   • Sexual activity: Never   Other Topics Concern   • None   Social History Narrative   • None     Social Determinants of Health     Financial Resource Strain: Low Risk  (3/25/2023)    Overall Financial Resource Strain (CARDIA)    • Difficulty of Paying Living Expenses: Not hard at all   Food Insecurity: Not on file   Transportation Needs: No Transportation Needs (3/25/2023)    PRAPARE - Transportation    • Lack of Transportation (Medical): No    • Lack of Transportation (Non-Medical): No   Physical Activity: Not on file   Stress: Not on file   Social Connections: Not on file   Intimate Partner Violence: Not on file   Housing Stability: Not on file     Current Outpatient Medications on File Prior to Visit   Medication Sig   • acetaminophen (TYLENOL 8 HOUR) 650 mg CR tablet Take 1 tablet (650 mg total) by mouth every 8 (eight) hours   • Coenzyme Q10 (CO Q-10) 200 MG CAPS    • dapagliflozin (Farxiga) 10 MG tablet Take 1 tablet (10 mg total) by mouth daily   • diclofenac sodium (VOLTAREN) 50 mg EC tablet TAKE 1 TABLET BY MOUTH TWICE A DAY AS NEEDED FOR PAIN   • glimepiride (AMARYL) 1 mg tablet TAKE 1 TABLET BY MOUTH TWICE A DAY WITH MEALS   • ketoconazole (NIZORAL) 2 % cream APPLY TWICE DAILY TO AFFECTED AREA ON THE FACE ONGOING   • lisinopril-hydrochlorothiazide (PRINZIDE,ZESTORETIC) 10-12.5 MG per tablet Take 1 tablet by mouth daily   • Omeprazole 20 MG TBEC    • rosuvastatin (CRESTOR) 10 MG tablet TAKE 1 TABLET BY MOUTH EVERY DAY   • semaglutide, 0.25 or 0.5 mg/dose, (Ozempic, 0.25 or 0.5 MG/DOSE,) 2 mg/3 mL injection pen Inject 0.5 mg once a week   • triamcinolone (KENALOG) 0.1 % cream APPLY TWICE DAILY TO AFFECTED AREA ON THE FACE X 7 DAYS MAX AS NEEDED.    • diphenhydrAMINE HCl 25 MG CHEW  (Patient not taking: Reported on 5/23/2023)     Allergies   Allergen Reactions   • Tramadol Itching     Immunization History   Administered Date(s) Administered   • COVID-19 MODERNA VACC 0.5 ML IM 03/16/2021, 04/15/2021, 10/29/2021   • H1N1, All Formulations 12/30/2009   • INFLUENZA 10/28/2015, 09/13/2016, 10/17/2019, 10/13/2020, 10/30/2021   • Influenza Quadrivalent 3 years and older 10/07/2014   • Influenza Quadrivalent Preservative Free 3 years and older IM 10/07/2014   • Influenza Quadrivalent Recombinant Preservative Free IM 10/17/2019, 10/13/2020   • Influenza Quadrivalent, 6-35 Months IM 10/28/2015, 09/13/2016, 09/28/2017   • Influenza, high dose seasonal 0.7 mL 10/30/2021   • Influenza, recombinant, quadrivalent,injectable, preservative free 10/09/2018   • Influenza, seasonal, injectable 1955   • Tuberculin Skin Test-PPD Intradermal 08/27/2015   • Zoster Vaccine Recombinant 11/12/2020, 03/18/2021       Objective     /80   Pulse 80   Temp 98.2 °F (36.8 °C)   Wt 116 kg (256 lb 9.6 oz)   SpO2 97%   BMI 32.95 kg/m²     Physical Exam  Vitals and nursing note reviewed. Constitutional:       General: He is not in acute distress. Appearance: Normal appearance. He is obese. He is not ill-appearing, toxic-appearing or diaphoretic. Comments: Pleasant, cheerful, obese 53-year-old male who is awake alert in no acute distress oriented x3   HENT:      Head: Normocephalic and atraumatic. Right Ear: Tympanic membrane, ear canal and external ear normal. There is no impacted cerumen. Left Ear: Tympanic membrane, ear canal and external ear normal. There is no impacted cerumen. Nose: Nose normal. No congestion or rhinorrhea. Mouth/Throat:      Mouth: Mucous membranes are moist.      Pharynx: Oropharynx is clear. No oropharyngeal exudate or posterior oropharyngeal erythema. Eyes:      General: No scleral icterus. Right eye: No discharge. Left eye: No discharge. Conjunctiva/sclera: Conjunctivae normal.      Pupils: Pupils are equal, round, and reactive to light. Neck:      Vascular: No carotid bruit. Cardiovascular:      Rate and Rhythm: Normal rate. Pulses: Normal pulses. Heart sounds: Normal heart sounds. No murmur heard. No friction rub. No gallop. Pulmonary:      Effort: Pulmonary effort is normal. No respiratory distress. Breath sounds: Normal breath sounds. No stridor. No wheezing, rhonchi or rales. Chest:      Chest wall: No tenderness. Abdominal:      General: Bowel sounds are normal. There is no distension. Palpations: Abdomen is soft. There is no mass. Tenderness: There is no abdominal tenderness. There is no right CVA tenderness, left CVA tenderness, guarding or rebound. Hernia: No hernia is present. Comments: Obese   Genitourinary:     Penis: Normal.       Testes: Normal.      Prostate: Normal.      Rectum: Normal.   Musculoskeletal:         General: Deformity present. No swelling, tenderness or signs of injury. Normal range of motion. Cervical back: Normal range of motion and neck supple. No rigidity or tenderness. Right lower leg: No edema. Left lower leg: No edema. Comments: Diffuse arthritic changes as previously especially noted flattening to the lumbar curve with decreased range of motion both actively and passively and only minimal discomfort with movement   Lymphadenopathy:      Cervical: No cervical adenopathy. Skin:     General: Skin is warm and dry. Capillary Refill: Capillary refill takes less than 2 seconds. Coloration: Skin is not jaundiced or pale. Findings: No bruising, erythema, lesion or rash. Neurological:      General: No focal deficit present. Mental Status: He is alert and oriented to person, place, and time. Mental status is at baseline. Cranial Nerves: No cranial nerve deficit. Sensory: No sensory deficit. Motor: No weakness. Coordination: Coordination normal.      Gait: Gait normal.      Deep Tendon Reflexes: Reflexes normal.   Psychiatric:         Mood and Affect: Mood normal.         Behavior: Behavior normal.         Thought Content:  Thought content normal.         Judgment: Judgment normal.       Hannah Hinton,

## 2023-08-04 NOTE — ASSESSMENT & PLAN NOTE
In the past patient was considered morbidly obese.   He has had continued weight loss which is multifactorial both with him taking Ozempic but also being more physically active    He is commended on his endeavors

## 2023-08-17 ENCOUNTER — OFFICE VISIT (OUTPATIENT)
Age: 68
End: 2023-08-17

## 2023-08-17 VITALS
DIASTOLIC BLOOD PRESSURE: 77 MMHG | HEART RATE: 84 BPM | BODY MASS INDEX: 32.98 KG/M2 | WEIGHT: 257 LBS | HEIGHT: 74 IN | SYSTOLIC BLOOD PRESSURE: 114 MMHG

## 2023-08-17 DIAGNOSIS — L60.0 INGROWN TOENAIL OF LEFT FOOT WITH INFECTION: ICD-10-CM

## 2023-08-17 DIAGNOSIS — B35.1 ONYCHOMYCOSIS: ICD-10-CM

## 2023-08-17 DIAGNOSIS — E11.42 TYPE 2 DIABETES, CONTROLLED, WITH PERIPHERAL NEUROPATHY (HCC): Primary | ICD-10-CM

## 2023-08-17 DIAGNOSIS — L60.0 INGROWN TOENAIL OF RIGHT FOOT: ICD-10-CM

## 2023-08-17 RX ORDER — LIDOCAINE HYDROCHLORIDE 10 MG/ML
5 INJECTION, SOLUTION EPIDURAL; INFILTRATION; INTRACAUDAL; PERINEURAL ONCE
Status: COMPLETED | OUTPATIENT
Start: 2023-08-17 | End: 2023-08-17

## 2023-08-17 RX ADMIN — LIDOCAINE HYDROCHLORIDE 5 ML: 10 INJECTION, SOLUTION EPIDURAL; INFILTRATION; INTRACAUDAL; PERINEURAL at 14:00

## 2023-08-17 NOTE — PROGRESS NOTES
This patient was seen on 8/17/23. My role is Foot , Ankle, and Wound Specialist    ASSESSMENT     Diagnoses and all orders for this visit:    Type 2 diabetes, controlled, with peripheral neuropathy ([address])    Ingrown toenail of left foot with infection    Ingrown toenail of right foot    Onychomycosis         Problem List Items Addressed This Visit    None  Visit Diagnoses     Type 2 diabetes, controlled, with peripheral neuropathy ([address])    -  Primary    Ingrown toenail of left foot with infection        Ingrown toenail of right foot        Onychomycosis              PLAN  -Nail avulsion performed as below:  -Nail bed was dressed with bacitracin, Xeroform, DSD, 1 inch Coban  -Patient instructed to take bandage off in 24 hours, wash area lightly with warm soap and water, dry area thoroughly, apply bacitracin and Band-Aid daily x10 days.  -Patient reminded to thoroughly evaluate his feet daily given his peripheral neuropathy, wear white socks as to notice drainage, wear supportive/diabetic sneakers. -RTC 2 weeks, patient instructed to call our office for an earlier appointment should any extreme pain, redness, swelling, purulent drainage present. Nail removal    Date/Time: 8/17/2023 2:15 PM    Performed by: Jackie Hernandez DPM  Authorized by: Jackie Hernandez DPM    Patient location:  ClinicUniversal Protocol:  Consent: Verbal consent obtained. Consent given by: patient  Patient understanding: patient states understanding of the procedure being performed  Patient identity confirmed: verbally with patient      Location:     Foot:  L big toe  Pre-procedure details:     Skin preparation:  Alcohol and Betadine  Anesthesia (see MAR for exact dosages):      Anesthesia method:  Local infiltration    Local anesthetic:  Lidocaine 1% w/o epi  Nail Removal:     Nail removed:  Partial    Nail side:  Lateral  Ingrown nail:     Nail matrix removed or ablated:  None  Post-procedure details:     Dressing:  4x4 sterile gauze, antibiotic ointment, gauze roll and Xeroform gauze    Patient tolerance of procedure: Tolerated well, no immediate complications        SUBJECTIVE    Chief Complaint:  Left toe pain and redness, right toe pain     Patient ID: Larwance Halsted is a 79 y.o. male. Jeffy Solorzano is a 53-year-old male with a past medical history significant for type 2 diabetes. He states that 2 days ago he went for a hike with a tight pair of hiking boots, he says that part way into the hike his toes began to hurt. When he took off his boots he noticed that his bilateral big toenails were partially lifting up from the underlying nailbed and were also red surrounding them. He could see fluid moving underneath the nail, specifically the left great toenail. He states that although both great toenails are red, the left is more painful. He denies any nausea, vomiting, fevers, chills, shortness of breath, chest pain. The following portions of the patient's history were reviewed and updated as appropriate: allergies, current medications, past family history, past medical history, past social history, past surgical history and problem list.    Review of Systems   Constitutional: Negative. Respiratory: Negative. Cardiovascular: Negative. Gastrointestinal: Negative. Genitourinary: Negative. Musculoskeletal: Positive for arthralgias. Skin: Positive for color change. OBJECTIVE      /77   Pulse 84   Ht 6' 2" (1.88 m)   Wt 117 kg (257 lb)   BMI 33.00 kg/m²        Physical Exam  Constitutional:       Appearance: Normal appearance. HENT:      Head: Normocephalic and atraumatic. Eyes:      General:         Right eye: No discharge. Left eye: No discharge. Cardiovascular:      Rate and Rhythm: Normal rate and regular rhythm. Pulmonary:      Effort: Pulmonary effort is normal.      Breath sounds: Normal breath sounds.    Neurological:      Mental Status: He is alert and oriented to person, place, and time.   Psychiatric:         Mood and Affect: Mood normal.        Vascular:  -DP and PT pulses intact b/l  -Capillary refill time <2 sec b/l  -Digital hair growth: Present  -Skin temp: WNL    Neuro:  -Light sensation decreased bilaterally  -Protective sensation decreased bilaterally with 0/10 points felt with Marianne Hind monofilament    MSK:  -Pain on palpation of lateral aspect of left first digit  -Active range of motion lesser digits intact  -Manual muscle testing is 5/5 to all muscle compartments of the lower extremity  -Ankle dorsiflexion <10 degrees with knee extended, and knee flexed    Derm:  -Lateral aspect of left and right first digit periungual tissue is erythematous, edematous, with mild serous drainage noted. The lateral portion of the digital nail can be seen under lapping the periungual tissue.   This is consistent with onychocryptosis and surrounding paronychia  -Digital nails x10 are elongated, thickened, discolored, this is consistent with onychomycosis.  -No calluses noted on exam

## 2023-08-17 NOTE — PATIENT INSTRUCTIONS
-Remove bandage after 24-hours. Wash area with soap and water and dry thoroughly. Cover nail bed with antibiotic ointment and a band-aid. Continue this process for 10-days.

## 2023-08-21 ENCOUNTER — OFFICE VISIT (OUTPATIENT)
Age: 68
End: 2023-08-21

## 2023-08-21 VITALS
BODY MASS INDEX: 32.98 KG/M2 | DIASTOLIC BLOOD PRESSURE: 78 MMHG | SYSTOLIC BLOOD PRESSURE: 133 MMHG | HEART RATE: 92 BPM | WEIGHT: 257 LBS | HEIGHT: 74 IN

## 2023-08-21 DIAGNOSIS — S90.851A FOREIGN BODY IN RIGHT FOOT, INITIAL ENCOUNTER: ICD-10-CM

## 2023-08-21 DIAGNOSIS — E11.42 TYPE 2 DIABETES, CONTROLLED, WITH PERIPHERAL NEUROPATHY (HCC): ICD-10-CM

## 2023-08-21 DIAGNOSIS — L60.0 INGROWN TOENAIL OF RIGHT FOOT: Primary | ICD-10-CM

## 2023-08-21 RX ORDER — LIDOCAINE HYDROCHLORIDE 10 MG/ML
5 INJECTION, SOLUTION EPIDURAL; INFILTRATION; INTRACAUDAL; PERINEURAL ONCE
Status: COMPLETED | OUTPATIENT
Start: 2023-08-21 | End: 2023-08-21

## 2023-08-21 RX ADMIN — LIDOCAINE HYDROCHLORIDE 5 ML: 10 INJECTION, SOLUTION EPIDURAL; INFILTRATION; INTRACAUDAL; PERINEURAL at 11:35

## 2023-08-21 NOTE — PROGRESS NOTES
This patient was seen on 8/21/23. My role is Foot , Ankle, and Wound Specialist    ASSESSMENT     Diagnoses and all orders for this visit:    Ingrown toenail of right foot    Foreign body in right foot, initial encounter    Type 2 diabetes, controlled, with peripheral neuropathy ([address])         Problem List Items Addressed This Visit    None  Visit Diagnoses     Ingrown toenail of right foot    -  Primary    Foreign body in right foot, initial encounter        Type 2 diabetes, controlled, with peripheral neuropathy ([facility])              PLAN  -Nail avulsion performed as below:  -Nail bed was dressed with bacitracin, DSD, 1 inch Coban  -Patient instructed to take bandage off in 24 hours, wash area lightly with warm soap and water, dry area thoroughly, apply bacitracin and Band-Aid daily x10 days.  -Foreign body removed from plantar aspect of right foot as below:  -Patient was instructed to carefully monitor his right partial nail avulsion site and right plantar foot foreign body removal site for signs of infection including redness, swelling, increased pain, purulent drainage. He was instructed to call our office for an appointment should he notice any of these. -RTC 8/31/23, patient instructed to call our office for an earlier appointment should any extreme pain, redness, swelling, purulent drainage present. Universal Protocol:  Consent: Verbal consent obtained.   Consent given by: patient  Patient understanding: patient states understanding of the procedure being performed  Patient identity confirmed: verbally with patient    Foreign body removal    Date/Time: 8/21/2023 10:15 AM    Performed by: Fadia Devlin DPM  Authorized by: Fadia Devlin DPM  Body area: skin  General location: lower extremity  Location details: right foot    Sedation:  Patient sedated: no  Patient restrained: no  Localization method: visualized  Removal mechanism: forceps and scalpel  Dressing: antibiotic ointment and dressing applied  Tendon involvement: none  Depth: subcutaneous  Complexity: simple  1 objects recovered. Objects recovered: Piece of hair  Post-procedure assessment: foreign body removed  Patient tolerance: patient tolerated the procedure well with no immediate complications  Nail removal    Date/Time: 8/21/2023 10:15 AM    Performed by: Morales Lozoya DPM  Authorized by: Morales Lozoya DPM    Patient location:  ClinicUniversal Protocol:  Consent: Verbal consent obtained. Consent given by: patient  Patient understanding: patient states understanding of the procedure being performed      Location:     Foot:  R big toe  Pre-procedure details:     Skin preparation:  Alcohol and Betadine  Anesthesia (see MAR for exact dosages): Anesthesia method:  Local infiltration    Local anesthetic:  Lidocaine 1% w/o epi  Nail Removal:     Nail removed:  Partial    Nail side:  Lateral    Nail bed sutured: no    Ingrown nail:     Wedge excision of skin: no      Nail matrix removed or ablated:  None  Post-procedure details:     Dressing:  4x4 sterile gauze, antibiotic ointment and gauze roll    Patient tolerance of procedure: Tolerated well, no immediate complications      SUBJECTIVE    Chief Complaint:  R hallux ingrown nail, R foot foreign body     Patient ID: Hal Finch is a 79 y.o. male. 8/17/23: Royal Rudolph is a 70-year-old male with a past medical history significant for type 2 diabetes. He states that 2 days ago he went for a hike with a tight pair of hiking boots, he says that part way into the hike his toes began to hurt. When he took off his boots he noticed that his bilateral big toenails were partially lifting up from the underlying nailbed and were also red surrounding them. He could see fluid moving underneath the nail, specifically the left great toenail. He states that although both great toenails are red, the left is more painful. He denies any nausea, vomiting, fevers, chills, shortness of breath, chest pain. 8/21/23: Symone Del Rio was initially scheduled for his right partial nail avulsion to be formed on 8/31/2023. He states that the right hallux nail began bothering him more and was slightly more discolored over the weekend and therefore made an appointment for today. He also states that the hyperpigmented line in the plantar aspect of his right foot began bothering him when he would press on it. He inquires about getting this removed today. The following portions of the patient's history were reviewed and updated as appropriate: allergies, current medications, past family history, past medical history, past social history, past surgical history and problem list.    Review of Systems   Constitutional: Negative. Respiratory: Negative. Cardiovascular: Negative. Gastrointestinal: Negative. Genitourinary: Negative. Musculoskeletal: Positive for arthralgias. Skin: Positive for color change. OBJECTIVE      /78   Pulse 92   Ht 6' 2" (1.88 m)   Wt 117 kg (257 lb) Comment: verbal  BMI 33.00 kg/m²        Physical Exam  Constitutional:       Appearance: Normal appearance. HENT:      Head: Normocephalic and atraumatic. Eyes:      General:         Right eye: No discharge. Left eye: No discharge. Cardiovascular:      Rate and Rhythm: Normal rate and regular rhythm. Pulmonary:      Effort: Pulmonary effort is normal.      Breath sounds: Normal breath sounds. Neurological:      Mental Status: He is alert and oriented to person, place, and time.    Psychiatric:         Mood and Affect: Mood normal.        Vascular:  -DP and PT pulses intact b/l  -Capillary refill time <2 sec b/l  -Digital hair growth: Present  -Skin temp: WNL     Neuro:  -Light sensation decreased bilaterally  -Protective sensation decreased bilaterally with 0/10 points felt with Tereasa Kei monofilament     MSK:  -Pain on palpation of lateral aspect of right first digit  -Pain on palpation to plantar aspect of right foot at the location of suspected foreign body.  -Active range of motion lesser digits intact  -Manual muscle testing is 5/5 to all muscle compartments of the lower extremity  -Ankle dorsiflexion <10 degrees with knee extended, and knee flexed     Derm:  -Lateral aspect of right first digit periungual tissue is erythematous, edematous, with mild serous drainage noted. The lateral portion of the digital nail can be seen under lapping the periungual tissue. This is consistent with onychocryptosis. -Digital nails x10 are elongated, thickened, discolored, this is consistent with onychomycosis. -Left hallux partial nail avulsion site appears clean, dry, with no drainage noted. No surrounding erythema, edema noted. Routine healing  -No calluses noted on exam  -Thin, linear, hyperpigmented line noted in plantar aspect of right foot just distal to the heel. Object measures approximately 1 cm long, under 1 mm in width.

## 2023-08-31 ENCOUNTER — OFFICE VISIT (OUTPATIENT)
Age: 68
End: 2023-08-31

## 2023-08-31 VITALS
BODY MASS INDEX: 32.21 KG/M2 | HEART RATE: 79 BPM | WEIGHT: 251 LBS | DIASTOLIC BLOOD PRESSURE: 76 MMHG | SYSTOLIC BLOOD PRESSURE: 117 MMHG | HEIGHT: 74 IN

## 2023-08-31 DIAGNOSIS — L60.0 INGROWN TOENAIL OF LEFT FOOT WITH INFECTION: ICD-10-CM

## 2023-08-31 DIAGNOSIS — L60.0 INGROWN TOENAIL OF RIGHT FOOT: Primary | ICD-10-CM

## 2023-08-31 NOTE — PROGRESS NOTES
This patient was seen on 8/31/23. My role is Foot , Ankle, and Wound Specialist    ASSESSMENT     Diagnoses and all orders for this visit:    Ingrown toenail of right foot    Ingrown toenail of left foot with infection         Problem List Items Addressed This Visit    None  Visit Diagnoses     Ingrown toenail of right foot    -  Primary    Ingrown toenail of left foot with infection              PLAN  -Bilateral nail avulsion sites are stable with no local signs of infection, no drainage, nailbed intact and fully healed.  -No restrictions, patient may ambulate in normal sneakers as tolerated, no need for local wound care to the bilateral great toes  -Return to clinic as needed    SUBJECTIVE    Chief Complaint:  Status post bilateral partial nail avulsions to hallux     Patient ID: Natalya Lopez is a 79 y.o. male. 8/17/23: Ophelia Antony is a 46-year-old male with a past medical history significant for type 2 diabetes. He states that 2 days ago he went for a hike with a tight pair of hiking boots, he says that part way into the hike his toes began to hurt. When he took off his boots he noticed that his bilateral big toenails were partially lifting up from the underlying nailbed and were also red surrounding them. He could see fluid moving underneath the nail, specifically the left great toenail. He states that although both great toenails are red, the left is more painful. He denies any nausea, vomiting, fevers, chills, shortness of breath, chest pain. 8/21/23: Ophelia Antony was initially scheduled for his right partial nail avulsion to be performed on 8/31/2023. He states that the right hallux nail began bothering him more and was slightly more discolored over the weekend and therefore made an appointment for today. He also states that the hyperpigmented line in the plantar aspect of his right foot began bothering him when he would press on it. He inquires about getting this removed today. 8/31/23:  Ophelia Antony denies pain to his bilateral great toes. He relates compliance with dressing instructions. He is excited to go to the beach early in September. The following portions of the patient's history were reviewed and updated as appropriate: allergies, current medications, past family history, past medical history, past social history, past surgical history and problem list.    Review of Systems   Constitutional: Negative. Respiratory: Negative. Cardiovascular: Negative. Gastrointestinal: Negative. Genitourinary: Negative. Musculoskeletal: Negative. Skin: Negative. OBJECTIVE      /76 (BP Location: Right arm, Patient Position: Sitting, Cuff Size: Large)   Pulse 79   Ht 6' 2" (1.88 m) Comment: verbal  Wt 114 kg (251 lb) Comment: verbal  BMI 32.23 kg/m²        Physical Exam  Constitutional:       Appearance: Normal appearance. HENT:      Head: Normocephalic and atraumatic. Eyes:      General:         Right eye: No discharge. Left eye: No discharge. Cardiovascular:      Rate and Rhythm: Normal rate and regular rhythm. Pulses:           Dorsalis pedis pulses are 2+ on the right side and 2+ on the left side. Posterior tibial pulses are 2+ on the right side and 2+ on the left side. Pulmonary:      Effort: Pulmonary effort is normal.      Breath sounds: Normal breath sounds. Skin:     General: Skin is warm. Capillary Refill: Capillary refill takes less than 2 seconds. Neurological:      Mental Status: He is alert and oriented to person, place, and time. Sensory: Sensation is intact. No sensory deficit. Psychiatric:         Mood and Affect: Mood normal.        Bilateral hallux nailbeds at the site of partial nail avulsion is clean, dry, fully healed with no local signs of infection or drainage noted.

## 2023-09-10 ENCOUNTER — OFFICE VISIT (OUTPATIENT)
Dept: LAB | Age: 68
End: 2023-09-10
Payer: MEDICARE

## 2023-09-10 DIAGNOSIS — G47.33 OBSTRUCTIVE SLEEP APNEA (ADULT) (PEDIATRIC): ICD-10-CM

## 2023-09-10 DIAGNOSIS — Z01.818 PRE-OPERATIVE EXAMINATION: ICD-10-CM

## 2023-09-10 DIAGNOSIS — E11.65 TYPE 2 DIABETES MELLITUS WITH HYPERGLYCEMIA, WITHOUT LONG-TERM CURRENT USE OF INSULIN (HCC): ICD-10-CM

## 2023-09-10 LAB
ATRIAL RATE: 75 BPM
ATRIAL RATE: 77 BPM
P AXIS: 32 DEGREES
P AXIS: 33 DEGREES
PR INTERVAL: 156 MS
PR INTERVAL: 156 MS
QRS AXIS: 3 DEGREES
QRS AXIS: 3 DEGREES
QRSD INTERVAL: 114 MS
QRSD INTERVAL: 122 MS
QT INTERVAL: 370 MS
QT INTERVAL: 392 MS
QTC INTERVAL: 418 MS
QTC INTERVAL: 437 MS
T WAVE AXIS: 62 DEGREES
T WAVE AXIS: 66 DEGREES
VENTRICULAR RATE: 75 BPM
VENTRICULAR RATE: 77 BPM

## 2023-09-10 PROCEDURE — 93005 ELECTROCARDIOGRAM TRACING: CPT

## 2023-09-10 PROCEDURE — 93010 ELECTROCARDIOGRAM REPORT: CPT | Performed by: INTERNAL MEDICINE

## 2023-09-22 NOTE — PRE-PROCEDURE INSTRUCTIONS
Pre-Surgery Instructions:   Medication Instructions   • acetaminophen (TYLENOL 8 HOUR) 650 mg CR tablet Hold day of surgery. • Coenzyme Q10 (CO Q-10) 200 MG CAPS Hold day of surgery. • dapagliflozin (Farxiga) 10 MG tablet Stop taking 4 days prior to surgery. • diclofenac sodium (VOLTAREN) 50 mg EC tablet Hold day of surgery. • diphenhydrAMINE HCl 25 MG CHEW Hold day of surgery. • glimepiride (AMARYL) 1 mg tablet Hold day of surgery. • lisinopril-hydrochlorothiazide (PRINZIDE,ZESTORETIC) 10-12.5 MG per tablet Hold day of surgery. • Omeprazole 20 MG TBEC Uses PRN- OK to take day of surgery   • rosuvastatin (CRESTOR) 10 MG tablet Take night before surgery   • semaglutide, 0.25 or 0.5 mg/dose, (Ozempic, 0.25 or 0.5 MG/DOSE,) 2 mg/3 mL injection pen Stop taking 7 days prior to surgery. Medication instructions for day surgery reviewed. Please use only a sip of water to take your instructed medications. Avoid all over the counter vitamins, supplements and NSAIDS for one week prior to surgery per anesthesia guidelines. Tylenol is ok to take as needed. You will receive a call one business day prior to surgery with an arrival time and hospital directions. If your surgery is scheduled on a Monday, the hospital will be calling you on the Friday prior to your surgery. If you have not heard from anyone by 8pm, please call the hospital supervisor through the hospital  at [phone]. Sheyla Hoyos [phone]). Do not eat or drink anything after midnight the night before your surgery, including candy, mints, lifesavers, or chewing gum. Do not drink alcohol 24hrs before your surgery. Try not to smoke at least 24hrs before your surgery. Follow the pre surgery showering instructions as listed in the [facility] Surgical Experience Booklet” or otherwise provided by your surgeon's office. Do not shave the surgical area 24 hours before surgery.  Do not apply any lotions, creams, including makeup, cologne, deodorant, or perfumes after showering on the day of your surgery. No contact lenses, eye make-up, or artificial eyelashes. Remove nail polish, including gel polish, and any artificial, gel, or acrylic nails if possible. Remove all jewelry including rings and body piercing jewelry. Wear causal clothing that is easy to take on and off. Consider your type of surgery. Keep any valuables, jewelry, piercings at home. Please bring any specially ordered equipment (sling, braces) if indicated. Arrange for a responsible person to drive you to and from the hospital on the day of your surgery. Visitor Guidelines discussed. Call the surgeon's office with any new illnesses, exposures, or additional questions prior to surgery. Please reference your [facility] Surgical Experience Booklet” for additional information to prepare for your upcoming surgery. Pt verbalized understanding of shower and med instructions. Last dose of Keke Hait is 9/23/23. Last dose of Ozempic is 9/17/23.

## 2023-09-27 ENCOUNTER — TELEPHONE (OUTPATIENT)
Dept: ENDOCRINOLOGY | Facility: CLINIC | Age: 68
End: 2023-09-27

## 2023-09-27 DIAGNOSIS — E11.65 TYPE 2 DIABETES MELLITUS WITH HYPERGLYCEMIA, WITHOUT LONG-TERM CURRENT USE OF INSULIN (HCC): Primary | ICD-10-CM

## 2023-09-27 RX ORDER — BLOOD SUGAR DIAGNOSTIC
STRIP MISCELLANEOUS
Qty: 100 EACH | Refills: 2 | Status: SHIPPED | OUTPATIENT
Start: 2023-09-27

## 2023-09-27 RX ORDER — BLOOD-GLUCOSE METER
EACH MISCELLANEOUS DAILY
Qty: 1 KIT | Refills: 0 | Status: SHIPPED | OUTPATIENT
Start: 2023-09-27

## 2023-09-27 RX ORDER — LANCETS
EACH MISCELLANEOUS DAILY
Qty: 100 EACH | Refills: 1 | Status: SHIPPED | OUTPATIENT
Start: 2023-09-27 | End: 2023-12-26

## 2023-09-28 ENCOUNTER — ANESTHESIA EVENT (OUTPATIENT)
Dept: PERIOP | Facility: AMBULARY SURGERY CENTER | Age: 68
End: 2023-09-28
Payer: MEDICARE

## 2023-09-28 ENCOUNTER — ANESTHESIA (OUTPATIENT)
Dept: PERIOP | Facility: AMBULARY SURGERY CENTER | Age: 68
End: 2023-09-28
Payer: MEDICARE

## 2023-09-28 ENCOUNTER — HOSPITAL ENCOUNTER (OUTPATIENT)
Facility: AMBULARY SURGERY CENTER | Age: 68
Setting detail: OUTPATIENT SURGERY
Discharge: HOME | End: 2023-09-28
Attending: OTOLARYNGOLOGY | Admitting: OTOLARYNGOLOGY
Payer: MEDICARE

## 2023-09-28 VITALS
DIASTOLIC BLOOD PRESSURE: 86 MMHG | HEIGHT: 74 IN | HEART RATE: 73 BPM | TEMPERATURE: 98.2 F | RESPIRATION RATE: 18 BRPM | WEIGHT: 256.8 LBS | BODY MASS INDEX: 32.96 KG/M2 | OXYGEN SATURATION: 95 % | SYSTOLIC BLOOD PRESSURE: 134 MMHG

## 2023-09-28 LAB — GLUCOSE SERPL-MCNC: 126 MG/DL (ref 65–140)

## 2023-09-28 PROCEDURE — 82948 REAGENT STRIP/BLOOD GLUCOSE: CPT

## 2023-09-28 PROCEDURE — 42975 DISE EVAL SLP DO BRTH FLX DX: CPT | Performed by: OTOLARYNGOLOGY

## 2023-09-28 RX ORDER — ACETAMINOPHEN 325 MG/1
650 TABLET ORAL EVERY 6 HOURS PRN
Status: DISCONTINUED | OUTPATIENT
Start: 2023-09-28 | End: 2023-09-28 | Stop reason: HOSPADM

## 2023-09-28 RX ORDER — PROPOFOL 10 MG/ML
INJECTION, EMULSION INTRAVENOUS AS NEEDED
Status: DISCONTINUED | OUTPATIENT
Start: 2023-09-28 | End: 2023-09-28

## 2023-09-28 RX ORDER — SODIUM CHLORIDE, SODIUM LACTATE, POTASSIUM CHLORIDE, CALCIUM CHLORIDE 600; 310; 30; 20 MG/100ML; MG/100ML; MG/100ML; MG/100ML
INJECTION, SOLUTION INTRAVENOUS CONTINUOUS PRN
Status: DISCONTINUED | OUTPATIENT
Start: 2023-09-28 | End: 2023-09-28

## 2023-09-28 RX ADMIN — PROPOFOL 120 MCG/KG/MIN: 10 INJECTION, EMULSION INTRAVENOUS at 08:54

## 2023-09-28 RX ADMIN — PROPOFOL 20 MG: 10 INJECTION, EMULSION INTRAVENOUS at 08:56

## 2023-09-28 RX ADMIN — PROPOFOL 50 MG: 10 INJECTION, EMULSION INTRAVENOUS at 08:53

## 2023-09-28 RX ADMIN — SODIUM CHLORIDE, SODIUM LACTATE, POTASSIUM CHLORIDE, AND CALCIUM CHLORIDE: .6; .31; .03; .02 INJECTION, SOLUTION INTRAVENOUS at 08:46

## 2023-09-28 NOTE — OP NOTE
OPERATIVE REPORT  PATIENT NAME: John Baig    :  1955  MRN: [id number]  Pt Location: [facility] OR [facility] 01    SURGERY DATE: 2023    Surgeon(s) and Role:     * Jose Alejandro Cabrera MD - Primary    Preop Diagnosis:  Obstructive sleep apnea (adult) (pediatric) [G47.33]    Post-Op Diagnosis Codes:     * Obstructive sleep apnea (adult) (pediatric) [G47.33]    Procedure(s):  DRUG INDUCED SLEEP ENDOSCOPY    Specimen(s):  * No specimens in log *    Estimated Blood Loss:   Minimal    Drains:  * No LDAs found *    Anesthesia Type:   General    Operative Indications:  Obstructive sleep apnea (adult) (pediatric) [G47.33]  BMI 32    Operative Findings:  V 2 AP  O 1 AP  T 2 AP  E 2 AP    Complications:   None    Procedure and Technique:    PREOPERATIVE DIAGNOSES: Obstructive sleep apnea with positive pressure   intolerance and persistent sleep apnea after CPAP trial    POSTOPERATIVE DIAGNOSES: Same    PROCEDURES: Drug-induced sleep endoscopy (flexible fiberoptic laryngoscopy   with examination under anesthesia). ANESTHESIA: IV sedation. ESTIMATED BLOOD LOSS: None. COMPLICATIONS: None. INDICATIONS: John Baig is a 79y.o. year-old male with a history of symptomatic obstructive sleep apnea, who is intolerant and unable to achieve benefit with positive pressure therapy. He presents today for drug-induced sleep endoscopy to better characterize her locations and pattern of obstruction and to predict appropriate medical and/or surgical options moving forward. FINDINGS: There was no evidence of complete concentric palatal obstruction and he does appear to be a candidate anatomically for hypoglossal nerve   stimulation therapy. DESCRIPTION OF PROCEDURE: John Baig was brought to the operating room and was anesthetized via the standard drug-induced sleep endoscopy protocol. The propofol infusion rate was startedand gradually increased until conditions that mimic sleep were gradually observed. With the patient not responsive to verbal commands, but still with spontaneous respiration, sleep disordered breathing events and associated desaturations were clearly observed    Under these conditions, the flexible endoscope was inserted to examine both sides of the nose as well as the pharynx and larynx. The VOTE score at baseline was V: 2 complete AP; O: 1 partial AP; T: 2 complete AP; E: 2 complete AP. With simulated jaw advancement and tongue advancement, the hypopharyngeal obstruction and secondarily the palatal collapse also improved. In summary, there was no evidence of complete concentric palatal obstruction and he does appear to be a candidate anatomically for hypoglossal nerve stimulation therapy. The patient was then allowed to awaken while monitoring by anesthesia was performed until he was awake and able to maintain his own saturations. The patient was taken to the recovery area in stable condition. All instruments and sponge counts were correct at the end of the procedure. Zully Cortes MD, was present for and performed all key elements of the procedure. I was present for the entire procedure. and A qualified resident physician was not available.     Patient Disposition:  PACU  and hemodynamically stable        SIGNATURE: Yana Glass MD  DATE: September 28, 2023  TIME: 9:01 AM

## 2023-09-28 NOTE — ANESTHESIA POSTPROCEDURE EVALUATION
Post-Op Assessment Note    CV Status:  Stable  Pain Score: 0    Pain management: adequate     Mental Status:  Awake and alert   Hydration Status:  Stable   PONV Controlled:  None   Airway Patency:  Patent and adequate      Post Op Vitals Reviewed: Yes      Staff: CRNA, Anesthesiologist         No notable events documented.     BP   128/77   Temp  97.1   Pulse  73   Resp   16   SpO2 92%

## 2023-09-28 NOTE — H&P
Andre Romero is a 79 y.o.male who presents for re-evaluation of AIDEE. Patient had HST performed by Dr. Guicho Nieves on 8/16 demonstrated an AHI of 40.7. He tried using CPAP 2 different times with different masks but was unable to tolerate. Previously had UPPP in 2000. Interested in [place]. 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 Well-developed and well-nourished, no apparent distress, non-toxic appearance. Cooperative, able to hear and answer questions without difficulty. Voice: Normal voice quality. Head: Normocephalic, atraumatic. No scars, masses or lesions. Face: Symmetric, no edema, no sinus tenderness. Eyes: Vision grossly intact, extra-ocular movement intact. Ears: External ear normal.  Bilateral tympanic membranes are intact with intact normal landmarks. No post-auricular erythema or tenderness. Nose: Septum midline, nares clear. Mucosa moist, turbinates well appearing. No crusting, polyps or discharge evident. Oral cavity: Dentition intact. Mucosa moist, lips normal.  Tongue mobile, floor of mouth normal.  Hard palate unremarkable. No masses or lesions. Oropharynx: Uvula is absent, soft palate normal.  Unremarkable oropharyngeal inlet. Tonsils absent. Posterior pharyngeal wall clear. No masses or lesions.   Salivary glands:  Parotid glands and submandibular glands symmetric, no enlargement or tenderness. Neck: Normal laryngeal elevation with swallow. Trachea midline. No masses or lesions. No palpable adenopathy. Thyroid: normal in size, unremarkable without tenderness or palpable nodules. Pulmonary/Chest: Normal effort and rate. No respiratory distress. Musculoskeletal: Normal range of motion. Neurological: Cranial nerves 2-12 intact. Skin: Skin is warm and dry. Psychiatric: Normal mood and affect. CTAB  RRR  Abd soft NTND    A/P: Obstructive sleep apnea: We discussed the nature of obstructive sleep apnea. We discussed the natural history of sleep apnea. We discussed options for management. We discussed non-surgical management including weight loss, mandibular advancement devices, and positive airway pressure therapy including various options. We discussed that he is not tolerating his CPAP and has at least moderate AIDEE with an AHI of 40.7 and BMI of 32.2, he would like to move forward with Drug Induced Sleep Endoscopy to evaluate the source of the obstructions. We will plan for DISE and if necessary repeat sleep study if one has not been performed within 3 years. If surgical treatable causes of sleep apnea are seen such as tongue base laxity, we will consider options for surgical treatment. After the procedure we will further discuss surgical and non-surgical options, if necessary, at that time.

## 2023-09-28 NOTE — ANESTHESIA PREPROCEDURE EVALUATION
Procedure:  DRUG INDUCED SLEEP ENDOSCOPY (Mouth)    Relevant Problems   ANESTHESIA (within normal limits)      CARDIO   (+) Essential hypertension   (+) Hyperlipidemia      ENDO   (+) Diabetes 1.5, 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HCC)      MUSCULOSKELETAL   (+) Lumbago with sciatica, left side   (+) Lumbar degenerative disc disease   (+) Lumbar spondylosis   (+) Sacroiliitis (HCC)      PULMONARY   (+) AIDEE (obstructive sleep apnea)        Physical Exam    Airway    Mallampati score: III  TM Distance: <3 FB  Neck ROM: full     Dental   No notable dental hx     Cardiovascular      Pulmonary      Other Findings        Anesthesia Plan  ASA Score- 2     Anesthesia Type- IV sedation with anesthesia with ASA Monitors. Additional Monitors:   Airway Plan:           Plan Factors-Exercise tolerance (METS): >4 METS. Chart reviewed. EKG reviewed. Existing labs reviewed. Patient summary reviewed. Patient is not a current smoker. Induction- intravenous. Postoperative Plan-     Informed Consent- Anesthetic plan and risks discussed with patient. I personally reviewed this patient with the CRNA. Discussed and agreed on the Anesthesia Plan with the CRNA. Omid Acuna

## 2023-09-28 NOTE — DISCHARGE INSTR - AVS FIRST PAGE
Drug Induced Sleep Endoscopy     WHAT YOU SHOULD KNOW:   During a drug induced sleep endsocopy (DISE), your caregiver places a scope into your nose to see inside your nose and throat. You may need a DISE to find the cause of your sleep apnea. DISE helps your caregiver diagnose your condition and create a treatment plan. You might also have surgery or other treatments during a DISE. AFTER YOU LEAVE:     Follow up with your primary healthcare provider or specialist as directed:  Write down your questions so you remember to ask them during your visits. Medicines:   Keep a current list of your medicines:  Include the amounts, and when, how, and why you take them. Take the list or the pill bottles to follow-up visits. Carry your medicine list with you in case of an emergency. Throw away old medicine lists. Use vitamins, herbs, or food supplements only as directed. Take your medicine as directed:  Call your healthcare provider if you think your medicine is not working as expected. Tell him about any medicine allergies, and if you want to quit taking or change your medicine. Nutrition:  Most people eat and drink as usual after a laryngoscopy. Ask your primary healthcare provider if you are not sure. Contact your primary healthcare provider if:  You have problems breathing or talking. You see new injuries to your teeth, lips, or tongue. Your pain does not go away or gets worse. You have questions about your procedure, medicine, or care. If you have any questions or concerns during business hours please call our office at [phone].  After hours please call the surgeon on call or Dr. Lulu Huitron at [phone]

## 2023-10-11 DIAGNOSIS — E78.01 FAMILIAL HYPERCHOLESTEROLEMIA: ICD-10-CM

## 2023-10-11 RX ORDER — ROSUVASTATIN CALCIUM 10 MG/1
TABLET, COATED ORAL
Qty: 90 TABLET | Refills: 3 | Status: SHIPPED | OUTPATIENT
Start: 2023-10-11

## 2023-10-13 DIAGNOSIS — E11.65 TYPE 2 DIABETES MELLITUS WITH HYPERGLYCEMIA, WITHOUT LONG-TERM CURRENT USE OF INSULIN (HCC): ICD-10-CM

## 2023-10-16 DIAGNOSIS — M46.1 SACROILIITIS (HCC): ICD-10-CM

## 2023-10-18 ENCOUNTER — APPOINTMENT (OUTPATIENT)
Dept: LAB | Age: 68
End: 2023-10-18
Payer: MEDICARE

## 2023-10-18 DIAGNOSIS — Z01.818 PRE-OPERATIVE EXAMINATION: ICD-10-CM

## 2023-10-18 DIAGNOSIS — I10 ESSENTIAL HYPERTENSION: ICD-10-CM

## 2023-10-18 DIAGNOSIS — G47.33 OBSTRUCTIVE SLEEP APNEA (ADULT) (PEDIATRIC): ICD-10-CM

## 2023-10-18 DIAGNOSIS — E11.65 TYPE 2 DIABETES MELLITUS WITH HYPERGLYCEMIA, WITHOUT LONG-TERM CURRENT USE OF INSULIN (HCC): ICD-10-CM

## 2023-10-18 LAB
ANION GAP SERPL CALCULATED.3IONS-SCNC: 5 MMOL/L
BASOPHILS # BLD AUTO: 0.04 THOUSANDS/ÂΜL (ref 0–0.1)
BASOPHILS NFR BLD AUTO: 1 % (ref 0–1)
BUN SERPL-MCNC: 20 MG/DL (ref 5–25)
CALCIUM SERPL-MCNC: 9.8 MG/DL (ref 8.4–10.2)
CHLORIDE SERPL-SCNC: 103 MMOL/L (ref 96–108)
CO2 SERPL-SCNC: 32 MMOL/L (ref 21–32)
CREAT SERPL-MCNC: 0.84 MG/DL (ref 0.6–1.3)
CREAT UR-MCNC: 141 MG/DL
EOSINOPHIL # BLD AUTO: 0.15 THOUSAND/ÂΜL (ref 0–0.61)
EOSINOPHIL NFR BLD AUTO: 2 % (ref 0–6)
ERYTHROCYTE [DISTWIDTH] IN BLOOD BY AUTOMATED COUNT: 13.6 % (ref 11.6–15.1)
EST. AVERAGE GLUCOSE BLD GHB EST-MCNC: 137 MG/DL
GFR SERPL CREATININE-BSD FRML MDRD: 91 ML/MIN/1.73SQ M
GLUCOSE P FAST SERPL-MCNC: 120 MG/DL (ref 65–99)
HBA1C MFR BLD: 6.4 %
HCT VFR BLD AUTO: 49.8 % (ref 36.5–49.3)
HGB BLD-MCNC: 16.3 G/DL (ref 12–17)
IMM GRANULOCYTES # BLD AUTO: 0.03 THOUSAND/UL (ref 0–0.2)
IMM GRANULOCYTES NFR BLD AUTO: 0 % (ref 0–2)
LYMPHOCYTES # BLD AUTO: 1.72 THOUSANDS/ÂΜL (ref 0.6–4.47)
LYMPHOCYTES NFR BLD AUTO: 23 % (ref 14–44)
MCH RBC QN AUTO: 27.7 PG (ref 26.8–34.3)
MCHC RBC AUTO-ENTMCNC: 32.7 G/DL (ref 31.4–37.4)
MCV RBC AUTO: 85 FL (ref 82–98)
MICROALBUMIN UR-MCNC: 71 MG/L
MICROALBUMIN/CREAT 24H UR: 50 MG/G CREATININE (ref 0–30)
MONOCYTES # BLD AUTO: 0.58 THOUSAND/ÂΜL (ref 0.17–1.22)
MONOCYTES NFR BLD AUTO: 8 % (ref 4–12)
NEUTROPHILS # BLD AUTO: 4.93 THOUSANDS/ÂΜL (ref 1.85–7.62)
NEUTS SEG NFR BLD AUTO: 66 % (ref 43–75)
NRBC BLD AUTO-RTO: 0 /100 WBCS
PLATELET # BLD AUTO: 209 THOUSANDS/UL (ref 149–390)
PMV BLD AUTO: 10 FL (ref 8.9–12.7)
POTASSIUM SERPL-SCNC: 4.8 MMOL/L (ref 3.5–5.3)
RBC # BLD AUTO: 5.88 MILLION/UL (ref 3.88–5.62)
SODIUM SERPL-SCNC: 140 MMOL/L (ref 135–147)
WBC # BLD AUTO: 7.45 THOUSAND/UL (ref 4.31–10.16)

## 2023-10-18 PROCEDURE — 80048 BASIC METABOLIC PNL TOTAL CA: CPT

## 2023-10-18 PROCEDURE — 36415 COLL VENOUS BLD VENIPUNCTURE: CPT

## 2023-10-18 PROCEDURE — 85025 COMPLETE CBC W/AUTO DIFF WBC: CPT

## 2023-10-18 PROCEDURE — 83036 HEMOGLOBIN GLYCOSYLATED A1C: CPT

## 2023-10-18 PROCEDURE — 82043 UR ALBUMIN QUANTITATIVE: CPT

## 2023-10-18 PROCEDURE — 82570 ASSAY OF URINE CREATININE: CPT

## 2023-10-23 ENCOUNTER — OFFICE VISIT (OUTPATIENT)
Dept: ENDOCRINOLOGY | Facility: CLINIC | Age: 68
End: 2023-10-23
Payer: MEDICARE

## 2023-10-23 VITALS
OXYGEN SATURATION: 96 % | BODY MASS INDEX: 33.5 KG/M2 | SYSTOLIC BLOOD PRESSURE: 126 MMHG | DIASTOLIC BLOOD PRESSURE: 72 MMHG | HEIGHT: 74 IN | WEIGHT: 261 LBS | HEART RATE: 74 BPM

## 2023-10-23 DIAGNOSIS — E66.9 OBESITY (BMI 30-39.9): ICD-10-CM

## 2023-10-23 DIAGNOSIS — I10 ESSENTIAL HYPERTENSION: ICD-10-CM

## 2023-10-23 DIAGNOSIS — E11.65 TYPE 2 DIABETES MELLITUS WITH HYPERGLYCEMIA, WITHOUT LONG-TERM CURRENT USE OF INSULIN (HCC): Primary | ICD-10-CM

## 2023-10-23 DIAGNOSIS — E78.2 MIXED HYPERLIPIDEMIA: ICD-10-CM

## 2023-10-23 PROCEDURE — 99214 OFFICE O/P EST MOD 30 MIN: CPT | Performed by: INTERNAL MEDICINE

## 2023-10-23 RX ORDER — GLIMEPIRIDE 1 MG/1
TABLET ORAL
Qty: 180 TABLET | Refills: 1
Start: 2023-10-23

## 2023-10-23 NOTE — PROGRESS NOTES
Lily Wright 79 y.o. male MRN: [id number]    Encounter: [id number]      Assessment/Plan     Assessment: This is a 79y.o.-year-old male with diabetes with hyperglycemia. Plan:    Diagnoses and all orders for this visit:    Type 2 diabetes mellitus with hyperglycemia, without long-term current use of insulin ([address])    Lab Results   Component Value Date    HGBA1C 6.4 (H) 10/18/2023   A1c 6.4%, at goal  We will increase Ozempic to 1 mg once a week, reduce glimepiride to 0.5 mg twice a day. Hugo to check blood sugar twice daily and send log in 2 weeks, blood sugars are still in 80 to 160 mg per DL range ,consider discontinuing glimepiride. Discussed hypoglycemia symptoms and treatment. Discussed to keep carbohydrate consistent with meals to avoid fluctuation in blood sugars. The importance of follow-up with ophthalmology and podiatry. Follow-up in 6 months, or sooner if hyperglycemia or hypoglycemia    -     glimepiride (AMARYL) 1 mg tablet; TAKE 1/2  TABLET BY MOUTH TWICE A DAY WITH MEALS  -     Basic metabolic panel; Future  -     Albumin / creatinine urine ratio; Future  -     Hemoglobin A1C; Future  -     semaglutide, 1 mg/dose, (Ozempic, 1 MG/DOSE,) 4 mg/3 mL injection pen; Inject 1 mg once a week    Essential hypertension  Blood pressure, well controlled. Continue current management as per PCP  Mixed hyperlipidemia  LDL is at goal, continue statins  Obesity (BMI 30-39. 9)  Increase Ozempic to 1 mg once a week. Reinforced lifestyle modifications       CC: Diabetes    History of Present Illness     HPI:    Lily Wright is 68-year-old male with medical history of type 2 diabetes, hypertension, hyperlipidemia and obesity is here for follow-up. .  Denies any send history of hospitalization for hyperglycemia or hypoglycemia. Beatties course has been stable. Current regimen includes glimepiride 1 mg twice a day with food, Farxiga 10 mg daily, Ozempic 0.5 mg once a week.   Patient admits compliance to the medication, denies side effects. For hypertension, he takes lisinopril/hydrochlorothiazide, 10/12.5 mg daily  For hyperlipidemia he takes Crestor 10 mg daily. Lab Results   Component Value Date    LDLCALC 94 07/31/2023        Wt Readings from Last 3 Encounters:   10/23/23 118 kg (261 lb)   10/09/23 113 kg (250 lb)   09/28/23 116 kg (256 lb 12.8 oz)        Lab Results   Component Value Date    HGBA1C 6.4 (H) 10/18/2023     Component      Latest Ref Rng 9/28/2023   Sodium      135 - 147 mmol/L    Potassium      3.5 - 5.3 mmol/L    Chloride      96 - 108 mmol/L    CO2      21 - 32 mmol/L    Anion Gap      mmol/L    BUN      5 - 25 mg/dL    Creatinine      0.60 - 1.30 mg/dL    GLUCOSE FASTING      65 - 99 mg/dL    Calcium      8.4 - 10.2 mg/dL    eGFR      ml/min/1.73sq m    EXT Creatinine Urine      Reference range not established. mg/dL    MICROALBUM.,U,RANDOM      <20.0 mg/L    MICROALBUMIN/CREATININE RATIO      0 - 30 mg/g creatinine    Hemoglobin A1C      Normal 4.0-5.6%; PreDiabetic 5.7-6.4%; Diabetic >=6.5%; Glycemic control for adults with diabetes <7.0% %    eAG, EST AVG Glucose      mg/dl    POC Glucose      65 - 140 mg/dl 126      Component      Latest Ref Rng 10/18/2023   Sodium      135 - 147 mmol/L 140    Potassium      3.5 - 5.3 mmol/L 4.8    Chloride      96 - 108 mmol/L 103    CO2      21 - 32 mmol/L 32    Anion Gap      mmol/L 5    BUN      5 - 25 mg/dL 20    Creatinine      0.60 - 1.30 mg/dL 0.84    GLUCOSE FASTING      65 - 99 mg/dL 120 (H)    Calcium      8.4 - 10.2 mg/dL 9.8    eGFR      ml/min/1.73sq m 91    EXT Creatinine Urine      Reference range not established. mg/dL 141.0    MICROALBUM.,U,RANDOM      <20.0 mg/L 71.0 (H)    MICROALBUMIN/CREATININE RATIO      0 - 30 mg/g creatinine 50 (H)    Hemoglobin A1C      Normal 4.0-5.6%; PreDiabetic 5.7-6.4%;  Diabetic >=6.5%; Glycemic control for adults with diabetes <7.0% % 6.4 (H)    eAG, EST AVG Glucose      mg/dl 137    POC Glucose      65 - 140 mg/dl           Review of Systems    Historical Information   Past Medical History:   Diagnosis Date    Arthritis 2018    low back pain    Diabetes mellitus ([address])     GERD (gastroesophageal reflux disease)     Headache     Headache(784.0)     High cholesterol     Hypertension     Ingrown toenail big toe - several times    Kidney stone 2005    Kidney stones     Memory loss 2017    due to Gabapentin    Neuropathy in diabetes ([address]) 2003    Obesity     Sleep apnea     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     Past Surgical History:   Procedure Laterality Date    COLONOSCOPY N/A 11/22/2016    Procedure: COLONOSCOPY;  Surgeon: Marva Carrington MD;  Location: [facility];   Service:     DEBRIDEMENT TENNIS ELBOW  2006    EXAMINATION UNDER ANESTHESIA N/A 9/28/2023    Procedure: DRUG INDUCED SLEEP ENDOSCOPY;  Surgeon: Domnick Mortimer, MD;  [facility];  Service: ENT    EYE SURGERY  2008    laser holes to relieve pressure    HERNIA REPAIR  2928    x2, umbilical and right side per patient    KNEE SURGERY  2000    meniscus respair    NASAL SEPTUM SURGERY  1980    MO TENDON SHEATH INCISION Right 11/19/2019    Procedure: RELEASE TRIGGER FINGER-right long finger;  Surgeon: Braeden Morillo MD;  Location: [facility];  Service: Orthopedics    MO TENDON SHEATH INCISION Left 11/26/2019    Procedure: RELEASE TRIGGER FINGER-left long finger;  Surgeon: Braeden Morillo MD;  Location: [facility];  Service: Orthopedics    TONSILLECTOMY  1961    UVULOPALATOPHARYNGOPLASTY  2004     Social History   Social History     Substance and Sexual Activity   Alcohol Use Not Currently     Social History     Substance and Sexual Activity   Drug Use Yes    Types: Marijuana     Social History     Tobacco Use   Smoking Status Never   Smokeless Tobacco Never     Family History:   Family History   Problem Relation Age of Onset    Arthritis Mother         mostly in her back       Meds/Allergies   Current Outpatient Medications   Medication Sig Dispense Refill    Accu-Chek FastClix Lancets MISC Use in the morning Check sugar once daily DX: E13.9 100 each 1    acetaminophen (TYLENOL 8 HOUR) 650 mg CR tablet Take 1 tablet (650 mg total) by mouth every 8 (eight) hours (Patient taking differently: Take 650 mg by mouth every 8 (eight) hours as needed) 15 tablet 0    Blood Glucose Monitoring Suppl (Accu-Chek Maida Plus) w/Device KIT Use in the morning Check once daily dx E13.9 1 kit 0    Coenzyme Q10 (CO Q-10) 200 MG CAPS in the morning      dapagliflozin (Farxiga) 10 MG tablet Take 1 tablet (10 mg total) by mouth daily 90 tablet 3    diclofenac sodium (VOLTAREN) 50 mg EC tablet TAKE 1 TABLET BY MOUTH TWICE A DAY AS NEEDED FOR PAIN 180 tablet 0    diphenhydrAMINE HCl 25 MG CHEW if needed      glimepiride (AMARYL) 1 mg tablet TAKE 1/2  TABLET BY MOUTH TWICE A DAY WITH MEALS 180 tablet 1    glucose blood (Accu-Chek Maida Plus) test strip Check sugar once daily dx: E13.9 100 each 2    lisinopril-hydrochlorothiazide (PRINZIDE,ZESTORETIC) 10-12.5 MG per tablet Take 1 tablet by mouth daily 90 tablet 3    Omeprazole 20 MG TBEC if needed      rosuvastatin (CRESTOR) 10 MG tablet TAKE 1 TABLET BY MOUTH EVERY DAY 90 tablet 3    semaglutide, 1 mg/dose, (Ozempic, 1 MG/DOSE,) 4 mg/3 mL injection pen Inject 1 mg once a week 3 mL 5     No current facility-administered medications for this visit. Allergies   Allergen Reactions    Tramadol Itching       Objective   Vitals: Blood pressure 126/72, pulse 74, height 6' 2" (1.88 m), weight 118 kg (261 lb), SpO2 96 %. Physical Exam  Vitals reviewed. Constitutional:       General: He is not in acute distress. Appearance: He is well-developed. HENT:      Head: Normocephalic and atraumatic. Eyes:      Pupils: Pupils are equal, round, and reactive to light. Neck:      Thyroid: No thyromegaly. Cardiovascular:      Rate and Rhythm: Normal rate and regular rhythm. Heart sounds: Normal heart sounds.    Pulmonary: Effort: Pulmonary effort is normal. No respiratory distress. Breath sounds: Normal breath sounds. Musculoskeletal:         General: No deformity. Normal range of motion. Cervical back: Normal range of motion and neck supple. Skin:     General: Skin is warm and dry. Findings: No erythema. Neurological:      Mental Status: He is alert and oriented to person, place, and time. The history was obtained from the review of the chart, patient. Lab Results:   Lab Results   Component Value Date/Time    Hemoglobin A1C 6.4 (H) 10/18/2023 08:23 AM    Hemoglobin A1C 6.3 (H) 06/26/2023 08:41 AM    Hemoglobin A1C 7.8 (H) 03/29/2023 07:40 AM    WBC 7.45 10/18/2023 08:23 AM    WBC 7.36 07/31/2023 08:04 AM    Hemoglobin 16.3 10/18/2023 08:23 AM    Hemoglobin 16.5 07/31/2023 08:04 AM    Hematocrit 49.8 (H) 10/18/2023 08:23 AM    Hematocrit 48.7 07/31/2023 08:04 AM    MCV 85 10/18/2023 08:23 AM    MCV 84 07/31/2023 08:04 AM    Platelets 907 49/49/4053 08:23 AM    Platelets 475 61/18/6805 08:04 AM    BUN 20 10/18/2023 08:23 AM    BUN 24 07/31/2023 08:04 AM    BUN 25 03/29/2023 07:40 AM    Potassium 4.8 10/18/2023 08:23 AM    Potassium 5.0 07/31/2023 08:04 AM    Potassium 4.4 03/29/2023 07:40 AM    Chloride 103 10/18/2023 08:23 AM    Chloride 106 07/31/2023 08:04 AM    Chloride 104 03/29/2023 07:40 AM    CO2 32 10/18/2023 08:23 AM    CO2 32 07/31/2023 08:04 AM    CO2 31 03/29/2023 07:40 AM    Creatinine 0.84 10/18/2023 08:23 AM    Creatinine 0.78 07/31/2023 08:04 AM    Creatinine 0.94 03/29/2023 07:40 AM    AST 15 07/31/2023 08:04 AM    ALT 36 07/31/2023 08:04 AM    Total Protein 7.2 07/31/2023 08:04 AM    Albumin 3.7 07/31/2023 08:04 AM    HDL, Direct 51 07/31/2023 08:04 AM    HDL, Direct 59 03/06/2023 08:11 AM    Triglycerides 102 07/31/2023 08:04 AM    Triglycerides 118 03/06/2023 08:11 AM           Imaging Studies: I have personally reviewed pertinent reports.       Portions of the record may have been created with voice recognition software. Occasional wrong word or "sound a like" substitutions may have occurred due to the inherent limitations of voice recognition software. Read the chart carefully and recognize, using context, where substitutions have occurred.

## 2023-11-01 NOTE — PRE-PROCEDURE INSTRUCTIONS
Pre-Surgery Instructions:   Medication Instructions    acetaminophen (TYLENOL 8 HOUR) 650 mg CR tablet Uses PRN- OK to take day of surgery    Coenzyme Q10 (CO Q-10) 200 MG CAPS Stop taking 7 days prior to surgery. dapagliflozin (Farxiga) 10 MG tablet Stop taking 4 days prior to surgery. diclofenac sodium (VOLTAREN) 50 mg EC tablet Stop taking 3 days prior to surgery. diphenhydrAMINE HCl 25 MG CHEW Uses PRN- OK to take day of surgery    glimepiride (AMARYL) 1 mg tablet Hold day of surgery. lisinopril-hydrochlorothiazide (PRINZIDE,ZESTORETIC) 10-12.5 MG per tablet Hold day of surgery. Omeprazole 20 MG TBEC Uses PRN- OK to take day of surgery    rosuvastatin (CRESTOR) 10 MG tablet Take day of surgery. semaglutide, 1 mg/dose, (Ozempic, 1 MG/DOSE,) 4 mg/3 mL injection pen Stop taking 7 days prior to surgery. Medication instructions for day surgery reviewed. Please use only a sip of water to take your instructed medications. Avoid all over the counter vitamins, supplements and NSAIDS for one week prior to surgery per anesthesia guidelines. Tylenol is ok to take as needed. You will receive a call one business day prior to surgery with an arrival time and hospital directions. If your surgery is scheduled on a Monday, the hospital will be calling you on the Friday prior to your surgery. If you have not heard from anyone by 8pm, please call the hospital supervisor through the hospital  at [phone]. Dianna Ashley [phone]). Do not eat or drink anything after midnight the night before your surgery, including candy, mints, lifesavers, or chewing gum. Do not drink alcohol 24hrs before your surgery. Try not to smoke at least 24hrs before your surgery. Follow the pre surgery showering instructions as listed in the [facility] Surgical Experience Booklet” or otherwise provided by your surgeon's office. Do not use a blade to shave the surgical area 1 week before surgery.  It is okay to use a clean electric clippers up to 24 hours before surgery. Do not apply any lotions, creams, including makeup, cologne, deodorant, or perfumes after showering on the day of your surgery. Do not use dry shampoo, hair spray, hair gel, or any type of hair products. No contact lenses, eye make-up, or artificial eyelashes. Remove nail polish, including gel polish, and any artificial, gel, or acrylic nails if possible. Remove all jewelry including rings and body piercing jewelry. Wear causal clothing that is easy to take on and off. Consider your type of surgery. Keep any valuables, jewelry, piercings at home. Please bring any specially ordered equipment (sling, braces) if indicated. Arrange for a responsible person to drive you to and from the hospital on the day of your surgery. Visitor Guidelines discussed. Call the surgeon's office with any new illnesses, exposures, or additional questions prior to surgery. Please reference your [facility] Surgical Experience Booklet” for additional information to prepare for your upcoming surgery.

## 2023-11-03 ENCOUNTER — OFFICE VISIT (OUTPATIENT)
Dept: INTERNAL MEDICINE CLINIC | Facility: CLINIC | Age: 68
End: 2023-11-03
Payer: MEDICARE

## 2023-11-03 VITALS
OXYGEN SATURATION: 96 % | BODY MASS INDEX: 33.37 KG/M2 | SYSTOLIC BLOOD PRESSURE: 124 MMHG | WEIGHT: 260 LBS | HEIGHT: 74 IN | HEART RATE: 74 BPM | DIASTOLIC BLOOD PRESSURE: 86 MMHG | TEMPERATURE: 97.6 F

## 2023-11-03 DIAGNOSIS — G47.33 OSA (OBSTRUCTIVE SLEEP APNEA): ICD-10-CM

## 2023-11-03 DIAGNOSIS — G47.10 HYPERSOMNIA: ICD-10-CM

## 2023-11-03 DIAGNOSIS — E66.9 OBESITY (BMI 30-39.9): ICD-10-CM

## 2023-11-03 DIAGNOSIS — H65.112 NON-RECURRENT ACUTE ALLERGIC OTITIS MEDIA OF LEFT EAR: Primary | ICD-10-CM

## 2023-11-03 PROCEDURE — 99214 OFFICE O/P EST MOD 30 MIN: CPT | Performed by: INTERNAL MEDICINE

## 2023-11-03 RX ORDER — FLUTICASONE PROPIONATE 50 MCG
2 SPRAY, SUSPENSION (ML) NASAL DAILY
Qty: 16 G | Refills: 2 | Status: SHIPPED | OUTPATIENT
Start: 2023-11-03

## 2023-11-03 NOTE — ASSESSMENT & PLAN NOTE
There is a cut off as far as her weight is concerned for BMI of 35 for the procedure as planned.   Patient has noted BMI of 33.38. [FreeTextEntry1] : 45 y/o Chinese woman here for chronic HBV (eAb+, on Vemlidy).\par \par She has chronic hepatitis B, eAb positive, was started on TDF since 3/2018 by her outside physician when she developed high HBV viremia of 42,142 IU/ml but normal ALT. She responded to TDF and was switched to Vemlidy for better kidney and BMD safety profile since 6/2019.  She has tolerated Vemlidy without side effects. \par Her viral load has been undetectable to unquantifiable.\par \par I have explained to the patient the natural history of chronic hepatitis B, disease progression to cirrhosis and risk of HCC and risks and benefits and side effects of Vemlidy and adverse effects of sudden discontinuation of Vemlidy. \par \par DEXA 2/2020 normal. repeat in 2-3 years.\par \par Labs and ultrasound q 6 months ordered. \par \par RTC 6 months w/ Fibroscan\par labs and ultrasound ordered.\par \par

## 2023-11-03 NOTE — PROGRESS NOTES
Name: Zofia Munguia      : 1955      MRN: [id number]  Encounter Provider: Corazon Banda DO  Encounter Date: 11/3/2023   Encounter department: [facility]    Assessment & Plan     1. Non-recurrent acute allergic otitis media of left ear  Assessment & Plan:  Patient is complaining of fullness bilaterally in the ears more on the left than the right. He states this has been going on proximately 1 week. He denies any nasal congestion other than the problem his common allergic rhinitis. Patient denies any significant changes in his auditory acuity. He feels like a pressure especially in the left ear. Patient on evaluation has dull tympanic membranes bilaterally. Slight erythema to nasal mucosa with slightly enlarged nasal turbinates and a clear nasal discharge, mild erythema to posterior airway but again a clear postnasal drip. We feel that this disorder is most likely from his allergic rhinitis and we did give the patient a prescription for Flonase nasal spray to use 1 spray to each nostril twice a day. To call and discussed with his ENT physician if not improving prior to his upcoming surgical procedure    Orders:  -     fluticasone (FLONASE) 50 mcg/act nasal spray; 2 sprays into each nostril daily    2. AIDEE (obstructive sleep apnea)  Assessment & Plan:  Patient is a 51-year-old male with a history of multiple medical problems including a diagnosis of obstructive sleep apnea. Along with his sleep specialist we have tried CPAP and patient is unable to tolerate this. Patient has recently been seen by ENT physician for evaluation for surgery for the inspire device to help with his sleep apnea. The ENT physician feels the patient will benefit from implantation of the device which will be performed in the near future. He is here for preop evaluation and did have labs performed prior to the visit we did discuss the results.   Patient has no medical contraindication to have the procedure performed as planned. Patient states he has had long discussion with his ENT physician about possible complications and the risks involved with the surgery. He will keep in touch as to whether or not this has been successful      3. Obesity (BMI 30-39. 9)  Assessment & Plan:  There is a cut off as far as her weight is concerned for BMI of 35 for the procedure as planned. Patient has noted BMI of 33.38.      4. Hypersomnia  Assessment & Plan:  Chronic sleep disorder secondary to his sleep apnea. Hopefully the upcoming procedure will be helpful. States all that he wants is a good night sleep             Subjective     Patient is a 80-year-old male history of multiple medical problems who is here today for preop evaluation prior to undergoing procedure for placement of inspire device to help with his sleep apnea. Patient has undergone full evaluation both by his ENT physician and his sleep physician. He did have preop evaluation with lab testing and we did review the results. Denies any new medical complaints or concerns other than fullness and pressure in his left ear  Review of Systems   Constitutional:  Positive for fatigue. Negative for activity change, appetite change, chills, diaphoresis, fever and unexpected weight change. HENT:  Positive for ear pain. Negative for congestion, dental problem, drooling, ear discharge, facial swelling, hearing loss, mouth sores, nosebleeds, postnasal drip, rhinorrhea, sinus pressure, sinus pain, sneezing, sore throat, tinnitus, trouble swallowing and voice change. Eyes: Negative. Respiratory: Negative. Cardiovascular: Negative. Gastrointestinal: Negative. Endocrine: Negative. Genitourinary: Negative. Musculoskeletal: Negative. Allergic/Immunologic: Negative. Neurological: Negative. Hematological: Negative. Psychiatric/Behavioral: Negative.        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     Past Surgical History:   Procedure Laterality Date   • COLONOSCOPY N/A 11/22/2016    Procedure: COLONOSCOPY;  Surgeon: Donna Hays MD;  Location: [facility];   Service:    • DEBRIDEMENT TENNIS ELBOW  2006   • EXAMINATION UNDER ANESTHESIA N/A 9/28/2023    Procedure: DRUG INDUCED SLEEP ENDOSCOPY;  Surgeon: Nhi Hernandez MD;  Location: [facility];  Service: ENT   • EYE SURGERY  2008    laser holes to relieve pressure   • HERNIA REPAIR  6756    x2, umbilical and right side per patient   • KNEE SURGERY  2000    meniscus respair   • NASAL SEPTUM SURGERY  1980   • NV TENDON SHEATH INCISION Right 11/19/2019    Procedure: RELEASE TRIGGER FINGER-right long finger;  Surgeon: Leon Brunner MD;  Location: [facility];  Service: Orthopedics   • NV TENDON SHEATH INCISION Left 11/26/2019    Procedure: RELEASE TRIGGER FINGER-left long finger;  Surgeon: Leon Brunner MD;  Location: [facility];  Service: Orthopedics   • TONSILLECTOMY  1961   • UVULOPALATOPHARYNGOPLASTY  2004     Family History   Problem Relation Age of Onset   • Arthritis Mother         mostly in her back     Social History     Socioeconomic History   • Marital status: /Civil Union     Spouse name: None   • Number of children: None   • Years of education: None   • Highest education level: None   Occupational History   • None   Tobacco Use   • Smoking status: Never   • Smokeless tobacco: Never   Vaping Use   • Vaping Use: Never used   Substance and Sexual Activity   • Alcohol use: Not Currently     Comment: seldom   • Drug use: Not Currently     Types: Marijuana • Sexual activity: Never   Other Topics Concern   • None   Social History Narrative   • None     Social Determinants of Health     Financial Resource Strain: Low Risk  (3/25/2023)    Overall Financial Resource Strain (CARDIA)    • Difficulty of Paying Living Expenses: Not hard at all   Food Insecurity: Not on file   Transportation Needs: No Transportation Needs (3/25/2023)    PRAPARE - Transportation    • Lack of Transportation (Medical): No    • Lack of Transportation (Non-Medical):  No   Physical Activity: Not on file   Stress: Not on file   Social Connections: Not on file   Intimate Partner Violence: Not on file   Housing Stability: Not on file     Current Outpatient Medications on File Prior to Visit   Medication Sig   • Accu-Chek FastClix Lancets MISC Use in the morning Check sugar once daily DX: E13.9   • acetaminophen (TYLENOL 8 HOUR) 650 mg CR tablet Take 1 tablet (650 mg total) by mouth every 8 (eight) hours (Patient taking differently: Take 650 mg by mouth every 8 (eight) hours as needed)   • Blood Glucose Monitoring Suppl (Accu-Chek Maida Plus) w/Device KIT Use in the morning Check once daily dx E13.9   • Coenzyme Q10 (CO Q-10) 200 MG CAPS in the morning   • dapagliflozin (Farxiga) 10 MG tablet Take 1 tablet (10 mg total) by mouth daily   • diclofenac sodium (VOLTAREN) 50 mg EC tablet TAKE 1 TABLET BY MOUTH TWICE A DAY AS NEEDED FOR PAIN   • diphenhydrAMINE HCl 25 MG CHEW if needed   • glimepiride (AMARYL) 1 mg tablet TAKE 1/2  TABLET BY MOUTH TWICE A DAY WITH MEALS   • glucose blood (Accu-Chek Maida Plus) test strip Check sugar once daily dx: E13.9   • lisinopril-hydrochlorothiazide (PRINZIDE,ZESTORETIC) 10-12.5 MG per tablet Take 1 tablet by mouth daily   • Omeprazole 20 MG TBEC if needed   • rosuvastatin (CRESTOR) 10 MG tablet TAKE 1 TABLET BY MOUTH EVERY DAY   • semaglutide, 1 mg/dose, (Ozempic, 1 MG/DOSE,) 4 mg/3 mL injection pen Inject 1 mg once a week     Allergies   Allergen Reactions   • Tramadol Itching     Immunization History   Administered Date(s) Administered   • COVID-19 MODERNA VACC 0.5 ML IM 03/16/2021, 04/15/2021, 10/29/2021   • COVID-19 Moderna mRNA Vaccine 12 Yr+ 50 mcg/0.5 mL (Spikevax) 10/02/2023   • H1N1, All Formulations 12/30/2009   • INFLUENZA 10/28/2015, 09/13/2016, 10/17/2019, 10/13/2020, 10/30/2021, 10/02/2023   • Influenza Quadrivalent 3 years and older 10/07/2014   • Influenza Quadrivalent Preservative Free 3 years and older IM 10/07/2014   • Influenza Quadrivalent Recombinant Preservative Free IM 10/17/2019, 10/13/2020   • Influenza Quadrivalent, 6-35 Months IM 10/28/2015, 09/13/2016, 09/28/2017   • Influenza, Seasonal Vaccine, Quadrivalent, Adjuvanted, . 5e 10/02/2023   • Influenza, high dose seasonal 0.7 mL 10/30/2021   • Influenza, recombinant, quadrivalent,injectable, preservative free 10/09/2018   • Influenza, seasonal, injectable 1955   • Tuberculin Skin Test-PPD Intradermal 08/27/2015   • Zoster Vaccine Recombinant 11/12/2020, 03/18/2021       Objective     /86   Pulse 74   Temp 97.6 °F (36.4 °C)   Ht 6' 2" (1.88 m)   Wt 118 kg (260 lb)   SpO2 96%   BMI 33.38 kg/m²     Physical Exam  Vitals and nursing note reviewed. Constitutional:       General: He is not in acute distress. Appearance: Normal appearance. He is obese. He is not ill-appearing, toxic-appearing or diaphoretic. Comments: Pleasant, articulate, obese 73-year-old male who is awake alert in no acute distress oriented x3   HENT:      Head: Normocephalic and atraumatic. Right Ear: Ear canal and external ear normal. There is no impacted cerumen. Left Ear: Ear canal and external ear normal. There is no impacted cerumen. Ears:      Comments: Dullness to tympanic membrane's bilaterally no erythema and no effusion     Nose: Nose normal. No congestion or rhinorrhea. Mouth/Throat:      Mouth: Mucous membranes are moist.      Pharynx: Oropharynx is clear.  No oropharyngeal exudate or posterior oropharyngeal erythema. Comments: Some crowding of oral airway as noted previously, thick neck  Eyes:      General: No scleral icterus. Right eye: No discharge. Left eye: No discharge. Conjunctiva/sclera: Conjunctivae normal.      Pupils: Pupils are equal, round, and reactive to light. Neck:      Vascular: No carotid bruit. Cardiovascular:      Rate and Rhythm: Normal rate. Pulses: Normal pulses. Heart sounds: Normal heart sounds. No murmur heard. No friction rub. No gallop. Pulmonary:      Effort: Pulmonary effort is normal. No respiratory distress. Breath sounds: Normal breath sounds. No stridor. No wheezing, rhonchi or rales. Chest:      Chest wall: No tenderness. Abdominal:      General: Bowel sounds are normal. There is no distension. Palpations: Abdomen is soft. There is no mass. Tenderness: There is no abdominal tenderness. There is no right CVA tenderness, left CVA tenderness, guarding or rebound. Hernia: No hernia is present. Comments: Obese   Genitourinary:     Penis: Normal.       Testes: Normal.      Prostate: Normal.      Rectum: Normal.   Musculoskeletal:         General: Deformity present. No swelling, tenderness or signs of injury. Normal range of motion. Cervical back: Normal range of motion and neck supple. No rigidity or tenderness. Right lower leg: No edema. Left lower leg: No edema. Comments: Diffuse arthritic changes as previously especially noted flattening to the lumbar curve with decreased range of motion both actively and passively and only minimal discomfort with movement, without change   Lymphadenopathy:      Cervical: No cervical adenopathy. Skin:     General: Skin is warm and dry. Capillary Refill: Capillary refill takes less than 2 seconds. Coloration: Skin is not jaundiced or pale. Findings: No bruising, erythema, lesion or rash.    Neurological: General: No focal deficit present. Mental Status: He is alert and oriented to person, place, and time. Mental status is at baseline. Cranial Nerves: No cranial nerve deficit. Sensory: No sensory deficit. Motor: No weakness. Coordination: Coordination normal.      Gait: Gait normal.      Deep Tendon Reflexes: Reflexes normal.   Psychiatric:         Mood and Affect: Mood normal.         Behavior: Behavior normal.         Thought Content:  Thought content normal.         Judgment: Judgment normal.   Rico Casarez, DO

## 2023-11-03 NOTE — ASSESSMENT & PLAN NOTE
Chronic sleep disorder secondary to his sleep apnea. Hopefully the upcoming procedure will be helpful.   States all that he wants is a good night sleep

## 2023-11-03 NOTE — ASSESSMENT & PLAN NOTE
Patient is a 80-year-old male with a history of multiple medical problems including a diagnosis of obstructive sleep apnea. Along with his sleep specialist we have tried CPAP and patient is unable to tolerate this. Patient has recently been seen by ENT physician for evaluation for surgery for the inspire device to help with his sleep apnea. The ENT physician feels the patient will benefit from implantation of the device which will be performed in the near future. He is here for preop evaluation and did have labs performed prior to the visit we did discuss the results. Patient has no medical contraindication to have the procedure performed as planned. Patient states he has had long discussion with his ENT physician about possible complications and the risks involved with the surgery.   He will keep in touch as to whether or not this has been successful

## 2023-11-03 NOTE — ASSESSMENT & PLAN NOTE
Patient is complaining of fullness bilaterally in the ears more on the left than the right. He states this has been going on proximately 1 week. He denies any nasal congestion other than the problem his common allergic rhinitis. Patient denies any significant changes in his auditory acuity. He feels like a pressure especially in the left ear. Patient on evaluation has dull tympanic membranes bilaterally. Slight erythema to nasal mucosa with slightly enlarged nasal turbinates and a clear nasal discharge, mild erythema to posterior airway but again a clear postnasal drip. We feel that this disorder is most likely from his allergic rhinitis and we did give the patient a prescription for Flonase nasal spray to use 1 spray to each nostril twice a day.   To call and discussed with his ENT physician if not improving prior to his upcoming surgical procedure

## 2023-11-08 ENCOUNTER — ANESTHESIA EVENT (OUTPATIENT)
Dept: PERIOP | Facility: HOSPITAL | Age: 68
End: 2023-11-08
Payer: MEDICARE

## 2023-11-09 ENCOUNTER — APPOINTMENT (OUTPATIENT)
Dept: RADIOLOGY | Facility: HOSPITAL | Age: 68
End: 2023-11-09
Payer: MEDICARE

## 2023-11-09 ENCOUNTER — ANESTHESIA (OUTPATIENT)
Dept: PERIOP | Facility: HOSPITAL | Age: 68
End: 2023-11-09
Payer: MEDICARE

## 2023-11-09 ENCOUNTER — HOSPITAL ENCOUNTER (OUTPATIENT)
Facility: HOSPITAL | Age: 68
Setting detail: OUTPATIENT SURGERY
Discharge: HOME | End: 2023-11-09
Attending: OTOLARYNGOLOGY | Admitting: OTOLARYNGOLOGY
Payer: MEDICARE

## 2023-11-09 VITALS
TEMPERATURE: 97.2 F | HEIGHT: 74 IN | HEART RATE: 78 BPM | BODY MASS INDEX: 33.64 KG/M2 | WEIGHT: 262.13 LBS | SYSTOLIC BLOOD PRESSURE: 154 MMHG | OXYGEN SATURATION: 95 % | RESPIRATION RATE: 16 BRPM | DIASTOLIC BLOOD PRESSURE: 88 MMHG

## 2023-11-09 DIAGNOSIS — G47.33 OSA (OBSTRUCTIVE SLEEP APNEA): Primary | ICD-10-CM

## 2023-11-09 LAB
GLUCOSE SERPL-MCNC: 121 MG/DL (ref 65–140)
GLUCOSE SERPL-MCNC: 133 MG/DL (ref 65–140)

## 2023-11-09 PROCEDURE — C1787 PATIENT PROGR, NEUROSTIM: HCPCS | Performed by: OTOLARYNGOLOGY

## 2023-11-09 PROCEDURE — 82948 REAGENT STRIP/BLOOD GLUCOSE: CPT

## 2023-11-09 PROCEDURE — 71045 X-RAY EXAM CHEST 1 VIEW: CPT

## 2023-11-09 PROCEDURE — C1778 LEAD, NEUROSTIMULATOR: HCPCS | Performed by: OTOLARYNGOLOGY

## 2023-11-09 PROCEDURE — 64582 OPN MPLTJ HPGLSL NSTM ARY PG: CPT | Performed by: OTOLARYNGOLOGY

## 2023-11-09 PROCEDURE — C1767 GENERATOR, NEURO NON-RECHARG: HCPCS | Performed by: OTOLARYNGOLOGY

## 2023-11-09 PROCEDURE — 72040 X-RAY EXAM NECK SPINE 2-3 VW: CPT

## 2023-11-09 DEVICE — THE MODEL 3028 IPG CONTAINS ELECTRONICS AND A BATTERY THAT ARE SEALED INSIDE A TITANIUM CASE.  THE IPG IS IMPLANTED SUBCUTANEOUSLY, BELOW THE CLAVICLE IN THE UPPER CHEST, AND CONNECT TO THE STIMULATION LEAD AND SENSING LEAD.  THE MODEL 3028 DOES NOT CONTAIN ANY SOFTWARE OR FIRMWARE.  ALL FUNCTIONS INCLUDING THE TELEMETRY AND ALGORITHM HAVE BEEN DESIGNED INTO THE HARDWARE OF THE IPG.  THE ALGORITHM SYNCHRONIZES STIMULATION OF THE HYPOGLOSSAL NERVE WITH RESPIRATION SIGNALS.   THE IPG PROCESSES THE SAME DYNAMIC RANGE OF PRESSURE SIGNALS (2-48 CMH2O) IN ORDER TO ACCOUNT FOR PRESSURE READING VARIABILITY.  BASED ON TYPICAL SETTINGS FROM THE STAR PIVOTAL TRIAL, THE LONGEVITY OF THE MODEL 3028’S BATTERY WILL AVERAGE 10 YEARS ALTHOUGH THE DEVICE IS SMALLER THAN THE CURRENTLY APPROVED VERSION (MODEL 3024).  IN ADDITION THE MODEL 3028 IPG WILL ALLOW PATIENTS TO SAFELY UNDERGO MAGNETIC RESONANCE IMAGING (MRI) UNDER SPECIFIED CONDITIONS.
Type: IMPLANTABLE DEVICE | Site: CHEST | Status: FUNCTIONAL
Brand: INSPIRE

## 2023-11-09 DEVICE — THE INSPIRE® STIMULATION LEAD (MODEL 4063) IS DESIGNED TO DELIVER STIMULATION TO THE HYPOGLOSSAL NERVE FOR THE TREATMENT OF OBSTRUCTIVE SLEEP APNEA. THE LEAD FEATURES A FLEXIBLE, SELF-SIZING CUFF. ELECTRODES IN THE INNER SURFACE OF THE CUFF DELIVER STIMULATION TO THE NERVE. THE LEAD INCORPORATES A STANDARD CONNECTOR FOR COUPLING TO THE INSPIRE IMPLANTABLE PULSE GENERATOR (IPG).
Type: IMPLANTABLE DEVICE | Site: NECK | Status: FUNCTIONAL
Brand: INSPIRE

## 2023-11-09 DEVICE — THE INSPIRE® RESPIRATORY SENSING LEAD (MODEL 4340) IS DESIGNED TO DETECT RESPIRATORY EFFORT. THE LEAD FEATURES A PRESSURE SENSITIVE MEMBRANE THAT CONVERTS THE MECHANICAL ENERGY OF RESPIRATION INTO AN ELECTRICAL SIGNAL. THE LEAD INCORPORATES A STANDARD CONNECTOR FOR COUPLING TO THE INSPIRE IMPLANTABLE PULSE GENERATOR (IPG) FOR TREATMENT OF OBSTRUCTIVE SLEEP APNEA.
Type: IMPLANTABLE DEVICE | Site: CHEST | Status: FUNCTIONAL
Brand: INSPIRE

## 2023-11-09 RX ORDER — PROPOFOL 10 MG/ML
INJECTION, EMULSION INTRAVENOUS CONTINUOUS PRN
Status: DISCONTINUED | OUTPATIENT
Start: 2023-11-09 | End: 2023-11-09

## 2023-11-09 RX ORDER — CEFAZOLIN SODIUM 2 G/50ML
2000 SOLUTION INTRAVENOUS ONCE
Status: COMPLETED | OUTPATIENT
Start: 2023-11-09 | End: 2023-11-09

## 2023-11-09 RX ORDER — FENTANYL CITRATE 50 UG/ML
INJECTION, SOLUTION INTRAMUSCULAR; INTRAVENOUS AS NEEDED
Status: DISCONTINUED | OUTPATIENT
Start: 2023-11-09 | End: 2023-11-09

## 2023-11-09 RX ORDER — ACETAMINOPHEN 10 MG/ML
1000 INJECTION, SOLUTION INTRAVENOUS ONCE
Status: COMPLETED | OUTPATIENT
Start: 2023-11-09 | End: 2023-11-09

## 2023-11-09 RX ORDER — OXYCODONE HYDROCHLORIDE 5 MG/1
5 TABLET ORAL EVERY 4 HOURS PRN
Qty: 10 TABLET | Refills: 0 | Status: SHIPPED | OUTPATIENT
Start: 2023-11-09 | End: 2023-11-19

## 2023-11-09 RX ORDER — LIDOCAINE HYDROCHLORIDE 20 MG/ML
INJECTION, SOLUTION EPIDURAL; INFILTRATION; INTRACAUDAL; PERINEURAL AS NEEDED
Status: DISCONTINUED | OUTPATIENT
Start: 2023-11-09 | End: 2023-11-09

## 2023-11-09 RX ORDER — ACETAMINOPHEN 160 MG/5ML
650 SUSPENSION ORAL ONCE
Status: COMPLETED | OUTPATIENT
Start: 2023-11-09 | End: 2023-11-09

## 2023-11-09 RX ORDER — EPHEDRINE SULFATE 50 MG/ML
INJECTION INTRAVENOUS AS NEEDED
Status: DISCONTINUED | OUTPATIENT
Start: 2023-11-09 | End: 2023-11-09

## 2023-11-09 RX ORDER — OXYCODONE HCL 5 MG/5 ML
5 SOLUTION, ORAL ORAL EVERY 4 HOURS PRN
Status: DISCONTINUED | OUTPATIENT
Start: 2023-11-09 | End: 2023-11-09 | Stop reason: HOSPADM

## 2023-11-09 RX ORDER — MIDAZOLAM HYDROCHLORIDE 2 MG/2ML
INJECTION, SOLUTION INTRAMUSCULAR; INTRAVENOUS AS NEEDED
Status: DISCONTINUED | OUTPATIENT
Start: 2023-11-09 | End: 2023-11-09

## 2023-11-09 RX ORDER — SUCCINYLCHOLINE/SOD CL,ISO/PF 100 MG/5ML
SYRINGE (ML) INTRAVENOUS AS NEEDED
Status: DISCONTINUED | OUTPATIENT
Start: 2023-11-09 | End: 2023-11-09

## 2023-11-09 RX ORDER — LIDOCAINE HYDROCHLORIDE AND EPINEPHRINE 10; 10 MG/ML; UG/ML
INJECTION, SOLUTION INFILTRATION; PERINEURAL AS NEEDED
Status: DISCONTINUED | OUTPATIENT
Start: 2023-11-09 | End: 2023-11-09 | Stop reason: HOSPADM

## 2023-11-09 RX ORDER — ROCURONIUM BROMIDE 10 MG/ML
INJECTION, SOLUTION INTRAVENOUS AS NEEDED
Status: DISCONTINUED | OUTPATIENT
Start: 2023-11-09 | End: 2023-11-09

## 2023-11-09 RX ORDER — PROPOFOL 10 MG/ML
INJECTION, EMULSION INTRAVENOUS AS NEEDED
Status: DISCONTINUED | OUTPATIENT
Start: 2023-11-09 | End: 2023-11-09

## 2023-11-09 RX ORDER — SODIUM CHLORIDE 9 MG/ML
125 INJECTION, SOLUTION INTRAVENOUS CONTINUOUS
Status: DISCONTINUED | OUTPATIENT
Start: 2023-11-09 | End: 2023-11-09 | Stop reason: HOSPADM

## 2023-11-09 RX ORDER — ONDANSETRON 2 MG/ML
INJECTION INTRAMUSCULAR; INTRAVENOUS AS NEEDED
Status: DISCONTINUED | OUTPATIENT
Start: 2023-11-09 | End: 2023-11-09

## 2023-11-09 RX ORDER — FENTANYL CITRATE/PF 50 MCG/ML
25 SYRINGE (ML) INJECTION
Status: DISCONTINUED | OUTPATIENT
Start: 2023-11-09 | End: 2023-11-09 | Stop reason: HOSPADM

## 2023-11-09 RX ORDER — ONDANSETRON 2 MG/ML
4 INJECTION INTRAMUSCULAR; INTRAVENOUS ONCE AS NEEDED
Status: DISCONTINUED | OUTPATIENT
Start: 2023-11-09 | End: 2023-11-09 | Stop reason: HOSPADM

## 2023-11-09 RX ORDER — DEXAMETHASONE SODIUM PHOSPHATE 10 MG/ML
INJECTION, SOLUTION INTRAMUSCULAR; INTRAVENOUS AS NEEDED
Status: DISCONTINUED | OUTPATIENT
Start: 2023-11-09 | End: 2023-11-09

## 2023-11-09 RX ADMIN — EPHEDRINE SULFATE 10 MG: 50 INJECTION, SOLUTION INTRAVENOUS at 09:43

## 2023-11-09 RX ADMIN — MIDAZOLAM 2 MG: 1 INJECTION INTRAMUSCULAR; INTRAVENOUS at 09:06

## 2023-11-09 RX ADMIN — CEFAZOLIN SODIUM 2000 MG: 2 SOLUTION INTRAVENOUS at 09:07

## 2023-11-09 RX ADMIN — EPHEDRINE SULFATE 10 MG: 50 INJECTION, SOLUTION INTRAVENOUS at 10:30

## 2023-11-09 RX ADMIN — SODIUM CHLORIDE 0.15 MCG/KG/MIN: 9 INJECTION, SOLUTION INTRAVENOUS at 09:20

## 2023-11-09 RX ADMIN — EPHEDRINE SULFATE 5 MG: 50 INJECTION, SOLUTION INTRAVENOUS at 09:52

## 2023-11-09 RX ADMIN — FENTANYL CITRATE 50 MCG: 50 INJECTION INTRAMUSCULAR; INTRAVENOUS at 10:39

## 2023-11-09 RX ADMIN — OXYCODONE HYDROCHLORIDE 5 MG: 5 SOLUTION ORAL at 12:24

## 2023-11-09 RX ADMIN — SODIUM CHLORIDE 125 ML/HR: 0.9 INJECTION, SOLUTION INTRAVENOUS at 07:59

## 2023-11-09 RX ADMIN — ACETAMINOPHEN 1000 MG: 10 INJECTION INTRAVENOUS at 08:55

## 2023-11-09 RX ADMIN — SODIUM CHLORIDE: 0.9 INJECTION, SOLUTION INTRAVENOUS at 10:00

## 2023-11-09 RX ADMIN — ROCURONIUM BROMIDE 5 MG: 10 INJECTION, SOLUTION INTRAVENOUS at 09:17

## 2023-11-09 RX ADMIN — EPHEDRINE SULFATE 10 MG: 50 INJECTION, SOLUTION INTRAVENOUS at 09:30

## 2023-11-09 RX ADMIN — PROPOFOL 60 MG: 10 INJECTION, EMULSION INTRAVENOUS at 10:07

## 2023-11-09 RX ADMIN — LIDOCAINE HYDROCHLORIDE 100 MG: 20 INJECTION, SOLUTION EPIDURAL; INFILTRATION; INTRACAUDAL at 09:17

## 2023-11-09 RX ADMIN — DEXAMETHASONE SODIUM PHOSPHATE 10 MG: 10 INJECTION INTRAMUSCULAR; INTRAVENOUS at 09:30

## 2023-11-09 RX ADMIN — ONDANSETRON 4 MG: 2 INJECTION INTRAMUSCULAR; INTRAVENOUS at 10:26

## 2023-11-09 RX ADMIN — PROPOFOL 100 MCG/KG/MIN: 10 INJECTION, EMULSION INTRAVENOUS at 09:20

## 2023-11-09 RX ADMIN — ACETAMINOPHEN 649.6 MG: 650 SUSPENSION ORAL at 11:57

## 2023-11-09 RX ADMIN — Medication 120 MG: at 09:17

## 2023-11-09 RX ADMIN — FENTANYL CITRATE 50 MCG: 50 INJECTION INTRAMUSCULAR; INTRAVENOUS at 09:17

## 2023-11-09 RX ADMIN — PROPOFOL 250 MG: 10 INJECTION, EMULSION INTRAVENOUS at 09:17

## 2023-11-09 NOTE — ANESTHESIA POSTPROCEDURE EVALUATION
Post-Op Assessment Note    CV Status:  Stable    Pain management: adequate     Mental Status:  Alert and awake   Hydration Status:  Euvolemic   PONV Controlled:  Controlled   Airway Patency:  Patent      Post Op Vitals Reviewed: Yes      Staff: Anesthesiologist   Reason for prolonged intubation > 24 hours:  N/aReason for prolonged intubation > 48 hours:  N/a      No notable events documented.     BP      Temp     Pulse     Resp      SpO2      /87   Pulse 76   Temp (!) 97.1 °F (36.2 °C) (Temporal)   Resp 15   Ht 6' 2" (1.88 m)   Wt 119 kg (262 lb 2 oz)   SpO2 97%   BMI 33.66 kg/m²

## 2023-11-09 NOTE — H&P
Olaf Ortiz is a 79 y.o.male who presents for re-evaluation of AIDEE. Patient had HST performed by Dr. Jame Peña on 8/16 demonstrated an AHI of 40.7. He tried using CPAP 2 different times with different masks but was unable to tolerate. Previously had UPPP in 2000. Interested in [place]. DISE performed on 9/28 showed no evidence of complete concentric collapse. 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 Physical Exam   Constitutional: Oriented to person, place, and time. Well-developed and well-nourished, no apparent distress, non-toxic appearance. Cooperative, able to hear and answer questions without difficulty. Voice: Normal voice quality. Head: Normocephalic, atraumatic. No scars, masses or lesions. Face: Symmetric, no edema, no sinus tenderness. Eyes: Vision grossly intact, extra-ocular movement intact. Ears: External ear normal.  Bilateral tympanic membranes are intact with intact normal landmarks. No post-auricular erythema or tenderness. Nose: Septum midline, nares clear. Mucosa moist, turbinates well appearing. No crusting, polyps or discharge evident. Oral cavity: Dentition intact. Mucosa moist, lips normal.  Tongue mobile, floor of mouth normal.  Hard palate unremarkable. No masses or lesions. Oropharynx: Uvula is midline, soft palate normal.  Unremarkable oropharyngeal inlet. Tonsils unremarkable. Posterior pharyngeal wall clear. No masses or lesions.   Salivary glands:  Parotid glands and submandibular glands symmetric, no enlargement or tenderness. Neck: Normal laryngeal elevation with swallow. Trachea midline. No masses or lesions. No palpable adenopathy. Thyroid: normal in size, unremarkable without tenderness or palpable nodules. Pulmonary/Chest: Normal effort and rate. No respiratory distress. Musculoskeletal: Normal range of motion. Neurological: Cranial nerves 2-12 intact. Skin: Skin is warm and dry. Psychiatric: Normal mood and affect. CTAB  RRR  Abd soft NTND    A/P: Obstructive sleep apnea: We discussed the nature of obstructive sleep apnea. We discussed the natural history of sleep apnea. We discussed options for management. We discussed non-surgical management including weight loss, mandibular advancement devices, and positive airway pressure therapy including various options. We discussed that he is not tolerating his CPAP and has at least moderate AIDEE with an AHI of 40.7 and BMI of 32.9, he would like to move forward with Inspire hypoglossal nerve stimulator. Risks, benefits, and alternatives were discussed. Will seek his insurance approval and have him return in follow up for any further discussion.

## 2023-11-09 NOTE — ANESTHESIA PREPROCEDURE EVALUATION
Procedure:  INSPIRE IMPLANT (Head)    Relevant Problems   CARDIO   (+) Essential hypertension   (+) Hyperlipidemia      ENDO   (+) Diabetes 1.5, 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HCC)                            PULMONARY   (+) AIDEE (obstructive sleep apnea)     (+) GERD    Physical Exam    Airway    Mallampati score: III  TM Distance: >3 FB  Neck ROM: full     Dental   Comment: Upper left permanent bridge, pre-molar and molar implants     Cardiovascular  Rhythm: regular    Pulmonary   Breath sounds clear to auscultation    Other Findings        Anesthesia Plan  ASA Score- 2     Anesthesia Type- general with ASA Monitors. Additional Monitors:     Airway Plan: ETT. Plan Factors-Exercise tolerance (METS): >4 METS. Chart reviewed. Existing labs reviewed. Patient is not a current smoker. Induction- intravenous. Postoperative Plan- Plan for postoperative opioid use. Planned trial extubation    Informed Consent- Anesthetic plan and risks discussed with patient.

## 2023-11-09 NOTE — OP NOTE
OPERATIVE REPORT  PATIENT NAME: Zofia Munguia    :  1955  MRN: [id number]  Pt Location: [facility] OR ROOM 04    SURGERY DATE: 2023    Surgeon(s) and Role:     * Sia Jiang MD - Primary     * Deo Tena PA-C - Assisting    Preop Diagnosis:  Obstructive sleep apnea (adult) (pediatric) [G47.33]    Post-Op Diagnosis Codes:     * Obstructive sleep apnea (adult) (pediatric) [G47.33]    Procedure(s):  INSPIRE IMPLANT    Specimen(s):  * No specimens in log *    Estimated Blood Loss:   Minimal    Drains:  * No LDAs found *    Anesthesia Type:   General    Operative Indications:  Obstructive sleep apnea (adult) (pediatric) [G47.33]  BMI 33    Operative Findings:  Good stimulation at 0.7 V and no retraction at 0.4 V    Complications:   None    Procedure and Technique:  Indications for procedure: Zofia Munguia is a 79 y.o. male with a history of Moderate to Severe obstructive sleep apnea, who is intolerant and unable to achieve benefit from positive pressure therapy. Patient has passed the clinical, polysomnographic, and endoscopic screening criteria and presents today for the implant, whom I have seen in consultation for the above-listed procedure. After discussion of risks, benefits, and alternatives the patient elected to undergo the procedure and informed consent was obtained. Procedure in detail: The patient was brought back to the operating room laid in the supine position and general endotracheal anesthesia was administered. The patient was positioned appropriately. Appropriate time-out was taken and procedure, sidedness, and marking was confirmed. 5 mL of 1% lidocaine with 1:100,000 epinephrine was infiltrated into the marked areas. Prior to prepping and draping, electrodes were placed in the genioglossus and styloglossus muscle and connected to the NIM box for intraoperative nerve monitoring. The patient was prepped and draped in standard fashion.      Neuroplasty was performed as follows: The lateral branches to retrusor muscles were identified, and tested intra-operatively using the NIM stimulator. The branches were identified and the inclusion branches were stimulated with both visual and neurostimulator confirmation. The branches were dissected in 360 degrees for 1.5 cm around the TV and C1 branches with care not to include the HG branches. Insertion of an upper airway stimulator was performed as follows: The cuff electrode for the hypoglossal nerve stimulator was placed distally to these branches on the medial nerve branch to the genioglossus muscle. Diagnostic evaluation confirmed activation of the genioglossus nerve, resulting in genioglossal activation and tongue protrusion, confirmed visually. The stimulation electrode was then looped under and secured to the digastric tendon on its lateral surface with the provided anchor. Insertion of a thoracic sensor lead was performed as follows:  A second 5 cm incision was made in the right upper chest approximately 3 cm below the clavicle. Dissection was carried down to the pectoralis muscle. An inferior pocket was created deep to the subcutaneous layer and superficial to the pectoralis muscle. Dissection was carried down through pectoralis muscle using blunt dissection. The interspace between the 2nd and 3rd ribs were exposed. The external oblique muscles were identified, and bluntly dissected, and a tunnel was created between the external and internal intercostals in the 2nd intercostal space just on the superior aspect of the third rib. The pleural respiration sensor was placed into the pocket in the inferior aspect of the intercostal space. The sensor was sutured to the fascia using the provided anchors to maintain the sensor facing the pleural space. The stimulation lead was then tunneled in a subplatysmal plane and brought out into the sub-clavicular pocket. Insertion of an upper airway stimulator was continued as follows:  Both the cuff electrode and the respiration sensing lead were connected to the implantable pulse generator. Diagnostic evaluation was run, which confirmed a good respiration sensing signal as well as good tongue protrusion stimulation. The implantable pulse generator was placed in the subclavicular pocket and secured loosely to the pectoralis fascia using 2-0 silk sutures. All the wounds were thoroughly irrigated with irrigation. The wounds were then closed in three layers with deep layers closed with 3-0 Vicryl and the skin closed with 4-0 Monocryl. Wound dressings were placed. The patient was returned to the care of Anesthesia, extubated without difficulty, and taken to the recovery area in stable condition. All instruments and sponge counts were correct at the end of the procedure. Terrence Olmedo MD, was present for and performed all key elements of the procedure. I was present for the entire procedure., A qualified resident physician was not available. , and A physician assistant was required during the procedure for retraction, tissue handling, dissection and suturing.     Patient Disposition:  PACU  and extubated and stable        SIGNATURE: Dasha Brady MD  DATE: November 9, 2023  TIME: 9:37 AM

## 2023-11-09 NOTE — DISCHARGE INSTR - AVS FIRST PAGE
Saleem Drake M.D. [facility]    Post-Operative Care  Office [phone]  Cell [phone]               At Home (in the days immediately following the procedure):   Try to sleep with your head elevated on 2-3 pillows   Iced packs placed over wound will help reduce swelling. They should be used 20 minutes on/ 20 minutes off while awake for the first full day. Crushed ice in ziplock bags or frozen peas or corn work well. The dressings may be removed 1 day after surgery. There are small strips under the dressings. These should fall off on their own. If they do not, our office will remove them at follow up. If they fall off early, the wounds should be cleaned with a Q-tip soaked in hydrogen peroxide mixed 50/50 with water. After removal apply antibiotic ointment (Bacitracin) or Vaseline to the external incisions 3-4 times per day. Take your medicines as prescribed. REMEMBER:  DO NOT DRIVE WHILE TAKING PAIN MEDICATIONS. You should do neck rolls 10 times clockwise and 10 times counterclockwise directions for 1 week after surgery. You may shower with luke-warm water only after the dressings are removed. You may use ibuprofen (Motrin, Advil) and acetaminophen (Tyelnol) for pain control in addition to any prescribed pain medications. You may get out of bed and go to the bathroom with assistance. Eat light, soft meals as tolerated, avoiding gas-stimulating foods. Follow-up care:   Eat before coming to the office for post-operative visits. Rest for the first week after the procedure, avoiding excessive physical activities, hard chewing, lifting objects over 8 lbs (about the weight of a phone book), or bending over. We request that you do not travel by plane for one week after surgery. Follow-up visits:     At one week, any external sutures will be removed; you may drive yourself to this appointment (as long as you are no longer taking prescription/narcotic pain medicines). After dressing removal, make-up may be worn, avoiding the incision lines. Additional follow-up visits will be scheduled at this time. Note that final results from the incisions may not be apparent until Elmo Mcclain after surgery. Healing Care:   After surgery try not to roll onto the wound while asleep. Clean the external skin gently but thoroughly with soap. The use of alcohol and tobacco products prolong swelling and healing and are best avoided for 2 weeks after surgery. Do not expose your wound to sun for 4-6 weeks after surgery. Use sunscreen (SPF 30 or higher) for 6 months after surgery if sun exposure is absolutely necessary. Avoid any physical exercise that can cause over-heating or over-exertion for two weeks after the surgery. Complete healing may take 12 months. It is to your advantage to return for all postoperative visits so that long-term results may be evaluated. Frequently Asked Questions:  When can I shower and shampoo my hair? You may shower the day after your surgery, BUT KEEP ANY DRESSING DRY. This may mean you wash your face/hair in the sink instead. It is important that you do not use hot water, as this can increase the swelling. Lukewarm water is best.      When will the swelling and bruising go away? This usually takes 7-10 days or so, but may take less or more time, depending on the individual.    When can I take aspirin? You should not take aspirin for 2 weeks prior to or after surgery. The same is true for vitamin E, ginko, garlic pills, and other “natural” supplements. When can I take ibuprofen? Non-steroidal anti-inflammatory drugs such as advil (ibuprofen), alleve (naproxen), or other similar may be used immediately after surgery as per the guidelines on the package. When can I wear my glasses? You will be able to wear contact lenses as soon as you feel comfortable.   You may wear glasses one to two weeks after surgery, depending on the type of surgery you had. In any case, you must be careful not to place any pressure on the wound. When can I wear makeup? Make-up can be applied after suture removal, but not directly on the incisions until 3 weeks after the procedure. When will I activate my device? We will wait for your healing to finish prior to activation which usually means a few weeks. The plan will be set up at your first follow up appointment at 1 week after the procedure. What about exercise? Please adhere to the following schedule:   Up to week 1 after surgery:  REST! No strenuous exercise. Walking is ok. Week 1-3 after surgery: You may begin light aerobic exercise, but no bending over/straining/lifting weights. You may begin some range of motion exercises of your shoulder. Week 3+: You may begin more strenuous exercise, such as yoga, stretching, bending over, lifting weights. Please remember to start slowly. Week 6+: You may resume contact sports, such as soccer, basketball, etc    POST-OPERATIVE APPOINTMENTS:  1 week:  wound check in the office. 1 month: device activation and wound check in the office. 3 months: device titration sleep study at [facility]. 4 months: final wound check in the office. Yearly: device check at office. How to contact us:    Phone: If you have questions or concerns, please call us at [phone] during business hours (8 am to 5 pm). On nights and weekends, you may page the ENT surgeon on call  at [facility].  In case of emergency, please call 911.

## 2023-11-21 DIAGNOSIS — E11.65 TYPE 2 DIABETES MELLITUS WITH HYPERGLYCEMIA, WITHOUT LONG-TERM CURRENT USE OF INSULIN (HCC): ICD-10-CM

## 2023-11-21 RX ORDER — GLIMEPIRIDE 1 MG/1
TABLET ORAL
Qty: 180 TABLET | Refills: 1 | Status: SHIPPED | OUTPATIENT
Start: 2023-11-21

## 2023-11-28 ENCOUNTER — RA CDI HCC (OUTPATIENT)
Dept: OTHER | Facility: HOSPITAL | Age: 68
End: 2023-11-28

## 2023-12-03 ENCOUNTER — APPOINTMENT (OUTPATIENT)
Dept: LAB | Age: 68
End: 2023-12-03
Payer: MEDICARE

## 2023-12-03 DIAGNOSIS — I10 ESSENTIAL HYPERTENSION: ICD-10-CM

## 2023-12-03 LAB
ANION GAP SERPL CALCULATED.3IONS-SCNC: 4 MMOL/L
BUN SERPL-MCNC: 20 MG/DL (ref 5–25)
CALCIUM SERPL-MCNC: 9.6 MG/DL (ref 8.4–10.2)
CHLORIDE SERPL-SCNC: 102 MMOL/L (ref 96–108)
CO2 SERPL-SCNC: 33 MMOL/L (ref 21–32)
CREAT SERPL-MCNC: 0.83 MG/DL (ref 0.6–1.3)
GFR SERPL CREATININE-BSD FRML MDRD: 91 ML/MIN/1.73SQ M
GLUCOSE P FAST SERPL-MCNC: 110 MG/DL (ref 65–99)
POTASSIUM SERPL-SCNC: 4.2 MMOL/L (ref 3.5–5.3)
SODIUM SERPL-SCNC: 139 MMOL/L (ref 135–147)

## 2023-12-03 PROCEDURE — 80048 BASIC METABOLIC PNL TOTAL CA: CPT

## 2023-12-03 PROCEDURE — 36415 COLL VENOUS BLD VENIPUNCTURE: CPT

## 2023-12-07 ENCOUNTER — OFFICE VISIT (OUTPATIENT)
Dept: INTERNAL MEDICINE CLINIC | Facility: CLINIC | Age: 68
End: 2023-12-07
Payer: MEDICARE

## 2023-12-07 VITALS
WEIGHT: 255 LBS | SYSTOLIC BLOOD PRESSURE: 120 MMHG | OXYGEN SATURATION: 97 % | DIASTOLIC BLOOD PRESSURE: 80 MMHG | BODY MASS INDEX: 32.73 KG/M2 | HEART RATE: 88 BPM | HEIGHT: 74 IN | TEMPERATURE: 97 F

## 2023-12-07 DIAGNOSIS — E78.2 MIXED HYPERLIPIDEMIA: ICD-10-CM

## 2023-12-07 DIAGNOSIS — Z00.00 MEDICARE ANNUAL WELLNESS VISIT, INITIAL: Primary | ICD-10-CM

## 2023-12-07 DIAGNOSIS — G47.33 OSA (OBSTRUCTIVE SLEEP APNEA): ICD-10-CM

## 2023-12-07 DIAGNOSIS — E13.9 DIABETES 1.5, MANAGED AS TYPE 2 (HCC): ICD-10-CM

## 2023-12-07 DIAGNOSIS — I10 ESSENTIAL HYPERTENSION: ICD-10-CM

## 2023-12-07 PROCEDURE — 99214 OFFICE O/P EST MOD 30 MIN: CPT | Performed by: INTERNAL MEDICINE

## 2023-12-07 NOTE — ASSESSMENT & PLAN NOTE
Patient had been doing extremely well as far as weight management loss of weight. His weight was down to 246 now up to 255. States that he has not been as compliant with diet recently but intends in the new year to make some changes and is hopeful that the sleep apnea device will help him as far as his metabolism is concerned. Sugars have been stable.

## 2023-12-07 NOTE — ASSESSMENT & PLAN NOTE
Will be due for repeat Medicare wellness visit when he returns to the office in 4 months. Was given a slip for labs to be performed prior to the visit. In the interim patient was told if any new medical problems or concerns to please call. Discussed with the patient getting along with the influenza and COVID-vaccine the RSV vaccine.

## 2023-12-07 NOTE — ASSESSMENT & PLAN NOTE
Patient has just had implantation of inspire device. He states that he is scheduled to start using the device on Monday. Apparently tolerated procedure without any difficulty and is anxious to hopefully finally get a good night sleep. He will continue to keep in touch with his sleep medicine physician in the ENT physician.

## 2023-12-07 NOTE — ASSESSMENT & PLAN NOTE
Continues to follow-up with his endocrinologist.  Remains on Ozempic and has had again weight loss and improvement with his sugar control.   Lab Results   Component Value Date    HGBA1C 6.4 (H) 10/18/2023

## 2023-12-07 NOTE — ASSESSMENT & PLAN NOTE
History of hyperlipidemia. Patient's cholesterol has been under adequate control with the patient on Crestor 10 mg daily. Check a lipid profile with his next visit and make modification of treatment as indicated.   Again instructed to watch his intake of fats and cholesterol with his diet

## 2023-12-07 NOTE — ASSESSMENT & PLAN NOTE
Patient's blood pressure showing excellent control. Patient will continue present surveillance medication. Continue to monitor his blood pressure and renal function and make adjustments to medication if needed in the future.

## 2023-12-07 NOTE — PROGRESS NOTES
Name: Mainor Kwok      : 1955      MRN: [id number]  Encounter Provider: Nirmala Rao DO  Encounter Date: 2023   Encounter department: [facility]    Assessment & Plan     1. Medicare annual wellness visit, initial  Assessment & Plan: Will be due for repeat Medicare wellness visit when he returns to the office in 4 months. Was given a slip for labs to be performed prior to the visit. In the interim patient was told if any new medical problems or concerns to please call. Discussed with the patient getting along with the influenza and COVID-vaccine the RSV vaccine. Orders:  -     Comprehensive metabolic panel; Future  -     CBC and differential; Future  -     Lipid panel; Future  -     UA (URINE) with reflex to Scope; Future; Expected date: 2023    2. Mixed hyperlipidemia  Assessment & Plan:  History of hyperlipidemia. Patient's cholesterol has been under adequate control with the patient on Crestor 10 mg daily. Check a lipid profile with his next visit and make modification of treatment as indicated. Again instructed to watch his intake of fats and cholesterol with his diet    Orders:  -     Lipid panel; Future    3. Essential hypertension  Assessment & Plan:  Patient's blood pressure showing excellent control. Patient will continue present surveillance medication. Continue to monitor his blood pressure and renal function and make adjustments to medication if needed in the future. Orders:  -     Comprehensive metabolic panel; Future  -     CBC and differential; Future  -     Lipid panel; Future  -     UA (URINE) with reflex to Scope; Future; Expected date: 2023    4. AIDEE (obstructive sleep apnea)  Assessment & Plan:  Patient has just had implantation of inspire device. He states that he is scheduled to start using the device on Monday.   Apparently tolerated procedure without any difficulty and is anxious to hopefully finally get a good night sleep. He will continue to keep in touch with his sleep medicine physician in the ENT physician. 5. Diabetes 1.5, managed as type 2 [facility])  Assessment & Plan:  Continues to follow-up with his endocrinologist.  Bill Heads on Ozempic and has had again weight loss and improvement with his sugar control. Lab Results   Component Value Date    HGBA1C 6.4 (H) 10/18/2023                Subjective     Patient is a 61-year-old male with a history of extensive medical problems outlined previously who is here today for a routine follow-up. Patient is status post implantation of inspire device for his sleep apnea. Will begin the use of device on Monday and hopefully this will be successful for treatment of his sleep apnea    Otherwise patient states she is doing well offers no new complaints      Review of Systems   Constitutional: Negative. HENT: Negative. Eyes: Negative. Respiratory: Negative. Cardiovascular: Negative. Gastrointestinal: Negative. Endocrine: Negative. Genitourinary: Negative. Musculoskeletal: Negative. Skin: Negative. Allergic/Immunologic: Negative. Neurological: Negative. Hematological: Negative. Psychiatric/Behavioral: Negative. 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     Past Surgical History:   Procedure Laterality Date   • COLONOSCOPY N/A 11/22/2016    Procedure: COLONOSCOPY;  Surgeon: Den Carlson MD;  Location: [facility];   Service:    • DEBRIDEMENT TENNIS ELBOW  2006   • EXAMINATION UNDER ANESTHESIA N/A 9/28/2023    Procedure: DRUG INDUCED SLEEP ENDOSCOPY;  Surgeon: Maud Brittle, MD;  [facility];  Service: ENT   • EYE SURGERY  2008    laser holes to relieve pressure   • HERNIA REPAIR  8213    x2, umbilical and right side per patient   • KNEE SURGERY  2000    meniscus respair   • NASAL SEPTUM SURGERY  1980   • [address]iz Right 11/19/2019    Procedure: RELEASE TRIGGER FINGER-right long finger;  Surgeon: Xochilt Palacio MD;  [facility];  Service: Orthopedics   • UT TENDON SHEATH INCISION Left 11/26/2019    Procedure: RELEASE TRIGGER FINGER-left long finger;  Surgeon: Xochilt Palacio MD;  [facility];  Service: Orthopedics   • TONSILLECTOMY  1961   • UPPER AIRWAY STIMULATOR N/A 11/9/2023    Procedure: Monster Evans;  Surgeon: Maud Brittle, MD;  Location[facility];  Service: ENT   • UVULOPALATOPHARYNGOPLASTY  2004     Family History   Problem Relation Age of Onset   • Arthritis Mother         mostly in her back     Social History     Socioeconomic History   • Marital status: /Civil Union     Spouse name: None   • Number of children: None   • Years of education: None   • Highest education level: None   Occupational History   • None   Tobacco Use   • Smoking status: Never   • Smokeless tobacco: Never   Vaping Use   • Vaping Use: Never used   Substance and Sexual Activity   • Alcohol use: Not Currently     Comment: seldom   • Drug use: Not Currently     Types: Marijuana   • Sexual activity: Never   Other Topics Concern   • None   Social History Narrative   • None     Social Determinants of Health     Financial Resource Strain: Low Risk  (3/25/2023)    Overall Financial Resource Strain (CARDIA)    • Difficulty of Paying Living Expenses: Not hard at all   Food Insecurity: Not on file   Transportation Needs: No Transportation Needs (3/25/2023)    PRAPARE - Transportation    • Lack of Transportation (Medical): No    • Lack of Transportation (Non-Medical):  No   Physical Activity: Not on file   Stress: Not on file   Social Connections: Not on file   Intimate Partner Violence: Not on file   Housing Stability: Not on file     Current Outpatient Medications on File Prior to Visit   Medication Sig   • Accu-Chek FastClix Lancets MISC Use in the morning Check sugar once daily DX: E13.9   • acetaminophen (TYLENOL 8 HOUR) 650 mg CR tablet Take 1 tablet (650 mg total) by mouth every 8 (eight) hours (Patient taking differently: Take 650 mg by mouth every 8 (eight) hours as needed)   • Blood Glucose Monitoring Suppl (Accu-Chek Maida Plus) w/Device KIT Use in the morning Check once daily dx E13.9   • Coenzyme Q10 (CO Q-10) 200 MG CAPS in the morning   • dapagliflozin (Farxiga) 10 MG tablet Take 1 tablet (10 mg total) by mouth daily   • diclofenac sodium (VOLTAREN) 50 mg EC tablet TAKE 1 TABLET BY MOUTH TWICE A DAY AS NEEDED FOR PAIN   • diphenhydrAMINE HCl 25 MG CHEW if needed   • fluticasone (FLONASE) 50 mcg/act nasal spray 2 sprays into each nostril daily   • glimepiride (AMARYL) 1 mg tablet TAKE 1 TABLET BY MOUTH TWICE A DAY WITH MEALS   • glucose blood (Accu-Chek Maida Plus) test strip Check sugar once daily dx: E13.9   • lisinopril-hydrochlorothiazide (PRINZIDE,ZESTORETIC) 10-12.5 MG per tablet Take 1 tablet by mouth daily   • Omeprazole 20 MG TBEC if needed   • rosuvastatin (CRESTOR) 10 MG tablet TAKE 1 TABLET BY MOUTH EVERY DAY   • semaglutide, 1 mg/dose, (Ozempic, 1 MG/DOSE,) 4 mg/3 mL injection pen Inject 1 mg once a week     Allergies   Allergen Reactions   • Tramadol Itching     Immunization History   Administered Date(s) Administered   • COVID-19 MODERNA VACC 0.5 ML IM 03/16/2021, 04/15/2021, 10/29/2021   • COVID-19 Moderna mRNA Vaccine 12 Yr+ 50 mcg/0.5 mL (Spikevax) 10/02/2023   • H1N1, All Formulations 12/30/2009   • INFLUENZA 10/28/2015, 09/13/2016, 10/17/2019, 10/13/2020, 10/30/2021, 10/02/2023   • Influenza Quadrivalent 3 years and older 10/07/2014   • Influenza Quadrivalent Preservative Free 3 years and older IM 10/07/2014   • Influenza Quadrivalent Recombinant Preservative Free IM 10/17/2019, 10/13/2020   • Influenza Quadrivalent, 6-35 Months IM 10/28/2015, 09/13/2016, 09/28/2017   • Influenza, Seasonal Vaccine, Quadrivalent, Adjuvanted, . 5e 10/02/2023   • Influenza, high dose seasonal 0.7 mL 10/30/2021   • Influenza, recombinant, quadrivalent,injectable, preservative free 10/09/2018   • Influenza, seasonal, injectable 1955   • Tuberculin Skin Test-PPD Intradermal 08/27/2015   • Zoster Vaccine Recombinant 11/12/2020, 03/18/2021       Objective     /80 (BP Location: Left arm, Patient Position: Sitting, Cuff Size: Large)   Pulse 88   Temp (!) 97 °F (36.1 °C)   Ht 6' 2" (1.88 m)   Wt 116 kg (255 lb)   SpO2 97%   BMI 32.74 kg/m²     Physical Exam  Vitals and nursing note reviewed. Constitutional:       General: He is not in acute distress. Appearance: Normal appearance. He is obese. He is not ill-appearing, toxic-appearing or diaphoretic. Comments: Pleasant articulate obese 53-year-old male who is awake alert in no acute distress and oriented x 3   HENT:      Head: Normocephalic and atraumatic. Right Ear: Tympanic membrane, ear canal and external ear normal. There is no impacted cerumen. Left Ear: Tympanic membrane, ear canal and external ear normal. There is no impacted cerumen. Nose: Nose normal. No congestion or rhinorrhea. Mouth/Throat:      Mouth: Mucous membranes are moist.      Pharynx: Oropharynx is clear. No oropharyngeal exudate or posterior oropharyngeal erythema. Eyes:      General: No scleral icterus. Right eye: No discharge. Left eye: No discharge. Extraocular Movements: Extraocular movements intact. Conjunctiva/sclera: Conjunctivae normal.      Pupils: Pupils are equal, round, and reactive to light. Neck:      Vascular: No carotid bruit.       Comments: Recent surgery implantation of inspire device neck on the left side.  Normal healing to the area. The lead wires are visible to the skin surface    No evidence of infection and no tenderness to palpation. Cardiovascular:      Rate and Rhythm: Normal rate and regular rhythm. Pulses: Normal pulses. Heart sounds: Normal heart sounds. No murmur heard. No friction rub. No gallop. Pulmonary:      Effort: Pulmonary effort is normal. No respiratory distress. Breath sounds: Normal breath sounds. No stridor. No wheezing, rhonchi or rales. Chest:      Chest wall: No tenderness. Abdominal:      General: Bowel sounds are normal. There is no distension. Palpations: There is no mass. Tenderness: There is no abdominal tenderness. There is no right CVA tenderness, left CVA tenderness, guarding or rebound. Hernia: No hernia is present. Musculoskeletal:         General: No swelling, tenderness, deformity or signs of injury. Normal range of motion. Cervical back: Normal range of motion and neck supple. No rigidity or tenderness. Right lower leg: No edema. Left lower leg: No edema. Lymphadenopathy:      Cervical: No cervical adenopathy. Skin:     General: Skin is warm and dry. Coloration: Skin is not jaundiced or pale. Findings: Lesion present. No bruising, erythema or rash. Comments: Patient has incisions from recent surgery. Healing no abnormalities. Anterior neck on the right anterior chest wall again on the right. No tenderness to palpation and again no signs of infection   Neurological:      General: No focal deficit present. Mental Status: He is alert and oriented to person, place, and time. Mental status is at baseline. Cranial Nerves: No cranial nerve deficit. Sensory: Sensory deficit present. Motor: No weakness.       Coordination: Coordination normal.      Gait: Gait normal.      Deep Tendon Reflexes: Reflexes normal.   Psychiatric:         Mood and Affect: Mood normal.         Behavior: Behavior normal.         Thought Content:  Thought content normal.         Judgment: Judgment normal.   Xenia Maria,

## 2024-01-02 PROBLEM — H65.112 NON-RECURRENT ACUTE ALLERGIC OTITIS MEDIA OF LEFT EAR: Status: RESOLVED | Noted: 2023-11-03 | Resolved: 2024-01-02

## 2024-01-09 LAB
LEFT EYE DIABETIC RETINOPATHY: NORMAL
RIGHT EYE DIABETIC RETINOPATHY: NORMAL

## 2024-01-11 DIAGNOSIS — M46.1 SACROILIITIS (HCC): ICD-10-CM

## 2024-02-16 ENCOUNTER — TELEPHONE (OUTPATIENT)
Age: 69
End: 2024-02-16

## 2024-02-20 ENCOUNTER — OFFICE VISIT (OUTPATIENT)
Age: 69
End: 2024-02-20
Payer: MEDICARE

## 2024-02-20 VITALS — HEIGHT: 74 IN | WEIGHT: 245 LBS | RESPIRATION RATE: 16 BRPM | BODY MASS INDEX: 31.44 KG/M2

## 2024-02-20 DIAGNOSIS — M54.16 RADICULOPATHY OF LUMBAR REGION: ICD-10-CM

## 2024-02-20 DIAGNOSIS — M77.42 METATARSALGIA OF BOTH FEET: Primary | ICD-10-CM

## 2024-02-20 DIAGNOSIS — M77.41 METATARSALGIA OF BOTH FEET: Primary | ICD-10-CM

## 2024-02-20 DIAGNOSIS — M21.962 ACQUIRED DEFORMITY OF BOTH FEET: ICD-10-CM

## 2024-02-20 DIAGNOSIS — M21.961 ACQUIRED DEFORMITY OF BOTH FEET: ICD-10-CM

## 2024-02-20 DIAGNOSIS — Z00.6 ENCOUNTER FOR EXAMINATION FOR NORMAL COMPARISON OR CONTROL IN CLINICAL RESEARCH PROGRAM: ICD-10-CM

## 2024-02-20 DIAGNOSIS — E11.42 DIABETIC POLYNEUROPATHY ASSOCIATED WITH TYPE 2 DIABETES MELLITUS (HCC): ICD-10-CM

## 2024-02-20 DIAGNOSIS — B35.1 ONYCHOMYCOSIS: ICD-10-CM

## 2024-02-20 PROCEDURE — 99214 OFFICE O/P EST MOD 30 MIN: CPT | Performed by: PODIATRIST

## 2024-02-20 RX ORDER — TERBINAFINE HYDROCHLORIDE 250 MG/1
TABLET ORAL
Qty: 15 TABLET | Refills: 0 | Status: SHIPPED | OUTPATIENT
Start: 2024-02-20 | End: 2024-03-20

## 2024-02-20 NOTE — PROGRESS NOTES
Assessment/Plan: Metatarsalgia secondary to possible diabetic neuropathy and radiculopathy.  Pain upon ambulation.  Acquired deformity foot.  Mycosis of nail.    Plan.  Chart reviewed.  PCP notes reviewed.  Lab work reviewed.  Diabetic foot exam performed.  Patient educated on care of the diabetic foot.  At this time we will start the patient on Lamisil.  He will be every other day dosing.  He will spray his shoes with Lysol and take vitamin B 5.  Aftercare instruction given.  In addition we will workup radiculopathy.  Spine x-rays ordered.  Patient may need referral to pain management.    In addition, patient's feet measured.  Patient is not ill fitting shoes.  Proper shoe sizing directed.         Diagnoses and all orders for this visit:    Metatarsalgia of both feet    Radiculopathy of lumbar region  -     XR spine lumbar minimum 4 views non injury; Future    Onychomycosis  -     terbinafine (LamISIL) 250 mg tablet; 1 tab p.o. every other day.    Acquired deformity of both feet    Diabetic polyneuropathy associated with type 2 diabetes mellitus (HCC)          Subjective: Patient is concerned with his big toe toenails.  Patient is diabetic.  He says he gets numb feet.  He has chronic low back pain.    Allergies   Allergen Reactions    Tramadol Itching         Current Outpatient Medications:     terbinafine (LamISIL) 250 mg tablet, 1 tab p.o. every other day., Disp: 15 tablet, Rfl: 0    Accu-Chek FastClix Lancets MISC, Use in the morning Check sugar once daily DX: E13.9, Disp: 100 each, Rfl: 1    acetaminophen (TYLENOL 8 HOUR) 650 mg CR tablet, Take 1 tablet (650 mg total) by mouth every 8 (eight) hours (Patient taking differently: Take 650 mg by mouth every 8 (eight) hours as needed), Disp: 15 tablet, Rfl: 0    Blood Glucose Monitoring Suppl (Accu-Chek Maida Plus) w/Device KIT, Use in the morning Check once daily dx E13.9, Disp: 1 kit, Rfl: 0    Coenzyme Q10 (CO Q-10) 200 MG CAPS, in the morning, Disp: , Rfl:      dapagliflozin (Farxiga) 10 MG tablet, Take 1 tablet (10 mg total) by mouth daily, Disp: 90 tablet, Rfl: 3    diclofenac sodium (VOLTAREN) 50 mg EC tablet, TAKE 1 TABLET BY MOUTH TWICE A DAY AS NEEDED FOR PAIN, Disp: 180 tablet, Rfl: 0    diphenhydrAMINE HCl 25 MG CHEW, if needed, Disp: , Rfl:     fluticasone (FLONASE) 50 mcg/act nasal spray, 2 sprays into each nostril daily, Disp: 16 g, Rfl: 2    glimepiride (AMARYL) 1 mg tablet, TAKE 1 TABLET BY MOUTH TWICE A DAY WITH MEALS, Disp: 180 tablet, Rfl: 1    glucose blood (Accu-Chek Maida Plus) test strip, Check sugar once daily dx: E13.9, Disp: 100 each, Rfl: 2    lisinopril-hydrochlorothiazide (PRINZIDE,ZESTORETIC) 10-12.5 MG per tablet, Take 1 tablet by mouth daily, Disp: 90 tablet, Rfl: 3    Omeprazole 20 MG TBEC, if needed, Disp: , Rfl:     rosuvastatin (CRESTOR) 10 MG tablet, TAKE 1 TABLET BY MOUTH EVERY DAY, Disp: 90 tablet, Rfl: 3    semaglutide, 1 mg/dose, (Ozempic, 1 MG/DOSE,) 4 mg/3 mL injection pen, Inject 1 mg once a week, Disp: 3 mL, Rfl: 5    Patient Active Problem List   Diagnosis    Lumbago with sciatica, left side    Lumbar degenerative disc disease    Lumbar herniated disc    Lumbar radiculopathy    Lumbar spondylosis    Myofascial pain    Obesity    Sacroiliitis (HCC)    Hyperlipidemia    Diabetes (HCC)    Essential hypertension    AIDEE (obstructive sleep apnea)    Insomnia    Hypersomnia    Obesity (BMI 30-39.9)    Diabetes 1.5, 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HCC)    Morbid obesity (HCC)    Trigger middle finger of right hand    Trigger finger, left middle finger    Greater trochanteric bursitis of both hips    Occult blood in stools    Blood in stool    BMI 33.0-33.9,adult          Patient ID: Mahendra Flores is a 69 y.o. male.    HPI    The following portions of the patient's history were reviewed and updated as appropriate:     family history includes Arthritis in his mother.      reports that he has never smoked. He has never used smokeless tobacco. He reports  that he does not currently use alcohol. He reports that he does not currently use drugs after having used the following drugs: Marijuana.    Vitals:    02/20/24 1520   Resp: 16       Review of Systems      Objective:  Patient's shoes and socks removed.   Foot ExamPhysical Exam  Cardiovascular:      Pulses: no weak pulses.           Constitutional:       Appearance: Normal appearance.   HENT:      Head: Normocephalic and atraumatic.   Eyes:      General:         Right eye: No discharge.         Left eye: No discharge.   Cardiovascular:      Rate and Rhythm: Normal rate and regular rhythm.      Pulses:           Dorsalis pedis pulses are 2+ on the right side and 2+ on the left side.        Posterior tibial pulses are 2+ on the right side and 2+ on the left side.   Pulmonary:      Effort: Pulmonary effort is normal.      Breath sounds: Normal breath sounds.   Skin:     General: Skin is warm.      Capillary Refill: Capillary refill takes less than 2 seconds.   Neurological:      Mental Status: He is alert and oriented to person, place, and time.      Sensory: Sensation is intact. No sensory deficit.   Psychiatric:         Mood and Affect: Mood normal.         Bilateral hallux nailbeds at the site of partial nail avulsion is clean, dry, fully healed with no local signs of infection or drainage noted.  Patient demonstrates distal mycosis of residual nail plate hallux bilateral.    Patient's shoes and socks removed.    Right Foot/Ankle   Right Foot Inspection      Toe Exam: right toe deformity.     Sensory   Vibration: diminished  Proprioception: diminished  Monofilament testing: diminished    Vascular  Capillary refills: < 3 seconds      Left Foot/Ankle  Left Foot Inspection      Toe Exam: left toe deformity.     Sensory   Vibration: diminished  Proprioception: diminished  Monofilament testing: diminished    Vascular  Capillary refills: < 3 seconds      Assign Risk Category  Deformity present  Loss of protective  sensation  No weak pulses  Risk: 2

## 2024-02-23 ENCOUNTER — APPOINTMENT (OUTPATIENT)
Dept: LAB | Age: 69
End: 2024-02-23
Payer: MEDICARE

## 2024-02-23 ENCOUNTER — TELEPHONE (OUTPATIENT)
Age: 69
End: 2024-02-23

## 2024-02-23 ENCOUNTER — APPOINTMENT (OUTPATIENT)
Dept: RADIOLOGY | Age: 69
End: 2024-02-23
Payer: MEDICARE

## 2024-02-23 DIAGNOSIS — M54.16 RADICULOPATHY OF LUMBAR REGION: ICD-10-CM

## 2024-02-23 DIAGNOSIS — Z00.6 ENCOUNTER FOR EXAMINATION FOR NORMAL COMPARISON OR CONTROL IN CLINICAL RESEARCH PROGRAM: ICD-10-CM

## 2024-02-23 PROCEDURE — 72110 X-RAY EXAM L-2 SPINE 4/>VWS: CPT

## 2024-02-23 PROCEDURE — 36415 COLL VENOUS BLD VENIPUNCTURE: CPT

## 2024-02-23 NOTE — TELEPHONE ENCOUNTER
Lmom to call back for results. They are below and can be given to patient----- Message from Thang Nash DPM sent at 2/23/2024  1:32 PM EST -----  Is advise patient he has degeneration and arthritis of the spine.  We recommend our local chiropractor.  ----- Message -----  From: Interface, Radiology Results In  Sent: 2/23/2024   1:28 PM EST  To: Thang Nash DPM

## 2024-02-23 NOTE — TELEPHONE ENCOUNTER
Caller: Patient- Mahendra Flores     Doctor: Dr Nash    Reason for call: Patient is calling back in from a missed call from the office to relay the results.     Call back#: [phone] (Per patient call on Monday since he will not be available for the rest of the day)

## 2024-03-02 DIAGNOSIS — I10 ESSENTIAL HYPERTENSION: ICD-10-CM

## 2024-03-02 DIAGNOSIS — E11.65 TYPE 2 DIABETES MELLITUS WITH HYPERGLYCEMIA, WITHOUT LONG-TERM CURRENT USE OF INSULIN (HCC): ICD-10-CM

## 2024-03-04 RX ORDER — DAPAGLIFLOZIN 10 MG/1
10 TABLET, FILM COATED ORAL DAILY
Qty: 90 TABLET | Refills: 3 | Status: SHIPPED | OUTPATIENT
Start: 2024-03-04

## 2024-03-04 RX ORDER — LISINOPRIL AND HYDROCHLOROTHIAZIDE 12.5; 1 MG/1; MG/1
1 TABLET ORAL DAILY
Qty: 90 TABLET | Refills: 3 | Status: SHIPPED | OUTPATIENT
Start: 2024-03-04

## 2024-03-16 LAB
APOB+LDLR+PCSK9 GENE MUT ANL BLD/T: NOT DETECTED
BRCA1+BRCA2 DEL+DUP + FULL MUT ANL BLD/T: NOT DETECTED
MLH1+MSH2+MSH6+PMS2 GN DEL+DUP+FUL M: NOT DETECTED

## 2024-03-27 ENCOUNTER — HOSPITAL ENCOUNTER (OUTPATIENT)
Dept: SLEEP CENTER | Facility: CLINIC | Age: 69
Discharge: HOME | End: 2024-03-27
Payer: MEDICARE

## 2024-03-27 DIAGNOSIS — G47.33 OSA (OBSTRUCTIVE SLEEP APNEA): ICD-10-CM

## 2024-03-27 PROCEDURE — 95977 ALYS CPLX CN NPGT PRGRMG: CPT

## 2024-03-27 PROCEDURE — 95810 POLYSOM 6/> YRS 4/> PARAM: CPT

## 2024-03-28 NOTE — PROGRESS NOTES
Sleep Study Documentation    Pre-Sleep Study       Sleep testing procedure explained to patient:YES    Patient napped prior to study:YES- less than 30 minutes. Napped after 2PM: no    Caffeine:Dayshift worker after 12PM.  Caffeine use:NO    Alcohol:Dayshift workers after 5PM: Alcohol use:NO    Typical day for patient:YES       Study Documentation    Sleep Study Indications: Witnessed apneas    Sleep Study: Treatment Final settings: 1.6V    Snore:Eliminated  REM Obtained:yes  Supplemental O2: no    Minimum SaO2 86  Baseline SaO2 93    Minimum SaO2 at final :  89  Mode of Therapy: INSPIRE    EKG abnormalities: yes:  PVCs    EEG abnormalities: no    Were abnormal behaviors in sleep observed:NO    Is Total Sleep Study Recording Time < 2 hours: N/A    Is Total Sleep Study Recording Time > 2 hours but study is incomplete: N/A    Is Total Sleep Study Recording Time 6 hours or more but sleep was not obtained: NO    Patient classification: retired       Post-Sleep Study    Medication used at bedtime or during sleep study:NO    Patient reports time it took to fall asleep:20 to 30 minutes    Patient reports waking up during study:3 or more times.  Patient reports returning to sleep in 10 to 30 minutes.    Patient reports sleeping 4 to 6 hours without dreaming.    Does the Patient feel this is a typical night of sleep:worse than usual    Patient rated sleepiness: Somewhat sleepy or tired    PAP treatment:no.

## 2024-03-30 DIAGNOSIS — M46.1 SACROILIITIS (HCC): ICD-10-CM

## 2024-04-09 PROBLEM — Z45.42 ENCOUNTER FOR ADJUSTMENT AND MANAGEMENT OF NEUROSTIMULATOR: Status: ACTIVE | Noted: 2024-04-09

## 2024-04-24 DIAGNOSIS — E11.65 TYPE 2 DIABETES MELLITUS WITH HYPERGLYCEMIA, WITHOUT LONG-TERM CURRENT USE OF INSULIN (HCC): ICD-10-CM

## 2024-04-25 RX ORDER — SEMAGLUTIDE 1.34 MG/ML
INJECTION, SOLUTION SUBCUTANEOUS
Qty: 3 ML | Refills: 5 | Status: SHIPPED | OUTPATIENT
Start: 2024-04-25

## 2024-04-27 ENCOUNTER — LAB (OUTPATIENT)
Dept: LAB | Age: 69
End: 2024-04-27
Payer: MEDICARE

## 2024-04-27 DIAGNOSIS — E11.65 TYPE 2 DIABETES MELLITUS WITH HYPERGLYCEMIA, WITHOUT LONG-TERM CURRENT USE OF INSULIN (HCC): ICD-10-CM

## 2024-04-27 LAB
ANION GAP SERPL CALCULATED.3IONS-SCNC: 7 MMOL/L (ref 4–13)
BUN SERPL-MCNC: 24 MG/DL (ref 5–25)
CALCIUM SERPL-MCNC: 9.3 MG/DL (ref 8.4–10.2)
CHLORIDE SERPL-SCNC: 102 MMOL/L (ref 96–108)
CO2 SERPL-SCNC: 32 MMOL/L (ref 21–32)
CREAT SERPL-MCNC: 0.75 MG/DL (ref 0.6–1.3)
CREAT UR-MCNC: 118.5 MG/DL
EST. AVERAGE GLUCOSE BLD GHB EST-MCNC: 137 MG/DL
GFR SERPL CREATININE-BSD FRML MDRD: 94 ML/MIN/1.73SQ M
GLUCOSE P FAST SERPL-MCNC: 114 MG/DL (ref 65–99)
HBA1C MFR BLD: 6.4 %
MICROALBUMIN UR-MCNC: 32.3 MG/L
MICROALBUMIN/CREAT 24H UR: 27 MG/G CREATININE (ref 0–30)
POTASSIUM SERPL-SCNC: 4.3 MMOL/L (ref 3.5–5.3)
SODIUM SERPL-SCNC: 141 MMOL/L (ref 135–147)

## 2024-04-27 PROCEDURE — 83036 HEMOGLOBIN GLYCOSYLATED A1C: CPT

## 2024-04-27 PROCEDURE — 82043 UR ALBUMIN QUANTITATIVE: CPT

## 2024-04-27 PROCEDURE — 82570 ASSAY OF URINE CREATININE: CPT

## 2024-04-27 PROCEDURE — 80048 BASIC METABOLIC PNL TOTAL CA: CPT

## 2024-04-27 PROCEDURE — 36415 COLL VENOUS BLD VENIPUNCTURE: CPT

## 2024-04-29 ENCOUNTER — OFFICE VISIT (OUTPATIENT)
Dept: ENDOCRINOLOGY | Facility: CLINIC | Age: 69
End: 2024-04-29
Payer: MEDICARE

## 2024-04-29 VITALS
DIASTOLIC BLOOD PRESSURE: 80 MMHG | SYSTOLIC BLOOD PRESSURE: 116 MMHG | WEIGHT: 250.6 LBS | OXYGEN SATURATION: 96 % | HEART RATE: 77 BPM | BODY MASS INDEX: 32.16 KG/M2 | HEIGHT: 74 IN

## 2024-04-29 DIAGNOSIS — I10 ESSENTIAL HYPERTENSION: ICD-10-CM

## 2024-04-29 DIAGNOSIS — E78.2 MIXED HYPERLIPIDEMIA: ICD-10-CM

## 2024-04-29 DIAGNOSIS — E11.65 TYPE 2 DIABETES MELLITUS WITH HYPERGLYCEMIA, WITHOUT LONG-TERM CURRENT USE OF INSULIN (HCC): Primary | ICD-10-CM

## 2024-04-29 PROCEDURE — 1124F ACP DISCUSS-NO DSCNMKR DOCD: CPT | Performed by: PHYSICIAN ASSISTANT

## 2024-04-29 PROCEDURE — 99214 OFFICE O/P EST MOD 30 MIN: CPT | Performed by: PHYSICIAN ASSISTANT

## 2024-04-29 RX ORDER — LANCETS
EACH MISCELLANEOUS
Qty: 100 EACH | Refills: 1 | Status: SHIPPED | OUTPATIENT
Start: 2024-04-29

## 2024-04-29 RX ORDER — GLIMEPIRIDE 1 MG/1
TABLET ORAL
Start: 2024-04-29

## 2024-04-29 NOTE — PROGRESS NOTES
Patient Progress Note      CC: DM      Referring Provider  Swapnil Mims, Do  [address] [facility]  [address]  [place]  [place] [address]     History of Present Illness:   Mahendra Flores is a 69 y.o. male with a history of type 2 diabetes without long term use of insulin. Diabetes course has been stable. Denies recent illness or hospitalizations. Denies recent severe hypoglycemic or severe hyperglycemic episodes. Denies any issues with his current regimen. Home glucose monitoring: are performed regularly     Home blood glucose readings: 104-119 mg/dl (mornings)     Current regimen: Farxiga 10 mg daily, glimepiride 1 mg BID (taking 1/2 tablet twice a day), Ozempic 1 mg weekly  Per patient, glimepiride dose was changed at last visit.   compliant most of the time, denies any side effects from medications.  Hypoglycemic episodes: No, never.  Does admit that sometimes he feels a little shaky between breakfast and lunch but he has never checked his blood glucose.  Treatment of hypoglycemia: discussed treatment     Diabetes education: Yes  Diet: 3 meals per day, 2-3 snacks per day. Timing of meals is predictable.   Diabetic diet compliance: compliant some of the time. He does try to watch carb portions.  Activity: Daily activity is predictable: Yes. He tries to exercise 3-4 times a week.     Ophthamology: states he went to [employer] this year.   Podiatry: diabetic foot exam UTD, February 2024     Has hypertension: on ACE inhibitor/ARB, compliant most of the time  Has hyperlipidemia: on statin - tolerating well, no myalgias. compliant most of the time, denies any side effects from medications.  Thyroid disorders: No  History of pancreatitis: No      Patient Active Problem List   Diagnosis    Lumbago with sciatica, left side    Lumbar degenerative disc disease    Lumbar herniated disc    Lumbar radiculopathy    Lumbar spondylosis    Myofascial pain    Obesity    Sacroiliitis (HCC)    Hyperlipidemia    Diabetes (HCC)     Essential hypertension    AIDEE (obstructive sleep apnea)    Insomnia    Hypersomnia    Obesity (BMI 30-39.9)    Type 2 diabetes mellitus with hyperglycemia, without long-term current use of insulin (HCC)    Morbid obesity (HCC)    Trigger middle finger of right hand    Trigger finger, left middle finger    Greater trochanteric bursitis of both hips    Occult blood in stools    Blood in stool    BMI 33.0-33.9,adult    Encounter for adjustment and management of neurostimulator      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      Past Surgical History:   Procedure Laterality Date    COLONOSCOPY N/A 11/22/2016    Procedure: COLONOSCOPY;  Surgeon: Byron Allan MD;  Location: [facility];  Service:     DEBRIDEMENT TENNIS ELBOW  2006    EXAMINATION UNDER ANESTHESIA N/A 9/28/2023    Procedure: DRUG INDUCED SLEEP ENDOSCOPY;  Surgeon: Juan Pablo Santana MD;  [facility];  Service: ENT    EYE SURGERY  2008    laser holes to relieve pressure    HERNIA REPAIR  2011    x2, umbilical and right side per patient    KNEE SURGERY  2000    meniscus respair    NASAL SEPTUM SURGERY  1980    WI TENDON SHEATH INCISION Right 11/19/2019    Procedure: RELEASE TRIGGER FINGER-right long finger;  Surgeon: Percy Gatica MD;  Location: [facility];  Service: Orthopedics    WI TENDON SHEATH INCISION Left 11/26/2019    Procedure: RELEASE TRIGGER FINGER-left long finger;  Surgeon: Percy Gatica MD;  Location[facility];  Service: Orthopedics    TONSILLECTOMY  1961    UPPER AIRWAY STIMULATOR N/A 11/9/2023    Procedure: INSPIRE  IMPLANT;  Surgeon: Juan Pablo Santana MD;  Location: [facility];  Service: ENT    UVULOPALATOPHARYNGOPLASTY  2004      Family History   Problem Relation Age of Onset    Arthritis Mother         mostly in her back     Social History     Tobacco Use    Smoking status: Never    Smokeless tobacco: Never   Substance Use Topics    Alcohol use: Not Currently     Comment: seldom     Allergies   Allergen Reactions    Tramadol Itching         Current Outpatient Medications:     Accu-Chek FastClix Lancets MISC, Check glucose daily, Disp: 100 each, Rfl: 1    acetaminophen (TYLENOL 8 HOUR) 650 mg CR tablet, Take 1 tablet (650 mg total) by mouth every 8 (eight) hours (Patient taking differently: Take 650 mg by mouth every 8 (eight) hours as needed), Disp: 15 tablet, Rfl: 0    Blood Glucose Monitoring Suppl (Accu-Chek Maida Plus) w/Device KIT, Use in the morning Check once daily dx E13.9, Disp: 1 kit, Rfl: 0    Coenzyme Q10 (CO Q-10) 200 MG CAPS, in the morning, Disp: , Rfl:     diclofenac sodium (VOLTAREN) 50 mg EC tablet, TAKE 1 TABLET BY MOUTH TWICE A DAY AS NEEDED FOR PAIN, Disp: 180 tablet, Rfl: 0    diphenhydrAMINE HCl 25 MG CHEW, if needed, Disp: , Rfl:     Farxiga 10 MG tablet, TAKE 1 TABLET BY MOUTH EVERY DAY, Disp: 90 tablet, Rfl: 3    fluticasone (FLONASE) 50 mcg/act nasal spray, 2 sprays into each nostril daily, Disp: 16 g, Rfl: 2    glimepiride (AMARYL) 1 mg tablet, Take 1/2 tablet with dinner., Disp: , Rfl:     glucose blood (Accu-Chek Maida Plus) test strip, Check sugar once daily dx: E13.9, Disp: 100 each, Rfl: 2    lisinopril-hydrochlorothiazide (PRINZIDE,ZESTORETIC) 10-12.5 MG per tablet, TAKE 1 TABLET BY MOUTH EVERY DAY, Disp: 90 tablet, Rfl: 3    Omeprazole 20 MG TBEC, if needed, Disp: , Rfl:     rosuvastatin (CRESTOR) 10 MG tablet, TAKE 1 TABLET BY MOUTH EVERY DAY, Disp: 90 tablet, Rfl: 3    semaglutide, 1 mg/dose, (Ozempic, 1 MG/DOSE,) 4 mg/3 mL injection pen, INJECT 1 MG ONCE A WEEK, Disp: 3 mL, Rfl: 5  Review  "of Systems   Constitutional:  Negative for activity change, appetite change, fatigue and unexpected weight change.   HENT:  Negative for trouble swallowing.    Eyes:  Negative for visual disturbance.   Respiratory:  Negative for shortness of breath.    Cardiovascular:  Negative for chest pain and palpitations.   Gastrointestinal:  Negative for constipation and diarrhea.   Endocrine: Negative for polydipsia and polyuria.   Musculoskeletal:  Positive for back pain.   Neurological:  Negative for numbness.   Psychiatric/Behavioral: Negative.         Physical Exam:  Body mass index is 32.18 kg/m².  /80   Pulse 77   Ht 6' 2\" (1.88 m)   Wt 114 kg (250 lb 9.6 oz)   SpO2 96%   BMI 32.18 kg/m²    Wt Readings from Last 3 Encounters:   04/29/24 114 kg (250 lb 9.6 oz)   04/08/24 112 kg (247 lb)   02/20/24 111 kg (245 lb)       Physical Exam  Vitals and nursing note reviewed.   Constitutional:       Appearance: He is well-developed.   HENT:      Head: Normocephalic.   Eyes:      General: No scleral icterus.     Pupils: Pupils are equal, round, and reactive to light.   Neck:      Thyroid: No thyromegaly.   Cardiovascular:      Rate and Rhythm: Normal rate and regular rhythm.      Pulses:           Radial pulses are 2+ on the right side and 2+ on the left side.      Heart sounds: No murmur heard.  Pulmonary:      Effort: Pulmonary effort is normal. No respiratory distress.      Breath sounds: Normal breath sounds. No wheezing.   Musculoskeletal:      Cervical back: Neck supple.   Skin:     General: Skin is warm and dry.   Neurological:      Mental Status: He is alert.       Patient's shoes and socks were not removed.          Labs:   Component      Latest Ref Rng 10/18/2023 4/27/2024   Sodium      135 - 147 mmol/L 140  141    Potassium      3.5 - 5.3 mmol/L 4.8  4.3    Chloride      96 - 108 mmol/L 103  102    Carbon Dioxide      21 - 32 mmol/L 32  32    ANION GAP      4 - 13 mmol/L 5  7    BUN      5 - 25 mg/dL 20  24  "   Creatinine      0.60 - 1.30 mg/dL 0.84  0.75    GLUCOSE, FASTING      65 - 99 mg/dL 120 (H)  114 (H)    Calcium      8.4 - 10.2 mg/dL 9.8  9.3    GFR, Calculated      ml/min/1.73sq m 91  94    EXT Creatinine Urine      Reference range not established. mg/dL 141.0  118.5    Albumin,U,Random      <20.0 mg/L 71.0 (H)  32.3 (H)    Albumin Creat Ratio      0 - 30 mg/g creatinine 50 (H)  27    Hemoglobin A1C      Normal 4.0-5.6%; PreDiabetic 5.7-6.4%; Diabetic >=6.5%; Glycemic control for adults with diabetes <7.0% % 6.4 (H)  6.4 (H)    eAG, EST AVG Glucose      mg/dl 137  137       Legend:  (H) High      Plan:    Diagnoses and all orders for this visit:    Type 2 diabetes mellitus with hyperglycemia, without long-term current use of insulin ([facility])  HGA1C 6.4%. Remained the Same.  Treatment regimen: Possibly experiencing hypoglycemia between breakfast and lunch.  Stop glimepiride dose at breakfast.  Continue half a tablet at dinner.  Send log in 1 to 2 weeks. If blood sugars are adequately controlled we will stop dinnertime dose of glimepiride as well.  Continue Farxiga and Ozempic.  Episodes of hypoglycemia can lead to permanent disability and death.  Discussed risks/complications associated with uncontrolled diabetes.    Advised to adhere to diabetic diet, and recommended staying active/exercising routinely as tolerated.  Keep carbohydrates consistent to limit blood glucose fluctuations.  Advised to call if blood sugars less than 70 mg/dl or over 300 mg/dl.   Check blood glucose 1-2 times a day  Discussed symptoms and treatment of hypoglycemia.   Recommended routine follow-up with podiatry and ophthalmology.   Send log in 2 weeks.   Ordered blood work to complete prior to next visit.   -     glimepiride (AMARYL) 1 mg tablet; Take 1/2 tablet with dinner.  -     Hemoglobin A1C; Future  -     Basic metabolic panel; Future  -     Accu-Chek FastClix Lancets MISC; Check glucose daily    Essential hypertension  Blood pressure  well controlled, continue current treatment     Mixed hyperlipidemia  LDL previously 94  Continue statin therapy  Managed by PCP          Discussed with the patient diagnosis and treatment and all questions fully answered. He will call me if any problems arise.    Counseled patient on diagnostic results, prognosis, risk and benefit of treatment options, instruction for management, importance of treatment compliance, risk factor reduction and impressions.      Maddie Kidd PA-C

## 2024-04-29 NOTE — PATIENT INSTRUCTIONS
Hypoglycemia instructions   Mahendra Flores  4/29/2024  [id number]    Low Blood Sugar    Steps to treat low blood sugar.    1. Test blood sugar if you have symptoms of low blood sugar:   Low Blood Sugar Symptoms:  o Sweaty  o Dizzy  o Rapid heartbeat  o Shaky  o Bad mood  o Hungry      2. Treat blood sugar less than 70 with 15 grams of fast-acting carbohydrate:   Examples of 15 grams Fast-Acting Carbohydrate:  o 4 oz juice  o 4 oz regular soda  o 3-4 glucose tablets (chew)  o 3-4 hard candies (chew)          3.  Wait 15 minutes and test your blood sugar again     4. If blood sugar is less than 100, repeat steps 2-3.    5. When your blood sugar is 100 or more, eat a snack if it will be longer than one hour until your next meal. The snack should be 15 grams of carbohydrate and a protein:   Examples of snacks:  o ½ sandwich  o 6 crackers with cheese  o Piece of fruit with cheese or peanut butter  o 6 crackers with peanut butter

## 2024-06-29 DIAGNOSIS — M46.1 SACROILIITIS (HCC): ICD-10-CM

## 2024-07-09 ENCOUNTER — RA CDI HCC (OUTPATIENT)
Dept: OTHER | Facility: HOSPITAL | Age: 69
End: 2024-07-09

## 2024-07-12 ENCOUNTER — APPOINTMENT (OUTPATIENT)
Dept: LAB | Age: 69
End: 2024-07-12
Payer: MEDICARE

## 2024-07-12 DIAGNOSIS — I10 ESSENTIAL HYPERTENSION: ICD-10-CM

## 2024-07-12 DIAGNOSIS — Z00.00 MEDICARE ANNUAL WELLNESS VISIT, INITIAL: ICD-10-CM

## 2024-07-12 DIAGNOSIS — E78.2 MIXED HYPERLIPIDEMIA: ICD-10-CM

## 2024-07-12 LAB
ALBUMIN SERPL BCG-MCNC: 4 G/DL (ref 3.5–5)
ALP SERPL-CCNC: 38 U/L (ref 34–104)
ALT SERPL W P-5'-P-CCNC: 30 U/L (ref 7–52)
ANION GAP SERPL CALCULATED.3IONS-SCNC: 7 MMOL/L (ref 4–13)
AST SERPL W P-5'-P-CCNC: 14 U/L (ref 13–39)
BACTERIA UR QL AUTO: NORMAL /HPF
BASOPHILS # BLD AUTO: 0.05 THOUSANDS/ÂΜL (ref 0–0.1)
BASOPHILS NFR BLD AUTO: 1 % (ref 0–1)
BILIRUB SERPL-MCNC: 0.5 MG/DL (ref 0.2–1)
BILIRUB UR QL STRIP: NEGATIVE
BUN SERPL-MCNC: 21 MG/DL (ref 5–25)
CALCIUM SERPL-MCNC: 9.4 MG/DL (ref 8.4–10.2)
CHLORIDE SERPL-SCNC: 101 MMOL/L (ref 96–108)
CHOLEST SERPL-MCNC: 176 MG/DL
CLARITY UR: CLEAR
CO2 SERPL-SCNC: 31 MMOL/L (ref 21–32)
COLOR UR: ABNORMAL
CREAT SERPL-MCNC: 0.82 MG/DL (ref 0.6–1.3)
EOSINOPHIL # BLD AUTO: 0.13 THOUSAND/ÂΜL (ref 0–0.61)
EOSINOPHIL NFR BLD AUTO: 2 % (ref 0–6)
ERYTHROCYTE [DISTWIDTH] IN BLOOD BY AUTOMATED COUNT: 13.6 % (ref 11.6–15.1)
GFR SERPL CREATININE-BSD FRML MDRD: 91 ML/MIN/1.73SQ M
GLUCOSE P FAST SERPL-MCNC: 142 MG/DL (ref 65–99)
GLUCOSE UR STRIP-MCNC: ABNORMAL MG/DL
HCT VFR BLD AUTO: 49.2 % (ref 36.5–49.3)
HDLC SERPL-MCNC: 53 MG/DL
HGB BLD-MCNC: 16.7 G/DL (ref 12–17)
HGB UR QL STRIP.AUTO: NEGATIVE
IMM GRANULOCYTES # BLD AUTO: 0.03 THOUSAND/UL (ref 0–0.2)
IMM GRANULOCYTES NFR BLD AUTO: 0 % (ref 0–2)
KETONES UR STRIP-MCNC: NEGATIVE MG/DL
LDLC SERPL CALC-MCNC: 100 MG/DL (ref 0–100)
LEUKOCYTE ESTERASE UR QL STRIP: ABNORMAL
LYMPHOCYTES # BLD AUTO: 1.65 THOUSANDS/ÂΜL (ref 0.6–4.47)
LYMPHOCYTES NFR BLD AUTO: 21 % (ref 14–44)
MCH RBC QN AUTO: 28.5 PG (ref 26.8–34.3)
MCHC RBC AUTO-ENTMCNC: 33.9 G/DL (ref 31.4–37.4)
MCV RBC AUTO: 84 FL (ref 82–98)
MONOCYTES # BLD AUTO: 0.65 THOUSAND/ÂΜL (ref 0.17–1.22)
MONOCYTES NFR BLD AUTO: 8 % (ref 4–12)
NEUTROPHILS # BLD AUTO: 5.51 THOUSANDS/ÂΜL (ref 1.85–7.62)
NEUTS SEG NFR BLD AUTO: 68 % (ref 43–75)
NITRITE UR QL STRIP: NEGATIVE
NON-SQ EPI CELLS URNS QL MICRO: NORMAL /HPF
NONHDLC SERPL-MCNC: 123 MG/DL
NRBC BLD AUTO-RTO: 0 /100 WBCS
PH UR STRIP.AUTO: 5.5 [PH]
PLATELET # BLD AUTO: 206 THOUSANDS/UL (ref 149–390)
PMV BLD AUTO: 10 FL (ref 8.9–12.7)
POTASSIUM SERPL-SCNC: 4.5 MMOL/L (ref 3.5–5.3)
PROT SERPL-MCNC: 6.5 G/DL (ref 6.4–8.4)
PROT UR STRIP-MCNC: ABNORMAL MG/DL
RBC # BLD AUTO: 5.85 MILLION/UL (ref 3.88–5.62)
RBC #/AREA URNS AUTO: NORMAL /HPF
SODIUM SERPL-SCNC: 139 MMOL/L (ref 135–147)
SP GR UR STRIP.AUTO: 1.03 (ref 1–1.03)
TRIGL SERPL-MCNC: 114 MG/DL
UROBILINOGEN UR STRIP-ACNC: <2 MG/DL
WBC # BLD AUTO: 8.02 THOUSAND/UL (ref 4.31–10.16)
WBC #/AREA URNS AUTO: NORMAL /HPF

## 2024-07-12 PROCEDURE — 80061 LIPID PANEL: CPT

## 2024-07-12 PROCEDURE — 36415 COLL VENOUS BLD VENIPUNCTURE: CPT

## 2024-07-12 PROCEDURE — 81001 URINALYSIS AUTO W/SCOPE: CPT

## 2024-07-12 PROCEDURE — 80053 COMPREHEN METABOLIC PANEL: CPT

## 2024-07-12 PROCEDURE — 85025 COMPLETE CBC W/AUTO DIFF WBC: CPT

## 2024-07-15 ENCOUNTER — OFFICE VISIT (OUTPATIENT)
Dept: INTERNAL MEDICINE CLINIC | Facility: CLINIC | Age: 69
End: 2024-07-15
Payer: MEDICARE

## 2024-07-15 VITALS
HEIGHT: 74 IN | BODY MASS INDEX: 31.95 KG/M2 | DIASTOLIC BLOOD PRESSURE: 78 MMHG | HEART RATE: 75 BPM | OXYGEN SATURATION: 96 % | WEIGHT: 249 LBS | TEMPERATURE: 97.6 F | SYSTOLIC BLOOD PRESSURE: 122 MMHG

## 2024-07-15 DIAGNOSIS — I10 ESSENTIAL HYPERTENSION: Primary | ICD-10-CM

## 2024-07-15 DIAGNOSIS — G47.33 OSA (OBSTRUCTIVE SLEEP APNEA): ICD-10-CM

## 2024-07-15 DIAGNOSIS — E11.65 TYPE 2 DIABETES MELLITUS WITH HYPERGLYCEMIA, WITHOUT LONG-TERM CURRENT USE OF INSULIN (HCC): ICD-10-CM

## 2024-07-15 DIAGNOSIS — Z00.00 MEDICARE ANNUAL WELLNESS VISIT, SUBSEQUENT: ICD-10-CM

## 2024-07-15 DIAGNOSIS — M51.36 LUMBAR DEGENERATIVE DISC DISEASE: ICD-10-CM

## 2024-07-15 DIAGNOSIS — E66.9 OBESITY (BMI 30-39.9): ICD-10-CM

## 2024-07-15 DIAGNOSIS — E78.2 MIXED HYPERLIPIDEMIA: ICD-10-CM

## 2024-07-15 PROCEDURE — G0438 PPPS, INITIAL VISIT: HCPCS | Performed by: INTERNAL MEDICINE

## 2024-07-15 PROCEDURE — 99214 OFFICE O/P EST MOD 30 MIN: CPT | Performed by: INTERNAL MEDICINE

## 2024-07-15 NOTE — ASSESSMENT & PLAN NOTE
Patient's blood pressure is showing excellent control.  Renal function is stable.  Patient will continue present medication and surveillance and we will continue to modify treatment in the future if indicated.

## 2024-07-15 NOTE — ASSESSMENT & PLAN NOTE
Does have a history of lumbar disc disease and chronic back pain states that on his visit to [place] slept on a bed in a hotel that when he woke up for 3 days had no back pain.  Did order a new bed mattress and he is hoping that this will lessen his back pain and discomfort

## 2024-07-15 NOTE — PROGRESS NOTES
Ambulatory Visit  Name: Mahendra Flores      : 1955      MRN: [id number]  Encounter Provider: Swapnil Mims DO  Encounter Date: 7/15/2024   Encounter department: [facility] INTERNAL MEDICINE    Assessment & Plan   1. Essential hypertension  Assessment & Plan:  Patient's blood pressure is showing excellent control.  Renal function is stable.  Patient will continue present medication and surveillance and we will continue to modify treatment in the future if indicated.  Orders:  -     Basic metabolic panel; Future; Expected date: 10/15/2024  2. Lumbar degenerative disc disease  Assessment & Plan:  Does have a history of lumbar disc disease and chronic back pain states that on his visit to [place] slept on a bed in a hotel that when he woke up for 3 days had no back pain.  Did order a new bed mattress and he is hoping that this will lessen his back pain and discomfort  3. Type 2 diabetes mellitus with hyperglycemia, without long-term current use of insulin (HCC)  Assessment & Plan:  Does have a history of diabetes mellitus type 2.  Remains on multiple medication including Ozempic glimepiride and Farxiga.  Patient's blood sugars are showing adequate control with a hemoglobin of 6.4.  He does have a follow-up to be seen by his endocrinologist and will continue with medication to not only have control of his blood sugars but continue to lose weight.  Patient is up-to-date with diabetic eye exam and foot exam.  Lab Results   Component Value Date    HGBA1C 6.4 (H) 2024     4. Mixed hyperlipidemia  Assessment & Plan:  History of hyperlipidemia.  Patient remains on Crestor 10 mg daily and cholesterol is under control.  Triglyceride level is slightly high.  We discussed again the importance of diet watching his intake of fats and cholesterol not only to keep his cholesterol under control but to lose weight.  Will continue to monitor this and make modifications to treatment in the future if needed.  5.  AIDEE (obstructive sleep apnea)  Assessment & Plan:  Was unable to tolerate the CPAP device.  Patient went for evaluation by ENT physicians and was felt to be a candidate for the plantation of the inspire.  He did undergo the procedure and is pleased with the results.  Able to sleep through the night without any difficulty.  6. Medicare annual wellness visit, subsequent  Assessment & Plan:  Patient is a 69-year-old male with a history of multiple medical problems outlined previously who is here today for repeat Medicare wellness visit.  Patient is up-to-date with all routine lab testing and we did discuss the results with the patient with a recent test.  Patient states he is doing well and he is enjoying his jail.  He did go on a cruise to [place] at with his wife which she enjoyed and does have plans in the fall to go to [place]    Since last seen he did have implantation of an inspire device for his sleep apnea.  Continues very pleased and states that he is sleeping well.  No longer needs the CPAP device  7. Obesity (BMI 30-39.9)  Assessment & Plan:  Patient has a history of obesity.  His BMI is stable at 31.97.  He states that he is not getting as much exercise and not watching his diet as closely but did have an improvement overall as far as his weight is concerned.  He was told he needs to work harder and he will discuss this with his endocrinologist as to whether or not there is any further modification of treatment that would be indicated.         History of Present Illness     69-year-old male history of medical problems outlined previously who is here today for a repeat Medicare wellness visit.  He did have labs performed prior to the visit and we did discuss the results.  Patient states he is feeling wonderful and he is enjoying his jail.  Since last seen did had inspire implanted and no longer needing to wear the CPAP      Review of Systems   Constitutional: Negative.    HENT: Negative.     Eyes:  Negative.    Respiratory: Negative.     Cardiovascular: Negative.    Gastrointestinal: Negative.    Endocrine: Negative.    Genitourinary: Negative.    Musculoskeletal: Negative.         Still having some chronic back pain.   Skin: Negative.    Allergic/Immunologic: Negative.    Neurological: Negative.    Hematological: Negative.    Psychiatric/Behavioral: Negative.       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     Past Surgical History:   Procedure Laterality Date    COLONOSCOPY N/A 11/22/2016    Procedure: COLONOSCOPY;  Surgeon: Byron Allan MD;  Location: [facility];  Service:     DEBRIDEMENT TENNIS ELBOW  2006    EXAMINATION UNDER ANESTHESIA N/A 9/28/2023    Procedure: DRUG INDUCED SLEEP ENDOSCOPY;  Surgeon: Juan Pablo Santana MD;  Location[facility];  Service: ENT    EYE SURGERY  2008    laser holes to relieve pressure    HERNIA REPAIR  2011    x2, umbilical and right side per patient    KNEE SURGERY  2000    meniscus respair    NASAL SEPTUM SURGERY  1980    NH TENDON SHEATH INCISION Right 11/19/2019    Procedure: RELEASE TRIGGER FINGER-right long finger;  Surgeon: Percy Gatica MD;  Location: [facility];  Service: Orthopedics    NH TENDON SHEATH INCISION Left 11/26/2019    Procedure: RELEASE TRIGGER FINGER-left long finger;  Surgeon: Percy Gatica MD;  Location[facility];  Service: Orthopedics    TONSILLECTOMY  1961    UPPER AIRWAY STIMULATOR N/A 11/9/2023    Procedure: INSPIRE IMPLANT;  Surgeon: Juan Pablo Santana MD;  Location: [facility];  Service: ENT    UVULOPALATOPHARYNGOPLASTY  2004      Family History   Problem Relation Age of Onset    Arthritis Mother         mostly in her back     Social History     Tobacco Use    Smoking status: Never    Smokeless tobacco: Never   Vaping Use    Vaping status: Never Used   Substance and Sexual Activity    Alcohol use: Not Currently     Comment: seldom    Drug use: Not Currently     Types: Marijuana    Sexual activity: Never     Current Outpatient Medications on File Prior to Visit   Medication Sig    Accu-Chek FastClix Lancets MISC Check glucose daily    acetaminophen (TYLENOL 8 HOUR) 650 mg CR tablet Take 1 tablet (650 mg total) by mouth every 8 (eight) hours (Patient taking differently: Take 650 mg by mouth every 8 (eight) hours as needed)    Blood Glucose Monitoring Suppl (Accu-Chek Maida Plus) w/Device KIT Use in the morning Check once daily dx E13.9    Coenzyme Q10 (CO Q-10) 200 MG CAPS in the morning    diclofenac sodium (VOLTAREN) 50 mg EC tablet TAKE 1 TABLET BY MOUTH TWICE A DAY AS NEEDED FOR PAIN    diphenhydrAMINE HCl 25 MG CHEW if needed    Farxiga 10 MG tablet TAKE 1 TABLET BY MOUTH EVERY DAY    fluticasone (FLONASE) 50 mcg/act nasal spray 2 sprays into each nostril daily    glucose blood (Accu-Chek Maida Plus) test strip Check sugar once daily dx: E13.9    lisinopril-hydrochlorothiazide (PRINZIDE,ZESTORETIC) 10-12.5 MG per tablet TAKE 1 TABLET BY MOUTH EVERY DAY    Omeprazole 20 MG TBEC if needed    rosuvastatin (CRESTOR) 10 MG tablet TAKE 1 TABLET BY MOUTH EVERY DAY    semaglutide, 1 mg/dose, (Ozempic, 1 MG/DOSE,) 4 mg/3 mL injection pen INJECT 1 MG ONCE A WEEK    glimepiride (AMARYL) 1 mg tablet Take 1/2 tablet with dinner. (Patient not taking: Reported on 7/15/2024)     Allergies   Allergen Reactions    Tramadol Itching     Immunization History   Administered Date(s) Administered    COVID-19 MODERNA VACC 0.5 ML IM 03/16/2021, 04/15/2021, 10/29/2021, 07/18/2022    COVID-19 Moderna Vac BIVALENT 12 Yr+ IM 0.5 ML 10/13/2022    COVID-19 Moderna  "mRNA Vaccine 12 Yr+ 50 mcg/0.5 mL (Spikevax) 10/02/2023    H1N1, All Formulations 12/30/2009    INFLUENZA 10/28/2015, 09/13/2016, 10/17/2019, 10/13/2020, 10/30/2021, 10/02/2023    Influenza Quadrivalent 3 years and older 10/07/2014    Influenza Quadrivalent Preservative Free 3 years and older IM 10/07/2014    Influenza Quadrivalent Recombinant Preservative Free IM 10/17/2019, 10/13/2020    Influenza Quadrivalent, 6-35 Months IM 10/28/2015, 09/13/2016, 09/28/2017    Influenza, Seasonal Vaccine, Quadrivalent, Adjuvanted, .5e 10/02/2023    Influenza, high dose seasonal 0.7 mL 10/30/2021    Influenza, recombinant, quadrivalent,injectable, preservative free 10/09/2018    Influenza, seasonal, injectable 1955    Tuberculin Skin Test-PPD Intradermal 08/27/2015    Zoster Vaccine Recombinant 11/12/2020, 03/18/2021     Objective     /78   Pulse 75   Temp 97.6 °F (36.4 °C)   Ht 6' 2\" (1.88 m)   Wt 113 kg (249 lb)   SpO2 96%   BMI 31.97 kg/m²     Physical Exam  Vitals and nursing note reviewed.   Constitutional:       General: He is not in acute distress.     Appearance: Normal appearance. He is obese. He is not ill-appearing, toxic-appearing or diaphoretic.      Comments: Pleasant, cheerful, obese 69-year-old male who is awake alert no acute distress oriented x 3   HENT:      Head: Normocephalic and atraumatic.      Right Ear: Tympanic membrane, ear canal and external ear normal. There is no impacted cerumen.      Left Ear: Tympanic membrane, ear canal and external ear normal. There is no impacted cerumen.      Nose: Nose normal. No congestion or rhinorrhea.      Mouth/Throat:      Mouth: Mucous membranes are moist.      Pharynx: Oropharynx is clear. No oropharyngeal exudate or posterior oropharyngeal erythema.   Eyes:      General: No scleral icterus.        Right eye: No discharge.         Left eye: No discharge.      Extraocular Movements: Extraocular movements intact.      Conjunctiva/sclera: Conjunctivae " normal.      Pupils: Pupils are equal, round, and reactive to light.   Neck:      Vascular: No carotid bruit.   Cardiovascular:      Rate and Rhythm: Normal rate and regular rhythm.      Pulses: Normal pulses.      Heart sounds: Normal heart sounds. No murmur heard.     No friction rub. No gallop.   Pulmonary:      Effort: Pulmonary effort is normal. No respiratory distress.      Breath sounds: Normal breath sounds. No stridor. No wheezing, rhonchi or rales.   Chest:      Chest wall: No tenderness.   Abdominal:      General: Abdomen is flat. Bowel sounds are normal. There is no distension.      Palpations: Abdomen is soft. There is no mass.      Tenderness: There is no abdominal tenderness. There is no right CVA tenderness, left CVA tenderness, guarding or rebound.      Hernia: No hernia is present.   Genitourinary:     Penis: Normal.       Testes: Normal.      Prostate: Normal.      Rectum: Normal.   Musculoskeletal:         General: No swelling, tenderness, deformity or signs of injury. Normal range of motion.      Cervical back: Normal range of motion and neck supple. No rigidity or tenderness.      Right lower leg: No edema.      Left lower leg: No edema.   Lymphadenopathy:      Cervical: No cervical adenopathy.   Skin:     General: Skin is warm and dry.      Coloration: Skin is not jaundiced or pale.      Findings: No bruising, erythema, lesion or rash.   Neurological:      General: No focal deficit present.      Mental Status: He is alert and oriented to person, place, and time. Mental status is at baseline.      Cranial Nerves: No cranial nerve deficit.      Sensory: No sensory deficit.      Motor: No weakness.      Coordination: Coordination normal.      Gait: Gait normal.      Deep Tendon Reflexes: Reflexes normal.   Psychiatric:         Mood and Affect: Mood normal.         Behavior: Behavior normal.         Thought Content: Thought content normal.         Judgment: Judgment normal.         Answers  "submitted by the patient for this visit:  Medicare Annual Wellness Visit (Submitted on 7/8/2024)  How would you rate your overall health?: good  Compared to last year, how is your physical health?: same  In general, how satisfied are you with your life?: very satisfied  Compared to last year, how is your eyesight?: slightly worse  Compared to last year, how is your hearing?: same  Compared to last year, how is your emotional/mental health?: same  How often is anger a problem for you?: never, rarely  How often do you feel unusually tired/fatigued?: sometimes  In the past 7 days, how much pain have you experienced?: some  If you answered \"some\" or \"a lot\", please rate the severity of your pain on a scale of 1 to 10 (1 being the least severe pain and 10 being the most intense pain).: 3/10  In the past 6 months, have you lost or gained 10 pounds without trying?: No  One or more falls in the last year: No  Do you have trouble with the stairs inside or outside your home?: No  Does your home have working smoke alarms?: Yes  Does your home have a carbon monoxide monitor?: Yes  Which safety hazards (if any) have you experienced in your home? Please select all that apply.: none  How would you describe your current diet? Please select all that apply.: Regular, Diabetic, No Added Salt  In addition to prescription medications, are you taking any over-the-counter supplements?: Yes  If yes, what supplements are you taking?: CoQ10, Tylenol, caffeine tablets  Can you manage your medications?: Yes  Are you currently taking any opioid medications?: No  Can you walk and transfer into and out of your bed and chair?: Yes  Can you dress and groom yourself?: Yes  Can you bathe or shower yourself?: Yes  Can you feed yourself?: Yes  Can you do your laundry/ housekeeping?: Yes  Can you manage your money, pay your bills, and track your expenses?: Yes  Can you make your own meals?: Yes  Can you do your own shopping?: Yes  Within the last 12 " months, have you had any hospitalizations or Emergency Department visits?: No  Do you have a living will?: Yes  Do you have a Durable POA (Power of ) for healthcare decisions?: Yes  Do you have an Advanced Directive for end of life decisions?: Yes  How often have you used an illegal drug (including marijuana) or a prescription medication for non-medical reasons in the past year?: never  What is the typical number of drinks you consume in a day?: 0  What is the typical number of drinks you consume in a week?: 0  How often did you have a drink containing alcohol in the past year?: monthly or less  How many drinks did you have on a typical day  when you were drinking in the past year?: 1 to 2  How often did you have 6 or more drinks on one occasion in the past year?: never

## 2024-07-15 NOTE — ASSESSMENT & PLAN NOTE
Patient is a 69-year-old male with a history of multiple medical problems outlined previously who is here today for repeat Medicare wellness visit.  Patient is up-to-date with all routine lab testing and we did discuss the results with the patient with a recent test.  Patient states he is doing well and he is enjoying his jail.  He did go on a cruise to [place] at with his wife which she enjoyed and does have plans in the fall to go to [place]    Since last seen he did have implantation of an inspire device for his sleep apnea.  Continues very pleased and states that he is sleeping well.  No longer needs the CPAP device

## 2024-07-15 NOTE — PROGRESS NOTES
Ambulatory Visit  Name: Mahendra Flores      : 1955      MRN: [id number]  Encounter Provider: Swapnil Mims DO  Encounter Date: 7/15/2024   Encounter department: [facility] INTERNAL MEDICINE    Assessment & Plan        Preventive health issues were discussed with patient, and age appropriate screening tests were ordered as noted in patient's After Visit Summary. Personalized health advice and appropriate referrals for health education or preventive services given if needed, as noted in patient's After Visit Summary.    History of Present Illness     HPI   Patient Care Team:  Swapnil Mims DO as PCP - General  DO Tomer Grider MD Anthony Dippolito, MD as Endoscopist  Maddie Kidd PA-C as Physician Assistant (Endocrinology)  Ana Garcia MD (Endocrinology)    Review of Systems  Medical History Reviewed by provider this encounter:       Annual Wellness Visit Questionnaire       Health Risk Assessment:   Patient rates overall health as good. Patient feels that their physical health rating is same. Patient is very satisfied with their life. Eyesight was rated as slightly worse. Hearing was rated as same. Patient feels that their emotional and mental health rating is same. Patients states they are never, rarely angry. Patient states they are sometimes unusually tired/fatigued. Pain experienced in the last 7 days has been some. Patient's pain rating has been 3/10. Patient states that he has experienced no weight loss or gain in last 6 months.     Depression Screening:   PHQ-2 Score: 0      Fall Risk Screening:   In the past year, patient has experienced: no history of falling in past year      Home Safety:  Patient does not have trouble with stairs inside or outside of their home. Patient has working smoke alarms and has working carbon monoxide detector. Home safety hazards include: none.     Nutrition:   Current diet is Regular, Diabetic and No Added Salt.     Medications:    Patient is currently taking over-the-counter supplements. OTC medications include: see medication list. Patient is able to manage medications.     Activities of Daily Living (ADLs)/Instrumental Activities of Daily Living (IADLs):   Walk and transfer into and out of bed and chair?: Yes  Dress and groom yourself?: Yes    Bathe or shower yourself?: Yes    Feed yourself? Yes  Do your laundry/housekeeping?: Yes  Manage your money, pay your bills and track your expenses?: Yes  Make your own meals?: Yes    Do your own shopping?: Yes    Previous Hospitalizations:   Any hospitalizations or ED visits within the last 12 months?: No      Advance Care Planning:   Living will: Yes    Durable POA for healthcare: Yes    Advanced directive: Yes      PREVENTIVE SCREENINGS      Cardiovascular Screening:    General: Screening Not Indicated and History Lipid Disorder      Diabetes Screening:     General: Screening Not Indicated and History Diabetes      Colorectal Cancer Screening:     General: Screening Current      Prostate Cancer Screening:    General: Screening Current      Abdominal Aortic Aneurysm (AAA) Screening:    Risk factors include: age between 65-76 yo        Lung Cancer Screening:     General: Screening Not Indicated      Hepatitis C Screening:    General: Screening Current    Screening, Brief Intervention, and Referral to Treatment (SBIRT)    Screening  Typical number of drinks in a day: 0  Typical number of drinks in a week: 0  Interpretation: Low risk drinking behavior.    AUDIT-C Screenin) How often did you have a drink containing alcohol in the past year? monthly or less  2) How many drinks did you have on a typical day when you were drinking in the past year? 1 to 2  3) How often did you have 6 or more drinks on one occasion in the past year? never    AUDIT-C Score: 1  Interpretation: Score 0-3 (male): Negative screen for alcohol misuse    Single Item Drug Screening:  How often have you used an illegal drug  "(including marijuana) or a prescription medication for non-medical reasons in the past year? never    Single Item Drug Screen Score: 0  Interpretation: Negative screen for possible drug use disorder    Social Determinants of Health     Financial Resource Strain: Low Risk  (3/25/2023)    Overall Financial Resource Strain (CARDIA)     Difficulty of Paying Living Expenses: Not hard at all   Food Insecurity: No Food Insecurity (7/8/2024)    Hunger Vital Sign     Worried About Running Out of Food in the Last Year: Never true     Ran Out of Food in the Last Year: Never true   Transportation Needs: No Transportation Needs (7/8/2024)    PRAPARE - Transportation     Lack of Transportation (Medical): No     Lack of Transportation (Non-Medical): No   Housing Stability: Low Risk  (7/8/2024)    Housing Stability Vital Sign     Unable to Pay for Housing in the Last Year: No     Number of Times Moved in the Last Year: 0     Homeless in the Last Year: No   Utilities: Not At Risk (7/8/2024)    [facility] Utilities     Threatened with loss of utilities: No     No results found.    Objective     Ht 6' 2\" (1.88 m)   BMI 32.18 kg/m²     Physical Exam      "

## 2024-07-15 NOTE — PATIENT INSTRUCTIONS
Medicare Preventive Visit Patient Instructions  Thank you for completing your Welcome to Medicare Visit or Medicare Annual Wellness Visit today. Your next wellness visit will be due in one year (7/16/2025).  The screening/preventive services that you may require over the next 5-10 years are detailed below. Some tests may not apply to you based off risk factors and/or age. Screening tests ordered at today's visit but not completed yet may show as past due. Also, please note that scanned in results may not display below.  Preventive Screenings:  Service Recommendations Previous Testing/Comments   Colorectal Cancer Screening  Colonoscopy    Fecal Occult Blood Test (FOBT)/Fecal Immunochemical Test (FIT)  Fecal DNA/Cologuard Test  Flexible Sigmoidoscopy Age: 45-75 years old   Colonoscopy: every 10 years (May be performed more frequently if at higher risk)  OR  FOBT/FIT: every 1 year  OR  Cologuard: every 3 years  OR  Sigmoidoscopy: every 5 years  Screening may be recommended earlier than age 45 if at higher risk for colorectal cancer. Also, an individualized decision between you and your healthcare provider will decide whether screening between the ages of 76-85 would be appropriate. Colonoscopy: 12/10/2019  FOBT/FIT: 07/06/2022  Cologuard: Not on file  Sigmoidoscopy: Not on file    Screening Current     Prostate Cancer Screening Individualized decision between patient and health care provider in men between ages of 55-69   Medicare will cover every 12 months beginning on the day after your 50th birthday PSA: 1.35 ng/mL     Screening Current     Hepatitis C Screening Once for adults born between 1945 and 1965  More frequently in patients at high risk for Hepatitis C Hep C Antibody: 10/31/2019    Screening Current   Diabetes Screening 1-2 times per year if you're at risk for diabetes or have pre-diabetes Fasting glucose: 142 mg/dL (7/12/2024)  A1C: 6.4 % (4/27/2024)  Screening Not Indicated  History Diabetes   Cholesterol  Screening Once every 5 years if you don't have a lipid disorder. May order more often based on risk factors. Lipid panel: 07/12/2024  Screening Not Indicated  History Lipid Disorder      Other Preventive Screenings Covered by Medicare:  Abdominal Aortic Aneurysm (AAA) Screening: covered once if your at risk. You're considered to be at risk if you have a family history of AAA or a male between the age of 65-75 who smoking at least 100 cigarettes in your lifetime.  Lung Cancer Screening: covers low dose CT scan once per year if you meet all of the following conditions: (1) Age 55-77; (2) No signs or symptoms of lung cancer; (3) Current smoker or have quit smoking within the last 15 years; (4) You have a tobacco smoking history of at least 20 pack years (packs per day x number of years you smoked); (5) You get a written order from a healthcare provider.  Glaucoma Screening: covered annually if you're considered high risk: (1) You have diabetes OR (2) Family history of glaucoma OR (3)  aged 50 and older OR (4)  American aged 65 and older  Osteoporosis Screening: covered every 2 years if you meet one of the following conditions: (1) Have a vertebral abnormality; (2) On glucocorticoid therapy for more than 3 months; (3) Have primary hyperparathyroidism; (4) On osteoporosis medications and need to assess response to drug therapy.  HIV Screening: covered annually if you're between the age of 15-65. Also covered annually if you are younger than 15 and older than 65 with risk factors for HIV infection. For pregnant patients, it is covered up to 3 times per pregnancy.    Immunizations:  Immunization Recommendations   Influenza Vaccine Annual influenza vaccination during flu season is recommended for all persons aged >= 6 months who do not have contraindications   Pneumococcal Vaccine   * Pneumococcal conjugate vaccine = PCV13 (Prevnar 13), PCV15 (Vaxneuvance), PCV20 (Prevnar 20)  * Pneumococcal  polysaccharide vaccine = PPSV23 (Pneumovax) Adults 19-63 yo with certain risk factors or if 65+ yo  If never received any pneumonia vaccine: recommend Prevnar 20 (PCV20)  Give PCV20 if previously received 1 dose of PCV13 or PPSV23   Hepatitis B Vaccine 3 dose series if at intermediate or high risk (ex: diabetes, end stage renal disease, liver disease)   Respiratory syncytial virus (RSV) Vaccine - COVERED BY MEDICARE PART D  * RSVPreF3 (Arexvy) CDC recommends that adults 60 years of age and older may receive a single dose of RSV vaccine using shared clinical decision-making (SCDM)   Tetanus (Td) Vaccine - COST NOT COVERED BY MEDICARE PART B Following completion of primary series, a booster dose should be given every 10 years to maintain immunity against tetanus. Td may also be given as tetanus wound prophylaxis.   Tdap Vaccine - COST NOT COVERED BY MEDICARE PART B Recommended at least once for all adults. For pregnant patients, recommended with each pregnancy.   Shingles Vaccine (Shingrix) - COST NOT COVERED BY MEDICARE PART B  2 shot series recommended in those 19 years and older who have or will have weakened immune systems or those 50 years and older     Health Maintenance Due:      Topic Date Due   • Colorectal Cancer Screening  12/10/2029   • Hepatitis C Screening  Completed     Immunizations Due:      Topic Date Due   • Pneumococcal Vaccine: 65+ Years (1 of 2 - PCV) Never done   • Influenza Vaccine (1) 09/01/2024     Advance Directives   What are advance directives?  Advance directives are legal documents that state your wishes and plans for medical care. These plans are made ahead of time in case you lose your ability to make decisions for yourself. Advance directives can apply to any medical decision, such as the treatments you want, and if you want to donate organs.   What are the types of advance directives?  There are many types of advance directives, and each state has rules about how to use them. You may  choose a combination of any of the following:  Living will:  This is a written record of the treatment you want. You can also choose which treatments you do not want, which to limit, and which to stop at a certain time. This includes surgery, medicine, IV fluid, and tube feedings.   Durable power of  for healthcare (DPAHC):  This is a written record that states who you want to make healthcare choices for you when you are unable to make them for yourself. This person, called a proxy, is usually a family member or a friend. You may choose more than 1 proxy.  Do not resuscitate (DNR) order:  A DNR order is used in case your heart stops beating or you stop breathing. It is a request not to have certain forms of treatment, such as CPR. A DNR order may be included in other types of advance directives.  Medical directive:  This covers the care that you want if you are in a coma, near death, or unable to make decisions for yourself. You can list the treatments you want for each condition. Treatment may include pain medicine, surgery, blood transfusions, dialysis, IV or tube feedings, and a ventilator (breathing machine).  Values history:  This document has questions about your views, beliefs, and how you feel and think about life. This information can help others choose the care that you would choose.  Why are advance directives important?  An advance directive helps you control your care. Although spoken wishes may be used, it is better to have your wishes written down. Spoken wishes can be misunderstood, or not followed. Treatments may be given even if you do not want them. An advance directive may make it easier for your family to make difficult choices about your care.   Weight Management   Why it is important to manage your weight:  Being overweight increases your risk of health conditions such as heart disease, high blood pressure, type 2 diabetes, and certain types of cancer. It can also increase your risk  for osteoarthritis, sleep apnea, and other respiratory problems. Aim for a slow, steady weight loss. Even a small amount of weight loss can lower your risk of health problems.  How to lose weight safely:  A safe and healthy way to lose weight is to eat fewer calories and get regular exercise. You can lose up about 1 pound a week by decreasing the number of calories you eat by 500 calories each day.   Healthy meal plan for weight management:  A healthy meal plan includes a variety of foods, contains fewer calories, and helps you stay healthy. A healthy meal plan includes the following:  Eat whole-grain foods more often.  A healthy meal plan should contain fiber. Fiber is the part of grains, fruits, and vegetables that is not broken down by your body. Whole-grain foods are healthy and provide extra fiber in your diet. Some examples of whole-grain foods are whole-wheat breads and pastas, oatmeal, brown rice, and bulgur.  Eat a variety of vegetables every day.  Include dark, leafy greens such as spinach, kale, mg greens, and mustard greens. Eat yellow and orange vegetables such as carrots, sweet potatoes, and winter squash.   Eat a variety of fruits every day.  Choose fresh or canned fruit (canned in its own juice or light syrup) instead of juice. Fruit juice has very little or no fiber.  Eat low-fat dairy foods.  Drink fat-free (skim) milk or 1% milk. Eat fat-free yogurt and low-fat cottage cheese. Try low-fat cheeses such as mozzarella and other reduced-fat cheeses.  Choose meat and other protein foods that are low in fat.  Choose beans or other legumes such as split peas or lentils. Choose fish, skinless poultry (chicken or turkey), or lean cuts of red meat (beef or pork). Before you cook meat or poultry, cut off any visible fat.   Use less fat and oil.  Try baking foods instead of frying them. Add less fat, such as margarine, sour cream, regular salad dressing and mayonnaise to foods. Eat fewer high-fat foods.  Some examples of high-fat foods include french fries, doughnuts, ice cream, and cakes.  Eat fewer sweets.  Limit foods and drinks that are high in sugar. This includes candy, cookies, regular soda, and sweetened drinks.  Exercise:  Exercise at least 30 minutes per day on most days of the week. Some examples of exercise include walking, biking, dancing, and swimming. You can also fit in more physical activity by taking the stairs instead of the elevator or parking farther away from stores. Ask your healthcare provider about the best exercise plan for you.      © Copyright [employer] 2018 Information is for End User's use only and may not be sold, redistributed or otherwise used for commercial purposes. All illustrations and images included in CareNotes® are the copyrighted property of [employer] or [employer]

## 2024-07-15 NOTE — ASSESSMENT & PLAN NOTE
Patient has a history of obesity.  His BMI is stable at 31.97.  He states that he is not getting as much exercise and not watching his diet as closely but did have an improvement overall as far as his weight is concerned.  He was told he needs to work harder and he will discuss this with his endocrinologist as to whether or not there is any further modification of treatment that would be indicated.

## 2024-07-15 NOTE — ASSESSMENT & PLAN NOTE
History of hyperlipidemia.  Patient remains on Crestor 10 mg daily and cholesterol is under control.  Triglyceride level is slightly high.  We discussed again the importance of diet watching his intake of fats and cholesterol not only to keep his cholesterol under control but to lose weight.  Will continue to monitor this and make modifications to treatment in the future if needed.

## 2024-07-15 NOTE — ASSESSMENT & PLAN NOTE
Was unable to tolerate the CPAP device.  Patient went for evaluation by ENT physicians and was felt to be a candidate for the [place] of the [place].  He did undergo the procedure and is pleased with the results.  Able to sleep through the night without any difficulty.

## 2024-07-15 NOTE — ASSESSMENT & PLAN NOTE
Does have a history of diabetes mellitus type 2.  Remains on multiple medication including Ozempic glimepiride and Farxiga.  Patient's blood sugars are showing adequate control with a hemoglobin of 6.4.  He does have a follow-up to be seen by his endocrinologist and will continue with medication to not only have control of his blood sugars but continue to lose weight.  Patient is up-to-date with diabetic eye exam and foot exam.  Lab Results   Component Value Date    HGBA1C 6.4 (H) 04/27/2024

## 2024-08-01 DIAGNOSIS — E11.65 TYPE 2 DIABETES MELLITUS WITH HYPERGLYCEMIA, WITHOUT LONG-TERM CURRENT USE OF INSULIN (HCC): ICD-10-CM

## 2024-08-01 RX ORDER — BLOOD SUGAR DIAGNOSTIC
STRIP MISCELLANEOUS
Qty: 100 STRIP | Refills: 2 | Status: SHIPPED | OUTPATIENT
Start: 2024-08-01

## 2024-08-14 PROBLEM — Z00.00 MEDICARE ANNUAL WELLNESS VISIT, SUBSEQUENT: Status: RESOLVED | Noted: 2024-04-09 | Resolved: 2024-08-14

## 2024-09-30 ENCOUNTER — OFFICE VISIT (OUTPATIENT)
Age: 69
End: 2024-09-30
Payer: MEDICARE

## 2024-09-30 VITALS
WEIGHT: 251 LBS | SYSTOLIC BLOOD PRESSURE: 128 MMHG | BODY MASS INDEX: 32.21 KG/M2 | TEMPERATURE: 97.2 F | HEIGHT: 74 IN | DIASTOLIC BLOOD PRESSURE: 82 MMHG | RESPIRATION RATE: 16 BRPM | OXYGEN SATURATION: 95 % | HEART RATE: 80 BPM

## 2024-09-30 DIAGNOSIS — E66.01 MORBID OBESITY (HCC): ICD-10-CM

## 2024-09-30 DIAGNOSIS — Z23 ENCOUNTER FOR IMMUNIZATION: ICD-10-CM

## 2024-09-30 DIAGNOSIS — M46.1 SACROILIITIS (HCC): ICD-10-CM

## 2024-09-30 DIAGNOSIS — Z12.5 PROSTATE CANCER SCREENING: Primary | ICD-10-CM

## 2024-09-30 DIAGNOSIS — M51.369 LUMBAR DEGENERATIVE DISC DISEASE: ICD-10-CM

## 2024-09-30 DIAGNOSIS — E11.65 TYPE 2 DIABETES MELLITUS WITH HYPERGLYCEMIA, WITHOUT LONG-TERM CURRENT USE OF INSULIN (HCC): ICD-10-CM

## 2024-09-30 DIAGNOSIS — G47.33 OSA (OBSTRUCTIVE SLEEP APNEA): ICD-10-CM

## 2024-09-30 DIAGNOSIS — M54.16 LUMBAR RADICULOPATHY: ICD-10-CM

## 2024-09-30 PROCEDURE — 90662 IIV NO PRSV INCREASED AG IM: CPT | Performed by: INTERNAL MEDICINE

## 2024-09-30 PROCEDURE — G0008 ADMIN INFLUENZA VIRUS VAC: HCPCS | Performed by: INTERNAL MEDICINE

## 2024-09-30 PROCEDURE — 99214 OFFICE O/P EST MOD 30 MIN: CPT | Performed by: INTERNAL MEDICINE

## 2024-09-30 NOTE — PROGRESS NOTES
Ambulatory Visit  Name: Mahendra Flores      : 1955      MRN: [id number]  Encounter Provider: Swapnil Mims DO  Encounter Date: 2024   Encounter department: [facility] INTERNAL MEDICINE    Assessment & Plan  Encounter for immunization    Orders:    influenza vaccine, high-dose, PF 0.5 mL (Fluzone High Dose)    Morbid obesity (HCC)         Lumbar degenerative disc disease  He is continuing to struggle with his back pain and chronic discomfort.  He states that recently he was away on vacation with his wife in [place] and did have an acute attack of bilateral low back pain starting out on the left then going to the right and he was severely disabled secondary to this to have difficulties with ambulation no weakness down his legs.  On a scale of 1-10 he states at its worst the pain was approximately a 10 but since he is back home he has had a decrease in his pain and now states that the pain levels approximately L1.  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  We have placed an order for the patient to be seen by pain management which he has seen before and because of recurrence of disease and documented disease in the past he is going for repeat MRI of the lumbar spine to assess the extent of disease at this point in time.  He had been taking a nonsteroidal anti-inflammatory to help with his discomfort which she states is not helping and with the patient having a history of diabetes nonsteroidals are not indicated and I do not feel comfortable giving the patient narcotic analgesics and would leave that up to pain management.    Orders:    Ambulatory referral to Spine & Pain Management; Future    MRI lumbar spine wo contrast; Future    Sacroiliitis (HCC)    Orders:    Ambulatory referral to Spine & Pain Management; Future    MRI lumbar spine wo contrast; Future    Lumbar radiculopathy    Orders:     Ambulatory referral to Spine & Pain Management; Future    MRI lumbar spine wo contrast; Future    Prostate cancer screening    Orders:    PSA, Total Screen; Future    Type 2 diabetes mellitus with hyperglycemia, without long-term current use of insulin ([facility])    Lab Results   Component Value Date    HGBA1C 6.4 (H) 04/27/2024            BMI 33.0-33.9,adult  Patient has been on Ozempic to help him with his weight loss and control of his diabetes.  The sugars have been controlled but patient has had no significant weight loss over the past 5 months.  He will continue to follow-up with endocrinology and weight management         AIDEE (obstructive sleep apnea)  Patient did have surgical treatment of his obstructive sleep apnea using the inspire device.  Patient is pleased and is getting restful sleep at night.              History of Present Illness     69-year-old male history of multiple medical issues as outlined previously who is being seen for reevaluation.  Recently returned from a trip to [place] with his wife and states he was doing well after the flight did develop severe low back pain first on the left radiating to the right and it did become severe and disabling.  Patient did not have any acute accident or injury    Back Pain  The current episode started more than 1 year ago. The problem occurs constantly. The problem has been waxing and waning since onset. The pain is present in the gluteal and sacro-iliac. The quality of the pain is described as aching. The pain does not radiate. The pain is The same all the time. The symptoms are aggravated by position, lying down, sitting and standing. Stiffness is present In the morning, at night and all day. Associated symptoms include bladder incontinence, numbness, paresthesias and tingling. Pertinent negatives include no abdominal pain, bowel incontinence, chest pain, dysuria, fever, headaches, leg pain, paresis, pelvic pain, perianal numbness, weakness or weight loss.  Risk factors include obesity and poor posture.     Review of Systems   Constitutional:  Positive for activity change. Negative for appetite change, chills, diaphoresis, fatigue, fever, unexpected weight change and weight loss.        With the onset of pain did have a significant decrease in activity, ability to ambulate.  Now is improving but is still having concerns   HENT: Negative.     Eyes: Negative.    Respiratory: Negative.     Cardiovascular: Negative.  Negative for chest pain.   Gastrointestinal: Negative.  Negative for abdominal pain and bowel incontinence.   Endocrine: Negative.    Genitourinary:  Positive for bladder incontinence. Negative for decreased urine volume, difficulty urinating, dysuria, enuresis, flank pain, frequency, genital sores, hematuria, pelvic pain, penile discharge, penile pain, penile swelling, scrotal swelling, testicular pain and urgency.   Musculoskeletal:  Positive for back pain. Negative for arthralgias, gait problem, joint swelling, myalgias, neck pain and neck stiffness.   Skin: Negative.    Allergic/Immunologic: Negative.    Neurological:  Positive for tingling, numbness and paresthesias. Negative for dizziness, tremors, seizures, syncope, facial asymmetry, speech difficulty, weakness, light-headedness and headaches.   Hematological: Negative.    Psychiatric/Behavioral: Negative.       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     Past Surgical History:   Procedure Laterality Date    COLONOSCOPY N/A 11/22/2016    Procedure: COLONOSCOPY;  Surgeon: Byron Allan MD;  Location:  [facility];  Service:     DEBRIDEMENT TENNIS ELBOW  2006    EXAMINATION UNDER ANESTHESIA N/A 9/28/2023    Procedure: DRUG INDUCED SLEEP ENDOSCOPY;  Surgeon: Juan Pablo Santana MD;  [facility]: [facility];  Service: ENT    EYE SURGERY  2008    laser holes to relieve pressure    HERNIA REPAIR  2011    x2, umbilical and right side per patient    KNEE SURGERY  2000    meniscus respair    NASAL SEPTUM SURGERY  1980    OR TENDON SHEATH INCISION Right 11/19/2019    Procedure: RELEASE TRIGGER FINGER-right long finger;  Surgeon: Percy Gatica MD;  Location: [facility];  Service: Orthopedics    OR TENDON SHEATH INCISION Left 11/26/2019    Procedure: RELEASE TRIGGER FINGER-left long finger;  Surgeon: Percy Gatica MD;  Location: [facility];  Service: Orthopedics    TONSILLECTOMY  1961    UPPER AIRWAY STIMULATOR N/A 11/9/2023    Procedure: INSPIRE IMPLANT;  Surgeon: Juan Pablo Santana MD;  Location: [facility];  Service: ENT    UVULOPALATOPHARYNGOPLASTY  2004     Family History   Problem Relation Age of Onset    Arthritis Mother         mostly in her back     Social History     Tobacco Use    Smoking status: Never    Smokeless tobacco: Never   Vaping Use    Vaping status: Never Used   Substance and Sexual Activity    Alcohol use: Not Currently     Comment: seldom    Drug use: Not Currently     Types: Marijuana    Sexual activity: Never     Current Outpatient Medications on File Prior to Visit   Medication Sig    Accu-Chek FastClix Lancets MISC Check glucose daily    acetaminophen (TYLENOL 8 HOUR) 650 mg CR tablet Take 1 tablet (650 mg total) by mouth every 8 (eight) hours (Patient taking differently: Take 650 mg by mouth every 8 (eight) hours as needed)    Blood Glucose Monitoring Suppl (Accu-Chek Maida Plus) w/Device KIT Use in the morning Check once daily dx E13.9    Coenzyme Q10 (CO Q-10) 200 MG CAPS in the morning    diclofenac sodium (VOLTAREN) 50 mg EC tablet TAKE 1 TABLET BY MOUTH TWICE A DAY AS NEEDED FOR PAIN     "diphenhydrAMINE HCl 25 MG CHEW if needed    Farxiga 10 MG tablet TAKE 1 TABLET BY MOUTH EVERY DAY    fluticasone (FLONASE) 50 mcg/act nasal spray 2 sprays into each nostril daily    glucose blood (Accu-Chek Maida Plus) test strip CHECK ONCE DAILY IN THE MORNING    lisinopril-hydrochlorothiazide (PRINZIDE,ZESTORETIC) 10-12.5 MG per tablet TAKE 1 TABLET BY MOUTH EVERY DAY    Omeprazole 20 MG TBEC if needed    rosuvastatin (CRESTOR) 10 MG tablet TAKE 1 TABLET BY MOUTH EVERY DAY    semaglutide, 1 mg/dose, (Ozempic, 1 MG/DOSE,) 4 mg/3 mL injection pen INJECT 1 MG ONCE A WEEK    glimepiride (AMARYL) 1 mg tablet Take 1/2 tablet with dinner. (Patient not taking: Reported on 7/15/2024)     Allergies   Allergen Reactions    Tramadol Itching     Immunization History   Administered Date(s) Administered    COVID-19 MODERNA VACC 0.5 ML IM 03/16/2021, 04/15/2021, 10/29/2021, 07/18/2022    COVID-19 Moderna Vac BIVALENT 12 Yr+ IM 0.5 ML 10/13/2022    COVID-19 Moderna mRNA Vaccine 12 Yr+ 50 mcg/0.5 mL (Spikevax) 10/02/2023    H1N1, All Formulations 12/30/2009    INFLUENZA 10/28/2015, 09/13/2016, 10/17/2019, 10/13/2020, 10/30/2021, 10/02/2023    Influenza Quadrivalent 3 years and older 10/07/2014    Influenza Quadrivalent Preservative Free 3 years and older IM 10/07/2014    Influenza Quadrivalent Recombinant Preservative Free IM 10/17/2019, 10/13/2020    Influenza Quadrivalent, 6-35 Months IM 10/28/2015, 09/13/2016, 09/28/2017    Influenza Split High Dose Preservative Free IM 09/30/2024    Influenza, Seasonal Vaccine, Quadrivalent, Adjuvanted, .5e 10/02/2023    Influenza, high dose seasonal 0.7 mL 10/30/2021    Influenza, recombinant, quadrivalent,injectable, preservative free 10/09/2018    Influenza, seasonal, injectable 1955    Tuberculin Skin Test-PPD Intradermal 08/27/2015    Zoster Vaccine Recombinant 11/12/2020, 03/18/2021     Objective     /82   Pulse 80   Temp (!) 97.2 °F (36.2 °C)   Resp 16   Ht 6' 2\" (1.88 m) "   Wt 114 kg (251 lb)   SpO2 95%   BMI 32.23 kg/m²     Physical Exam  Vitals and nursing note reviewed.   Constitutional:       General: He is not in acute distress.     Appearance: Normal appearance. He is obese. He is not ill-appearing, toxic-appearing or diaphoretic.      Comments: Pleasant articulate obese 69-year-old male who is awake alert in no acute distress oriented x 3   HENT:      Head: Normocephalic and atraumatic.      Right Ear: Tympanic membrane, ear canal and external ear normal. There is no impacted cerumen.      Left Ear: Tympanic membrane, ear canal and external ear normal. There is no impacted cerumen.      Nose: Nose normal. No congestion or rhinorrhea.      Mouth/Throat:      Mouth: Mucous membranes are moist.      Pharynx: Oropharynx is clear. No oropharyngeal exudate or posterior oropharyngeal erythema.   Eyes:      General: No scleral icterus.        Right eye: No discharge.         Left eye: No discharge.      Extraocular Movements: Extraocular movements intact.      Conjunctiva/sclera: Conjunctivae normal.      Pupils: Pupils are equal, round, and reactive to light.   Neck:      Vascular: No carotid bruit.   Cardiovascular:      Rate and Rhythm: Normal rate and regular rhythm.      Heart sounds: Normal heart sounds. No murmur heard.     No friction rub. No gallop.   Pulmonary:      Effort: Pulmonary effort is normal. No respiratory distress.      Breath sounds: Normal breath sounds. No stridor. No wheezing, rhonchi or rales.   Chest:      Chest wall: No tenderness.   Abdominal:      General: Bowel sounds are normal. There is no distension.      Palpations: Abdomen is soft. There is no mass.      Tenderness: There is no abdominal tenderness. There is no right CVA tenderness, left CVA tenderness, guarding or rebound.      Hernia: No hernia is present.   Musculoskeletal:         General: Deformity present. No swelling, tenderness or signs of injury.      Cervical back: Normal range of  motion and neck supple. No rigidity or tenderness.      Right lower leg: No edema.      Left lower leg: No edema.      Comments: Patient on evaluation has a flattening to the lumbar curve.  He underwent maneuvers to the lumbar spine both rotation forward backward bending and sidebending and no pain can be elicited with maneuvers.  Patient had a negative straight leg raising and no weakness to either lower extremities.   Lymphadenopathy:      Cervical: No cervical adenopathy.   Skin:     General: Skin is warm and dry.      Capillary Refill: Capillary refill takes less than 2 seconds.      Coloration: Skin is not jaundiced or pale.      Findings: No bruising, erythema, lesion or rash.   Neurological:      General: No focal deficit present.      Mental Status: He is alert and oriented to person, place, and time. Mental status is at baseline.      Cranial Nerves: No cranial nerve deficit.      Sensory: No sensory deficit.      Motor: No weakness.      Coordination: Coordination normal.      Gait: Gait normal.      Deep Tendon Reflexes: Reflexes normal.   Psychiatric:         Mood and Affect: Mood normal.         Behavior: Behavior normal.         Thought Content: Thought content normal.         Judgment: Judgment normal.

## 2024-09-30 NOTE — ASSESSMENT & PLAN NOTE
Orders:    Ambulatory referral to Spine & Pain Management; Future    MRI lumbar spine wo contrast; Future

## 2024-09-30 NOTE — ASSESSMENT & PLAN NOTE
Diabetes mellitus type 2.  He remains on Ozempic and oral medications.  As noted last hemoglobin A1c was 6.4 showing adequate control with present treatment.  Lab Results   Component Value Date    HGBA1C 6.4 (H) 04/27/2024

## 2024-09-30 NOTE — ASSESSMENT & PLAN NOTE
He is continuing to struggle with his back pain and chronic discomfort.  He states that recently he was away on vacation with his wife in [place] and did have an acute attack of bilateral low back pain starting out on the left then going to the right and he was severely disabled secondary to this to have difficulties with ambulation no weakness down his legs.  On a scale of 1-10 he states at its worst the pain was approximately a 10 but since he is back home he has had a decrease in his pain and now states that the pain levels approximately L1.  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  We have placed an order for the patient to be seen by pain management which he has seen before and because of recurrence of disease and documented disease in the past he is going for repeat MRI of the lumbar spine to assess the extent of disease at this point in time.  He had been taking a nonsteroidal anti-inflammatory to help with his discomfort which she states is not helping and with the patient having a history of diabetes nonsteroidals are not indicated and I do not feel comfortable giving the patient narcotic analgesics and would leave that up to pain management.    Orders:    Ambulatory referral to Spine & Pain Management; Future    MRI lumbar spine wo contrast; Future

## 2024-09-30 NOTE — ASSESSMENT & PLAN NOTE
Patient did have surgical treatment of his obstructive sleep apnea using the inspire device.  Patient is pleased and is getting restful sleep at night.

## 2024-09-30 NOTE — ASSESSMENT & PLAN NOTE
Patient has been on Ozempic to help him with his weight loss and control of his diabetes.  The sugars have been controlled but patient has had no significant weight loss over the past 5 months.  He will continue to follow-up with endocrinology and weight management

## 2024-10-07 ENCOUNTER — TELEPHONE (OUTPATIENT)
Facility: MEDICAL CENTER | Age: 69
End: 2024-10-07

## 2024-10-07 ENCOUNTER — LAB (OUTPATIENT)
Dept: LAB | Age: 69
End: 2024-10-07
Payer: MEDICARE

## 2024-10-07 DIAGNOSIS — E11.65 TYPE 2 DIABETES MELLITUS WITH HYPERGLYCEMIA, WITHOUT LONG-TERM CURRENT USE OF INSULIN (HCC): ICD-10-CM

## 2024-10-07 LAB
ANION GAP SERPL CALCULATED.3IONS-SCNC: 9 MMOL/L (ref 4–13)
BUN SERPL-MCNC: 18 MG/DL (ref 5–25)
CALCIUM SERPL-MCNC: 9 MG/DL (ref 8.4–10.2)
CHLORIDE SERPL-SCNC: 100 MMOL/L (ref 96–108)
CO2 SERPL-SCNC: 30 MMOL/L (ref 21–32)
CREAT SERPL-MCNC: 0.78 MG/DL (ref 0.6–1.3)
EST. AVERAGE GLUCOSE BLD GHB EST-MCNC: 163 MG/DL
GFR SERPL CREATININE-BSD FRML MDRD: 93 ML/MIN/1.73SQ M
GLUCOSE P FAST SERPL-MCNC: 158 MG/DL (ref 65–99)
HBA1C MFR BLD: 7.3 %
POTASSIUM SERPL-SCNC: 3.8 MMOL/L (ref 3.5–5.3)
SODIUM SERPL-SCNC: 139 MMOL/L (ref 135–147)

## 2024-10-07 PROCEDURE — 80048 BASIC METABOLIC PNL TOTAL CA: CPT

## 2024-10-07 PROCEDURE — 83036 HEMOGLOBIN GLYCOSYLATED A1C: CPT

## 2024-10-07 PROCEDURE — 36415 COLL VENOUS BLD VENIPUNCTURE: CPT

## 2024-10-07 NOTE — TELEPHONE ENCOUNTER
Note in chart after leaving a message for someone to schedule there MRI appointment:    I left a message today for Shun Flores to call me at [phone] to schedule their MRI appointment.

## 2024-10-09 ENCOUNTER — OFFICE VISIT (OUTPATIENT)
Dept: ENDOCRINOLOGY | Facility: CLINIC | Age: 69
End: 2024-10-09
Payer: MEDICARE

## 2024-10-09 VITALS
DIASTOLIC BLOOD PRESSURE: 80 MMHG | HEIGHT: 74 IN | BODY MASS INDEX: 31.75 KG/M2 | SYSTOLIC BLOOD PRESSURE: 116 MMHG | OXYGEN SATURATION: 95 % | WEIGHT: 247.4 LBS | HEART RATE: 69 BPM

## 2024-10-09 DIAGNOSIS — I10 ESSENTIAL HYPERTENSION: ICD-10-CM

## 2024-10-09 DIAGNOSIS — E78.2 MIXED HYPERLIPIDEMIA: ICD-10-CM

## 2024-10-09 DIAGNOSIS — E11.65 TYPE 2 DIABETES MELLITUS WITH HYPERGLYCEMIA, WITHOUT LONG-TERM CURRENT USE OF INSULIN (HCC): Primary | ICD-10-CM

## 2024-10-09 PROCEDURE — 99214 OFFICE O/P EST MOD 30 MIN: CPT | Performed by: PHYSICIAN ASSISTANT

## 2024-10-09 RX ORDER — METFORMIN HCL 500 MG
500 TABLET, EXTENDED RELEASE 24 HR ORAL
Qty: 90 TABLET | Refills: 1 | Status: SHIPPED | OUTPATIENT
Start: 2024-10-09

## 2024-10-09 NOTE — PROGRESS NOTES
Patient Progress Note      CC: DM      Referring Provider  Swapnil Mims, Do  [address]  [place] [address]     History of Present Illness:   Mahendra Flores is a 69 y.o. male with a history of type 2 diabetes without long term use of insulin. Diabetes course has been stable. Denies recent illness or hospitalizations. Denies recent severe hypoglycemic or severe hyperglycemic episodes. Denies any issues with his current regimen. Home glucose monitoring: are performed regularly     Home blood glucose readings: 130-160s mg/dl   Readings have gone up in the morning since he stopped the glimepiride    Current regimen: Farxiga 10 mg daily, Ozempic 1 mg weekly  Glimepiride was stopped because he was feeling dizzy on it  compliant most of the time, denies any side effects from medications.  Hypoglycemic episodes: No, never.  Treatment of hypoglycemia: discussed treatment     Diabetes education: Yes  Diet: 3 meals per day, 2-3 snacks per day. Timing of meals is predictable.   Diabetic diet compliance: compliant some of the time. He does like his sweets.  Activity: Daily activity is predictable: Yes. He tries to exercise 3-4 times a week.     Ophthamology: states he went to [facility] this year.   Podiatry: diabetic foot exam UTD, February 2024     Has hypertension: on ACE inhibitor/ARB, compliant most of the time  Has hyperlipidemia: on statin - tolerating well, no myalgias. compliant most of the time, denies any side effects from medications.  Thyroid disorders: No  History of pancreatitis: No      Patient Active Problem List   Diagnosis    Lumbago with sciatica, left side    Lumbar degenerative disc disease    Lumbar herniated disc    Lumbar radiculopathy    Lumbar spondylosis    Myofascial pain    Obesity    Sacroiliitis (HCC)    Hyperlipidemia    Diabetes (HCC)    Essential hypertension    AIDEE (obstructive sleep apnea)    Insomnia    Hypersomnia    Obesity (BMI 30-39.9)    Type 2 diabetes  mellitus with hyperglycemia, without long-term current use of insulin (HCC)    Morbid obesity (HCC)    Trigger middle finger of right hand    Trigger finger, left middle finger    Greater trochanteric bursitis of both hips    Occult blood in stools    Blood in stool    BMI 33.0-33.9,adult      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      Past Surgical History:   Procedure Laterality Date    COLONOSCOPY N/A 11/22/2016    Procedure: COLONOSCOPY;  Surgeon: Byron Allan MD;  Location: [facility];  Service:     DEBRIDEMENT TENNIS ELBOW  2006    EXAMINATION UNDER ANESTHESIA N/A 9/28/2023    Procedure: DRUG INDUCED SLEEP ENDOSCOPY;  Surgeon: Juan Pablo Santana MD;  Location: [facility];  Service: ENT    EYE SURGERY  2008    laser holes to relieve pressure    HERNIA REPAIR  2011    x2, umbilical and right side per patient    KNEE SURGERY  2000    meniscus respair    NASAL SEPTUM SURGERY  1980    MS TENDON SHEATH INCISION Right 11/19/2019    Procedure: RELEASE TRIGGER FINGER-right long finger;  Surgeon: Percy Gatica MD;  Location: [facility];  Service: Orthopedics    MS TENDON SHEATH INCISION Left 11/26/2019    Procedure: RELEASE TRIGGER FINGER-left long finger;  Surgeon: Percy Gatica MD;  Location: [facility];  Service: Orthopedics    TONSILLECTOMY  1961    UPPER AIRWAY STIMULATOR N/A 11/9/2023    Procedure: INSPIRE IMPLANT;  Surgeon: Juan Pablo Santana MD;  Location: [facility];  Service: ENT    UVULOPALATOPHARYNGOPLASTY  2004      Family History   Problem Relation Age of Onset    Arthritis Mother          mostly in her back     Social History     Tobacco Use    Smoking status: Never    Smokeless tobacco: Never   Substance Use Topics    Alcohol use: Not Currently     Comment: seldom     Allergies   Allergen Reactions    Tramadol Itching         Current Outpatient Medications:     Accu-Chek FastClix Lancets MISC, Check glucose daily, Disp: 100 each, Rfl: 1    acetaminophen (TYLENOL 8 HOUR) 650 mg CR tablet, Take 1 tablet (650 mg total) by mouth every 8 (eight) hours (Patient taking differently: Take 650 mg by mouth every 8 (eight) hours as needed), Disp: 15 tablet, Rfl: 0    Blood Glucose Monitoring Suppl (Accu-Chek Maida Plus) w/Device KIT, Use in the morning Check once daily dx E13.9, Disp: 1 kit, Rfl: 0    Coenzyme Q10 (CO Q-10) 200 MG CAPS, in the morning, Disp: , Rfl:     diclofenac sodium (VOLTAREN) 50 mg EC tablet, TAKE 1 TABLET BY MOUTH TWICE A DAY AS NEEDED FOR PAIN, Disp: 180 tablet, Rfl: 1    diphenhydrAMINE HCl 25 MG CHEW, if needed, Disp: , Rfl:     Farxiga 10 MG tablet, TAKE 1 TABLET BY MOUTH EVERY DAY, Disp: 90 tablet, Rfl: 3    fluticasone (FLONASE) 50 mcg/act nasal spray, 2 sprays into each nostril daily, Disp: 16 g, Rfl: 2    glucose blood (Accu-Chek Maida Plus) test strip, CHECK ONCE DAILY IN THE MORNING, Disp: 100 strip, Rfl: 2    lisinopril-hydrochlorothiazide (PRINZIDE,ZESTORETIC) 10-12.5 MG per tablet, TAKE 1 TABLET BY MOUTH EVERY DAY, Disp: 90 tablet, Rfl: 3    metFORMIN (GLUCOPHAGE-XR) 500 mg 24 hr tablet, Take 1 tablet (500 mg total) by mouth daily with dinner, Disp: 90 tablet, Rfl: 1    Omeprazole 20 MG TBEC, if needed, Disp: , Rfl:     rosuvastatin (CRESTOR) 10 MG tablet, TAKE 1 TABLET BY MOUTH EVERY DAY, Disp: 90 tablet, Rfl: 3    semaglutide, 1 mg/dose, (Ozempic, 1 MG/DOSE,) 4 mg/3 mL injection pen, INJECT 1 MG ONCE A WEEK, Disp: 3 mL, Rfl: 5  Review of Systems   Constitutional:  Negative for activity change, appetite change, fatigue and unexpected weight change.   HENT:  Negative for  "trouble swallowing.    Eyes:  Negative for visual disturbance.   Respiratory:  Negative for shortness of breath.    Cardiovascular:  Negative for chest pain and palpitations.   Gastrointestinal:  Negative for constipation and diarrhea.   Endocrine: Negative for polydipsia and polyuria.   Musculoskeletal: Negative.    Skin:  Negative for wound.   Neurological:  Positive for numbness.   Psychiatric/Behavioral: Negative.         Physical Exam:  Body mass index is 31.76 kg/m².  /80   Pulse 69   Ht 6' 2\" (1.88 m)   Wt 112 kg (247 lb 6.4 oz)   SpO2 95%   BMI 31.76 kg/m²    Wt Readings from Last 3 Encounters:   10/09/24 112 kg (247 lb 6.4 oz)   09/30/24 114 kg (251 lb)   07/15/24 113 kg (249 lb)       Physical Exam  Vitals and nursing note reviewed.   Constitutional:       Appearance: He is well-developed.   HENT:      Head: Normocephalic.   Eyes:      General: No scleral icterus.     Extraocular Movements: EOM normal.   Neck:      Thyroid: No thyromegaly.   Cardiovascular:      Rate and Rhythm: Normal rate and regular rhythm.      Pulses:           Radial pulses are 2+ on the right side and 2+ on the left side.      Heart sounds: No murmur heard.  Pulmonary:      Effort: Pulmonary effort is normal. No respiratory distress.      Breath sounds: Normal breath sounds. No wheezing.   Musculoskeletal:      Cervical back: Neck supple.   Skin:     General: Skin is warm and dry.   Neurological:      Mental Status: He is alert.   Psychiatric:         Mood and Affect: Mood and affect normal.           Labs:   Component      Latest Ref Rng 4/27/2024 7/12/2024 10/7/2024   Sodium      135 - 147 mmol/L 141  139  139    Potassium      3.5 - 5.3 mmol/L 4.3  4.5  3.8    Chloride      96 - 108 mmol/L 102  101  100    Carbon Dioxide      21 - 32 mmol/L 32  31  30    ANION GAP      4 - 13 mmol/L 7  7  9    BUN      5 - 25 mg/dL 24  21  18    Creatinine      0.60 - 1.30 mg/dL 0.75  0.82  0.78    GLUCOSE, FASTING      65 - 99 mg/dL " 114 (H)  142 (H)  158 (H)    Calcium      8.4 - 10.2 mg/dL 9.3  9.4  9.0    AST      13 - 39 U/L  14     ALT      7 - 52 U/L  30     ALK PHOS      34 - 104 U/L  38     Total Protein      6.4 - 8.4 g/dL  6.5     Albumin      3.5 - 5.0 g/dL  4.0     Total Bilirubin      0.20 - 1.00 mg/dL  0.50     GFR, Calculated      ml/min/1.73sq m 94  91  93    Cholesterol      See Comment mg/dL  176     Triglycerides      See Comment mg/dL  114     HDL      >=40 mg/dL  53     LDL Calculated      0 - 100 mg/dL  100     Non-HDL Cholesterol      mg/dl  123     EXT Creatinine Urine      Reference range not established. mg/dL 118.5      Albumin,U,Random      <20.0 mg/L 32.3 (H)      Albumin Creat Ratio      0 - 30 mg/g creatinine 27      Hemoglobin A1C      Normal 4.0-5.6%; PreDiabetic 5.7-6.4%; Diabetic >=6.5%; Glycemic control for adults with diabetes <7.0% % 6.4 (H)   7.3 (H)    eAG, EST AVG Glucose      mg/dl 137   163       Legend:  (H) High      Plan:    Diagnoses and all orders for this visit:    Type 2 diabetes mellitus with hyperglycemia, without long-term current use of insulin (HCC)  HGA1C 7.3%. Worsened.  Treatment regimen: start metformin 500 mg XR daily. Continue Farxiga and Ozempic.   Episodes of hypoglycemia can lead to permanent disability and death.  Discussed risks/complications associated with uncontrolled diabetes.    Advised to adhere to diabetic diet, and recommended staying active/exercising routinely as tolerated.  Keep carbohydrates consistent to limit blood glucose fluctuations.  Advised to call if blood sugars less than 70 mg/dl or over 300 mg/dl.   Check blood glucose 1-2 times a day  Discussed symptoms and treatment of hypoglycemia.   Recommended routine follow-up with podiatry and ophthalmology.   Send log in 2 weeks.    Ordered blood work to complete prior to next visit.  -     Albumin / creatinine urine ratio; Future  -     Basic metabolic panel; Future  -     Hemoglobin A1C; Future  -     metFORMIN  (GLUCOPHAGE-XR) 500 mg 24 hr tablet; Take 1 tablet (500 mg total) by mouth daily with dinner    Essential hypertension  Blood pressure is well controlled, continue current treatment    Mixed hyperlipidemia  LDL previously 100  On statin therapy  Managed by PCP          Discussed with the patient diagnosis and treatment and all questions fully answered. He will call me if any problems arise.    Counseled patient on diagnostic results, prognosis, risk and benefit of treatment options, instruction for management, importance of treatment compliance, risk factor reduction and impressions.      Maddie Kidd PA-C

## 2024-10-09 NOTE — PATIENT INSTRUCTIONS
Hypoglycemia instructions   Mahendra Flores  10/9/2024  [id number]    Low Blood Sugar    Steps to treat low blood sugar.    1. Test blood sugar if you have symptoms of low blood sugar:   Low Blood Sugar Symptoms:  o Sweaty  o Dizzy  o Rapid heartbeat  o Shaky  o Bad mood  o Hungry      2. Treat blood sugar less than 70 with 15 grams of fast-acting carbohydrate:   Examples of 15 grams Fast-Acting Carbohydrate:  o 4 oz juice  o 4 oz regular soda  o 3-4 glucose tablets (chew)  o 3-4 hard candies (chew)          3.  Wait 15 minutes and test your blood sugar again     4. If blood sugar is less than 100, repeat steps 2-3.    5. When your blood sugar is 100 or more, eat a snack if it will be longer than one hour until your next meal. The snack should be 15 grams of carbohydrate and a protein:   Examples of snacks:  o ½ sandwich  o 6 crackers with cheese  o Piece of fruit with cheese or peanut butter  o 6 crackers with peanut butter

## 2024-10-10 DIAGNOSIS — E11.65 TYPE 2 DIABETES MELLITUS WITH HYPERGLYCEMIA, WITHOUT LONG-TERM CURRENT USE OF INSULIN (HCC): ICD-10-CM

## 2024-10-10 RX ORDER — SEMAGLUTIDE 1.34 MG/ML
INJECTION, SOLUTION SUBCUTANEOUS
Qty: 3 ML | Refills: 5 | Status: SHIPPED | OUTPATIENT
Start: 2024-10-10

## 2024-10-14 DIAGNOSIS — M25.551 BILATERAL HIP PAIN: Primary | ICD-10-CM

## 2024-10-14 DIAGNOSIS — M25.552 BILATERAL HIP PAIN: Primary | ICD-10-CM

## 2024-10-16 DIAGNOSIS — E78.01 FAMILIAL HYPERCHOLESTEROLEMIA: ICD-10-CM

## 2024-10-16 RX ORDER — ROSUVASTATIN CALCIUM 10 MG/1
TABLET, COATED ORAL
Qty: 90 TABLET | Refills: 1 | Status: SHIPPED | OUTPATIENT
Start: 2024-10-16

## 2024-10-18 ENCOUNTER — APPOINTMENT (OUTPATIENT)
Dept: RADIOLOGY | Age: 69
End: 2024-10-18
Payer: MEDICARE

## 2024-10-18 ENCOUNTER — APPOINTMENT (OUTPATIENT)
Dept: LAB | Age: 69
End: 2024-10-18
Payer: MEDICARE

## 2024-10-18 DIAGNOSIS — M25.552 BILATERAL HIP PAIN: ICD-10-CM

## 2024-10-18 DIAGNOSIS — M25.551 BILATERAL HIP PAIN: ICD-10-CM

## 2024-10-18 PROCEDURE — 73521 X-RAY EXAM HIPS BI 2 VIEWS: CPT

## 2024-10-28 ENCOUNTER — HOSPITAL ENCOUNTER (OUTPATIENT)
Dept: MRI IMAGING | Facility: HOSPITAL | Age: 69
Discharge: HOME | End: 2024-10-28
Payer: MEDICARE

## 2024-10-28 ENCOUNTER — HOSPITAL ENCOUNTER (OUTPATIENT)
Dept: RADIOLOGY | Facility: HOSPITAL | Age: 69
Discharge: HOME | End: 2024-10-28
Payer: MEDICARE

## 2024-10-28 DIAGNOSIS — M46.1 SACROILIITIS (HCC): ICD-10-CM

## 2024-10-28 DIAGNOSIS — M51.369 LUMBAR DEGENERATIVE DISC DISEASE: ICD-10-CM

## 2024-10-28 DIAGNOSIS — M54.16 LUMBAR RADICULOPATHY: ICD-10-CM

## 2024-10-28 PROCEDURE — 72148 MRI LUMBAR SPINE W/O DYE: CPT

## 2024-11-08 ENCOUNTER — RA CDI HCC (OUTPATIENT)
Dept: OTHER | Facility: HOSPITAL | Age: 69
End: 2024-11-08

## 2024-11-11 ENCOUNTER — APPOINTMENT (OUTPATIENT)
Dept: LAB | Age: 69
End: 2024-11-11
Payer: MEDICARE

## 2024-11-11 DIAGNOSIS — I10 ESSENTIAL HYPERTENSION: ICD-10-CM

## 2024-11-11 DIAGNOSIS — Z12.5 PROSTATE CANCER SCREENING: ICD-10-CM

## 2024-11-11 LAB
ANION GAP SERPL CALCULATED.3IONS-SCNC: 7 MMOL/L (ref 4–13)
BUN SERPL-MCNC: 20 MG/DL (ref 5–25)
CALCIUM SERPL-MCNC: 9.3 MG/DL (ref 8.4–10.2)
CHLORIDE SERPL-SCNC: 101 MMOL/L (ref 96–108)
CO2 SERPL-SCNC: 31 MMOL/L (ref 21–32)
CREAT SERPL-MCNC: 0.81 MG/DL (ref 0.6–1.3)
GFR SERPL CREATININE-BSD FRML MDRD: 91 ML/MIN/1.73SQ M
GLUCOSE P FAST SERPL-MCNC: 140 MG/DL (ref 65–99)
POTASSIUM SERPL-SCNC: 3.9 MMOL/L (ref 3.5–5.3)
PSA SERPL-MCNC: 0.95 NG/ML (ref 0–4)
SODIUM SERPL-SCNC: 139 MMOL/L (ref 135–147)

## 2024-11-11 PROCEDURE — 36415 COLL VENOUS BLD VENIPUNCTURE: CPT

## 2024-11-11 PROCEDURE — 80048 BASIC METABOLIC PNL TOTAL CA: CPT

## 2024-11-11 PROCEDURE — G0103 PSA SCREENING: HCPCS

## 2024-11-14 DIAGNOSIS — E11.65 TYPE 2 DIABETES MELLITUS WITH HYPERGLYCEMIA, WITHOUT LONG-TERM CURRENT USE OF INSULIN (HCC): ICD-10-CM

## 2024-11-14 RX ORDER — LANCETS
EACH MISCELLANEOUS
Qty: 100 EACH | Refills: 1 | Status: SHIPPED | OUTPATIENT
Start: 2024-11-14

## 2024-12-05 ENCOUNTER — OFFICE VISIT (OUTPATIENT)
Age: 69
End: 2024-12-05
Payer: MEDICARE

## 2024-12-05 VITALS
WEIGHT: 254 LBS | HEART RATE: 90 BPM | OXYGEN SATURATION: 95 % | SYSTOLIC BLOOD PRESSURE: 112 MMHG | TEMPERATURE: 97.3 F | HEIGHT: 74 IN | RESPIRATION RATE: 16 BRPM | DIASTOLIC BLOOD PRESSURE: 76 MMHG | BODY MASS INDEX: 32.6 KG/M2

## 2024-12-05 DIAGNOSIS — E66.811 CLASS 1 OBESITY DUE TO EXCESS CALORIES WITHOUT SERIOUS COMORBIDITY WITH BODY MASS INDEX (BMI) OF 32.0 TO 32.9 IN ADULT: ICD-10-CM

## 2024-12-05 DIAGNOSIS — I10 ESSENTIAL HYPERTENSION: ICD-10-CM

## 2024-12-05 DIAGNOSIS — E66.09 CLASS 1 OBESITY DUE TO EXCESS CALORIES WITHOUT SERIOUS COMORBIDITY WITH BODY MASS INDEX (BMI) OF 32.0 TO 32.9 IN ADULT: ICD-10-CM

## 2024-12-05 DIAGNOSIS — E11.65 TYPE 2 DIABETES MELLITUS WITH HYPERGLYCEMIA, WITHOUT LONG-TERM CURRENT USE OF INSULIN (HCC): ICD-10-CM

## 2024-12-05 DIAGNOSIS — M47.816 LUMBAR SPONDYLOSIS: Primary | ICD-10-CM

## 2024-12-05 DIAGNOSIS — E78.2 MIXED HYPERLIPIDEMIA: ICD-10-CM

## 2024-12-05 DIAGNOSIS — G47.33 OSA (OBSTRUCTIVE SLEEP APNEA): ICD-10-CM

## 2024-12-05 PROCEDURE — 99214 OFFICE O/P EST MOD 30 MIN: CPT | Performed by: INTERNAL MEDICINE

## 2024-12-05 PROCEDURE — G2211 COMPLEX E/M VISIT ADD ON: HCPCS | Performed by: INTERNAL MEDICINE

## 2024-12-05 NOTE — ASSESSMENT & PLAN NOTE
Patient has a history of hyperlipidemia.  He remains on Crestor 10 mg daily.  We will continue to monitor his cholesterol profile and adjust medication if needed in the future.    Orders:    Lipid panel; Future

## 2024-12-05 NOTE — ASSESSMENT & PLAN NOTE
patient does have a standing history of obesity.  Patient remained on medication in order to help with weight loss specifically Ozempic.  Patient states the total weight loss since starting the medication is approximately 40 pounds.  He is pleased with the results and will discuss this with the endocrinologist with his next visit whether there is any other modification of treatment that may be more helpful to lose more weight.

## 2024-12-05 NOTE — ASSESSMENT & PLAN NOTE
Patient does have problems with chronic back pain.  He was told by his pain management physician that there is not any further treatment that could be provided to him by pain management.  Patient states that he did buy a new device he is at home in bed which is an accu pressure mat that he uses in bed and he states this does help with his back pain and discomfort.

## 2024-12-05 NOTE — ASSESSMENT & PLAN NOTE
With his last lab testing his hemoglobin A1c was 7.3.  I told the patient I would like to see his hemoglobin A1c down to 7 or less but this is more dependent on what is endocrinologist wishes to do.  He has had significant improvement with his sugar overall with being placed on Ozempic and along with his significant weight loss.  He will be due for repeat lab testing and decisions as to further treatment.  Reminded the patient of the importance of routine eye exam.  Lab Results   Component Value Date    HGBA1C 7.3 (H) 10/07/2024

## 2024-12-05 NOTE — ASSESSMENT & PLAN NOTE
Compliant with treatment.  Patient is status post placement of inspire device which she states has been helpful.

## 2024-12-05 NOTE — PROGRESS NOTES
Name: Mahendra Flores      : 1955      MRN: [id number]  Encounter Provider: Swapnil Mims DO  Encounter Date: 2024   Encounter department: [facility] INTERNAL MEDICINE    Assessment & Plan  Lumbar spondylosis  Patient does have problems with chronic back pain.  He was told by his pain management physician that there is not any further treatment that could be provided to him by pain management.  Patient states that he did buy a new device he is at home in bed which is an accu pressure mat that he uses in bed and he states this does help with his back pain and discomfort.         Mixed hyperlipidemia  Patient has a history of hyperlipidemia.  He remains on Crestor 10 mg daily.  We will continue to monitor his cholesterol profile and adjust medication if needed in the future.    Orders:    Lipid panel; Future    Essential hypertension  Blood pressure showing excellent control with present treatment.  Patient will continue present medication and surveillance.  We will continue to make adjustment medication if needed in the future and also continue to monitor his renal function which has been stable.    Orders:    Basic metabolic panel; Future    Class 1 obesity due to excess calories without serious comorbidity with body mass index (BMI) of 32.0 to 32.9 in adult   patient does have a standing history of obesity.  Patient remained on medication in order to help with weight loss specifically Ozempic.  Patient states the total weight loss since starting the medication is approximately 40 pounds.  He is pleased with the results and will discuss this with the endocrinologist with his next visit whether there is any other modification of treatment that may be more helpful to lose more weight.         Type 2 diabetes mellitus with hyperglycemia, without long-term current use of insulin (HCC)  With his last lab testing his hemoglobin A1c was 7.3.  I told the patient I would like to see his hemoglobin A1c  down to 7 or less but this is more dependent on what is endocrinologist wishes to do.  He has had significant improvement with his sugar overall with being placed on Ozempic and along with his significant weight loss.  He will be due for repeat lab testing and decisions as to further treatment.  Reminded the patient of the importance of routine eye exam.  Lab Results   Component Value Date    HGBA1C 7.3 (H) 10/07/2024            AIDEE (obstructive sleep apnea)  Compliant with treatment.  Patient is status post placement of inspire device which she states has been helpful.              History of Present Illness     -year-old male history of multiple medical problems outlined previously who is here today for a routine follow-up.  Patient did have labs performed prior to the visit today and we did discuss results.  He continues to follow-up with his specialists including his endocrinologist but there is no longer is being seen by pain management.  States that he is using a new device in bed that is helping with his back pain.  He is stable with his weight after losing 40 pounds on Ozempic    Review of Systems   Constitutional: Negative.         Since losing weight patient is staying more active.  He feels that he is also doing better as far as back pain is concerned and he continues to try to stay active physically.   HENT: Negative.     Eyes: Negative.    Respiratory: Negative.          Patient states he is doing well with his sleep apnea device   Cardiovascular: Negative.    Gastrointestinal: Negative.    Endocrine: Negative.    Genitourinary: Negative.    Musculoskeletal: Negative.         States he has a new device he is using that does Accu pressure to his spine which he feels has been helpful   Skin: Negative.    Allergic/Immunologic: Negative.    Neurological: Negative.    Psychiatric/Behavioral: Negative.       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     Past Surgical History:   Procedure Laterality Date    COLONOSCOPY N/A 11/22/2016    Procedure: COLONOSCOPY;  Surgeon: Byron Allan MD;  Location: [facility];  Service:     DEBRIDEMENT TENNIS ELBOW  2006    EXAMINATION UNDER ANESTHESIA N/A 9/28/2023    Procedure: DRUG INDUCED SLEEP ENDOSCOPY;  Surgeon: Juan Pablo Santana MD;  Location: [facility];  Service: ENT    EYE SURGERY  2008    laser holes to relieve pressure    HERNIA REPAIR  2011    x2, umbilical and right side per patient    KNEE SURGERY  2000    meniscus respair    NASAL SEPTUM SURGERY  1980    KY TENDON SHEATH INCISION Right 11/19/2019    Procedure: RELEASE TRIGGER FINGER-right long finger;  Surgeon: Percy Gatica MD;  Location: [facility];  Service: Orthopedics    KY TENDON SHEATH INCISION Left 11/26/2019    Procedure: RELEASE TRIGGER FINGER-left long finger;  Surgeon: Percy Gatica MD;  Location[facility];  Service: Orthopedics    TONSILLECTOMY  1961    UPPER AIRWAY STIMULATOR N/A 11/9/2023    Procedure: INSPIRE IMPLANT;  Surgeon: Juan Pablo Santana MD;  Location: [facility];  Service: ENT    UVULOPALATOPHARYNGOPLASTY  2004     Family History   Problem Relation Age of Onset    Arthritis Mother         mostly in her back     Social History     Tobacco Use    Smoking status: Never    Smokeless tobacco: Never   Vaping Use    Vaping status: Never Used   Substance and Sexual Activity    Alcohol use: Not Currently     Comment: seldom    Drug use: Not Currently     Types: Marijuana    Sexual activity: Never     Current Outpatient Medications on File Prior to Visit   Medication Sig    Accu-Chek Softclix  Lancets lancets CHECK GLUCOSE DAILY    acetaminophen (TYLENOL 8 HOUR) 650 mg CR tablet Take 1 tablet (650 mg total) by mouth every 8 (eight) hours (Patient taking differently: Take 650 mg by mouth every 8 (eight) hours as needed)    Blood Glucose Monitoring Suppl (Accu-Chek Maida Plus) w/Device KIT Use in the morning Check once daily dx E13.9    Coenzyme Q10 (CO Q-10) 200 MG CAPS in the morning    diclofenac sodium (VOLTAREN) 50 mg EC tablet TAKE 1 TABLET BY MOUTH TWICE A DAY AS NEEDED FOR PAIN    diphenhydrAMINE HCl 25 MG CHEW if needed    Farxiga 10 MG tablet TAKE 1 TABLET BY MOUTH EVERY DAY    fluticasone (FLONASE) 50 mcg/act nasal spray 2 sprays into each nostril daily    glucose blood (Accu-Chek Maida Plus) test strip CHECK ONCE DAILY IN THE MORNING    lisinopril-hydrochlorothiazide (PRINZIDE,ZESTORETIC) 10-12.5 MG per tablet TAKE 1 TABLET BY MOUTH EVERY DAY    metFORMIN (GLUCOPHAGE-XR) 500 mg 24 hr tablet Take 1 tablet (500 mg total) by mouth daily with dinner    Omeprazole 20 MG TBEC if needed    rosuvastatin (CRESTOR) 10 MG tablet TAKE 1 TABLET BY MOUTH EVERY DAY    semaglutide, 1 mg/dose, (Ozempic, 1 MG/DOSE,) 4 mg/3 mL injection pen INJECT 1 MG ONCE A WEEK     Allergies   Allergen Reactions    Tramadol Itching     Immunization History   Administered Date(s) Administered    COVID-19 MODERNA VACC 0.5 ML IM 03/16/2021, 04/15/2021, 10/29/2021, 07/18/2022    COVID-19 Moderna Vac BIVALENT 12 Yr+ IM 0.5 ML 10/13/2022    COVID-19 Moderna mRNA Vaccine 12 Yr+ 50 mcg/0.5 mL (Spikevax) 10/02/2023, 10/04/2024    H1N1, All Formulations 12/30/2009    INFLUENZA 10/28/2015, 09/13/2016, 10/17/2019, 10/13/2020, 10/30/2021, 10/02/2023    Influenza Quadrivalent 3 years and older 10/07/2014    Influenza Quadrivalent Preservative Free 3 years and older IM 10/07/2014    Influenza Quadrivalent Recombinant Preservative Free IM 10/17/2019, 10/13/2020    Influenza Quadrivalent, 6-35 Months IM 10/28/2015, 09/13/2016, 09/28/2017     "Influenza Split High Dose Preservative Free IM 09/30/2024    Influenza, Seasonal Vaccine, Quadrivalent, Adjuvanted, .5e 10/02/2023    Influenza, high dose seasonal 0.7 mL 10/30/2021    Influenza, recombinant, quadrivalent,injectable, preservative free 10/09/2018    Influenza, seasonal, injectable 1955    Tuberculin Skin Test-PPD Intradermal 08/27/2015    Zoster Vaccine Recombinant 11/12/2020, 03/18/2021     Objective   /76   Pulse 90   Temp (!) 97.3 °F (36.3 °C)   Resp 16   Ht 6' 2\" (1.88 m)   Wt 115 kg (254 lb)   SpO2 95%   BMI 32.61 kg/m²     Physical Exam  Vitals and nursing note reviewed.   Constitutional:       General: He is not in acute distress.     Appearance: Normal appearance. He is obese. He is not ill-appearing, toxic-appearing or diaphoretic.      Comments: Very pleasant, obese 69-year-old male who is awake alert no acute distress oriented x 3, cheerful   HENT:      Head: Normocephalic and atraumatic.      Right Ear: Tympanic membrane, ear canal and external ear normal. There is no impacted cerumen.      Left Ear: Tympanic membrane, ear canal and external ear normal. There is no impacted cerumen.      Nose: Nose normal. No congestion or rhinorrhea.      Mouth/Throat:      Mouth: Mucous membranes are moist.      Pharynx: Oropharynx is clear. No oropharyngeal exudate or posterior oropharyngeal erythema.   Eyes:      General: No scleral icterus.        Right eye: No discharge.         Left eye: No discharge.      Extraocular Movements: Extraocular movements intact.      Conjunctiva/sclera: Conjunctivae normal.      Pupils: Pupils are equal, round, and reactive to light.   Neck:      Vascular: No carotid bruit.   Cardiovascular:      Rate and Rhythm: Normal rate and regular rhythm.      Heart sounds: Normal heart sounds. No murmur heard.     No friction rub. No gallop.   Pulmonary:      Effort: Pulmonary effort is normal. No respiratory distress.      Breath sounds: Normal breath sounds. " No stridor. No wheezing, rhonchi or rales.   Chest:      Chest wall: No tenderness.   Abdominal:      General: Bowel sounds are normal. There is no distension.      Palpations: Abdomen is soft. There is no mass.      Tenderness: There is no abdominal tenderness. There is no right CVA tenderness, left CVA tenderness, guarding or rebound.      Hernia: No hernia is present.   Musculoskeletal:         General: Deformity present. No swelling, tenderness or signs of injury.      Cervical back: Normal range of motion and neck supple. No rigidity or tenderness.      Right lower leg: No edema.      Left lower leg: No edema.      Comments: Patient on evaluation has a flattening to the lumbar curve.  He underwent maneuvers to the lumbar spine both rotation forward backward bending and sidebending and no pain can be elicited with maneuvers.  Patient had a negative straight leg raising and no weakness to either lower extremities.   Lymphadenopathy:      Cervical: No cervical adenopathy.   Skin:     General: Skin is warm and dry.      Capillary Refill: Capillary refill takes less than 2 seconds.      Coloration: Skin is not jaundiced or pale.      Findings: No bruising, erythema, lesion or rash.   Neurological:      General: No focal deficit present.      Mental Status: He is alert and oriented to person, place, and time. Mental status is at baseline.      Cranial Nerves: No cranial nerve deficit.      Sensory: No sensory deficit.      Motor: No weakness.      Coordination: Coordination normal.      Gait: Gait normal.      Deep Tendon Reflexes: Reflexes normal.   Psychiatric:         Mood and Affect: Mood normal.         Behavior: Behavior normal.         Thought Content: Thought content normal.         Judgment: Judgment normal.

## 2024-12-05 NOTE — ASSESSMENT & PLAN NOTE
Blood pressure showing excellent control with present treatment.  Patient will continue present medication and surveillance.  We will continue to make adjustment medication if needed in the future and also continue to monitor his renal function which has been stable.    Orders:    Basic metabolic panel; Future

## 2024-12-24 DIAGNOSIS — M46.1 SACROILIITIS (HCC): ICD-10-CM

## 2025-01-16 LAB
LEFT EYE DIABETIC RETINOPATHY: NORMAL
RIGHT EYE DIABETIC RETINOPATHY: NORMAL

## 2025-02-03 ENCOUNTER — TELEPHONE (OUTPATIENT)
Age: 70
End: 2025-02-03

## 2025-02-03 NOTE — TELEPHONE ENCOUNTER
"Yoni ayala     \"Hi Dr Garcia,     New year...New Prescription Plan!!     The Ozempic requires a Prior Auth. Please see the info below and let me know if you need anything else.     [facility]  (Medicare Part D)  [phone]     Customer ID: [id number]  Health Plan: ([id number]     Thanks,     Shun\"  "

## 2025-02-05 NOTE — TELEPHONE ENCOUNTER
PA for (Ozempic, 1 MG/DOSE,)  NOT REQUIRED     Reason (screenshot if applicable)          Patient advised by          [x] MyChart Message  [] Phone call   []LMOM  []L/M to call office as no active Communication consent on file  []Unable to leave detailed message as VM not approved on Communication consent       Pharmacy advised by    [x]Fax  []Phone call

## 2025-02-05 NOTE — TELEPHONE ENCOUNTER
PA for (Ozempic, 1 MG/DOSE,) SUBMITTED to Medicare     via      [x][facility]-Case ID #       [x]PA sent as URGENT    All office notes, labs and other pertaining documents and studies sent. Clinical questions answered. Awaiting determination from insurance company.     Turnaround time for your insurance to make a decision on your Prior Authorization can take 7-21 business days.

## 2025-02-12 ENCOUNTER — TELEPHONE (OUTPATIENT)
Dept: ENDOCRINOLOGY | Facility: CLINIC | Age: 70
End: 2025-02-12

## 2025-02-13 DIAGNOSIS — I10 ESSENTIAL HYPERTENSION: ICD-10-CM

## 2025-02-13 RX ORDER — LISINOPRIL AND HYDROCHLOROTHIAZIDE 10; 12.5 MG/1; MG/1
1 TABLET ORAL DAILY
Qty: 90 TABLET | Refills: 1 | Status: SHIPPED | OUTPATIENT
Start: 2025-02-13

## 2025-02-21 DIAGNOSIS — E11.65 TYPE 2 DIABETES MELLITUS WITH HYPERGLYCEMIA, WITHOUT LONG-TERM CURRENT USE OF INSULIN (HCC): ICD-10-CM

## 2025-02-21 RX ORDER — DAPAGLIFLOZIN 10 MG/1
10 TABLET, FILM COATED ORAL DAILY
Qty: 90 TABLET | Refills: 3 | Status: SHIPPED | OUTPATIENT
Start: 2025-02-21

## 2025-03-17 ENCOUNTER — TELEPHONE (OUTPATIENT)
Age: 70
End: 2025-03-17

## 2025-03-17 NOTE — TELEPHONE ENCOUNTER
[facility] call about the medication for Farxiga have addition question and would like for someone to reach out to [facility] about addition question before approving the medication. Please reach out to Svetlana at [phone] and case # [id number]. Thank you

## 2025-03-18 ENCOUNTER — TELEPHONE (OUTPATIENT)
Age: 70
End: 2025-03-18

## 2025-03-18 DIAGNOSIS — E11.65 TYPE 2 DIABETES MELLITUS WITH HYPERGLYCEMIA, WITHOUT LONG-TERM CURRENT USE OF INSULIN (HCC): ICD-10-CM

## 2025-03-18 RX ORDER — DAPAGLIFLOZIN 10 MG/1
10 TABLET, FILM COATED ORAL DAILY
Qty: 90 TABLET | Refills: 1 | Status: SHIPPED | OUTPATIENT
Start: 2025-03-18

## 2025-03-18 NOTE — TELEPHONE ENCOUNTER
PCP placed last order in February. They deferred back to us. Our prior auth team cannot work on auth or appeal unless we place the order.      [facility] appeals dept calling in states that patient had started and appeal for the Farxiga (but PCP orders and they have been faxing to PCP but PCP office sent back to defer to endo.) she will fax us the paperwork.

## 2025-03-18 NOTE — TELEPHONE ENCOUNTER
Was finally able to get someone from the appeals department. They are faxing me the forms directly to submit asap as they are due tomorrow the 19th.

## 2025-03-18 NOTE — TELEPHONE ENCOUNTER
[facility] appeals dept calling in states that patient had started and appeal for the Farxiga (but PCP orders and they have been faxing to PCP but PCP office sent back to defer to endo.) she will fax us the paperwork.

## 2025-03-18 NOTE — TELEPHONE ENCOUNTER
Sent refill request to [facility]. PCP place last order in Feb. Our prior auth team cannot work on auth unless we place the order.

## 2025-03-20 DIAGNOSIS — E11.65 TYPE 2 DIABETES MELLITUS WITH HYPERGLYCEMIA, WITHOUT LONG-TERM CURRENT USE OF INSULIN (HCC): ICD-10-CM

## 2025-03-20 RX ORDER — SEMAGLUTIDE 1.34 MG/ML
INJECTION, SOLUTION SUBCUTANEOUS
Qty: 3 ML | Refills: 5 | Status: SHIPPED | OUTPATIENT
Start: 2025-03-20

## 2025-03-26 ENCOUNTER — APPOINTMENT (OUTPATIENT)
Dept: LAB | Age: 70
End: 2025-03-26
Payer: MEDICARE

## 2025-03-26 ENCOUNTER — RESULTS FOLLOW-UP (OUTPATIENT)
Dept: ENDOCRINOLOGY | Facility: CLINIC | Age: 70
End: 2025-03-26

## 2025-03-26 DIAGNOSIS — E78.2 MIXED HYPERLIPIDEMIA: ICD-10-CM

## 2025-03-26 DIAGNOSIS — I10 ESSENTIAL HYPERTENSION: ICD-10-CM

## 2025-03-26 DIAGNOSIS — E11.65 TYPE 2 DIABETES MELLITUS WITH HYPERGLYCEMIA, WITHOUT LONG-TERM CURRENT USE OF INSULIN (HCC): ICD-10-CM

## 2025-03-26 LAB
ANION GAP SERPL CALCULATED.3IONS-SCNC: 12 MMOL/L (ref 4–13)
BUN SERPL-MCNC: 24 MG/DL (ref 5–25)
CALCIUM SERPL-MCNC: 10 MG/DL (ref 8.4–10.2)
CHLORIDE SERPL-SCNC: 102 MMOL/L (ref 96–108)
CHOLEST SERPL-MCNC: 188 MG/DL (ref ?–200)
CO2 SERPL-SCNC: 28 MMOL/L (ref 21–32)
CREAT SERPL-MCNC: 0.81 MG/DL (ref 0.6–1.3)
CREAT UR-MCNC: 115.1 MG/DL
EST. AVERAGE GLUCOSE BLD GHB EST-MCNC: 174 MG/DL
GFR SERPL CREATININE-BSD FRML MDRD: 91 ML/MIN/1.73SQ M
GLUCOSE P FAST SERPL-MCNC: 158 MG/DL (ref 65–99)
HBA1C MFR BLD: 7.7 %
HDLC SERPL-MCNC: 57 MG/DL
LDLC SERPL CALC-MCNC: 99 MG/DL (ref 0–100)
MICROALBUMIN UR-MCNC: 39.6 MG/L
MICROALBUMIN/CREAT 24H UR: 34 MG/G CREATININE (ref 0–30)
NONHDLC SERPL-MCNC: 131 MG/DL
POTASSIUM SERPL-SCNC: 4.1 MMOL/L (ref 3.5–5.3)
SODIUM SERPL-SCNC: 142 MMOL/L (ref 135–147)
TRIGL SERPL-MCNC: 160 MG/DL (ref ?–150)

## 2025-03-26 PROCEDURE — 36415 COLL VENOUS BLD VENIPUNCTURE: CPT

## 2025-03-26 PROCEDURE — 82570 ASSAY OF URINE CREATININE: CPT

## 2025-03-26 PROCEDURE — 83036 HEMOGLOBIN GLYCOSYLATED A1C: CPT

## 2025-03-26 PROCEDURE — 80048 BASIC METABOLIC PNL TOTAL CA: CPT

## 2025-03-26 PROCEDURE — 82043 UR ALBUMIN QUANTITATIVE: CPT

## 2025-03-26 PROCEDURE — 80061 LIPID PANEL: CPT

## 2025-04-01 ENCOUNTER — RA CDI HCC (OUTPATIENT)
Dept: OTHER | Facility: HOSPITAL | Age: 70
End: 2025-04-01

## 2025-04-07 ENCOUNTER — OFFICE VISIT (OUTPATIENT)
Age: 70
End: 2025-04-07
Payer: MEDICARE

## 2025-04-07 VITALS
HEIGHT: 74 IN | SYSTOLIC BLOOD PRESSURE: 128 MMHG | DIASTOLIC BLOOD PRESSURE: 80 MMHG | RESPIRATION RATE: 16 BRPM | HEART RATE: 75 BPM | BODY MASS INDEX: 32.34 KG/M2 | OXYGEN SATURATION: 94 % | WEIGHT: 252 LBS | TEMPERATURE: 98.6 F

## 2025-04-07 DIAGNOSIS — E78.2 MIXED HYPERLIPIDEMIA: ICD-10-CM

## 2025-04-07 DIAGNOSIS — Z00.00 HEALTHCARE MAINTENANCE: Primary | ICD-10-CM

## 2025-04-07 DIAGNOSIS — E66.01 MORBID OBESITY (HCC): ICD-10-CM

## 2025-04-07 DIAGNOSIS — E11.65 TYPE 2 DIABETES MELLITUS WITH HYPERGLYCEMIA, WITHOUT LONG-TERM CURRENT USE OF INSULIN (HCC): ICD-10-CM

## 2025-04-07 DIAGNOSIS — M51.362 DEGENERATION OF INTERVERTEBRAL DISC OF LUMBAR REGION WITH DISCOGENIC BACK PAIN AND LOWER EXTREMITY PAIN: ICD-10-CM

## 2025-04-07 DIAGNOSIS — I10 ESSENTIAL HYPERTENSION: ICD-10-CM

## 2025-04-07 DIAGNOSIS — Z12.5 SCREENING FOR PROSTATE CANCER: ICD-10-CM

## 2025-04-07 DIAGNOSIS — G47.33 OSA (OBSTRUCTIVE SLEEP APNEA): ICD-10-CM

## 2025-04-07 PROBLEM — Z78.9 HEALTH MAINTENANCE ALTERATION IN INFANT: Status: ACTIVE | Noted: 2025-04-07

## 2025-04-07 PROBLEM — Z78.9 HEALTH MAINTENANCE ALTERATION IN INFANT: Status: RESOLVED | Noted: 2025-04-07 | Resolved: 2025-04-07

## 2025-04-07 PROCEDURE — 99214 OFFICE O/P EST MOD 30 MIN: CPT | Performed by: INTERNAL MEDICINE

## 2025-04-07 PROCEDURE — G2211 COMPLEX E/M VISIT ADD ON: HCPCS | Performed by: INTERNAL MEDICINE

## 2025-04-07 NOTE — ASSESSMENT & PLAN NOTE
Patient is still using the inspire device which he states has been helpful.  Sleeping well through the night

## 2025-04-07 NOTE — ASSESSMENT & PLAN NOTE
Patient does have a longstanding history of hypertension.  Blood pressure is controlled and renal function is stable.

## 2025-04-07 NOTE — PROGRESS NOTES
Name: Mahendra Flores      : 1955      MRN: [id number]  Encounter Provider: Swapnil Mims DO  Encounter Date: 2025   Encounter department: [facility] INTERNAL MEDICINE    Assessment & Plan  Essential hypertension  Patient does have a longstanding history of hypertension.  Blood pressure is controlled and renal function is stable.         Mixed hyperlipidemia  History of hyperlipidemia.  Patient remains on Crestor 10 mg daily.  Reminded the patient again the importance of watching his intake of fats and cholesterol with his diet.  Continue to monitor his lipid profile and make further adjustment of medication if needed in the future         Type 2 diabetes mellitus with hyperglycemia, without long-term current use of insulin (HCC)  Patient does have a history of diabetes mellitus type 2.  Patient remains on medication as prescribed by his endocrinologist and patient also is on Ozempic.  As noted his hemoglobin A1c is now out of control again at 7.7.  He has been trying to stay physically active during the winter but is not watching his diet is closely.  He states he has 1 weakness and that his chocolate and this seems to be available on a regular basis at home.  He does have an appointment to be seen by endocrinology in the near future and most importantly will probably need modification of treatment.  Will undergo foot exam today in the office.  Lab Results   Component Value Date    HGBA1C 7.7 (H) 2025       Orders:  •  Hemoglobin A1C; Future  •  PSA, Total Screen; Future    Healthcare maintenance  Patient will be due for a Medicare wellness visit when he returns to the office in 4 months.  He was given a slip for complete labs to be performed prior to the visit.  In the interim patient was told if any new medical problems or concerns to please call.    Orders:  •  Comprehensive metabolic panel; Future  •  CBC and differential; Future  •  Lipid panel; Future  •  Urinalysis with  microscopic; Future  •  Hemoglobin A1C; Future  •  PSA, Total Screen; Future    Screening for prostate cancer    Orders:  •  PSA, Total Screen; Future    Morbid obesity (HCC)   patient continues on Ozempic and seems to be stuck with no significant weight loss or gain recently.  Patient states he is not watching his diet as closely which is important.  I discussed with the patient and the importance of him following up with weight management and he does have an appointment in the future.  May need to have increased dose of the Ozempic         AIDEE (obstructive sleep apnea)  Patient is still using the inspire device which he states has been helpful.  Sleeping well through the night         Degeneration of intervertebral disc of lumbar region with discogenic back pain and lower extremity pain  Patient does have history of chronic low back pain.  History of lumbar herniated disc in the past and lumbar radiculopathy.  Patient states his back pain has been stable.  He is functional around the house and involved in numerous activities.  No increasing pain and he feels the weight loss has helped this.              History of Present Illness     Patient is a 70-year-old male with history of multiple medical problems previously who is here today for routine follow-up within 4-month period of time.  Patient states in general he is doing relatively well.  He had labs performed prior to the visit today we did discuss the results at length with the patient.  Patient continues to follow-up with his endocrinologist and has an appointment in the near future.  Review of Systems   Constitutional: Negative.    HENT: Negative.     Eyes: Negative.    Respiratory: Negative.     Cardiovascular: Negative.    Gastrointestinal: Negative.    Endocrine: Negative.    Genitourinary: Negative.    Musculoskeletal: Negative.    Skin: Negative.    Allergic/Immunologic: Negative.    Neurological: Negative.    Hematological: Negative.     Psychiatric/Behavioral: Negative.     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     Past Surgical History:   Procedure Laterality Date   • COLONOSCOPY N/A 11/22/2016    Procedure: COLONOSCOPY;  Surgeon: Byron Allan MD;  Location: [facility];  Service:    • DEBRIDEMENT TENNIS ELBOW  2006   • EXAMINATION UNDER ANESTHESIA N/A 9/28/2023    Procedure: DRUG INDUCED SLEEP ENDOSCOPY;  Surgeon: Juan Pablo Santana MD;  Location: [facility];  Service: ENT   • EYE SURGERY  2008    laser holes to relieve pressure   • HERNIA REPAIR  2011    x2, umbilical and right side per patient   • KNEE SURGERY  2000    meniscus respair   • NASAL SEPTUM SURGERY  1980   • NH TENDON SHEATH INCISION Right 11/19/2019    Procedure: RELEASE TRIGGER FINGER-right long finger;  Surgeon: Percy Gatica MD;  Location: [facility];  Service: Orthopedics   • NH TENDON SHEATH INCISION Left 11/26/2019    Procedure: RELEASE TRIGGER FINGER-left long finger;  Surgeon: Percy Gatica MD;  Location: [facility];  Service: Orthopedics   • TONSILLECTOMY  1961   • UPPER AIRWAY STIMULATOR N/A 11/9/2023    Procedure: INSPIRE IMPLANT;  Surgeon: Juan Pablo Santana MD;  Location: [facility];  Service: ENT   • UVULOPALATOPHARYNGOPLASTY  2004     Family History   Problem Relation Age of Onset   • Arthritis Mother         mostly in her back     Social History     Tobacco Use   • Smoking status: Never   • Smokeless tobacco: Never   Vaping Use   • Vaping status: Never Used   Substance and Sexual Activity   • Alcohol use: Not Currently     Comment:  seldom   • Drug use: Not Currently     Types: Marijuana   • Sexual activity: Never     Current Outpatient Medications on File Prior to Visit   Medication Sig   • Accu-Chek Softclix Lancets lancets CHECK GLUCOSE DAILY   • acetaminophen (TYLENOL 8 HOUR) 650 mg CR tablet Take 1 tablet (650 mg total) by mouth every 8 (eight) hours (Patient taking differently: Take 650 mg by mouth every 8 (eight) hours as needed)   • Blood Glucose Monitoring Suppl (Accu-Chek Maida Plus) w/Device KIT Use in the morning Check once daily dx E13.9   • Coenzyme Q10 (CO Q-10) 200 MG CAPS in the morning   • diclofenac sodium (VOLTAREN) 50 mg EC tablet TAKE 1 TABLET BY MOUTH TWICE A DAY AS NEEDED FOR PAIN   • diphenhydrAMINE HCl 25 MG CHEW if needed   • Farxiga 10 MG tablet Take 1 tablet (10 mg total) by mouth daily   • fluticasone (FLONASE) 50 mcg/act nasal spray 2 sprays into each nostril daily   • glucose blood (Accu-Chek Maida Plus) test strip CHECK ONCE DAILY IN THE MORNING   • lisinopril-hydrochlorothiazide (PRINZIDE,ZESTORETIC) 10-12.5 MG per tablet TAKE 1 TABLET BY MOUTH EVERY DAY   • metFORMIN (GLUCOPHAGE-XR) 500 mg 24 hr tablet Take 1 tablet (500 mg total) by mouth daily with dinner   • Omeprazole 20 MG TBEC if needed   • rosuvastatin (CRESTOR) 10 MG tablet TAKE 1 TABLET BY MOUTH EVERY DAY   • semaglutide, 1 mg/dose, (Ozempic, 1 MG/DOSE,) 4 mg/3 mL injection pen INJECT 1 MG ONCE A WEEK     Allergies   Allergen Reactions   • Tramadol Itching     Immunization History   Administered Date(s) Administered   • COVID-19 MODERNA VACC 0.5 ML IM 03/16/2021, 04/15/2021, 10/29/2021, 07/18/2022   • COVID-19 Moderna Vac BIVALENT 12 Yr+ IM 0.5 ML 10/13/2022   • COVID-19 Moderna mRNA Vaccine 12 Yr+ 50 mcg/0.5 mL (Spikevax) 10/02/2023, 10/04/2024   • H1N1, All Formulations 12/30/2009   • INFLUENZA 10/28/2015, 09/13/2016, 10/17/2019, 10/13/2020, 10/30/2021, 10/02/2023   • Influenza Quadrivalent 3 years and older 10/07/2014   • Influenza Quadrivalent  "Preservative Free 3 years and older IM 10/07/2014   • Influenza Quadrivalent Recombinant Preservative Free IM 10/17/2019, 10/13/2020   • Influenza Quadrivalent, 6-35 Months IM 10/28/2015, 09/13/2016, 09/28/2017   • Influenza Split High Dose Preservative Free IM 09/30/2024   • Influenza, Seasonal Vaccine, Quadrivalent, Adjuvanted, .5e 10/02/2023   • Influenza, high dose seasonal 0.7 mL 10/30/2021   • Influenza, recombinant, quadrivalent,injectable, preservative free 10/09/2018   • Influenza, seasonal, injectable 1955   • Tuberculin Skin Test-PPD Intradermal 08/27/2015   • Zoster Vaccine Recombinant 11/12/2020, 03/18/2021     Objective   /80   Pulse 75   Temp 98.6 °F (37 °C)   Resp 16   Ht 6' 2\" (1.88 m)   Wt 114 kg (252 lb)   SpO2 94%   BMI 32.35 kg/m²     Physical Exam  Vitals and nursing note reviewed.   Constitutional:       General: He is not in acute distress.     Appearance: Normal appearance. He is obese. He is not ill-appearing, toxic-appearing or diaphoretic.      Comments: Pleasant articulate obese 70-year-old male who is awake alert in no acute distress oriented x 3   HENT:      Head: Normocephalic and atraumatic.      Right Ear: Tympanic membrane, ear canal and external ear normal. There is no impacted cerumen.      Left Ear: Tympanic membrane, ear canal and external ear normal. There is no impacted cerumen.      Nose: Nose normal. No congestion or rhinorrhea.      Mouth/Throat:      Mouth: Mucous membranes are moist.      Pharynx: Oropharynx is clear. No oropharyngeal exudate or posterior oropharyngeal erythema.   Eyes:      General: No scleral icterus.        Right eye: No discharge.         Left eye: No discharge.      Extraocular Movements: Extraocular movements intact.      Conjunctiva/sclera: Conjunctivae normal.      Pupils: Pupils are equal, round, and reactive to light.   Neck:      Vascular: No carotid bruit.   Cardiovascular:      Rate and Rhythm: Normal rate and regular " rhythm.      Heart sounds: Normal heart sounds. No murmur heard.     No friction rub. No gallop.   Pulmonary:      Effort: Pulmonary effort is normal. No respiratory distress.      Breath sounds: Normal breath sounds. No stridor. No wheezing, rhonchi or rales.   Chest:      Chest wall: No tenderness.   Abdominal:      General: Abdomen is flat. Bowel sounds are normal. There is no distension.      Palpations: Abdomen is soft. There is no mass.      Tenderness: There is no abdominal tenderness. There is no right CVA tenderness, left CVA tenderness, guarding or rebound.      Hernia: No hernia is present.   Musculoskeletal:         General: Deformity present. No swelling, tenderness or signs of injury.      Cervical back: Normal range of motion and neck supple. No rigidity or tenderness.      Right lower leg: No edema.      Left lower leg: No edema.      Comments: Patient on evaluation has a flattening to the lumbar curve.  He underwent maneuvers to the lumbar spine both rotation forward backward bending and sidebending and no pain can be elicited with maneuvers.  Patient had a negative straight leg raising and no weakness to either lower extremities.  Patient on evaluation has some tenderness with movement to his left shoulder girdle.  Mostly discomfort area at the AC tendon anteriorly on the left.  Full range of motion but some discomfort again to the area of the AC joint.   Lymphadenopathy:      Cervical: No cervical adenopathy.   Skin:     General: Skin is warm and dry.      Capillary Refill: Capillary refill takes less than 2 seconds.      Coloration: Skin is not jaundiced or pale.      Findings: No bruising, erythema, lesion or rash.   Neurological:      General: No focal deficit present.      Mental Status: He is alert and oriented to person, place, and time. Mental status is at baseline.      Cranial Nerves: No cranial nerve deficit.      Sensory: No sensory deficit.      Motor: No weakness.      Coordination:  Coordination normal.      Gait: Gait normal.      Deep Tendon Reflexes: Reflexes normal.   Psychiatric:         Mood and Affect: Mood normal.         Behavior: Behavior normal.         Thought Content: Thought content normal.         Judgment: Judgment normal.

## 2025-04-07 NOTE — ASSESSMENT & PLAN NOTE
History of hyperlipidemia.  Patient remains on Crestor 10 mg daily.  Reminded the patient again the importance of watching his intake of fats and cholesterol with his diet.  Continue to monitor his lipid profile and make further adjustment of medication if needed in the future

## 2025-04-07 NOTE — ASSESSMENT & PLAN NOTE
Patient does have history of chronic low back pain.  History of lumbar herniated disc in the past and lumbar radiculopathy.  Patient states his back pain has been stable.  He is functional around the house and involved in numerous activities.  No increasing pain and he feels the weight loss has helped this.

## 2025-04-07 NOTE — ASSESSMENT & PLAN NOTE
Patient will be due for a Medicare wellness visit when he returns to the office in 4 months.  He was given a slip for complete labs to be performed prior to the visit.  In the interim patient was told if any new medical problems or concerns to please call.    Orders:    Comprehensive metabolic panel; Future    CBC and differential; Future    Lipid panel; Future    Urinalysis with microscopic; Future    Hemoglobin A1C; Future    PSA, Total Screen; Future

## 2025-04-07 NOTE — ASSESSMENT & PLAN NOTE
Patient does have a history of diabetes mellitus type 2.  Patient remains on medication as prescribed by his endocrinologist and patient also is on Ozempic.  As noted his hemoglobin A1c is now out of control again at 7.7.  He has been trying to stay physically active during the winter but is not watching his diet is closely.  He states he has 1 weakness and that his chocolate and this seems to be available on a regular basis at home.  He does have an appointment to be seen by endocrinology in the near future and most importantly will probably need modification of treatment.  Will undergo foot exam today in the office.  Lab Results   Component Value Date    HGBA1C 7.7 (H) 03/26/2025       Orders:    Hemoglobin A1C; Future    PSA, Total Screen; Future

## 2025-04-07 NOTE — ASSESSMENT & PLAN NOTE
patient continues on Ozempic and seems to be stuck with no significant weight loss or gain recently.  Patient states he is not watching his diet as closely which is important.  I discussed with the patient and the importance of him following up with weight management and he does have an appointment in the future.  May need to have increased dose of the Ozempic

## 2025-04-09 ENCOUNTER — OFFICE VISIT (OUTPATIENT)
Dept: ENDOCRINOLOGY | Facility: CLINIC | Age: 70
End: 2025-04-09
Payer: MEDICARE

## 2025-04-09 VITALS
HEIGHT: 74 IN | DIASTOLIC BLOOD PRESSURE: 80 MMHG | WEIGHT: 253 LBS | HEART RATE: 80 BPM | OXYGEN SATURATION: 97 % | SYSTOLIC BLOOD PRESSURE: 120 MMHG | BODY MASS INDEX: 32.47 KG/M2

## 2025-04-09 DIAGNOSIS — E11.42 DIABETIC POLYNEUROPATHY ASSOCIATED WITH TYPE 2 DIABETES MELLITUS (HCC): ICD-10-CM

## 2025-04-09 DIAGNOSIS — I10 ESSENTIAL HYPERTENSION: ICD-10-CM

## 2025-04-09 DIAGNOSIS — E78.2 MIXED HYPERLIPIDEMIA: ICD-10-CM

## 2025-04-09 DIAGNOSIS — E11.65 TYPE 2 DIABETES MELLITUS WITH HYPERGLYCEMIA, WITHOUT LONG-TERM CURRENT USE OF INSULIN (HCC): ICD-10-CM

## 2025-04-09 DIAGNOSIS — G47.33 OSA (OBSTRUCTIVE SLEEP APNEA): ICD-10-CM

## 2025-04-09 DIAGNOSIS — E78.01 FAMILIAL HYPERCHOLESTEROLEMIA: ICD-10-CM

## 2025-04-09 DIAGNOSIS — I50.32 CHRONIC DIASTOLIC HF (HEART FAILURE) (HCC): Primary | ICD-10-CM

## 2025-04-09 PROCEDURE — 99214 OFFICE O/P EST MOD 30 MIN: CPT | Performed by: INTERNAL MEDICINE

## 2025-04-09 PROCEDURE — G2211 COMPLEX E/M VISIT ADD ON: HCPCS | Performed by: INTERNAL MEDICINE

## 2025-04-09 RX ORDER — ROSUVASTATIN CALCIUM 10 MG/1
10 TABLET, COATED ORAL DAILY
Qty: 90 TABLET | Refills: 1 | Status: SHIPPED | OUTPATIENT
Start: 2025-04-09

## 2025-04-09 RX ORDER — METFORMIN HYDROCHLORIDE 500 MG/1
500 TABLET, EXTENDED RELEASE ORAL
Qty: 90 TABLET | Refills: 1 | Status: SHIPPED | OUTPATIENT
Start: 2025-04-09 | End: 2025-04-09 | Stop reason: SDUPTHER

## 2025-04-09 RX ORDER — METFORMIN HYDROCHLORIDE 500 MG/1
500 TABLET, EXTENDED RELEASE ORAL 2 TIMES DAILY WITH MEALS
Qty: 180 TABLET | Refills: 1 | Status: SHIPPED | OUTPATIENT
Start: 2025-04-09

## 2025-04-09 RX ORDER — LANCETS
EACH MISCELLANEOUS
COMMUNITY

## 2025-04-09 NOTE — ASSESSMENT & PLAN NOTE
Lab Results   Component Value Date    HGBA1C 7.7 (H) 03/26/2025   A1c uncontrolled, goal for A1c is 7%, as blood sugars are elevated overall especially in the morning 140 to 180 mg per DL, increase metformin to 500 mg twice a day  Continue Farxiga daily and Ozempic 1 mg once a week.  Educated about hypoglycemia symptoms and treatment  Discussed to check blood sugar before breakfast and before dinner and goal for blood sugar is 80 to 160 mg per DL range  Follow-up in 4 months with blood work prior.  Educated about hypoglycemia symptoms and treatment  Discussed the importance of follow-up with ophthalmology and podiatry  Orders:    Ambulatory referral to Podiatry; Future    metFORMIN (GLUCOPHAGE-XR) 500 mg 24 hr tablet; Take 1 tablet (500 mg total) by mouth 2 (two) times a day with meals    Hemoglobin A1C; Future    Albumin / creatinine urine ratio; Future    Basic metabolic panel; Future

## 2025-04-09 NOTE — PROGRESS NOTES
Name: Mahendra Flores      : 1955      MRN: [id number]  Encounter Provider: Ana Garcia MD  Encounter Date: 2025   Encounter department: [facility] [place]    No chief complaint on file.  :  Assessment & Plan  Type 2 diabetes mellitus with hyperglycemia, without long-term current use of insulin (HCC)    Lab Results   Component Value Date    HGBA1C 7.7 (H) 2025   A1c uncontrolled, goal for A1c is 7%, as blood sugars are elevated overall especially in the morning 140 to 180 mg per DL, increase metformin to 500 mg twice a day  Continue Farxiga daily and Ozempic 1 mg once a week.  Educated about hypoglycemia symptoms and treatment  Discussed to check blood sugar before breakfast and before dinner and goal for blood sugar is 80 to 160 mg per DL range  Follow-up in 4 months with blood work prior.  Educated about hypoglycemia symptoms and treatment  Discussed the importance of follow-up with ophthalmology and podiatry  Orders:    Ambulatory referral to Podiatry; Future    metFORMIN (GLUCOPHAGE-XR) 500 mg 24 hr tablet; Take 1 tablet (500 mg total) by mouth 2 (two) times a day with meals    Hemoglobin A1C; Future    Albumin / creatinine urine ratio; Future    Basic metabolic panel; Future    Chronic diastolic HF (heart failure) (HCC)  Wt Readings from Last 3 Encounters:   25 115 kg (253 lb)   25 114 kg (252 lb)   24 115 kg (254 lb)   Stable, followed by cardiology           Diabetic polyneuropathy associated with type 2 diabetes mellitus (HCC)  Stable, continue to follow with podiatry  He would benefit from diabetic shoes  Lab Results   Component Value Date    HGBA1C 7.7 (H) 2025            AIDEE (obstructive sleep apnea)  Well-controlled, status post inspire       Essential hypertension  BP Readings from Last 3 Encounters:   25 120/80   25 128/80   24 112/76           Mixed hyperlipidemia    Lab Results   Component Value  Date    LDLCALC 99 03/26/2025   Managed on statins.  Continue to follow with PCP             History of Present Illness   History of Present Illness    Mahendra Flores is a 70 y.o. male with type 2 diabetes seen in follow up. Reports complications of .lasta1. Denies recent severe hypoglycemic or severe hyperglycemic episodes. Denies any issues with his current regimen. Last A1C was   Lab Results   Component Value Date    HGBA1C 7.7 (H) 03/26/2025     . Denies recent illness, hospitalization or steroid use.     Home blood glucometer readings:   Before breakfast: 139-180 mg/dl   He does not check blood sugars at night     Current regimen:   Metformin  mg daily in the morning, Farxiga 10 mg daily, Ozempic 1 mg once a week    Last Eye Exam: 01/16/2025  Last Foot Exam: 02/20/2024  Health Maintenance   Topic Date Due    Diabetic Foot Exam  02/20/2025    Diabetic Eye Exam  01/16/2027     Pertinent Medical History   Hyperlipidemia-patient takes Crestor 10 mg daily  Hypertension-well-controlled, currently managed on lisinopril/hydrochlorothiazide 10/12.5 mg daily      Review of Systems   Constitutional:  Negative for activity change, diaphoresis, fatigue, fever and unexpected weight change.   HENT: Negative.     Eyes:  Negative for visual disturbance.   Respiratory:  Negative for cough, chest tightness and shortness of breath.    Cardiovascular:  Negative for chest pain, palpitations and leg swelling.   Gastrointestinal:  Negative for abdominal pain, blood in stool, constipation, diarrhea, nausea and vomiting.   Endocrine: Negative for cold intolerance, heat intolerance, polydipsia, polyphagia and polyuria.   Genitourinary:  Negative for dysuria, enuresis, frequency and urgency.   Musculoskeletal:  Negative for arthralgias and myalgias.   Skin:  Negative for pallor, rash and wound.   Allergic/Immunologic: Negative.    Neurological:  Negative for dizziness, tremors, weakness and numbness.   Hematological: Negative.   "  Psychiatric/Behavioral: Negative.      as per HPI    Current Outpatient Medications on File Prior to Visit   Medication Sig Dispense Refill    Accu-Chek Softclix Lancets lancets CHECK GLUCOSE DAILY 100 each 1    acetaminophen (TYLENOL 8 HOUR) 650 mg CR tablet Take 1 tablet (650 mg total) by mouth every 8 (eight) hours (Patient taking differently: Take 650 mg by mouth every 8 (eight) hours as needed) 15 tablet 0    Blood Glucose Monitoring Suppl (Accu-Chek Maida Plus) w/Device KIT Use in the morning Check once daily dx E13.9 1 kit 0    Coenzyme Q10 (CO Q-10) 200 MG CAPS in the morning      diclofenac sodium (VOLTAREN) 50 mg EC tablet TAKE 1 TABLET BY MOUTH TWICE A DAY AS NEEDED FOR PAIN 180 tablet 1    diphenhydrAMINE HCl 25 MG CHEW if needed      Farxiga 10 MG tablet Take 1 tablet (10 mg total) by mouth daily 90 tablet 1    fluticasone (FLONASE) 50 mcg/act nasal spray 2 sprays into each nostril daily 16 g 2    glucose blood (Accu-Chek Maida Plus) test strip CHECK ONCE DAILY IN THE MORNING 100 strip 2    lisinopril-hydrochlorothiazide (PRINZIDE,ZESTORETIC) 10-12.5 MG per tablet TAKE 1 TABLET BY MOUTH EVERY DAY 90 tablet 1    Microlet Lancets MISC       Omeprazole 20 MG TBEC if needed      rosuvastatin (CRESTOR) 10 MG tablet TAKE 1 TABLET BY MOUTH EVERY DAY 90 tablet 1    semaglutide, 1 mg/dose, (Ozempic, 1 MG/DOSE,) 4 mg/3 mL injection pen INJECT 1 MG ONCE A WEEK 3 mL 5    [DISCONTINUED] metFORMIN (GLUCOPHAGE-XR) 500 mg 24 hr tablet Take 1 tablet (500 mg total) by mouth daily with dinner 90 tablet 1     No current facility-administered medications on file prior to visit.         Medical History Reviewed by provider this encounter:     .    Objective   /80 (BP Location: Left arm, Patient Position: Sitting, Cuff Size: Large)   Pulse 80   Ht 6' 2\" (1.88 m)   Wt 115 kg (253 lb)   SpO2 97%   BMI 32.48 kg/m²      Body mass index is 32.48 kg/m².  Wt Readings from Last 3 Encounters:   04/09/25 115 kg (253 lb) "   04/07/25 114 kg (252 lb)   12/05/24 115 kg (254 lb)     Physical Exam    Physical Exam  Constitutional:       Appearance: Normal appearance.   Pulmonary:      Effort: Pulmonary effort is normal.      Breath sounds: Normal breath sounds.   Musculoskeletal:         General: Normal range of motion.      Cervical back: Normal range of motion and neck supple.      Right lower leg: No edema.      Left lower leg: No edema.   Skin:     General: Skin is warm.      Findings: No rash.   Neurological:      General: No focal deficit present.      Mental Status: He is alert and oriented to person, place, and time.   Psychiatric:         Mood and Affect: Mood normal.         Behavior: Behavior normal.       Results    Labs:   Lab Results   Component Value Date    HGBA1C 7.7 (H) 03/26/2025    HGBA1C 7.3 (H) 10/07/2024    HGBA1C 6.4 (H) 04/27/2024     Lab Results   Component Value Date    CREATININE 0.81 03/26/2025    CREATININE 0.81 11/11/2024    CREATININE 0.78 10/07/2024    BUN 24 03/26/2025     09/18/2015    K 4.1 03/26/2025     03/26/2025    CO2 28 03/26/2025     eGFR   Date Value Ref Range Status   03/26/2025 91 ml/min/1.73sq m Final     Lab Results   Component Value Date    CHOL 246 07/28/2015    HDL 57 03/26/2025    TRIG 160 (H) 03/26/2025     Lab Results   Component Value Date    ALT 30 07/12/2024    AST 14 07/12/2024    ALKPHOS 38 07/12/2024    BILITOT 0.55 07/28/2015     Lab Results   Component Value Date    HEO8QIMHBFHS 3.540 03/29/2023    RYX2OKCHUMKG 3.380 07/06/2022    [id number] 3.320 03/13/2021       There are no Patient Instructions on file for this visit.    Discussed with the patient and all questioned fully answered. He will call me if any problems arise.

## 2025-04-09 NOTE — ASSESSMENT & PLAN NOTE
Lab Results   Component Value Date    LDLCALC 99 03/26/2025   Managed on statins.  Continue to follow with PCP

## 2025-04-09 NOTE — ASSESSMENT & PLAN NOTE
Stable, continue to follow with podiatry  He would benefit from diabetic shoes  Lab Results   Component Value Date    HGBA1C 7.7 (H) 03/26/2025

## 2025-04-09 NOTE — ASSESSMENT & PLAN NOTE
Wt Readings from Last 3 Encounters:   04/09/25 115 kg (253 lb)   04/07/25 114 kg (252 lb)   12/05/24 115 kg (254 lb)   Stable, followed by cardiology

## 2025-04-09 NOTE — ASSESSMENT & PLAN NOTE
BP Readings from Last 3 Encounters:   04/09/25 120/80   04/07/25 128/80   12/05/24 112/76

## 2025-04-22 DIAGNOSIS — M51.362 DEGENERATION OF INTERVERTEBRAL DISC OF LUMBAR REGION WITH DISCOGENIC BACK PAIN AND LOWER EXTREMITY PAIN: Primary | ICD-10-CM

## 2025-04-22 RX ORDER — DICLOFENAC POTASSIUM 50 MG/1
50 TABLET, FILM COATED ORAL 3 TIMES DAILY
Qty: 60 TABLET | Refills: 3 | Status: SHIPPED | OUTPATIENT
Start: 2025-04-22

## 2025-04-24 ENCOUNTER — OFFICE VISIT (OUTPATIENT)
Dept: PODIATRY | Facility: CLINIC | Age: 70
End: 2025-04-24
Payer: MEDICARE

## 2025-04-24 VITALS — HEIGHT: 74 IN | BODY MASS INDEX: 32.34 KG/M2 | WEIGHT: 252 LBS

## 2025-04-24 DIAGNOSIS — E11.42 TYPE 2 DIABETES MELLITUS WITH DIABETIC POLYNEUROPATHY, WITHOUT LONG-TERM CURRENT USE OF INSULIN (HCC): ICD-10-CM

## 2025-04-24 PROCEDURE — 99213 OFFICE O/P EST LOW 20 MIN: CPT | Performed by: PODIATRIST

## 2025-04-24 NOTE — ASSESSMENT & PLAN NOTE
Diagnosis and options discussed with patient  Patient agreeable to the plan as stated below    -DM foot risk is moderate. Recommend continued daily home inspections of patient's feet.  -Discussed DM risk to lower extremities, proper shoe gear, and daily monitoring of feet.   -Discussed weight loss and suitable exercise regiment.   -Reviewed most recent PCP visit on 4/7/25  -Educated on A1C and the risks of poorly controlled Diabetes. Reviewed recent A1C:  Lab Results   Component Value Date    HGBA1C 7.7 (H) 03/26/2025    HGBA1C 6.4 (H) 07/28/2015   .     Lab Results   Component Value Date    HGBA1C 7.7 (H) 03/26/2025       Orders:  •  Ambulatory referral to Podiatry

## 2025-04-24 NOTE — PROGRESS NOTES
"Name: Mahendra Flores      : 1955      MRN: [id number]  Encounter Provider: Philip Romero DPM  Encounter Date: 2025   Encounter department: [facility] PODIATRY [facility]  :  Assessment & Plan  Type 2 diabetes mellitus with diabetic polyneuropathy, without long-term current use of insulin (HCC)  Diagnosis and options discussed with patient  Patient agreeable to the plan as stated below    -DM foot risk is moderate. Recommend continued daily home inspections of patient's feet.  -Discussed DM risk to lower extremities, proper shoe gear, and daily monitoring of feet.   -Discussed weight loss and suitable exercise regiment.   -Reviewed most recent PCP visit on 25  -Educated on A1C and the risks of poorly controlled Diabetes. Reviewed recent A1C:  Lab Results   Component Value Date    HGBA1C 7.7 (H) 2025    HGBA1C 6.4 (H) 2015   .     Lab Results   Component Value Date    HGBA1C 7.7 (H) 2025       Orders:  •  Ambulatory referral to Podiatry        History of Present Illness   HPI  Mahendra Flores is a 70 y.o. male who presents with complaints of diabetic neuropathy to both feet and wants to discuss to see if there are any options to make it go away. He is also here for a diabetic foot examination. He is a well controlled diabetic, but continues to have neuropathic pain. He also has a history of back pain and lumbar radiculopathy. He has recently seen his PCP 25 and regularly sees Endocrinology.  History obtained from: patient    Review of Systems  As stated in HPI, otherwise normal    Medical History Reviewed by provider this encounter:  Tobacco  Allergies  Meds  Problems  Med Hx  Surg Hx  Fam Hx         Objective   Ht 6' 2\" (1.88 m)   Wt 114 kg (252 lb)   BMI 32.35 kg/m²      Physical Exam  Vitals reviewed.   Constitutional:       General: He is not in acute distress.  Cardiovascular:      Rate and Rhythm: Normal rate.      Pulses: Normal pulses. no weak " pulses.           Dorsalis pedis pulses are 2+ on the right side and 2+ on the left side.        Posterior tibial pulses are 2+ on the right side and 2+ on the left side.   Pulmonary:      Effort: Pulmonary effort is normal. No respiratory distress.   Musculoskeletal:         General: No deformity.      Right foot: No deformity or bunion.      Left foot: No deformity or bunion.   Feet:      Right foot:      Protective Sensation: 10 sites tested.  4 sites sensed.      Skin integrity: No ulcer, blister, skin breakdown, erythema, warmth, callus or dry skin.      Toenail Condition: Right toenails are normal.      Left foot:      Protective Sensation: 10 sites tested.  4 sites sensed.      Skin integrity: No ulcer, blister, skin breakdown, erythema, warmth, callus or dry skin.      Toenail Condition: Left toenails are normal.   Skin:     Capillary Refill: Capillary refill takes less than 2 seconds.   Neurological:      Mental Status: He is alert and oriented to person, place, and time.      Sensory: No sensory deficit.      Gait: Gait normal.     Diabetic Foot Exam    Patient's shoes and socks removed.    Right Foot/Ankle   Right Foot Inspection  Skin Exam: skin normal and skin intact. No dry skin, no warmth, no blister, no callus, no erythema, no maceration, no abnormal color, no pre-ulcer, no ulcer and no callus.     Toe Exam: No swelling, no tenderness, erythema and  no right toe deformity    Sensory   Vibration: absent  Monofilament testing: diminished    Vascular  Capillary refills: < 3 seconds  The right DP pulse is 2+. The right PT pulse is 2+.     Right Toe  - Comprehensive Exam  Hallux valgus: no      Left Foot/Ankle  Left Foot Inspection  Skin Exam: skin normal and skin intact. No dry skin, no warmth, no erythema, no maceration, normal color, no pre-ulcer, no ulcer and no callus.     Toe Exam: No swelling, no tenderness, no erythema and no left toe deformity.     Sensory   Vibration: absent  Monofilament  testing: diminished    Vascular  Capillary refills: < 3 seconds  The left DP pulse is 2+. The left PT pulse is 2+.     Left Toe  - Comprehensive Exam  Hallux valgus: no      Assign Risk Category  No deformity present  Loss of protective sensation  No weak pulses  Risk: 1

## 2025-05-07 PROBLEM — Z00.00 HEALTHCARE MAINTENANCE: Status: RESOLVED | Noted: 2024-04-09 | Resolved: 2025-05-07

## 2025-05-15 ENCOUNTER — OFFICE VISIT (OUTPATIENT)
Age: 70
End: 2025-05-15
Payer: MEDICARE

## 2025-05-15 ENCOUNTER — TELEPHONE (OUTPATIENT)
Age: 70
End: 2025-05-15

## 2025-05-15 VITALS
HEART RATE: 78 BPM | BODY MASS INDEX: 31.7 KG/M2 | HEIGHT: 74 IN | SYSTOLIC BLOOD PRESSURE: 118 MMHG | WEIGHT: 247 LBS | OXYGEN SATURATION: 94 % | DIASTOLIC BLOOD PRESSURE: 78 MMHG

## 2025-05-15 DIAGNOSIS — M25.512 ACUTE PAIN OF LEFT SHOULDER: Primary | ICD-10-CM

## 2025-05-15 PROCEDURE — 99214 OFFICE O/P EST MOD 30 MIN: CPT | Performed by: INTERNAL MEDICINE

## 2025-05-15 PROCEDURE — G2211 COMPLEX E/M VISIT ADD ON: HCPCS | Performed by: INTERNAL MEDICINE

## 2025-05-15 NOTE — ASSESSMENT & PLAN NOTE
Patient is complaining of some increasing occultly and discomfort in his left shoulder girdle over the past few months.  Denies any accident or injury but the discomfort is becoming more intense and states now is chronic.  Does have some discomfort with movement and a clicking sensation occasionally with the shoulder.  Denies any weakness in his shoulder or arm.  No acute accident or injury.  States they pain at this point in time is chronic and on a scale of 1-10 at its worst usually a 7-8.  Most times he feels it is like a toothache and is not responding to him taking the Voltaren that he does use on a regular basis.  Patient has some discomfort he states anterior portion of his shoulder girdle no gross deformity.  Has a history of some deformity to his clavicle on the left with previous break when he was young but no tenderness there.  He does have a full range of motion with elevation of his left shoulder but some discomfort with movement especially with rotation posteriorly.  Some pain against resistance with his arm elevated at the shoulder which may indicate some type of rotator cuff abnormalities.  Again no significant decrease in strength to his left upper extremity.  We will obtain an x-ray and have instructed the patient to try a Lidoderm patch to the area daily until he is seen and evaluated by orthopedics.  Patient understands and agrees    Orders:    XR shoulder 2+ vw right; Future    Ambulatory Referral to Orthopedic Surgery; Future

## 2025-05-15 NOTE — TELEPHONE ENCOUNTER
Denisha of [facility] has Shun in for an XR of the shoulder. The order says Right shoulder, but it should be left shoulder.    Please correct ASAP so the patient can get the XR done.

## 2025-05-15 NOTE — PROGRESS NOTES
Name: Mahendra Flores      : 1955      MRN: [id number]  Encounter Provider: Swapnil Mims DO  Encounter Date: 5/15/2025   Encounter department: [facility] INTERNAL MEDICINE    Assessment & Plan  Acute pain of left shoulder  Patient is complaining of some increasing occultly and discomfort in his left shoulder girdle over the past few months.  Denies any accident or injury but the discomfort is becoming more intense and states now is chronic.  Does have some discomfort with movement and a clicking sensation occasionally with the shoulder.  Denies any weakness in his shoulder or arm.  No acute accident or injury.  States they pain at this point in time is chronic and on a scale of 1-10 at its worst usually a 7-8.  Most times he feels it is like a toothache and is not responding to him taking the Voltaren that he does use on a regular basis.  Patient has some discomfort he states anterior portion of his shoulder girdle no gross deformity.  Has a history of some deformity to his clavicle on the left with previous break when he was young but no tenderness there.  He does have a full range of motion with elevation of his left shoulder but some discomfort with movement especially with rotation posteriorly.  Some pain against resistance with his arm elevated at the shoulder which may indicate some type of rotator cuff abnormalities.  Again no significant decrease in strength to his left upper extremity.  We will obtain an x-ray and have instructed the patient to try a Lidoderm patch to the area daily until he is seen and evaluated by orthopedics.  Patient understands and agrees    Orders:    XR shoulder 2+ vw right; Future    Ambulatory Referral to Orthopedic Surgery; Future         History of Present Illness     Wheezing discomfort to his left left shoulder girdle over the past few months.  Increasing in intensity.  No accident or injury.      Review of Systems   Constitutional: Negative.    HENT:  Negative.     Eyes: Negative.    Respiratory: Negative.     Cardiovascular: Negative.    Gastrointestinal: Negative.    Endocrine: Negative.    Genitourinary: Negative.    Musculoskeletal:  Positive for arthralgias. Negative for back pain, gait problem, joint swelling, myalgias, neck pain and neck stiffness.   Skin: Negative.    Allergic/Immunologic: Negative.      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     Past Surgical History:   Procedure Laterality Date    COLONOSCOPY N/A 11/22/2016    Procedure: COLONOSCOPY;  Surgeon: Byron Allan MD;  Location: [facility];  Service:     DEBRIDEMENT TENNIS ELBOW  2006    ELBOW SURGERY  2003    tendonitis    EXAMINATION UNDER ANESTHESIA N/A 09/28/2023    Procedure: DRUG INDUCED SLEEP ENDOSCOPY;  Surgeon: Juan Pablo Santana MD;  [facility];  Service: ENT    EYE SURGERY  2008    laser holes to relieve pressure    HERNIA REPAIR  2011    x2, umbilical and right side per patient    KNEE SURGERY  2000    meniscus respair    NASAL SEPTUM SURGERY  1980    CO TENDON SHEATH INCISION Right 11/19/2019    Procedure: RELEASE TRIGGER FINGER-right long finger;  Surgeon: Percy Gatica MD;  Location: [facility];  Service: Orthopedics    CO TENDON SHEATH INCISION Left 11/26/2019    Procedure: RELEASE TRIGGER FINGER-left long finger;  Surgeon: Percy Gatica MD;  Location: [facility];  Service:  Orthopedics    TONSILLECTOMY  1961    UPPER AIRWAY STIMULATOR N/A 11/09/2023    Procedure: INSPIRE IMPLANT;  Surgeon: Juan Pablo Santana MD;  Location: [facility];  Service: ENT    UVULOPALATOPHARYNGOPLASTY  2004     Family History   Problem Relation Age of Onset    Arthritis Mother         mostly in her back     Social History     Tobacco Use    Smoking status: Never    Smokeless tobacco: Never   Vaping Use    Vaping status: Never Used   Substance and Sexual Activity    Alcohol use: Not Currently     Comment: seldom    Drug use: Not Currently     Types: Marijuana    Sexual activity: Never     Medications[1]  Allergies   Allergen Reactions    Tramadol Itching     Immunization History   Administered Date(s) Administered    COVID-19 MODERNA VACC 0.5 ML IM 03/16/2021, 04/15/2021, 10/29/2021, 07/18/2022    COVID-19 Moderna Vac BIVALENT 12 Yr+ IM 0.5 ML 10/13/2022    COVID-19 Moderna mRNA Vaccine 12 Yr+ 50 mcg/0.5 mL (Spikevax) 10/02/2023, 10/04/2024, 04/21/2025    H1N1, All Formulations 12/30/2009    INFLUENZA 10/28/2015, 09/13/2016, 10/17/2019, 10/13/2020, 10/30/2021, 10/13/2022, 10/02/2023    Influenza Quadrivalent 3 years and older 10/07/2014    Influenza Quadrivalent Preservative Free 3 years and older IM 10/07/2014    Influenza Quadrivalent Recombinant Preservative Free IM 10/17/2019, 10/13/2020    Influenza Quadrivalent, 6-35 Months IM 10/28/2015, 09/13/2016, 09/28/2017    Influenza Split High Dose Preservative Free IM 09/30/2024    Influenza, Seasonal Vaccine, Quadrivalent, Adjuvanted, .5e 10/02/2023    Influenza, high dose seasonal 0.7 mL 10/30/2021    Influenza, recombinant, quadrivalent,injectable, preservative free 10/09/2018    Influenza, seasonal, injectable 1955    MMR 04/04/2025    Respiratory Syncytial Virus Vaccine (Recombinant, Adjuvanted) 04/04/2025    Tuberculin Skin Test-PPD Intradermal 08/27/2015    Zoster Vaccine Recombinant 11/12/2020, 03/18/2021     Objective   /78   Pulse 78   Ht 6'  "2\" (1.88 m)   Wt 112 kg (247 lb)   SpO2 94%   BMI 31.71 kg/m²     Physical Exam         [1]   Current Outpatient Medications on File Prior to Visit   Medication Sig    Accu-Chek Softclix Lancets lancets CHECK GLUCOSE DAILY    Blood Glucose Monitoring Suppl (Accu-Chek Maida Plus) w/Device KIT Use in the morning Check once daily dx E13.9    Coenzyme Q10 (CO Q-10) 200 MG CAPS in the morning    diclofenac potassium (CATAFLAM) 50 mg tablet Take 1 tablet (50 mg total) by mouth 3 (three) times a day    diphenhydrAMINE HCl 25 MG CHEW if needed    Farxiga 10 MG tablet Take 1 tablet (10 mg total) by mouth daily    fluticasone (FLONASE) 50 mcg/act nasal spray 2 sprays into each nostril daily    glucose blood (Accu-Chek Maida Plus) test strip CHECK ONCE DAILY IN THE MORNING    lisinopril-hydrochlorothiazide (PRINZIDE,ZESTORETIC) 10-12.5 MG per tablet TAKE 1 TABLET BY MOUTH EVERY DAY    metFORMIN (GLUCOPHAGE-XR) 500 mg 24 hr tablet Take 1 tablet (500 mg total) by mouth 2 (two) times a day with meals    Microlet Lancets MISC     Omeprazole 20 MG TBEC if needed    rosuvastatin (CRESTOR) 10 MG tablet TAKE 1 TABLET BY MOUTH EVERY DAY    semaglutide, 1 mg/dose, (Ozempic, 1 MG/DOSE,) 4 mg/3 mL injection pen INJECT 1 MG ONCE A WEEK    acetaminophen (TYLENOL 8 HOUR) 650 mg CR tablet Take 1 tablet (650 mg total) by mouth every 8 (eight) hours (Patient taking differently: Take 650 mg by mouth every 8 (eight) hours as needed)    diclofenac sodium (VOLTAREN) 50 mg EC tablet TAKE 1 TABLET BY MOUTH TWICE A DAY AS NEEDED FOR PAIN     "

## 2025-05-16 ENCOUNTER — APPOINTMENT (OUTPATIENT)
Dept: RADIOLOGY | Age: 70
End: 2025-05-16
Payer: MEDICARE

## 2025-05-16 DIAGNOSIS — E11.65 TYPE 2 DIABETES MELLITUS WITH HYPERGLYCEMIA, WITHOUT LONG-TERM CURRENT USE OF INSULIN (HCC): ICD-10-CM

## 2025-05-16 DIAGNOSIS — M25.512 ACUTE PAIN OF LEFT SHOULDER: ICD-10-CM

## 2025-05-16 PROCEDURE — 73030 X-RAY EXAM OF SHOULDER: CPT

## 2025-05-16 RX ORDER — BLOOD SUGAR DIAGNOSTIC
STRIP MISCELLANEOUS
Qty: 100 STRIP | Refills: 1 | Status: SHIPPED | OUTPATIENT
Start: 2025-05-16

## 2025-05-19 ENCOUNTER — OFFICE VISIT (OUTPATIENT)
Dept: OBGYN CLINIC | Facility: CLINIC | Age: 70
End: 2025-05-19
Payer: MEDICARE

## 2025-05-19 VITALS — WEIGHT: 246 LBS | HEIGHT: 74 IN | BODY MASS INDEX: 31.57 KG/M2

## 2025-05-19 DIAGNOSIS — M75.42 IMPINGEMENT SYNDROME OF LEFT SHOULDER: Primary | ICD-10-CM

## 2025-05-19 PROCEDURE — 20610 DRAIN/INJ JOINT/BURSA W/O US: CPT

## 2025-05-19 PROCEDURE — 99204 OFFICE O/P NEW MOD 45 MIN: CPT | Performed by: ORTHOPAEDIC SURGERY

## 2025-05-19 RX ADMIN — TRIAMCINOLONE ACETONIDE 80 MG: 40 INJECTION, SUSPENSION INTRA-ARTICULAR; INTRAMUSCULAR at 12:45

## 2025-05-19 RX ADMIN — LIDOCAINE HYDROCHLORIDE 3 ML: 10 INJECTION, SOLUTION INFILTRATION; PERINEURAL at 12:45

## 2025-05-19 NOTE — PROGRESS NOTES
Sports Medicine and Shoulder Surgery    Mahendra Flores, 70 y.o. male   MRN# [id number]   : 1955        Assessment & Plan  Impingement syndrome of left shoulder    Orders:    Large joint arthrocentesis: L subacromial bursa         Differentials for the patient's shoulder include: Impingement Syndrome, Rotator Cuff Tendonitis, and Subacromial Bursitis  Recommend acetaminophen 500 mg every 6 hours as needed for breakthrough pain  Advise activity modification by avoiding overhead movements, heavy lifting, or activities that exacerbate pain   Encourage light activities within pain tolerance to avoid stiffness  Patient opted for corticosteroid injection after benefits and risks discussed  Discussion had with patient about whether to pursue MRI. After discussion, patient would prefer to hold off on MRI at this time. Encouraged patient to let us know if his pain worsens or changes their mind and we would be happy to order this  Follow up: as needed  If any issues, questions, or concerns arise between now and the next appointment, we have encouraged the patient contact our team.    Large joint arthrocentesis: L subacromial bursa    Performed by: Swapnil Garibay PA-C  Authorized by: Swapnil Garibay PA-C    Universal Protocol:  procedure performed by consultantConsent: Verbal consent obtained  Risks and benefits: risks, benefits and alternatives were discussed  Consent given by: patient  Patient understanding: patient states understanding of the procedure being performed  Patient consent: the patient's understanding of the procedure matches consent given  Site marked: the operative site was marked  Patient identity confirmed: verbally with patient  Supporting Documentation  Indications: pain     Is this a Visco injection? NoProcedure Details  Location: shoulder - L subacromial bursa  Needle size: 22 G  Ultrasound guidance: no  Approach: posterolateral  Medications administered: 80 mg triamcinolone acetonide 40  mg/mL; 3 mL lidocaine 1 %    Patient tolerance: patient tolerated the procedure well with no immediate complications  Dressing:  Sterile dressing applied                    Chief Complaint:    Left Shoulder Pain and Dysfunction    Subjective:   Patient is a right hand dominant individual who comes in for evaluation of the shoulder.  Patient said for the past couple months he has been dealing with intermittent pain on the anterior lateral aspect of his left shoulder.  He denies any obvious trauma but does mention that he picks up heavy things often.  He reports he has tried a couple topical conservative measures which have not been helpful.  He denies left shoulder surgery and injections.      Physical Examination:  General/Constitutional: Sitting comfortably in no apparent distress  Eyes: Normal ocular motion  Respiratory: Non-labored breathing  Psych: Normal mood and affect, oriented to person, place and time. Appropriate affect.  Musculoskeletal: Normal, except as noted in detailed exam and in HPI.    Left Shoulder(s)  Inspection  No visible deformity, swelling or erythema  Palpation  negative  over the AC  joint  Negative  Tenderness at the greater tuberosity and the anterior aspect of the shoulder (bicipital groove)  Range of Motion  Active ROM  Abduction: 120 / 160  Forward Flexion: 120 / 160  Internal Rotation: back pocket / T10  Passive ROM  Full in all directions but elicited discomfort at end ranges of abduction and internal rotation  Strength Testing  Abduction (Supraspinatus): 5/5   External rotation (Infraspinatus): 5/5   Internal rotation (Subscapularis): 5/5  Special Orthopedic Tests  Rotator Cuff/biceps tests  Negative Leland's  Positive  Lilly test  Positive  Neer impingement test  Neurological examination  Sensation intact to light touch in over the C5-T1 dermatomes  Fingers warm and well perfused     Imaging Studies:  Independent interpretation of shoulder x-rays demonstrate mild degenerative  changes of the glenohumeral joint, moderate degenerative changes of the AC joint, no acute osseous abnormalities     Review of Systems:  General- denies fever/chills  HEENT- denies hearing loss or sore throat  Eyes- denies eye pain or visual disturbances, denies red eyes  Respiratory- denies cough or SOB  Cardio- denies chest pain or palpitations  GI- denies abdominal pain  Endocrine- denies urinary frequency  Urinary- denies pain with urination  Musculoskeletal- Negative except noted above  Skin- denies rashes or wounds  Neurological- denies dizziness or headache  Psychiatric- denies anxiety or difficulty concentrating      Allergies:  Allergies   Allergen Reactions    Tramadol Itching       Medications:  Current Medications[1]    Past Medical History:  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        Past Surgical History:  Past Surgical History:   Procedure Laterality Date    COLONOSCOPY N/A 11/22/2016    Procedure: COLONOSCOPY;  Surgeon: Byron Allan MD;  Location: [facility];  Service:     DEBRIDEMENT TENNIS ELBOW  2006    ELBOW SURGERY  2003    tendonitis    EXAMINATION UNDER ANESTHESIA N/A 09/28/2023    Procedure: DRUG INDUCED SLEEP ENDOSCOPY;  Surgeon: Juan Pablo Santana MD;  [facility];  Service: ENT    EYE SURGERY  2008    laser holes to relieve pressure    HERNIA REPAIR  2011    x2, umbilical and right side per patient     KNEE SURGERY  2000    meniscus respair    NASAL SEPTUM SURGERY  1980    PA TENDON SHEATH INCISION Right 11/19/2019    Procedure: RELEASE TRIGGER FINGER-right long finger;  Surgeon: Percy Gatica MD;  Location: [facility];  Service: Orthopedics    PA TENDON SHEATH INCISION Left 11/26/2019    Procedure: RELEASE TRIGGER FINGER-left long finger;  Surgeon: Percy Gatica MD;  [facility];  Service: Orthopedics    TONSILLECTOMY  1961    UPPER AIRWAY STIMULATOR N/A 11/09/2023    Procedure: INSPIRE IMPLANT;  Surgeon: Juan Pablo Santana MD;  Location: [facility];  Service: ENT    UVULOPALATOPHARYNGOPLASTY  2004        Family History:  Family History   Problem Relation Age of Onset    Arthritis Mother         mostly in her back        Social History:  Social History     Socioeconomic History    Marital status: /Civil Union     Spouse name: Not on file    Number of children: Not on file    Years of education: Not on file    Highest education level: Not on file   Occupational History    Not on file   Tobacco Use    Smoking status: Never    Smokeless tobacco: Never   Vaping Use    Vaping status: Never Used   Substance and Sexual Activity    Alcohol use: Not Currently     Comment: seldom    Drug use: Not Currently     Types: Marijuana    Sexual activity: Never   Other Topics Concern    Not on file   Social History Narrative    Not on file     Social Drivers of Health     Financial Resource Strain: Low Risk  (3/25/2023)    Overall Financial Resource Strain (CARDIA)     Difficulty of Paying Living Expenses: Not hard at all   Food Insecurity: No Food Insecurity (7/8/2024)    Nursing - Inadequate Food Risk Classification     Worried About Running Out of Food in the Last Year: Never true     Ran Out of Food in the Last Year: Never true     Ran Out of Food in the Last Year: Not on file   Transportation Needs: No Transportation Needs (7/8/2024)    PRAPARE - Transportation     Lack of Transportation (Medical):  No     Lack of Transportation (Non-Medical): No   Physical Activity: Not on file   Stress: Not on file   Social Connections: Not on file   Intimate Partner Violence: Not on file   Housing Stability: Low Risk  (7/8/2024)    Housing Stability Vital Sign     Unable to Pay for Housing in the Last Year: No     Number of Times Moved in the Last Year: 0     Homeless in the Last Year: No         Objective:   Body mass index is 31.58 kg/m².   ---------------------------------------------------------------------  Kenneth Rangel MD, PhD   Orthopedic Surgery, [facility]   Sports Medicine and Shoulder Surgery    The complexity of the medical decision making, including the comprehensive history, detailed examination and moderate risk assessment, supports coding at a Level 4 for this encounter     Scribe Attestation      I,:  Swapnil Garibay PA-C am acting as a scribe while in the presence of the attending physician.:       I,:  Kenneth Rangel MD personally performed the services described in this documentation    as scribed in my presence.:                          [1]   Current Outpatient Medications:     Accu-Chek Softclix Lancets lancets, CHECK GLUCOSE DAILY, Disp: 100 each, Rfl: 1    acetaminophen (TYLENOL 8 HOUR) 650 mg CR tablet, Take 1 tablet (650 mg total) by mouth every 8 (eight) hours, Disp: 15 tablet, Rfl: 0    Blood Glucose Monitoring Suppl (Accu-Chek Maida Plus) w/Device KIT, Use in the morning Check once daily dx E13.9, Disp: 1 kit, Rfl: 0    Coenzyme Q10 (CO Q-10) 200 MG CAPS, in the morning, Disp: , Rfl:     diclofenac potassium (CATAFLAM) 50 mg tablet, Take 1 tablet (50 mg total) by mouth 3 (three) times a day, Disp: 60 tablet, Rfl: 3    diphenhydrAMINE HCl 25 MG CHEW, if needed, Disp: , Rfl:     Farxiga 10 MG tablet, Take 1 tablet (10 mg total) by mouth daily, Disp: 90 tablet, Rfl: 1    fluticasone (FLONASE) 50 mcg/act nasal spray, 2 sprays into each nostril daily, Disp: 16 g,  Rfl: 2    glucose blood (Accu-Chek Maida Plus) test strip, CHECK ONCE DAILY IN THE MORNING, Disp: 100 strip, Rfl: 1    lisinopril-hydrochlorothiazide (PRINZIDE,ZESTORETIC) 10-12.5 MG per tablet, TAKE 1 TABLET BY MOUTH EVERY DAY, Disp: 90 tablet, Rfl: 1    metFORMIN (GLUCOPHAGE-XR) 500 mg 24 hr tablet, Take 1 tablet (500 mg total) by mouth 2 (two) times a day with meals, Disp: 180 tablet, Rfl: 1    Microlet Lancets MISC, , Disp: , Rfl:     Omeprazole 20 MG TBEC, if needed, Disp: , Rfl:     rosuvastatin (CRESTOR) 10 MG tablet, TAKE 1 TABLET BY MOUTH EVERY DAY, Disp: 90 tablet, Rfl: 1    semaglutide, 1 mg/dose, (Ozempic, 1 MG/DOSE,) 4 mg/3 mL injection pen, INJECT 1 MG ONCE A WEEK, Disp: 3 mL, Rfl: 5    diclofenac sodium (VOLTAREN) 50 mg EC tablet, TAKE 1 TABLET BY MOUTH TWICE A DAY AS NEEDED FOR PAIN, Disp: 180 tablet, Rfl: 1

## 2025-05-21 RX ORDER — TRIAMCINOLONE ACETONIDE 40 MG/ML
80 INJECTION, SUSPENSION INTRA-ARTICULAR; INTRAMUSCULAR
Status: COMPLETED | OUTPATIENT
Start: 2025-05-19 | End: 2025-05-19

## 2025-05-21 RX ORDER — LIDOCAINE HYDROCHLORIDE 10 MG/ML
3 INJECTION, SOLUTION INFILTRATION; PERINEURAL
Status: COMPLETED | OUTPATIENT
Start: 2025-05-19 | End: 2025-05-19

## 2025-06-16 ENCOUNTER — TELEPHONE (OUTPATIENT)
Dept: ENDOCRINOLOGY | Facility: CLINIC | Age: 70
End: 2025-06-16

## 2025-06-16 DIAGNOSIS — E11.65 TYPE 2 DIABETES MELLITUS WITH HYPERGLYCEMIA, WITHOUT LONG-TERM CURRENT USE OF INSULIN (HCC): ICD-10-CM

## 2025-06-16 RX ORDER — METFORMIN HYDROCHLORIDE 500 MG/1
500 TABLET, EXTENDED RELEASE ORAL 2 TIMES DAILY WITH MEALS
Qty: 180 TABLET | Refills: 1 | Status: SHIPPED | OUTPATIENT
Start: 2025-06-16

## 2025-06-23 ENCOUNTER — TELEPHONE (OUTPATIENT)
Dept: ENDOCRINOLOGY | Facility: CLINIC | Age: 70
End: 2025-06-23

## 2025-06-23 DIAGNOSIS — E11.65 TYPE 2 DIABETES MELLITUS WITH HYPERGLYCEMIA, WITHOUT LONG-TERM CURRENT USE OF INSULIN (HCC): ICD-10-CM

## 2025-06-23 RX ORDER — LANCETS
EACH MISCELLANEOUS
Qty: 100 EACH | Refills: 1 | OUTPATIENT
Start: 2025-06-23

## 2025-06-25 NOTE — ASSESSMENT & PLAN NOTE
Blood pressure is under adequate control  Patient will continue present medication and surveillance  We will continue to monitor his renal function  CARDIOVASCULAR PROGRESS NOTE    REASON FOR CONSULT:   Nasra Marks is a 41 y.o. female who presents for follow up of MINDY, MDT ICD.    PCP: Redd  Gyn: Labadie, Juan  [facility]  HISTORY OF PRESENT ILLNESS:   The patient returns for follow up.  She was again hospitalized with heart failure and non-STEMI.  Catheterization revealed normal coronaries.  She has since been discharged, and comes in today for follow up.  She is describing orthopnea and 4 lb weight gain.  She denies any angina, palpitations, syncope, or defibrillator discharges.  Her most recent defibrillator interrogation noted brief episodes of nonsustained VT.  She tells me she stopped taking her Inspra, and I have instructed her to resume this.  She needs a refill on her Farxiga.  She otherwise denies melena, hematuria, or claudication symptoms.  There does not appear to be any ongoing anginal symptoms.  She has follow up with the heart failure team on .    CARDIOVASCULAR HISTORY:   NICM (cath 2017 with normal cors)  ICD (Donta 2017, MDT single chamber)    PAST MEDICAL HISTORY:     Past Medical History:   Diagnosis Date    CHF (congestive heart failure)     Chronic combined systolic and diastolic congestive heart failure 2019    Essential hypertension 2012    Hypertension     dx at 17y.o.    Hypertensive cardiovascular-renal disease, stage 1-4 or unspecified chronic kidney disease, with heart failure 2021    ICD (implantable cardioverter-defibrillator), single, in situ 2020    Nonischemic cardiomyopathy 2019    Pacemaker 2017    Pulmonary hypertension 2025    PASP 37 mmHg 25, previously normal- suspected Group 2         PAST SURGICAL HISTORY:     Past Surgical History:   Procedure Laterality Date    CARDIAC DEFIBRILLATOR PLACEMENT  2017     SECTION      x 1    CORONARY ANGIOGRAPHY N/A 2025    Procedure: ANGIOGRAM, CORONARY ARTERY;  Surgeon: Kashif Peguero MD;   Location: [facility] CATH LAB;  Service: Cardiology;  Laterality: N/A;  Right radial. Noon time - stick time    INDUCED       LEFT HEART CATHETERIZATION Left 2025    Procedure: Left heart cath;  Surgeon: Kashif Peguero MD;  Location: [facility] CATH LAB;  Service: Cardiology;  Laterality: Left;    RIGHT HEART CATHETERIZATION Right 2022    Procedure: INSERTION, CATHETER, RIGHT HEART;  Surgeon: Timothy Prajapati Jr., MD;  Location: [facility] CATH LAB;  Service: Cardiology;  Laterality: Right;    RIGHT HEART CATHETERIZATION Right 2024    Procedure: INSERTION, CATHETER, RIGHT HEART;  Surgeon: Lior Rueda MD;  Location: [facility] CATH LAB;  Service: Cardiology;  Laterality: Right;    TUBAL LIGATION         ALLERGIES AND MEDICATION:     Review of patient's allergies indicates:   Allergen Reactions    Aldactone [spironolactone] Swelling     Lips swelled    Hydralazine analogues Other (See Comments)     headaches    Isosorbide Other (See Comments)     headaches        Medication List            Accurate as of 2025 10:03 AM. If you have any questions, ask your nurse or doctor.                CONTINUE taking these medications      acetaminophen 500 MG tablet  Commonly known as: TYLENOL  Take 1 tablet (500 mg total) by mouth every 6 (six) hours as needed for Pain.     albuterol 90 mcg/actuation inhaler  Commonly known as: PROVENTIL/VENTOLIN HFA  Inhale 1-2 puffs into the lungs every 6 (six) hours as needed. Rescue     aspirin 81 MG EC tablet  Commonly known as: ECOTRIN  Take 1 tablet (81 mg total) by mouth once daily.     atorvastatin 80 MG tablet  Commonly known as: LIPITOR  Take 1 tablet (80 mg total) by mouth every evening.     benzocaine-menthoL 15-3.6 mg Lozg  1 lozenge by Mucous Membrane route every 2 (two) hours as needed (sore throat).     clopidogreL 75 mg tablet  Commonly known as: PLAVIX  Take 1 tablet (75 mg total) by mouth once daily.     dapagliflozin propanediol 10 mg  tablet  Commonly known as: FARXIGA  Take 1 tablet (10 mg total) by mouth once daily.     docusate sodium 100 MG capsule  Commonly known as: COLACE  Take 1 capsule (100 mg total) by mouth 2 (two) times daily.     ENTRESTO 49-51 mg per tablet  Generic drug: sacubitriL-valsartan  Take 1 tablet by mouth 2 (two) times daily.     eplerenone 25 MG Tab  Commonly known as: INSPRA  Take 1 tablet (25 mg total) by mouth once daily.     FLUoxetine 20 MG capsule  Take 1 capsule (20 mg total) by mouth once daily.     fluticasone propionate 50 mcg/actuation nasal spray  Commonly known as: FLONASE  1 spray (50 mcg total) by Each Nostril route 2 (two) times daily as needed for Rhinitis.     furosemide 80 MG tablet  Commonly known as: LASIX  Take 1 tablet (80 mg total) by mouth 2 (two) times a day.     gabapentin 300 MG capsule  Commonly known as: NEURONTIN  Take 2 capsules (600 mg total) by mouth 3 (three) times daily.     linaCLOtide 290 mcg Cap capsule  Commonly known as: LINZESS  Take 1 capsule (290 mcg total) by mouth before breakfast.     metoprolol succinate 25 MG 24 hr tablet  Commonly known as: TOPROL-XL  Take 1 tablet (25 mg total) by mouth once daily.     pantoprazole 40 MG tablet  Commonly known as: PROTONIX  Take 1 tablet (40 mg total) by mouth once daily. Please take 1 tablet twice a day for 3 days, then 1 tablet daily     potassium chloride SA 20 MEQ tablet  Commonly known as: K-DUR,KLOR-CON  Take 2 tablets (40 mEq total) by mouth 2 (two) times daily.              SOCIAL HISTORY:     Social History     Socioeconomic History    Marital status:    Occupational History     Employer: [employer]   Tobacco Use    Smoking status: Never    Smokeless tobacco: Never   Substance and Sexual Activity    Alcohol use: Not Currently     Comment: occasionally    Drug use: Not Currently     Types: Marijuana     Comment: in past very little marijuana    Sexual activity: Yes     Partners: Male     Birth control/protection: None      Social Drivers of Health     Financial Resource Strain: Low Risk  (5/5/2025)    Overall Financial Resource Strain (CARDIA)     Difficulty of Paying Living Expenses: Not hard at all   Food Insecurity: No Food Insecurity (5/5/2025)    Hunger Vital Sign     Worried About Running Out of Food in the Last Year: Never true     Ran Out of Food in the Last Year: Never true   Transportation Needs: No Transportation Needs (5/5/2025)    PRAPARE - Transportation     Lack of Transportation (Medical): No     Lack of Transportation (Non-Medical): No   Physical Activity: Inactive (1/6/2025)    Exercise Vital Sign     Days of Exercise per Week: 0 days     Minutes of Exercise per Session: 10 min   Stress: No Stress Concern Present (5/5/2025)    [employer] - Occupational Stress Questionnaire     Feeling of Stress : Only a little   Housing Stability: Low Risk  (5/5/2025)    Housing Stability Vital Sign     Unable to Pay for Housing in the Last Year: No     Homeless in the Last Year: No       FAMILY HISTORY:     Family History   Problem Relation Name Age of Onset    Hypertension Mother      Cancer Maternal Grandmother          breast x 2    Cancer Paternal Grandmother          breast    No Known Problems Sister      No Known Problems Brother      No Known Problems Son      No Known Problems Brother      Hypertension Other      Diabetes Other      Breast cancer Other      Cancer Paternal Aunt          breast    Cancer Paternal Uncle         REVIEW OF SYSTEMS:   Review of Systems   Constitutional:  Positive for malaise/fatigue. Negative for chills, diaphoresis and fever.   HENT:  Negative for nosebleeds.    Eyes:  Negative for blurred vision, double vision and photophobia.   Respiratory:  Positive for shortness of breath. Negative for hemoptysis and wheezing.    Cardiovascular:  Positive for orthopnea. Negative for chest pain, palpitations, claudication, leg swelling and PND.   Gastrointestinal:  Negative  "for abdominal pain, blood in stool, heartburn, melena, nausea and vomiting.   Genitourinary:  Negative for flank pain and hematuria.   Musculoskeletal:  Negative for falls, myalgias and neck pain.   Skin:  Negative for rash.   Neurological:  Negative for dizziness, seizures, loss of consciousness, weakness and headaches.   Endo/Heme/Allergies:  Negative for polydipsia. Does not bruise/bleed easily.   Psychiatric/Behavioral:  Negative for depression and memory loss. The patient is not nervous/anxious.        PHYSICAL EXAM:     Vitals:    06/25/25 0955   BP: 122/88   Pulse: (!) 51   Resp: 18      Body mass index is 25.56 kg/m².  Weight: 78.5 kg (173 lb 1 oz)   Height: 5' 9" (175.3 cm)     Physical Exam  Vitals reviewed.   Constitutional:       General: She is not in acute distress.     Appearance: Normal appearance. She is well-developed. She is not ill-appearing, toxic-appearing or diaphoretic.   HENT:      Head: Normocephalic and atraumatic.   Eyes:      General: No scleral icterus.     Extraocular Movements: Extraocular movements intact.      Conjunctiva/sclera: Conjunctivae normal.      Pupils: Pupils are equal, round, and reactive to light.   Neck:      Thyroid: No thyromegaly.      Vascular: Normal carotid pulses. No carotid bruit or JVD.      Trachea: Trachea normal. No tracheal deviation.   Cardiovascular:      Rate and Rhythm: Regular rhythm. Bradycardia present.      Pulses:           Carotid pulses are 2+ on the right side and 2+ on the left side.     Heart sounds: S1 normal and S2 normal. No murmur heard.     No friction rub. No gallop.      Comments: L infraclavicular ICD site well healed  Pulmonary:      Effort: Pulmonary effort is normal. No respiratory distress.      Breath sounds: Normal breath sounds. No stridor. No wheezing, rhonchi or rales.   Chest:      Chest wall: No tenderness.   Abdominal:      General: There is no distension.      Palpations: Abdomen is soft.   Musculoskeletal:         " General: No swelling or tenderness. Normal range of motion.      Cervical back: Normal range of motion and neck supple. No edema or rigidity.      Right lower leg: No edema.      Left lower leg: No edema.   Feet:      Right foot:      Skin integrity: No ulcer.      Left foot:      Skin integrity: No ulcer.   Skin:     General: Skin is warm and dry.      Coloration: Skin is not jaundiced.   Neurological:      General: No focal deficit present.      Mental Status: She is alert and oriented to person, place, and time.      Cranial Nerves: No cranial nerve deficit.   Psychiatric:         Mood and Affect: Mood normal.         Speech: Speech normal.         Behavior: Behavior normal. Behavior is cooperative.         DATA:   EKG: (personally reviewed tracing(s))  1/24/25 , PVCs, NSSTTW changes    Laboratory:  CBC:  Recent Labs   Lab 05/05/25  0548 05/06/25  0333 05/23/25  1052   WBC 11.23 9.12 5.96   HGB 9.2 L 10.4 L 11.4 L   HCT 28.8 L 32.6 L 36.7 L   Platelet Count 296 324 336       CHEMISTRIES:  Recent Labs   Lab 05/07/25  0603 05/08/25  0407 05/09/25  0925 05/23/25  1052   Glucose 86 87 101 88   Sodium 137 137 137 142   Potassium 3.4 L 3.6 3.7 3.7   BUN 16 17 16 13   Creatinine 0.8 0.8 0.8 0.9   Calcium 8.8 8.7 8.9 9.3   Magnesium  1.9 1.8  --  1.8       CARDIAC BIOMARKERS:  Recent Labs   Lab 05/04/25  1919 05/04/25  2131 05/05/25  0003   Troponin-I 3.022 H  3.077 H 4.404 H 4.498 H       COAGS:  Recent Labs   Lab 08/27/24  1249 08/27/24  1312 05/04/25  1819   INR 1.3 H 1.0 1.1       LIPIDS/LFTS:  Recent Labs   Lab 08/27/24  1312 12/28/24  1948 04/12/25  0539 04/18/25  1648 05/04/25  1658 05/04/25  1919 05/05/25  0548 05/23/25  1052   Cholesterol Total  --   --  122  --   --  119 L  --   --    Cholesterol 129  --   --   --   --   --   --   --    Triglycerides 70  --   --   --   --   --   --   --    Triglyceride  --   --  54  --   --  31  --   --    HDL 37 L  --   --   --   --   --   --   --    HDL Cholesterol  --    --  36 L  --   --  38 L  --   --    LDL Cholesterol 78.0  --  75.2  --   --  74.8  --   --    Non-HDL Cholesterol 92  --   --   --   --   --   --   --    Non HDL Cholesterol  --   --  86  --   --  81  --   --    AST 23   < > 61 H   < > 48 H  --  44 18   ALT 13   < > 83 H   < > 31  --  23 16    < > = values in this interval not displayed.     Lab Results   Component Value Date    TSH 3.141 04/12/2025      (1/24/25)    Cardiovascular Testing:  Cath 5/5/25  LVEDP: 18mmHg  LVEF: 15% by echo  Dominance: Right  LM: normal  LAD: normal  LCx: normal  RCA: normal  Hemostasis:  R Radial band  Impression:  CP (ongoing) with elev trop c/w NSTEMI  Normal cors  Elev LVEDP  Severe LV dysfxn (chronic) by cath  R rad TR band for hemostasis  Plan:  Cont med rx  Stop heparin/NTG gtts  Cont ASA 81mg qd  Cont Plavix 75mg qd for now (1 year rx planned (thru May 2026) assuming CTA chest neg).  Cont statin  Add IV lasix, ?CHF as cause of current presentation  Change metoprolol tartrate to succinate 25mg qd  Cont entresto  Check CTA chest r/o PE  Dispo planning pending above study and symptom control  Consider as a candidate for CardioMEMS in the future    Echo 5/5/25    Left Ventricle: The left ventricle is severely dilated. Normal wall thickness. There is severe eccentric hypertrophy. Severe global hypokinesis present. There is severely reduced systolic function with a visually estimated ejection fraction of 10 -15%. Grade III diastolic dysfunction.    Right Ventricle: The right ventricle is normal in size. Systolic function is normal.    Left Atrium: Severely dilated    Right Atrium: Right atrium is dilated.    Mitral Valve: There is moderate regurgitation.    Tricuspid Valve: There is moderate regurgitation.    Pulmonary Artery: The estimated pulmonary artery systolic pressure is 37 mmHg.    IVC/SVC: Normal venous pressure at 3 mmHg.    RHC 8/27/24    The estimated blood loss was none.    The filling pressures on the right and  left were mildly elevated.    RA 10  PA 31/15 (20)  PCWP 15    CO 5.7 l/min  CI 2.8 l/min/m2    TD cardiac output 5.2 l/min cardiac index 2.6 l/min/m2.    Condition: no inotropes or pressors.    Carotid US 1/20/23  There is 0-19% right Internal Carotid Stenosis.  There is 0-19% left Internal Carotid Stenosis.      ASSESSMENT:   # NICM, appears euvolemic but conplaining of ortha 4lb weight gain.  Following with Dr. Prajapati.  RHC 8/2024 as above with ?conversion d/o thereafter (CVA type sxs).  LHC 5/2025 with normal cors and elev filling pressures.  # MDT ICD, functioning normally, continue remote monitoring  # hx NSVT  # HTN, controlled  # HLP on atorva 80mg  # remote CVA (PICA territory on CT head 11/25/22).  Carotid US 1/20/23 neg.    PLAN:   Cont med rx  Cont ASA 81mg qd  Cont GDMT  Resume eplerenone 25mg qd (prev intol of aldact)  Inc lasix 80mg tid for 3 days, then back to bid  Follow up with Dr. Prajapati/[facility] HTS July 2025  RTC 1 month (July 2025), ?CardioMEMS (if pt willing to have repeat RHC)  Check lipids/LFT 5 months (Nov 2025)    The above documents medical care services that are part of ongoing care related to this patient's serious/complex condition (Code ) (NICM, HTN, HLP).      Kashif Peguero MD, FACC

## 2025-07-28 ENCOUNTER — APPOINTMENT (OUTPATIENT)
Dept: LAB | Age: 70
End: 2025-07-28
Attending: INTERNAL MEDICINE
Payer: MEDICARE

## 2025-07-28 DIAGNOSIS — E11.65 TYPE 2 DIABETES MELLITUS WITH HYPERGLYCEMIA, WITHOUT LONG-TERM CURRENT USE OF INSULIN (HCC): ICD-10-CM

## 2025-07-28 DIAGNOSIS — Z12.5 SCREENING FOR PROSTATE CANCER: ICD-10-CM

## 2025-07-28 DIAGNOSIS — Z00.00 HEALTHCARE MAINTENANCE: ICD-10-CM

## 2025-07-28 LAB
ALBUMIN SERPL BCG-MCNC: 4.1 G/DL (ref 3.5–5)
ALP SERPL-CCNC: 41 U/L (ref 34–104)
ALT SERPL W P-5'-P-CCNC: 25 U/L (ref 7–52)
ANION GAP SERPL CALCULATED.3IONS-SCNC: 6 MMOL/L (ref 4–13)
AST SERPL W P-5'-P-CCNC: 13 U/L (ref 13–39)
BACTERIA UR QL AUTO: ABNORMAL /HPF
BASOPHILS # BLD AUTO: 0.04 THOUSANDS/ÂΜL (ref 0–0.1)
BASOPHILS NFR BLD AUTO: 1 % (ref 0–1)
BILIRUB SERPL-MCNC: 0.8 MG/DL (ref 0.2–1)
BILIRUB UR QL STRIP: NEGATIVE
BUN SERPL-MCNC: 18 MG/DL (ref 5–25)
CALCIUM SERPL-MCNC: 9.5 MG/DL (ref 8.4–10.2)
CHLORIDE SERPL-SCNC: 100 MMOL/L (ref 96–108)
CHOLEST SERPL-MCNC: 165 MG/DL (ref ?–200)
CLARITY UR: CLEAR
CO2 SERPL-SCNC: 33 MMOL/L (ref 21–32)
COLOR UR: ABNORMAL
CREAT SERPL-MCNC: 0.76 MG/DL (ref 0.6–1.3)
EOSINOPHIL # BLD AUTO: 0.12 THOUSAND/ÂΜL (ref 0–0.61)
EOSINOPHIL NFR BLD AUTO: 1 % (ref 0–6)
ERYTHROCYTE [DISTWIDTH] IN BLOOD BY AUTOMATED COUNT: 13.5 % (ref 11.6–15.1)
EST. AVERAGE GLUCOSE BLD GHB EST-MCNC: 169 MG/DL
GFR SERPL CREATININE-BSD FRML MDRD: 93 ML/MIN/1.73SQ M
GLUCOSE P FAST SERPL-MCNC: 139 MG/DL (ref 65–99)
GLUCOSE UR STRIP-MCNC: ABNORMAL MG/DL
HBA1C MFR BLD: 7.5 %
HCT VFR BLD AUTO: 50 % (ref 36.5–49.3)
HDLC SERPL-MCNC: 53 MG/DL
HGB BLD-MCNC: 16.7 G/DL (ref 12–17)
HGB UR QL STRIP.AUTO: NEGATIVE
IMM GRANULOCYTES # BLD AUTO: 0.07 THOUSAND/UL (ref 0–0.2)
IMM GRANULOCYTES NFR BLD AUTO: 1 % (ref 0–2)
KETONES UR STRIP-MCNC: NEGATIVE MG/DL
LDLC SERPL CALC-MCNC: 86 MG/DL (ref 0–100)
LEUKOCYTE ESTERASE UR QL STRIP: ABNORMAL
LYMPHOCYTES # BLD AUTO: 2.14 THOUSANDS/ÂΜL (ref 0.6–4.47)
LYMPHOCYTES NFR BLD AUTO: 24 % (ref 14–44)
MCH RBC QN AUTO: 28.5 PG (ref 26.8–34.3)
MCHC RBC AUTO-ENTMCNC: 33.4 G/DL (ref 31.4–37.4)
MCV RBC AUTO: 85 FL (ref 82–98)
MONOCYTES # BLD AUTO: 0.76 THOUSAND/ÂΜL (ref 0.17–1.22)
MONOCYTES NFR BLD AUTO: 9 % (ref 4–12)
NEUTROPHILS # BLD AUTO: 5.67 THOUSANDS/ÂΜL (ref 1.85–7.62)
NEUTS SEG NFR BLD AUTO: 64 % (ref 43–75)
NITRITE UR QL STRIP: NEGATIVE
NON-SQ EPI CELLS URNS QL MICRO: ABNORMAL /HPF
NONHDLC SERPL-MCNC: 112 MG/DL
NRBC BLD AUTO-RTO: 0 /100 WBCS
PH UR STRIP.AUTO: 6 [PH]
PLATELET # BLD AUTO: 216 THOUSANDS/UL (ref 149–390)
PMV BLD AUTO: 9.8 FL (ref 8.9–12.7)
POTASSIUM SERPL-SCNC: 4 MMOL/L (ref 3.5–5.3)
PROT SERPL-MCNC: 6.7 G/DL (ref 6.4–8.4)
PROT UR STRIP-MCNC: NEGATIVE MG/DL
PSA SERPL-MCNC: 0.93 NG/ML (ref 0–4)
RBC # BLD AUTO: 5.86 MILLION/UL (ref 3.88–5.62)
RBC #/AREA URNS AUTO: ABNORMAL /HPF
SODIUM SERPL-SCNC: 139 MMOL/L (ref 135–147)
SP GR UR STRIP.AUTO: 1.04 (ref 1–1.03)
TRIGL SERPL-MCNC: 129 MG/DL (ref ?–150)
UROBILINOGEN UR STRIP-ACNC: <2 MG/DL
WBC # BLD AUTO: 8.8 THOUSAND/UL (ref 4.31–10.16)
WBC #/AREA URNS AUTO: ABNORMAL /HPF

## 2025-07-28 PROCEDURE — 85025 COMPLETE CBC W/AUTO DIFF WBC: CPT

## 2025-07-28 PROCEDURE — 81001 URINALYSIS AUTO W/SCOPE: CPT

## 2025-07-28 PROCEDURE — 83036 HEMOGLOBIN GLYCOSYLATED A1C: CPT

## 2025-07-28 PROCEDURE — G0103 PSA SCREENING: HCPCS

## 2025-07-28 PROCEDURE — 36415 COLL VENOUS BLD VENIPUNCTURE: CPT

## 2025-07-28 PROCEDURE — 80053 COMPREHEN METABOLIC PANEL: CPT

## 2025-07-28 PROCEDURE — 80061 LIPID PANEL: CPT

## 2025-08-01 ENCOUNTER — OFFICE VISIT (OUTPATIENT)
Age: 70
End: 2025-08-01
Payer: MEDICARE

## 2025-08-01 VITALS
OXYGEN SATURATION: 99 % | SYSTOLIC BLOOD PRESSURE: 120 MMHG | TEMPERATURE: 98 F | DIASTOLIC BLOOD PRESSURE: 80 MMHG | HEIGHT: 74 IN | BODY MASS INDEX: 29.52 KG/M2 | WEIGHT: 230 LBS | HEART RATE: 90 BPM | RESPIRATION RATE: 16 BRPM

## 2025-08-01 DIAGNOSIS — G47.33 OSA (OBSTRUCTIVE SLEEP APNEA): ICD-10-CM

## 2025-08-01 DIAGNOSIS — Z00.00 MEDICARE ANNUAL WELLNESS VISIT, SUBSEQUENT: ICD-10-CM

## 2025-08-01 DIAGNOSIS — E11.65 TYPE 2 DIABETES MELLITUS WITH HYPERGLYCEMIA, WITHOUT LONG-TERM CURRENT USE OF INSULIN (HCC): Primary | ICD-10-CM

## 2025-08-01 DIAGNOSIS — E66.3 OVERWEIGHT (BMI 25.0-29.9): ICD-10-CM

## 2025-08-01 DIAGNOSIS — M51.362 DEGENERATION OF INTERVERTEBRAL DISC OF LUMBAR REGION WITH DISCOGENIC BACK PAIN AND LOWER EXTREMITY PAIN: ICD-10-CM

## 2025-08-01 DIAGNOSIS — E78.2 MIXED HYPERLIPIDEMIA: ICD-10-CM

## 2025-08-01 DIAGNOSIS — M51.26 LUMBAR HERNIATED DISC: ICD-10-CM

## 2025-08-01 DIAGNOSIS — I10 ESSENTIAL HYPERTENSION: ICD-10-CM

## 2025-08-01 PROCEDURE — 99214 OFFICE O/P EST MOD 30 MIN: CPT | Performed by: INTERNAL MEDICINE

## 2025-08-01 PROCEDURE — G0439 PPPS, SUBSEQ VISIT: HCPCS | Performed by: INTERNAL MEDICINE

## 2025-08-01 PROCEDURE — G2211 COMPLEX E/M VISIT ADD ON: HCPCS | Performed by: INTERNAL MEDICINE

## 2025-08-20 DIAGNOSIS — E11.65 TYPE 2 DIABETES MELLITUS WITH HYPERGLYCEMIA, WITHOUT LONG-TERM CURRENT USE OF INSULIN (HCC): ICD-10-CM

## 2025-08-20 RX ORDER — BLOOD SUGAR DIAGNOSTIC
STRIP MISCELLANEOUS
Qty: 100 STRIP | Refills: 1 | Status: SHIPPED | OUTPATIENT
Start: 2025-08-20

## 2025-08-20 RX ORDER — LANCETS
EACH MISCELLANEOUS
Qty: 100 EACH | Refills: 1 | Status: SHIPPED | OUTPATIENT
Start: 2025-08-20

## (undated) DEVICE — GLOVE INDICATOR PI UNDERGLOVE SZ 7.5 BLUE

## (undated) DEVICE — SKIN MARKER DUAL TIP WITH RULER CAP, FLEXIBLE RULER AND LABELS: Brand: DEVON

## (undated) DEVICE — TIBURON SPLIT SHEET: Brand: CONVERTORS

## (undated) DEVICE — REMOTE SLEEP INSPIRE

## (undated) DEVICE — GLOVE INDICATOR PI UNDERGLOVE SZ 8 BLUE

## (undated) DEVICE — ALCOHOL PREP, 2 PLY, LARGE, MADE IN USA, SATURATED WITH 70% ISOPROPYL ALCOHOL, FOR EXTERNAL USE ONLY: Brand: WEBCOL

## (undated) DEVICE — BIPOLAR CORD DISP

## (undated) DEVICE — SUT VICRYL 3-0 SH 27 IN J416H

## (undated) DEVICE — ACE WRAP 3 IN STERILE

## (undated) DEVICE — INTENDED FOR TISSUE SEPARATION, AND OTHER PROCEDURES THAT REQUIRE A SHARP SURGICAL BLADE TO PUNCTURE OR CUT.: Brand: BARD-PARKER SAFETY BLADES SIZE 15, STERILE

## (undated) DEVICE — GAUZE SPONGES,16 PLY: Brand: CURITY

## (undated) DEVICE — GLOVE SRG BIOGEL 7.5

## (undated) DEVICE — SUT SILK PERMA-HAND 3-0 18IN A182H

## (undated) DEVICE — 3M™ TEGADERM™ TRANSPARENT FILM DRESSING FRAME STYLE, 1626W, 4 IN X 4-3/4 IN (10 CM X 12 CM), 50/CT 4CT/CASE: Brand: 3M™ TEGADERM™

## (undated) DEVICE — CHLORAPREP HI-LITE 26ML ORANGE

## (undated) DEVICE — IV CATH INTROCAN 18G X 1 1/4 SAFETY

## (undated) DEVICE — NEEDLE 25G X 1 1/2

## (undated) DEVICE — STERILE BETHLEHEM PLASTIC HAND: Brand: CARDINAL HEALTH

## (undated) DEVICE — SUT PROLENE 4-0 PS-2 18 IN 8682G

## (undated) DEVICE — ACE WRAP 3 IN UNSTERILE

## (undated) DEVICE — DISPOSABLE EQUIPMENT COVER: Brand: SMALL TOWEL DRAPE

## (undated) DEVICE — 3M™ TEGADERM™ TRANSPARENT FILM DRESSING FRAME STYLE, 1627, 4 IN X 10 IN (10 CM X 25 CM), 20/CT 4CT/CASE: Brand: 3M™ TEGADERM™

## (undated) DEVICE — ELECTRODE 8227304 5PK PRASS PR 18MM ROHS

## (undated) DEVICE — MASTISOL LIQ ADHESIVE 2/3ML

## (undated) DEVICE — SPONGE PVP SCRUB WING STERILE

## (undated) DEVICE — SUT SILK 3-0 RB-1 CV-23 18IN C053D

## (undated) DEVICE — CURITY STRETCH BANDAGE: Brand: CURITY

## (undated) DEVICE — NEEDLE 18 G X 1 1/2

## (undated) DEVICE — 3M™ TEGADERM™ TRANSPARENT FILM DRESSING FRAME STYLE, 1624W, 2-3/8 IN X 2-3/4 IN (6 CM X 7 CM), 100/CT 4CT/CASE: Brand: 3M™ TEGADERM™

## (undated) DEVICE — OCCLUSIVE GAUZE STRIP,3% BISMUTH TRIBROMOPHENATE IN PETROLATUM BLEND: Brand: XEROFORM

## (undated) DEVICE — CATHETER 8591-38 PASSER,38CM

## (undated) DEVICE — PACK UNIVERSAL NECK

## (undated) DEVICE — WET SKIN PREP TRAY: Brand: MEDLINE INDUSTRIES, INC.

## (undated) DEVICE — GAUZE SPONGES,USP TYPE VII GAUZE, 12 PLY: Brand: CURITY

## (undated) DEVICE — DRAPE SHEET THREE QUARTER

## (undated) DEVICE — SUT SILK 2-0 SH 30 IN K833H

## (undated) DEVICE — PROBE 8225401 5PK SD-SD BIPOL STIM ROHS

## (undated) DEVICE — HYDROPHILIC WOUND DRESSING WITH ZINC PLUS VITAMINS A AND B6.: Brand: DERMAGRAN®-B

## (undated) DEVICE — 3M™ STERI-STRIP™ REINFORCED ADHESIVE SKIN CLOSURES, R1547, 1/2 IN X 4 IN (12 MM X 100 MM), 6 STRIPS/ENVELOPE: Brand: 3M™ STERI-STRIP™

## (undated) DEVICE — 10FR FRAZIER SUCTION HANDLE: Brand: CARDINAL HEALTH

## (undated) DEVICE — 3M™ IOBAN™ 2 ANTIMICROBIAL INCISE DRAPE 6650EZ: Brand: IOBAN™ 2

## (undated) DEVICE — PADDING CAST 4 IN  COTTON STRL

## (undated) DEVICE — ASCOPE 4 RHINOLARYNGO SLIM: Brand: ASCOPE™ 4 RHINOLARYNGO SLIM

## (undated) DEVICE — DISPOSABLE OR TOWEL: Brand: CARDINAL HEALTH

## (undated) DEVICE — SYRINGE 10ML LL

## (undated) DEVICE — ALL PURPOSE SPONGES,NONWOVEN, 4 PLY: Brand: CURITY

## (undated) DEVICE — SUT MONOCRYL 4-0 PS-2 27 IN Y426H

## (undated) DEVICE — VESSEL LOOP MAXI - RED

## (undated) DEVICE — VESSEL LOOPS X-RAY DETECTABLE: Brand: DEROYAL

## (undated) DEVICE — PROVE COVER: Brand: UNBRANDED

## (undated) DEVICE — CUFF TOURNIQUET 18 X 4 IN QUICK CONNECT DISP 1 BLADDER